# Patient Record
Sex: FEMALE | Race: WHITE | NOT HISPANIC OR LATINO | Employment: FULL TIME | URBAN - METROPOLITAN AREA
[De-identification: names, ages, dates, MRNs, and addresses within clinical notes are randomized per-mention and may not be internally consistent; named-entity substitution may affect disease eponyms.]

---

## 2019-05-24 ENCOUNTER — TRANSFERRED RECORDS (OUTPATIENT)
Dept: HEALTH INFORMATION MANAGEMENT | Facility: CLINIC | Age: 44
End: 2019-05-24

## 2020-12-10 ENCOUNTER — TRANSFERRED RECORDS (OUTPATIENT)
Dept: HEALTH INFORMATION MANAGEMENT | Facility: CLINIC | Age: 45
End: 2020-12-10

## 2023-05-09 ENCOUNTER — TRANSFERRED RECORDS (OUTPATIENT)
Dept: HEALTH INFORMATION MANAGEMENT | Facility: CLINIC | Age: 48
End: 2023-05-09
Payer: COMMERCIAL

## 2023-05-17 ENCOUNTER — TRANSFERRED RECORDS (OUTPATIENT)
Dept: HEALTH INFORMATION MANAGEMENT | Facility: CLINIC | Age: 48
End: 2023-05-17
Payer: COMMERCIAL

## 2023-05-18 ENCOUNTER — TRANSFERRED RECORDS (OUTPATIENT)
Dept: HEALTH INFORMATION MANAGEMENT | Facility: CLINIC | Age: 48
End: 2023-05-18

## 2023-05-25 ENCOUNTER — TELEPHONE (OUTPATIENT)
Dept: TRANSPLANT | Facility: CLINIC | Age: 48
End: 2023-05-25
Payer: COMMERCIAL

## 2023-05-25 ENCOUNTER — TRANSCRIBE ORDERS (OUTPATIENT)
Dept: OTHER | Age: 48
End: 2023-05-25

## 2023-05-25 DIAGNOSIS — C81.98 HODGKIN'S DISEASE OF LYMPH NODES OF MULTIPLE SITES (H): Primary | ICD-10-CM

## 2023-05-25 DIAGNOSIS — C81.90 HODGKIN'S LYMPHOMA (H): Primary | ICD-10-CM

## 2023-05-30 ENCOUNTER — PRE VISIT (OUTPATIENT)
Dept: ONCOLOGY | Facility: CLINIC | Age: 48
End: 2023-05-30
Payer: COMMERCIAL

## 2023-05-30 NOTE — TELEPHONE ENCOUNTER
RECORDS STATUS - ALL OTHER DIAGNOSIS    Dx: Hodgkins Lymphoma    RECORDS RECEIVED FROM:  [facility] ([facility]) , [facility]/ [facility]    DATE RECEIVED: TBD- NN WQ     Action    Action Taken 5/30/2023 9:18AM KEB     I called [facility] Ph:  [phone]- unavailable. I called again - spoke to Luiza in medical records. They don't need an STEFANIE form. Patient was last seen on 5/17/2023 and then not until 2019.     I called [facility] Ph: [phone], opt 2- they will push two scans from 5/9/2023 and fax the reports over soon.     I called [facility] [phone] - They will push related scans from 2015- 2023 5/30/2023 10:35AM KEB   I faxed [facility] image reports to HIM.     10:48AM KEB   I resolved scans from [facility] and [facility] in PACS    5/30/2023 3:11pm MARTIN   I faxed recs from [facility] to HIM. I also emailed the recs to the NN Team.       NOTES STATUS DETAILS   OFFICE NOTE from referring provider Complete  Ref by: Dr Reddy   OFFICE NOTE from medical oncologist     DISCHARGE SUMMARY from hospital     DISCHARGE REPORT from the ER     OPERATIVE REPORT     CLINICAL TRIAL TREATMENTS TO DATE     LABS     PATHOLOGY REPORTS External [facility] Biopsy Slides  5/18/2023- CE  A) LYMPH NODE, RIGHT CERVICAL, BIOPSY:   1.  Nodular sclerosis classic Hodgkin lymphoma (NSCHL)(WHO 2016)    3/29/2023  A) UTERUS WITH CERVIX, OVARIES  AND FALLOPIAN TUBES, TOTAL HYSTERECTOMY WITH BILATERAL SALPINGO-OOPHORECTOMY:   1. Cervix: No significant histologic abnormality     1/23/2023   A) ENDOMETRIUM, BIOPSY:   1. Disordered proliferative endometrium with breakdown   ANYTHING RELATED TO DIAGNOSIS     GENONOMIC TESTING     TYPE:     IMAGING (NEED IMAGES & REPORT)     CT SCANS Complete - [facility]     Complete - [facility]  5/9/2023     [facility]  CT Neck Soft Tissue 5/2/2023   MRI     MAMMO     ULTRASOUND Complete- [facility]  US Pelvis Complete 11/1/2022    US Abdomen Limited 8/14/2022   PET Complete - [facility]  5/9/2023

## 2023-05-31 ENCOUNTER — PATIENT OUTREACH (OUTPATIENT)
Dept: ONCOLOGY | Facility: CLINIC | Age: 48
End: 2023-05-31
Payer: COMMERCIAL

## 2023-05-31 NOTE — PROGRESS NOTES
[facility]: Cancer Care                                                                   Hem/Onc  Referral reviewed  Referred By 05/25/23 9453 Referred To   Call from patient Diagnoses: Hodgkin's lymphoma (H)   Order: Adult Oncology/Hematology  Referral    Dr Sandeep Harrell  [facility] / [facility]      Comment:    Dx: Hodgkins Lymphoma   Ref to: Dr Harrell   Ref by: Dr Reddy   Records: [facility], [facility]     ASSESSMENT      Clinical History (per Nurse review of records provided):    47 year old female patient with hx of stage IIa Hodgkin lymphoma diagnosed Nov 2008 (neck/axillary LAD), now with neck LN bx and PET scan confirming relapse.   Referred by Dr Reddy to Dr Harrell for salvage therapy recommendations    Home address:  [address] [place], [place] [address]    Records Location: [facility] , [facility] [email or web address], Care Everywhere - [facility]    Pertinent labs -- BOOKMARKED    May 18, 2023 Pathology report  -- BOOKMARKED  Final Diagnosis  A) LYMPH NODE, RIGHT CERVICAL, BIOPSY:   1.  Nodular sclerosis classic Hodgkin lymphoma (NSCHL)(WHO 2016)   2.  See comment    Electronically signed by Lilia Bhakta MD on 5/23/2023 at  8:56 AM         Pertinent imaging recent PET-CT May 2023 -- [facility]    Referring provider notes-- fax reviewed by NN, HIM scanning into media, last clinic note with Dr Reddy 5/17/23    INTERVENTION(S)                                                      -OUTGOING CALL to pt:  Introduced my role as nurse navigator with [facility] Hematology/Oncology dept and that we have recd the referral to Dr Richards from Dr Reddy  Identity verified. Pt confirms they are aware of the referral and ready to schedule consult   Explained that records team will contact pt directly if ROIs needed for records, imaging, slides will be requested on recent bx for path consult at [facility] as part of the 2nd opinion  consultation  Pt voiced understanding of above instructions and information and denied further questions and was provided my callback phone number and understands to expect a call from our scheduling team to arrange the consult with Dr Harrell on 6/5 afternoon.    -Referral Triage Scheduling Instructions added to Hem/Onc  referral for Patient Access rep    PLAN                                                      Consult on June 5 with Dr Sandeep Harrell    Records Needed: YES including hempath slides from 5/18/23 [facility] bx for path consult at [facility] and all prior PET-CT images from [facility], etc resolved to PACS (May 2023 PET-CT is resolved)      See records team's Pre-Visit encounter documentation for additional records retrieval information.    Jocelin Jordan, RN, BSN, OCN  Hematology/Oncology New Patient Nurse Navigator   [facility]  [phone]

## 2023-06-01 NOTE — TELEPHONE ENCOUNTER
RECORDS STATUS - ALL OTHER DIAGNOSIS      RECORDS RECEIVED FROM: [facility], [facility], [facility] (outside imaging previously resolved)   DATE RECEIVED:    NOTES STATUS DETAILS   OFFICE NOTE from referring provider [facility] Dr. Dar Reddy   OFFICE NOTE from medical oncologist [facility] Dr. Reddy: 23   DISCHARGE SUMMARY from hospital CE - [facility] 22   DISCHARGE REPORT from the ER CE - [facility] 22, 22, 22, 18, 16, 1/18/15, 14, 14, 12, 4/10/09, 09, 12/15/08   OPERATIVE REPORT CE - [facility] 23: Excisional Bx of Right Cervical Lymph Node  3/29/23 Hysterectomy  08: Excision Mass Neck   MEDICATION LIST CE [facility]   LABS     PATHOLOGY REPORTS [facility], Reports in CE, slides requested   [employer] 23: [id number]  08: [id number]   ANYTHING RELATED TO DIAGNOSIS Epic/CE 23

## 2023-06-02 PROBLEM — C81.98 HODGKIN'S DISEASE OF LYMPH NODES OF MULTIPLE SITES (H): Status: ACTIVE | Noted: 2023-06-02

## 2023-06-05 ENCOUNTER — PRE VISIT (OUTPATIENT)
Dept: ONCOLOGY | Facility: CLINIC | Age: 48
End: 2023-06-05
Payer: COMMERCIAL

## 2023-06-06 ENCOUNTER — CARE COORDINATION (OUTPATIENT)
Dept: TRANSPLANT | Facility: CLINIC | Age: 48
End: 2023-06-06
Payer: COMMERCIAL

## 2023-06-06 NOTE — PROGRESS NOTES
[facility] BMT and Cell Therapy Program  RN Coordinator Pre-Visit Documentation      Jennifer Ward is a 47 year old female who has been referred to the [facility] BMT and Cell Therapy Program for hematopoietic cell transplant or immune effector cell therapy.      Referring MD Name: Dar Reddy, telephone [phone]    Reason for referral: auto HSCT    Link to BMT & CT Program Algorithms      For allos only:    Previous HLA typing? N/A    Previous formal donor search? N/A    PRA needed? N/A    CMV IGG needed? N/A    and ABO needed? N/A    Potential family donors to type? Unknown    Need URD consents? N/A    For CAR-T Candidates Only:    Orders placed for virologies: N/A      All relevant clinical notes, labs, imaging, and pathology may be reviewed in Epic Bookmarks under name: Yoon James RN      Patient Care Team    No active team members.           Yoon James RN

## 2023-06-08 ENCOUNTER — OFFICE VISIT (OUTPATIENT)
Dept: ONCOLOGY | Facility: CLINIC | Age: 48
End: 2023-06-08
Attending: INTERNAL MEDICINE
Payer: COMMERCIAL

## 2023-06-08 ENCOUNTER — LAB (OUTPATIENT)
Dept: LAB | Facility: CLINIC | Age: 48
End: 2023-06-08
Attending: INTERNAL MEDICINE
Payer: COMMERCIAL

## 2023-06-08 ENCOUNTER — ALLIED HEALTH/NURSE VISIT (OUTPATIENT)
Dept: TRANSPLANT | Facility: CLINIC | Age: 48
End: 2023-06-08
Attending: INTERNAL MEDICINE
Payer: COMMERCIAL

## 2023-06-08 ENCOUNTER — MEDICAL CORRESPONDENCE (OUTPATIENT)
Dept: TRANSPLANT | Facility: CLINIC | Age: 48
End: 2023-06-08
Payer: COMMERCIAL

## 2023-06-08 VITALS
RESPIRATION RATE: 18 BRPM | HEART RATE: 60 BPM | BODY MASS INDEX: 38.41 KG/M2 | SYSTOLIC BLOOD PRESSURE: 109 MMHG | HEIGHT: 64 IN | WEIGHT: 225 LBS | DIASTOLIC BLOOD PRESSURE: 76 MMHG | TEMPERATURE: 98 F | OXYGEN SATURATION: 100 %

## 2023-06-08 DIAGNOSIS — C81.90 HODGKIN DISEASE (H): Primary | ICD-10-CM

## 2023-06-08 DIAGNOSIS — Z01.818 ENCOUNTER FOR PRE-TRANSPLANT EVALUATION FOR STEM CELL TRANSPLANT: ICD-10-CM

## 2023-06-08 DIAGNOSIS — Z71.9 VISIT FOR COUNSELING: Primary | ICD-10-CM

## 2023-06-08 DIAGNOSIS — C81.18 NODULAR SCLEROSIS HODGKIN LYMPHOMA OF LYMPH NODES OF MULTIPLE REGIONS (H): Primary | ICD-10-CM

## 2023-06-08 PROCEDURE — 88321 CONSLTJ&REPRT SLD PREP ELSWR: CPT | Performed by: PATHOLOGY

## 2023-06-08 PROCEDURE — 99213 OFFICE O/P EST LOW 20 MIN: CPT | Performed by: INTERNAL MEDICINE

## 2023-06-08 PROCEDURE — 99205 OFFICE O/P NEW HI 60 MIN: CPT | Performed by: INTERNAL MEDICINE

## 2023-06-08 RX ORDER — TOPIRAMATE 50 MG/1
1 TABLET, FILM COATED ORAL EVERY EVENING
COMMUNITY
Start: 2023-05-31 | End: 2024-02-10

## 2023-06-08 RX ORDER — NARATRIPTAN 2.5 MG/1
2.5 TABLET ORAL
COMMUNITY
Start: 2023-04-11

## 2023-06-08 RX ORDER — VENLAFAXINE HYDROCHLORIDE 150 MG/1
150 CAPSULE, EXTENDED RELEASE ORAL DAILY
Status: ON HOLD | COMMUNITY
Start: 2023-03-20 | End: 2023-10-26

## 2023-06-08 RX ORDER — PROPRANOLOL HYDROCHLORIDE 20 MG/1
20 TABLET ORAL 2 TIMES DAILY
COMMUNITY
Start: 2023-05-08

## 2023-06-08 ASSESSMENT — PAIN SCALES - GENERAL: PAINLEVEL: SEVERE PAIN (7)

## 2023-06-08 NOTE — PROGRESS NOTES
[facility]         BMT/CT NEW PATIENT REFERRAL        Jun 8, 2023   Subjective   REFERRAL SOURCE: Dar Reddy MD    REASON FOR VISIT: Auto consult for relapsed Hodgkin lymphoma    HISTORY OF PRESENT ILLNESS:  Ms. Jennifer Ward is a 47 year old female who presents for consultation regarding late relapse of Hodgkin lymphoma.     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 PET showed lymphoma limited to left cervical, supraclavicular, and axillary lymph nodes. BmBx negative.     Late relapse in May 2023 presenting with right neck swelling. Right cervical LN biopsy showed recurrent vs new primary cHL (nodular sclerosing subtype). PET with FDG-avid right level II and level V cervical lymph nodes only (SUVmax 13.2)    Date Treatment Name Response Side Effects / Toxicities   12/2008 to 5/2009 ABVD x 6 cycles followed by adjuvant radiation CR Fatigue, N/V              Jennifer is here today with her  Philip for consideration of autologous transplant. She is still shocked overall by her late relapse. She noticed the right neck swelling about a month ago and went to see her PCP immediately which led to the work-up that showed relapsed cHL. She notes some occasional night sweats but wasn't sure if it was due to her KALYN-BSO in March. Otherwise no fever/chills, other lumps/bumps, SOB, chest pain, N/V, abdominal pain, diarrhea, bleeding, swelling, or numbness/tingling. Energy and appetite are good.    PASTMEDICAL HISTORY:  1. Migraines  2. Umbilical hernia  3. Hysterectomy  4. Obesity    FAMILY HISTORY:  Father had non-Hodgkin lymphoma in his 40s.     SOCIAL HISTORY:  Never smoker. Rare alcohol use.   Lives in [place], [place] with her  Philip. Has a 20-year son and 2 grandkids.   Works as a .     Allergies   Allergen  "Reactions     Ceclor [Cefaclor]        Current Outpatient Medications:      naratriptan (AMERGE) 2.5 MG tablet, , Disp: , Rfl:      propranolol (INDERAL) 20 MG tablet, , Disp: , Rfl:      topiramate (TOPAMAX) 50 MG tablet, Take 1 tablet by mouth daily at 2 pm, Disp: , Rfl:      venlafaxine (EFFEXOR XR) 150 MG 24 hr capsule, , Disp: , Rfl:      REVIEW OF SYSTEMS:  12-point ROS reviewed and negative other than that mentioned in HPI.     Objective   VITAL SIGNS:  /76   Pulse 60   Temp 98  F (36.7  C) (Oral)   Resp 18   Ht 1.635 m (5' 4.37\")   Wt 102.1 kg (225 lb)   SpO2 100%   BMI 38.18 kg/m      ECOG PS: 0     PHYSICAL EXAM:  General: Awake, alert, in no acute distress. Oriented x 3.  HEENT: Normocephalic, atraumatic. No scleral icterus. Mucous membranes moist.   Lymph: Well-healing right neck incision. No cervical, supraclavicular, or axillary lymphadenopathy appreciated.   CV: Regular rate and rhythm. No murmurs, rubs, or gallops appreciated.  Resp: Good inspiratory effort, lungs clear to auscultation bilaterally.  GI: Abdomen soft, nontender, nondistended. Normal bowel sounds. No masses or organomegaly appreciated.   Ext: No peripheral edema bilaterally.  Neuro: CN II-XII grossly intact. Moves all extremities spontaneously.   Skin: No rash, unusual bruising or prominent lesions.  Psych: Pleasant, normal affect.    LABS:  Outside records reviewed.    IMAGIN23 CT Neck:  Increased size of multiple predominantly right-sided cervical chain lymph nodes. Finding raises suspicion for disease recurrence. Alternatively, given asymmetry findings may be reactive due to nonspecific infectious/inflammatory process, although this is felt less likely.     23 PET-CT:      PATHOLOGY:  23 Right cervical LN biopsy:  A) LYMPH NODE, RIGHT CERVICAL, BIOPSY:   1.  Nodular sclerosis classic Hodgkin lymphoma (NSCHL)(WHO 2016)   2.  See comment     The diagnosis is supported on by morphologic and immunostain " "studies.  It is noted that the patient's original diagnosis was I 2008 (~15 years ago).  As discussed with Dr. Reddy, although this may represent a \"late recurrence\", the possibility of a new primary lymphoma is deferred to clinical impression as it may guide therapy decisions.     Assessment & Plan   #Recurrent classic Hodgkin lymphoma    Pleasant 46 yo female with history of stage II classic Hodgkin lymphoma in 2008 treated with ABVD x 6 cycles plus adjuvant radiation with CR. Now has recurrent early stage disease involving right neck lymph nodes almost 15 years out, unclear if relapse of same clone vs new primary cHL. However, we discussed that given second occurrence of Hodgkin lymphoma, we would recommend salvage therapy followed by consolidative autologous stem cell transplant as risk of relapse may be high without consolidation.     For salvage therapy, I recommend novel therapy with regimen of brentuximab plus nivolumab rather than traditional chemotherapy to try to avoid excess toxicity prior to stem cell transplant. She did have a lot of conventional toxicity with ABVD the first time. The phase 2 study of BV-nivo demonstrated [place] of 85% and CR of 67%. Patients who proceeded directly to transplant had 3-year PFS of 91% (https://doi.org/10.1182/blood.[email or web address]). Emerging retrospective data also show that receipt of immune checkpoint inhibitor prior to ASCT is associated with favorable transplant outcomes. I recommend restaging PET after 2 cycles of BV-nivo -  if in CR, she can proceed to transplant; if in TN, may need 1-2 additional cycles of BV-nivo.    We reviewed the transplant process, including the work-up week of testing, stem cell collection process using G-CSF priming and possibly plerixafor, and admission to the hospital for high-dose chemo and stem cell rescue. We discussed the BEAM conditioning regimen for non-Hodgkin lymphoma at the [facility] which consists of BCNU/carmustine 300 " mg/m  on day -6, etoposide 100 mg/m2 BID and cytarabine 100 mg/m2 BID on days -5 to -2, melphalan 140 mg/m2 on day -1, and then stem cell infusion at day 0. We discussed the hospital discharge to BMT clinic with frequent visits post transplant with preventative antibiotics, growth factor support, and possible transfusions until count recovery through day +30. We reviewed possible side effects of conditioning including fatigue, mucositis, N/V, diarrhea, organ dysfunction, infections, bleeding, and 2-5% risk of secondary cancers including MDS and leukemia.     We also discussed a new Phase III randomized clinical trial here (LY5216-60, E-Celerate study) that she may be eligible for, examining the use of AB-205 (genetically engineered allogenic endothelial cells) vs placebo 2-4 hours following stem cell infusion to potentially reduce the incidence of GI side effects associated with BEAM. Palifermin is no longer available as standard-of-care for mucositis prevention. She seemed interested and was provided a copy of the consent form for her review; will discuss again at close visit.       PLAN:  - Recommend starting brentuximab-nivolumab for salvage therapy  - Repeat PET after 2 cycles of BV-nivo; if in CR, will bring for auto work-up    Total of 60 minutes on patient visit, reviewing records, interpreting test results, placing orders, and documentation on the day of service.    Tammy Gonzalez MD  Attending Physician, [facility]

## 2023-06-08 NOTE — PROGRESS NOTES
Blood and Marrow Transplant   New Transplant Visit with   Clinical     Assessment completed on 6/8/23 in the BMT clinic. Information for this assessment was provided by pt and pt's spouse's report, consultation with medical team, and medical chart review.      Present:  Patient: Jennifer Ward  Spouse: Philip Ward  : DUONG Baker, [facility]    Medical Team   Nurse Coordinator: Yoon James RN  BMT Physician: Tammy Gonzalez MD  Referring Physician: Dar Reddy MD    Diagnosis: Hodgkin's Lymphoma  Diagnosis Date: 2008; recurrence 4/2023    Presenting Information:  Pt is a 47 year old female diagnosed with Hodgkin's Lymphoma . Pt was diagnosed on 2008 and relapsed 4/2023. Pt presents for an autologous stem cell transplant discussion.    Contact Information:  Cell Phone: [phone]  Pt email: [email or web address]    Special Needs:   Pt shared that she is feeling grief and anger associated w/ relapse of disease. She also processed anxiety associated w/ her grandchildren being fearful of her hair loss during the BMT process. SW discussed children's resources through the [employer]. Pt identified feeling vulnerable w/ relapsed disease processing that the first time she was diagnosed she was single w/ limited support and no health insurance at the time. Pt identifies are large support system at this time. Pt's spouse presents engaged and supportive.    Relocation Requirement:   Pt lives in [place], [place] (approximately [place] minutes from [facility]). Pt will need to relocate and will need local lodging. SW discussed relocation and explained in detail the different lodging options. Pt and spouse are in agreement with relocating. Pt is interested in staying at the [facility] post-BMT. She is processing what that may look like with multiple caregivers.    Living Situation:   Pt & spouse live in [place], [place]. Pt's son Oscar (21 y/o) lives there as well as spouse's son's (Mathew - 12 y/o & Daniel - 18  y/o) every other week.    Family Information:   Spouse: Philip Ward -  7 yrs in 2023.   Parents: Mother - Ba Dalton; Father   Siblings: 1 brother - Ramo (lives in [place])  Children: Pt has 2 adult children - Carol (26 y/o;  Yobani) & Oscar (21 y/o); Pt's spouse has 4 children - Mykel (wife Lauren), Filipe, Daniel, & Mathew.    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 No insurance concerns identified at this time. KEE provided information regarding the insurance authorization process and the role of the B[employer] Financial . KEE provided contact info for the [employer]  and referred pt to them for future insurance questions.     Finances:   Pt's source of income is payroll. No financial concerns identified at this time. KEE discussed willem options and asked pt to let KEE know if they would like to apply in the future.     Caregiver:   KEE discussed with the patient and spouse the caregiver role and expectation at length. Pt is agreeable to having a full time caregiver for the minimum of 30 days until cleared by the BMT Physician. Pt's caregiver plan is to be determined at this time. Caregiver education and information provided. No caregiver concerns identified.     Healthcare Directive:  No. KEE provided education and forms. KEE encouraged pt to have discussions with their family regarding their health care wishes.  In the absence of a healthcare document, KEE discussed the [place] Policy on who would make decisions on her behalf if she did not have the capacity to make healthcare decisions.    Resources Provided:  -BMT Information Book  -BMT Resources Packet  -Healthcare Directive  -Honoring Choices - Your Rights: Making Your Own Health Care Treatment  Decisions  -Caregiver Contract/Description  -Transplant Unit Description and Information   -Lodging Resources    Identified Concerns:  No concerns identified at this time.     Summary:  Pt presents to [facility] regarding an autologous stem cell transplant. Pt and pt's spouse asked good/appropriate questions regarding psychosocial factors related to BMT; all questions were addressed. Pt presented as pleasant and engaged. Pt's affect was appropriate. Pt shared that she is feeling grief and anger associated w/ relapse of disease. She also processed anxiety associated w/ her grandchildren being fearful of her hair loss during the BMT process. SW discussed children's resources through the [employer]. Pt identified feeling vulnerable w/ relapsed disease processing that the first time she was diagnosed she was single w/ limited support and no health insurance at the time. Pt identifies are large support system at this time. Pt's spouse presents engaged and supportive. Pt will need to relocate for BMT and is still determining her caregiver plan.    Plan:   SW provided contact information and encouraged pt to contact SW with any additional questions, concerns, resources and/or for support. SW will continue to follow pt to provide support and guidance with resources as needed.     DUONG Baker, [facility]  Adult Blood & Marrow Transplant   Phone: [phone]  Pager: [phone]

## 2023-06-08 NOTE — LETTER
6/8/2023         RE: Jennifer Ward  [address] [facility] [place] [address]        Dear Colleague,    Thank you for referring your patient, Jennifer Ward, to the [facility]. Please see a copy of my visit note below.                                     [facility]         BMT/CT NEW PATIENT REFERRAL        Jun 8, 2023   Subjective   REFERRAL SOURCE: Dar Reddy MD    REASON FOR VISIT: Auto consult for relapsed Hodgkin lymphoma    HISTORY OF PRESENT ILLNESS:  Ms. Jennifer Ward is a 47 year old female who presents for consultation regarding late relapse of Hodgkin lymphoma.     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 PET showed lymphoma limited to left cervical, supraclavicular, and axillary lymph nodes. BmBx negative.     Late relapse in May 2023 presenting with right neck swelling. Right cervical LN biopsy showed recurrent vs new primary cHL (nodular sclerosing subtype). PET with FDG-avid right level II and level V cervical lymph nodes only (SUVmax 13.2)    Date Treatment Name Response Side Effects / Toxicities   12/2008 to 5/2009 ABVD x 6 cycles followed by adjuvant radiation CR Fatigue, N/V              Jennifer is here today with her  Philip for consideration of autologous transplant. She is still shocked overall by her late relapse. She noticed the right neck swelling about a month ago and went to see her PCP immediately which led to the work-up that showed relapsed cHL. She notes some occasional night sweats but wasn't sure if it was due to her KALYN-BSO in March. Otherwise no fever/chills, other lumps/bumps, SOB, chest pain, N/V, abdominal pain, diarrhea, bleeding, swelling, or numbness/tingling. Energy and appetite are good.    PASTMEDICAL HISTORY:  Migraines  Umbilical hernia  Hysterectomy  Obesity    FAMILY  "HISTORY:  Father had non-Hodgkin lymphoma in his 40s.     SOCIAL HISTORY:  Never smoker. Rare alcohol use.   Lives in [place], [place] with her  Philip. Has a 20-year son and 2 grandkids.   Works as a .     Allergies   Allergen Reactions    Ceclor [Cefaclor]        Current Outpatient Medications:     naratriptan (AMERGE) 2.5 MG tablet, , Disp: , Rfl:     propranolol (INDERAL) 20 MG tablet, , Disp: , Rfl:     topiramate (TOPAMAX) 50 MG tablet, Take 1 tablet by mouth daily at 2 pm, Disp: , Rfl:     venlafaxine (EFFEXOR XR) 150 MG 24 hr capsule, , Disp: , Rfl:      REVIEW OF SYSTEMS:  12-point ROS reviewed and negative other than that mentioned in HPI.     Objective   VITAL SIGNS:  /76   Pulse 60   Temp 98  F (36.7  C) (Oral)   Resp 18   Ht 1.635 m (5' 4.37\")   Wt 102.1 kg (225 lb)   SpO2 100%   BMI 38.18 kg/m      ECOG PS: 0     PHYSICAL EXAM:  General: Awake, alert, in no acute distress. Oriented x 3.  HEENT: Normocephalic, atraumatic. No scleral icterus. Mucous membranes moist.   Lymph: Well-healing right neck incision. No cervical, supraclavicular, or axillary lymphadenopathy appreciated.   CV: Regular rate and rhythm. No murmurs, rubs, or gallops appreciated.  Resp: Good inspiratory effort, lungs clear to auscultation bilaterally.  GI: Abdomen soft, nontender, nondistended. Normal bowel sounds. No masses or organomegaly appreciated.   Ext: No peripheral edema bilaterally.  Neuro: CN II-XII grossly intact. Moves all extremities spontaneously.   Skin: No rash, unusual bruising or prominent lesions.  Psych: Pleasant, normal affect.    LABS:  Outside records reviewed.    IMAGIN23 CT Neck:  Increased size of multiple predominantly right-sided cervical chain lymph nodes. Finding raises suspicion for disease recurrence. Alternatively, given asymmetry findings may be reactive due to nonspecific infectious/inflammatory process, although this is felt less likely.     23 " "PET-CT:      PATHOLOGY:  5/18/23 Right cervical LN biopsy:  A) LYMPH NODE, RIGHT CERVICAL, BIOPSY:   1.  Nodular sclerosis classic Hodgkin lymphoma (NSCHL)(WHO 2016)   2.  See comment       The diagnosis is supported on by morphologic and immunostain studies.  It is noted that the patient's original diagnosis was I 2008 (~15 years ago).  As discussed with Dr. Reddy, although this may represent a \"late recurrence\", the possibility of a new primary lymphoma is deferred to clinical impression as it may guide therapy decisions.     Assessment & Plan   #Recurrent classic Hodgkin lymphoma    Pleasant 48 yo female with history of stage II classic Hodgkin lymphoma in 2008 treated with ABVD x 6 cycles plus adjuvant radiation with CR. Now has recurrent early stage disease involving right neck lymph nodes almost 15 years out, unclear if relapse of same clone vs new primary cHL. However, we discussed that given second occurrence of Hodgkin lymphoma, we would recommend salvage therapy followed by consolidative autologous stem cell transplant as risk of relapse may be high without consolidation.     For salvage therapy, I recommend novel therapy with regimen of brentuximab plus nivolumab rather than traditional chemotherapy to try to avoid excess toxicity prior to stem cell transplant. She did have a lot of conventional toxicity with ABVD the first time. The phase 2 study of BV-nivo demonstrated [place] of 85% and CR of 67%. Patients who proceeded directly to transplant had 3-year PFS of 91% (https://doi.org/10.1182/[email or web address]). Emerging retrospective data also show that receipt of immune checkpoint inhibitor prior to ASCT is associated with favorable transplant outcomes. I recommend restaging PET after 2 cycles of BV-nivo -  if in CR, she can proceed to transplant; if in NC, may need 1-2 additional cycles of BV-nivo.    We reviewed the transplant process, including the work-up week of testing, stem cell collection process using " G-CSF priming and possibly plerixafor, and admission to the hospital for high-dose chemo and stem cell rescue. We discussed the BEAM conditioning regimen for non-Hodgkin lymphoma at the [facility] which consists of BCNU/carmustine 300 mg/m  on day -6, etoposide 100 mg/m2 BID and cytarabine 100 mg/m2 BID on days -5 to -2, melphalan 140 mg/m2 on day -1, and then stem cell infusion at day 0. We discussed the hospital discharge to BMT clinic with frequent visits post transplant with preventative antibiotics, growth factor support, and possible transfusions until count recovery through day +30. We reviewed possible side effects of conditioning including fatigue, mucositis, N/V, diarrhea, organ dysfunction, infections, bleeding, and 2-5% risk of secondary cancers including MDS and leukemia.     We also discussed a new Phase III randomized clinical trial here (OD8302-44, E-Celerate study) that she may be eligible for, examining the use of AB-205 (genetically engineered allogenic endothelial cells) vs placebo 2-4 hours following stem cell infusion to potentially reduce the incidence of GI side effects associated with BEAM. Palifermin is no longer available as standard-of-care for mucositis prevention. She seemed interested and was provided a copy of the consent form for her review; will discuss again at close visit.       PLAN:  - Recommend starting brentuximab-nivolumab for salvage therapy  - Repeat PET after 2 cycles of BV-nivo; if in CR, will bring for auto work-up    Total of 60 minutes on patient visit, reviewing records, interpreting test results, placing orders, and documentation on the day of service.    Tammy Gonzalez MD  Attending Physician, [facility]

## 2023-06-08 NOTE — NURSING NOTE
"Oncology Rooming Note    June 8, 2023 12:14 PM   Jennifer Ward is a 47 year old female who presents for:    Chief Complaint   Patient presents with     Oncology Clinic Visit     Hodgkin's lymphoma     Initial Vitals: /76   Pulse 60   Temp 98  F (36.7  C) (Oral)   Resp 18   Ht 1.635 m (5' 4.37\")   Wt 102.1 kg (225 lb)   SpO2 100%   BMI 38.18 kg/m   Estimated body mass index is 38.18 kg/m  as calculated from the following:    Height as of this encounter: 1.635 m (5' 4.37\").    Weight as of this encounter: 102.1 kg (225 lb). Body surface area is 2.15 meters squared.  Severe Pain (7) Comment: Data Unavailable   No LMP recorded. Patient has had a hysterectomy.  Allergies reviewed: Yes  Medications reviewed: Yes    Medications: Medication refills not needed today.  Pharmacy name entered into [employer]: [id number] - [address] [place] AT [facility] [address]    Clinical concerns:        Mikayla Platt CMA              "

## 2023-06-13 LAB
PATH REPORT.COMMENTS IMP SPEC: ABNORMAL
PATH REPORT.COMMENTS IMP SPEC: YES
PATH REPORT.FINAL DX SPEC: ABNORMAL
PATH REPORT.GROSS SPEC: ABNORMAL
PATH REPORT.MICROSCOPIC SPEC OTHER STN: ABNORMAL
PATH REPORT.RELEVANT HX SPEC: ABNORMAL
PATH REPORT.RELEVANT HX SPEC: ABNORMAL
PATH REPORT.SITE OF ORIGIN SPEC: ABNORMAL

## 2023-06-14 ENCOUNTER — TELEPHONE (OUTPATIENT)
Dept: TRANSPLANT | Facility: CLINIC | Age: 48
End: 2023-06-14
Payer: COMMERCIAL

## 2023-06-14 NOTE — TELEPHONE ENCOUNTER
BMT CSW Telephone Encounter  Clinical Social Work  [facility]      Focus: Supportive Counseling /Resources    Data: Pt is a 47 year old female diagnosed with Hodgkin's Lymphoma . Pt was diagnosed on 2008 and relapsed 4/2023. Pt had a BMT NT w/ Dr. Gonzalez on 6/8/23 to discuss autologous stem cell transplant.     Pt contacted SW stating her insurance is asking her a specific question about the [facility] BMT center and asking if it is a part of the  [facility].  Per Pt, this would greatly impact whether or not she has good coverage and if she would receive relocation/travel benefits. SW sent this question to the BMT Financial  team requesting follow-up w/ Pt's insurance and to Pt to clarify this and her benefits for BMT.     Interventions: KEE sent this question to the BMT Financial  team requesting follow-up w/ Pt's insurance and to Pt to clarify this and her benefits for BMT. SW encouraged Pt to contact CSW for support, questions and/or resources.    Plan: CSW will continue to work with Pt and family to provide supportive counseling and assist with resources as needed. CSW will continue to collaborate with multidisciplinary team regarding Pt's plan of care.     DUONG Baker, [facility]  Adult Blood & Marrow Transplant   Phone: [phone]  Pager: [phone]

## 2023-07-16 ENCOUNTER — HEALTH MAINTENANCE LETTER (OUTPATIENT)
Age: 48
End: 2023-07-16

## 2023-09-12 ENCOUNTER — TRANSFERRED RECORDS (OUTPATIENT)
Dept: HEALTH INFORMATION MANAGEMENT | Facility: CLINIC | Age: 48
End: 2023-09-12
Payer: COMMERCIAL

## 2023-09-13 DIAGNOSIS — C81.98 HODGKIN'S DISEASE OF LYMPH NODES OF MULTIPLE SITES (H): ICD-10-CM

## 2023-09-13 DIAGNOSIS — Z11.59 SCREENING FOR VIRAL DISEASE: ICD-10-CM

## 2023-09-13 DIAGNOSIS — Z01.818 PREOP EXAMINATION: ICD-10-CM

## 2023-09-13 DIAGNOSIS — C81.90 HODGKIN DISEASE (H): ICD-10-CM

## 2023-09-13 DIAGNOSIS — C81.98 HODGKIN'S DISEASE OF LYMPH NODES OF MULTIPLE SITES (H): Primary | ICD-10-CM

## 2023-09-13 DIAGNOSIS — Z86.2 PERSONAL HISTORY OF DISEASES OF BLOOD AND BLOOD-FORMING ORGANS: ICD-10-CM

## 2023-09-13 LAB
BMT WORKUP IRRADIATED BLOOD REQUIRED: NORMAL
SPECIMEN EXPIRATION DATE: NORMAL

## 2023-09-15 ENCOUNTER — TELEPHONE (OUTPATIENT)
Dept: TRANSPLANT | Facility: CLINIC | Age: 48
End: 2023-09-15
Payer: COMMERCIAL

## 2023-09-18 ENCOUNTER — HOSPITAL ENCOUNTER (INPATIENT)
Dept: GENERAL RADIOLOGY | Facility: CLINIC | Age: 48
Discharge: HOME | End: 2023-09-18
Attending: INTERNAL MEDICINE
Payer: COMMERCIAL

## 2023-09-18 PROCEDURE — 78815 PET IMAGE W/CT SKULL-THIGH: CPT | Mod: 26 | Performed by: RADIOLOGY

## 2023-09-19 ENCOUNTER — LAB (OUTPATIENT)
Dept: LAB | Facility: CLINIC | Age: 48
End: 2023-09-19
Payer: COMMERCIAL

## 2023-09-19 DIAGNOSIS — C81.98 HODGKIN'S DISEASE OF LYMPH NODES OF MULTIPLE SITES (H): Primary | ICD-10-CM

## 2023-09-19 LAB
ABO/RH(D): NORMAL
ANTIBODY SCREEN: NEGATIVE
SPECIMEN EXPIRATION DATE: NORMAL

## 2023-09-19 RX ORDER — ALPRAZOLAM 0.25 MG
0.25 TABLET ORAL 3 TIMES DAILY PRN
COMMUNITY
End: 2023-09-21

## 2023-09-19 RX ORDER — BENZOCAINE/MENTHOL 6 MG-10 MG
LOZENGE MUCOUS MEMBRANE 3 TIMES DAILY
Status: ON HOLD | COMMUNITY
Start: 2023-06-29 | End: 2023-10-16

## 2023-09-19 RX ORDER — IBUPROFEN 800 MG/1
800 TABLET, FILM COATED ORAL EVERY 6 HOURS PRN
COMMUNITY
Start: 2023-03-29 | End: 2023-09-21

## 2023-09-19 RX ORDER — KETOROLAC TROMETHAMINE 10 MG/1
10 TABLET, FILM COATED ORAL EVERY 6 HOURS PRN
COMMUNITY
End: 2023-09-21

## 2023-09-19 RX ORDER — BUPROPION HYDROCHLORIDE 150 MG/1
150 TABLET ORAL EVERY MORNING
COMMUNITY
Start: 2021-07-15 | End: 2024-02-10

## 2023-09-19 RX ORDER — LEVOTHYROXINE SODIUM 75 UG/1
1 TABLET ORAL EVERY MORNING
COMMUNITY
Start: 2023-08-27 | End: 2024-02-10

## 2023-09-20 ENCOUNTER — MEDICAL CORRESPONDENCE (OUTPATIENT)
Dept: TRANSPLANT | Facility: CLINIC | Age: 48
End: 2023-09-20

## 2023-09-20 ENCOUNTER — ONCOLOGY VISIT (OUTPATIENT)
Dept: TRANSPLANT | Facility: CLINIC | Age: 48
End: 2023-09-20
Attending: INTERNAL MEDICINE
Payer: COMMERCIAL

## 2023-09-20 ENCOUNTER — OFFICE VISIT (OUTPATIENT)
Dept: PULMONOLOGY | Facility: CLINIC | Age: 48
End: 2023-09-20
Payer: COMMERCIAL

## 2023-09-20 VITALS
TEMPERATURE: 94.6 F | BODY MASS INDEX: 36.82 KG/M2 | DIASTOLIC BLOOD PRESSURE: 75 MMHG | HEIGHT: 65 IN | HEART RATE: 88 BPM | WEIGHT: 221 LBS | SYSTOLIC BLOOD PRESSURE: 120 MMHG | OXYGEN SATURATION: 100 % | RESPIRATION RATE: 16 BRPM

## 2023-09-20 DIAGNOSIS — C81.98 HODGKIN LYMPHOMA OF LYMPH NODES OF MULTIPLE REGIONS, UNSPECIFIED HODGKIN LYMPHOMA TYPE (H): ICD-10-CM

## 2023-09-20 DIAGNOSIS — C81.98 HODGKIN'S DISEASE OF LYMPH NODES OF MULTIPLE SITES (H): ICD-10-CM

## 2023-09-20 DIAGNOSIS — Z11.59 SCREENING FOR VIRAL DISEASE: ICD-10-CM

## 2023-09-20 DIAGNOSIS — Z86.2 PERSONAL HISTORY OF DISEASES OF BLOOD AND BLOOD-FORMING ORGANS: ICD-10-CM

## 2023-09-20 DIAGNOSIS — Z01.818 PREOP EXAMINATION: ICD-10-CM

## 2023-09-20 DIAGNOSIS — C81.98 HODGKIN LYMPHOMA OF LYMPH NODES OF MULTIPLE REGIONS, UNSPECIFIED HODGKIN LYMPHOMA TYPE (H): Primary | ICD-10-CM

## 2023-09-20 DIAGNOSIS — C81.28 MIXED CELLULARITY HODGKIN LYMPHOMA OF LYMPH NODES OF MULTIPLE REGIONS (H): ICD-10-CM

## 2023-09-20 LAB
ALBUMIN SERPL BCG-MCNC: 4.4 G/DL (ref 3.5–5.2)
ALBUMIN UR-MCNC: NEGATIVE MG/DL
ALP SERPL-CCNC: 69 U/L (ref 35–104)
ALT SERPL W P-5'-P-CCNC: 11 U/L (ref 0–50)
ANION GAP SERPL CALCULATED.3IONS-SCNC: 10 MMOL/L (ref 7–15)
APPEARANCE UR: ABNORMAL
APTT PPP: 31 SECONDS (ref 22–38)
AST SERPL W P-5'-P-CCNC: 14 U/L (ref 0–45)
B2 MICROGLOB TUMOR MARKER SER-MCNC: 2.2 MG/L
BACTERIA #/AREA URNS HPF: ABNORMAL /HPF
BASOPHILS # BLD AUTO: 0.1 10E3/UL (ref 0–0.2)
BASOPHILS NFR BLD AUTO: 1 %
BILIRUB SERPL-MCNC: 0.5 MG/DL
BILIRUB UR QL STRIP: NEGATIVE
BUN SERPL-MCNC: 21.3 MG/DL (ref 6–20)
CALCIUM SERPL-MCNC: 9.5 MG/DL (ref 8.6–10)
CHLORIDE SERPL-SCNC: 105 MMOL/L (ref 98–107)
COLOR UR AUTO: YELLOW
CREAT SERPL-MCNC: 0.99 MG/DL (ref 0.51–0.95)
DEPRECATED HCO3 PLAS-SCNC: 24 MMOL/L (ref 22–29)
EBV VCA IGG SER IA-ACNC: 149 U/ML
EBV VCA IGG SER IA-ACNC: POSITIVE
EGFRCR SERPLBLD CKD-EPI 2021: 70 ML/MIN/1.73M2
EOSINOPHIL # BLD AUTO: 0.3 10E3/UL (ref 0–0.7)
EOSINOPHIL NFR BLD AUTO: 6 %
ERYTHROCYTE [DISTWIDTH] IN BLOOD BY AUTOMATED COUNT: 13.2 % (ref 10–15)
GLUCOSE SERPL-MCNC: 93 MG/DL (ref 70–99)
GLUCOSE UR STRIP-MCNC: NEGATIVE MG/DL
HCG SERPL QL: NEGATIVE
HCT VFR BLD AUTO: 35.1 % (ref 35–47)
HGB BLD-MCNC: 11.4 G/DL (ref 11.7–15.7)
HGB UR QL STRIP: NEGATIVE
HOLD SPECIMEN: NORMAL
HSV1 IGG SERPL QL IA: 0.64 INDEX
HSV1 IGG SERPL QL IA: ABNORMAL
HSV2 IGG SERPL QL IA: 17.9 INDEX
HSV2 IGG SERPL QL IA: ABNORMAL
HYALINE CASTS: 9 /LPF
IGA SERPL-MCNC: 115 MG/DL (ref 84–499)
IGG SERPL-MCNC: 1044 MG/DL (ref 610–1616)
IGM SERPL-MCNC: 145 MG/DL (ref 35–242)
IMM GRANULOCYTES # BLD: 0 10E3/UL
IMM GRANULOCYTES NFR BLD: 0 %
INR PPP: 1.1 (ref 0.85–1.15)
KETONES UR STRIP-MCNC: NEGATIVE MG/DL
LDH SERPL L TO P-CCNC: 174 U/L (ref 0–250)
LEUKOCYTE ESTERASE UR QL STRIP: ABNORMAL
LYMPHOCYTES # BLD AUTO: 1.2 10E3/UL (ref 0.8–5.3)
LYMPHOCYTES NFR BLD AUTO: 29 %
MAGNESIUM SERPL-MCNC: 1.9 MG/DL (ref 1.7–2.3)
MCH RBC QN AUTO: 27.9 PG (ref 26.5–33)
MCHC RBC AUTO-ENTMCNC: 32.5 G/DL (ref 31.5–36.5)
MCV RBC AUTO: 86 FL (ref 78–100)
MONOCYTES # BLD AUTO: 0.3 10E3/UL (ref 0–1.3)
MONOCYTES NFR BLD AUTO: 7 %
MUCOUS THREADS #/AREA URNS LPF: PRESENT /LPF
NEUTROPHILS # BLD AUTO: 2.4 10E3/UL (ref 1.6–8.3)
NEUTROPHILS NFR BLD AUTO: 57 %
NITRATE UR QL: NEGATIVE
NRBC # BLD AUTO: 0 10E3/UL
NRBC BLD AUTO-RTO: 0 /100
PH UR STRIP: 6 [PH] (ref 5–7)
PHOSPHATE SERPL-MCNC: 3.4 MG/DL (ref 2.5–4.5)
PLATELET # BLD AUTO: 220 10E3/UL (ref 150–450)
POTASSIUM SERPL-SCNC: 4.2 MMOL/L (ref 3.4–5.3)
PROT SERPL-MCNC: 7.1 G/DL (ref 6.4–8.3)
RBC # BLD AUTO: 4.08 10E6/UL (ref 3.8–5.2)
RBC URINE: 1 /HPF
SODIUM SERPL-SCNC: 139 MMOL/L (ref 136–145)
SP GR UR STRIP: 1.03 (ref 1–1.03)
SQUAMOUS EPITHELIAL: 14 /HPF
T3FREE SERPL-MCNC: 2.8 PG/ML (ref 2–4.4)
T4 FREE SERPL-MCNC: 1.24 NG/DL (ref 0.9–1.7)
TOTAL PROTEIN SERUM FOR ELP: 6.6 G/DL (ref 6.4–8.3)
TSH SERPL DL<=0.005 MIU/L-ACNC: 0.79 UIU/ML (ref 0.3–4.2)
URATE SERPL-MCNC: 5.1 MG/DL (ref 2.4–5.7)
UROBILINOGEN UR STRIP-MCNC: NORMAL MG/DL
VZV IGG SER QL IA: 743.2 INDEX
VZV IGG SER QL IA: POSITIVE
WBC # BLD AUTO: 4.3 10E3/UL (ref 4–11)
WBC URINE: 5 /HPF

## 2023-09-20 PROCEDURE — 87535 HIV-1 PROBE&REVERSE TRNSCRPJ: CPT

## 2023-09-20 PROCEDURE — 84443 ASSAY THYROID STIM HORMONE: CPT

## 2023-09-20 PROCEDURE — 86696 HERPES SIMPLEX TYPE 2 TEST: CPT

## 2023-09-20 PROCEDURE — 84439 ASSAY OF FREE THYROXINE: CPT

## 2023-09-20 PROCEDURE — 86687 HTLV-I ANTIBODY: CPT

## 2023-09-20 PROCEDURE — 83615 LACTATE (LD) (LDH) ENZYME: CPT

## 2023-09-20 PROCEDURE — 83735 ASSAY OF MAGNESIUM: CPT

## 2023-09-20 PROCEDURE — 84550 ASSAY OF BLOOD/URIC ACID: CPT

## 2023-09-20 PROCEDURE — 86665 EPSTEIN-BARR CAPSID VCA: CPT

## 2023-09-20 PROCEDURE — 86703 HIV-1/HIV-2 1 RESULT ANTBDY: CPT

## 2023-09-20 PROCEDURE — 87086 URINE CULTURE/COLONY COUNT: CPT

## 2023-09-20 PROCEDURE — 93005 ELECTROCARDIOGRAM TRACING: CPT

## 2023-09-20 PROCEDURE — 86787 VARICELLA-ZOSTER ANTIBODY: CPT

## 2023-09-20 PROCEDURE — 36591 DRAW BLOOD OFF VENOUS DEVICE: CPT

## 2023-09-20 PROCEDURE — 94375 RESPIRATORY FLOW VOLUME LOOP: CPT | Performed by: INTERNAL MEDICINE

## 2023-09-20 PROCEDURE — 84165 PROTEIN E-PHORESIS SERUM: CPT | Mod: TC | Performed by: PATHOLOGY

## 2023-09-20 PROCEDURE — 93010 ELECTROCARDIOGRAM REPORT: CPT | Performed by: INTERNAL MEDICINE

## 2023-09-20 PROCEDURE — 87521 HEPATITIS C PROBE&RVRS TRNSC: CPT

## 2023-09-20 PROCEDURE — 86777 TOXOPLASMA ANTIBODY: CPT

## 2023-09-20 PROCEDURE — 250N000011 HC RX IP 250 OP 636: Mod: JZ | Performed by: INTERNAL MEDICINE

## 2023-09-20 PROCEDURE — 86850 RBC ANTIBODY SCREEN: CPT

## 2023-09-20 PROCEDURE — 85730 THROMBOPLASTIN TIME PARTIAL: CPT

## 2023-09-20 PROCEDURE — 82232 ASSAY OF BETA-2 PROTEIN: CPT

## 2023-09-20 PROCEDURE — 84703 CHORIONIC GONADOTROPIN ASSAY: CPT

## 2023-09-20 PROCEDURE — 88240 CELL CRYOPRESERVE/STORAGE: CPT

## 2023-09-20 PROCEDURE — 94729 DIFFUSING CAPACITY: CPT | Performed by: INTERNAL MEDICINE

## 2023-09-20 PROCEDURE — 84100 ASSAY OF PHOSPHORUS: CPT

## 2023-09-20 PROCEDURE — 82784 ASSAY IGA/IGD/IGG/IGM EACH: CPT

## 2023-09-20 PROCEDURE — 84155 ASSAY OF PROTEIN SERUM: CPT | Mod: 91

## 2023-09-20 PROCEDURE — 85025 COMPLETE CBC W/AUTO DIFF WBC: CPT

## 2023-09-20 PROCEDURE — 83021 HEMOGLOBIN CHROMOTOGRAPHY: CPT

## 2023-09-20 PROCEDURE — 80053 COMPREHEN METABOLIC PANEL: CPT

## 2023-09-20 PROCEDURE — 84481 FREE ASSAY (FT-3): CPT

## 2023-09-20 PROCEDURE — 81001 URINALYSIS AUTO W/SCOPE: CPT

## 2023-09-20 PROCEDURE — 86780 TREPONEMA PALLIDUM: CPT

## 2023-09-20 PROCEDURE — 94726 PLETHYSMOGRAPHY LUNG VOLUMES: CPT | Performed by: INTERNAL MEDICINE

## 2023-09-20 PROCEDURE — 85610 PROTHROMBIN TIME: CPT

## 2023-09-20 PROCEDURE — 99215 OFFICE O/P EST HI 40 MIN: CPT

## 2023-09-20 RX ORDER — HEPARIN SODIUM (PORCINE) LOCK FLUSH IV SOLN 100 UNIT/ML 100 UNIT/ML
5 SOLUTION INTRAVENOUS EVERY 8 HOURS
Status: DISCONTINUED | OUTPATIENT
Start: 2023-09-20 | End: 2023-09-28 | Stop reason: HOSPADM

## 2023-09-20 RX ADMIN — Medication 5 ML: at 10:26

## 2023-09-20 ASSESSMENT — PAIN SCALES - GENERAL: PAINLEVEL: NO PAIN (0)

## 2023-09-20 NOTE — LETTER
9/20/2023         RE: Jennifer Ward  [address] [facility] [address]        Dear Colleague,    Thank you for referring your patient, Jennifer aWrd, to the [facility] BLOOD AND MARROW TRANSPLANT PROGRAM [place]. Please see a copy of my visit note below.        [facility]  BMTCT OPEN VISIT    September 20, 2023        Jennifer Ward is a 48 year old female undergoing evaluation prior to hematopoietic cell transplant or immune effector cell therapy.    Reason for BMTCT: 2016-11 BEAM Auto    Recent chemotherapy: 8/23/23    Recent infections: No    Blood thinner use? If yes, why? No    Treatment for diabetes? No      Today, the patient notes the following symptoms:    Longstanding Migraines--used to be timed with menstrual cycle but now post total hysterectomy/oopherectomy   At least once a week still.     Itchy rash sometimes. She thinks nerve related. Also it warmer areas like heat rash. Not yeast.     Remainder of 10 point review Of Systems negative other then noted in HPI    Jennifer Ward's History    PASTMEDICAL HISTORY:  Migraines  Umbilical hernia  Obesity  HL    Surgical History:  Total Hysterectomy/bilateral oophorectomy and salpingectomy   Breast Reduction      FAMILY HISTORY:  Father had non-Hodgkin lymphoma in his 40s.      SOCIAL HISTORY:  Never smoker. Rare alcohol use.   Lives in [place], [place] with her  Philip. Has a 20-year son and a 25yr old daughter and 4 grandkids with a 5th on the way.   Works as a .        Jennifer Ward's Medications and Allergies    Current Outpatient Medications   Medication    ALPRAZolam (XANAX) 0.25 MG tablet    buPROPion (WELLBUTRIN XL) 150 MG 24 hr tablet    hydrocortisone (CORTAID) 1 % external cream    ibuprofen (ADVIL/MOTRIN) 800 MG tablet    ketorolac (TORADOL) 10 MG tablet    levothyroxine (SYNTHROID/LEVOTHROID) 75 MCG tablet    naratriptan (AMERGE) 2.5 MG tablet    propranolol (INDERAL) 20 MG tablet    topiramate  (TOPAMAX) 50 MG tablet    venlafaxine (EFFEXOR XR) 150 MG 24 hr capsule     No current facility-administered medications for this visit.     Facility-Administered Medications Ordered in Other Visits   Medication    heparin 100 unit/mL injection 5 mL        Allergies   Allergen Reactions    Ceclor [Cefaclor]      Physical Examination  Vitals: /75 RR 16 HR 88 SPO2 100%   Exam:  Constitutional: healthy, alert, and no distress  Head: Normocephalic. No masses, lesions, tenderness or abnormalities  ENT: +she notes a cracked upper molar on R that does cause pain off/on. No notable gum swelling or facial swelling.   Cardiovascular: RRR. NO G/R/M  Respiratory: BCTA  Musculoskeletal: extremities normal- no gross deformities noted, gait normal, and normal muscle tone  Skin: +few areas of eczema like irritation   Neurologic: No focal deficits  Psychiatric: mentation appears normal and affect normal/bright  Lymph: No LE edema      Frailty Screening    Weight loss: Have you lost >10 pounds (or >5% body weight) unintentionally over the last year? No      Exhaustion: How often in the past week did you feel that:  I feel that everything I do is an effort : .Exhaustion: 1 = some of the time (1-2 days)  I feel I cannot get going: Exhaustion: 1 = some of the time (1-2 days)    Weakness:  Hand  strength (measured by MA; calculate average): 11.3     Male BMI Frailty Criteria for  Male  Strength Female BMI Frailty Criteria for  Female  Strength   ?24 ?29 ?23 ?17   24.1 - 26 ?30 23.1 - 26 ?17.3   26.1 - 28 ?30 26.1 - 29 ?18   >28 ?32 >29 ?21     Slowness:  15 foot walk time (measured by MA):  4    Height Frailty Criteria for  15 Foot Walk Time   Men   ?173 cm ? 7 seconds    >173 cm  ? 6 seconds   Women   ?159 cm ? 7 seconds   >159 cm  ? 6 seconds     Physical activity:     *Please complete 2 calculations for kcal (see frailty worksheet for equations)     Energy expenditure for frailty: >270 kcal expended per week      Gender Frailty Criteria for Low Physical Activity   Male <383 kcal/week   Female <270 kcal/weel     Jennifer Ward met the following criteria for prefrailty (score 1-2) or frailty (score 3+): Frailty: Weakness  Frailty Score is: 1      Additional assessments not to be used in frailty calculation:     Sit to stand test (time to complete 5 reps): 6 seconds    Standing balance in 10 seconds:  Record the Total number of seconds(0-30)--add a+b+c ( take best attempt for each)  First attempt: 30 seconds      Overall Assessment    I have reviewed the diagnostic data, medications, frailty screening, and general processes prior to BMTCT.  I have notified the Primary BMT Physician/and or Attending Physician in the clinic of any issues. We also discussed in detail the database and biorepository research for which Jennifer Ward is eligible. We discussed the potential risks and potential benefits of each of these protocols individually. We explained potential alternatives to the protocols discussed. We explained to the patient that participation is voluntary and that consent may be withdrawn at any time.     Jennifer has a newly cracked upper molar that is causing discomfort also has not seen dentist in several years. Rec she call and get an appt to get this taken care of. She has a dentist to call who is family friend. She has a normal plt and wbc/anc      Consents Signed:  Blood transfusion consent form  Ethnicity form  [facility] database  [facility] BMTCT Database    Present during the discussion were patient, patient's cousin, and I. Copies of the signed consent forms will be provided to the patient on admission. No procedures specific to any studies were performed prior to the patient signing the consent form.    Jennifer Ward had the opportunity to ask questions, and I answered all of the questions to the best of my ability.      40 minutes spent by me on the date of the encounter doing chart review, history and exam,  documentation and further activities per the note         VIDA SantiagoC

## 2023-09-20 NOTE — PROGRESS NOTES
[facility]  BMTCT OPEN VISIT    September 20, 2023        Jennifer Ward is a 48 year old female undergoing evaluation prior to hematopoietic cell transplant or immune effector cell therapy.    Reason for BMTCT: 2016-11 BEAM Auto    Recent chemotherapy: 8/23/23    Recent infections: No    Blood thinner use? If yes, why? No    Treatment for diabetes? No      Today, the patient notes the following symptoms:    Longstanding Migraines--used to be timed with menstrual cycle but now post total hysterectomy/oopherectomy   At least once a week still.     Itchy rash sometimes. She thinks nerve related. Also it warmer areas like heat rash. Not yeast.     Remainder of 10 point review Of Systems negative other then noted in HPI    Jennifer Ward's History    PASTMEDICAL HISTORY:  Migraines  Umbilical hernia  Obesity  HL    Surgical History:  Total Hysterectomy/bilateral oophorectomy and salpingectomy   Breast Reduction      FAMILY HISTORY:  Father had non-Hodgkin lymphoma in his 40s.      SOCIAL HISTORY:  Never smoker. Rare alcohol use.   Lives in [place], [place] with her  Philip. Has a 20-year son and a 25yr old daughter and 4 grandkids with a 5th on the way.   Works as a .        Jennifer Ward's Medications and Allergies    Current Outpatient Medications   Medication    ALPRAZolam (XANAX) 0.25 MG tablet    buPROPion (WELLBUTRIN XL) 150 MG 24 hr tablet    hydrocortisone (CORTAID) 1 % external cream    ibuprofen (ADVIL/MOTRIN) 800 MG tablet    ketorolac (TORADOL) 10 MG tablet    levothyroxine (SYNTHROID/LEVOTHROID) 75 MCG tablet    naratriptan (AMERGE) 2.5 MG tablet    propranolol (INDERAL) 20 MG tablet    topiramate (TOPAMAX) 50 MG tablet    venlafaxine (EFFEXOR XR) 150 MG 24 hr capsule     No current facility-administered medications for this visit.     Facility-Administered Medications Ordered in Other Visits   Medication    heparin 100 unit/mL injection 5 mL        Allergies   Allergen Reactions     Darianlor [Cefaclor]      Physical Examination  Vitals: /75 RR 16 HR 88 SPO2 100%   Exam:  Constitutional: healthy, alert, and no distress  Head: Normocephalic. No masses, lesions, tenderness or abnormalities  ENT: +she notes a cracked upper molar on R that does cause pain off/on. No notable gum swelling or facial swelling.   Cardiovascular: RRR. NO G/R/M  Respiratory: BCTA  Musculoskeletal: extremities normal- no gross deformities noted, gait normal, and normal muscle tone  Skin: +few areas of eczema like irritation   Neurologic: No focal deficits  Psychiatric: mentation appears normal and affect normal/bright  Lymph: No LE edema      Frailty Screening    Weight loss: Have you lost >10 pounds (or >5% body weight) unintentionally over the last year? No      Exhaustion: How often in the past week did you feel that:  I feel that everything I do is an effort : .Exhaustion: 1 = some of the time (1-2 days)  I feel I cannot get going: Exhaustion: 1 = some of the time (1-2 days)    Weakness:  Hand  strength (measured by MA; calculate average): 11.3     Male BMI Frailty Criteria for  Male  Strength Female BMI Frailty Criteria for  Female  Strength   ?24 ?29 ?23 ?17   24.1 - 26 ?30 23.1 - 26 ?17.3   26.1 - 28 ?30 26.1 - 29 ?18   >28 ?32 >29 ?21     Slowness:  15 foot walk time (measured by MA):  4    Height Frailty Criteria for  15 Foot Walk Time   Men   ?173 cm ? 7 seconds    >173 cm  ? 6 seconds   Women   ?159 cm ? 7 seconds   >159 cm  ? 6 seconds     Physical activity:     *Please complete 2 calculations for kcal (see frailty worksheet for equations)     Energy expenditure for frailty: >270 kcal expended per week     Gender Frailty Criteria for Low Physical Activity   Male <383 kcal/week   Female <270 kcal/charisma BURRELL [place] met the following criteria for prefrailty (score 1-2) or frailty (score 3+): Frailty: Weakness  Frailty Score is: 1      Additional assessments not to be used in frailty  calculation:     Sit to stand test (time to complete 5 reps): 6 seconds    Standing balance in 10 seconds:  Record the Total number of seconds(0-30)--add a+b+c ( take best attempt for each)  First attempt: 30 seconds      Overall Assessment    I have reviewed the diagnostic data, medications, frailty screening, and general processes prior to BMTCT.  I have notified the Primary BMT Physician/and or Attending Physician in the clinic of any issues. We also discussed in detail the database and biorepository research for which Jennifer Ward is eligible. We discussed the potential risks and potential benefits of each of these protocols individually. We explained potential alternatives to the protocols discussed. We explained to the patient that participation is voluntary and that consent may be withdrawn at any time.     Jennifer has a newly cracked upper molar that is causing discomfort also has not seen dentist in several years. Rec she call and get an appt to get this taken care of. She has a dentist to call who is family friend. She has a normal plt and wbc/anc      Consents Signed:  Blood transfusion consent form  Ethnicity form  [facility] database  [facility] BMTCT Database    Present during the discussion were patient, patient's cousin, and I. Copies of the signed consent forms will be provided to the patient on admission. No procedures specific to any studies were performed prior to the patient signing the consent form.    Jennifer Ward had the opportunity to ask questions, and I answered all of the questions to the best of my ability.      40 minutes spent by me on the date of the encounter doing chart review, history and exam, documentation and further activities per the note         Tracy Warren PA-C

## 2023-09-20 NOTE — NURSING NOTE
EKG was performed today per order written by Tammy Gonzalez.  Name and  verified with patient. Patient tolerated well without incident. File transmitted to chart.    Eh Albert on 2023 at 11:44 AM

## 2023-09-21 ENCOUNTER — ALLIED HEALTH/NURSE VISIT (OUTPATIENT)
Dept: TRANSPLANT | Facility: CLINIC | Age: 48
End: 2023-09-21
Attending: INTERNAL MEDICINE
Payer: COMMERCIAL

## 2023-09-21 DIAGNOSIS — C81.98 HODGKIN'S DISEASE OF LYMPH NODES OF MULTIPLE SITES (H): ICD-10-CM

## 2023-09-21 DIAGNOSIS — Z11.59 SCREENING FOR VIRAL DISEASE: ICD-10-CM

## 2023-09-21 DIAGNOSIS — Z86.2 PERSONAL HISTORY OF DISEASES OF BLOOD AND BLOOD-FORMING ORGANS: ICD-10-CM

## 2023-09-21 DIAGNOSIS — Z01.818 PREOP EXAMINATION: ICD-10-CM

## 2023-09-21 DIAGNOSIS — Z71.9 VISIT FOR COUNSELING: Primary | ICD-10-CM

## 2023-09-21 LAB
ALBUMIN SERPL ELPH-MCNC: 4.1 G/DL (ref 3.7–5.1)
ALPHA1 GLOB SERPL ELPH-MCNC: 0.2 G/DL (ref 0.2–0.4)
ALPHA2 GLOB SERPL ELPH-MCNC: 0.7 G/DL (ref 0.5–0.9)
B-GLOBULIN SERPL ELPH-MCNC: 0.7 G/DL (ref 0.6–1)
GAMMA GLOB SERPL ELPH-MCNC: 0.9 G/DL (ref 0.7–1.6)
M PROTEIN SERPL ELPH-MCNC: 0 G/DL
PROT PATTERN SERPL ELPH-IMP: NORMAL

## 2023-09-21 PROCEDURE — 84165 PROTEIN E-PHORESIS SERUM: CPT | Mod: 26

## 2023-09-21 ASSESSMENT — ANXIETY QUESTIONNAIRES
7. FEELING AFRAID AS IF SOMETHING AWFUL MIGHT HAPPEN: NOT AT ALL
6. BECOMING EASILY ANNOYED OR IRRITABLE: NOT AT ALL
5. BEING SO RESTLESS THAT IT IS HARD TO SIT STILL: NOT AT ALL
1. FEELING NERVOUS, ANXIOUS, OR ON EDGE: MORE THAN HALF THE DAYS
IF YOU CHECKED OFF ANY PROBLEMS ON THIS QUESTIONNAIRE, HOW DIFFICULT HAVE THESE PROBLEMS MADE IT FOR YOU TO DO YOUR WORK, TAKE CARE OF THINGS AT HOME, OR GET ALONG WITH OTHER PEOPLE: NOT DIFFICULT AT ALL
3. WORRYING TOO MUCH ABOUT DIFFERENT THINGS: SEVERAL DAYS
GAD7 TOTAL SCORE: 6
2. NOT BEING ABLE TO STOP OR CONTROL WORRYING: MORE THAN HALF THE DAYS
GAD7 TOTAL SCORE: 6

## 2023-09-21 ASSESSMENT — PATIENT HEALTH QUESTIONNAIRE - PHQ9
5. POOR APPETITE OR OVEREATING: SEVERAL DAYS
SUM OF ALL RESPONSES TO PHQ QUESTIONS 1-9: 11

## 2023-09-21 NOTE — PROGRESS NOTES
BMT Teaching Flowsheet    Jennifer Ward is a 48 year old female  Diagnoses of Hodgkin's disease of lymph nodes of multiple sites (H), Personal history of diseases of blood and blood-forming organs, Preop examination, and Screening for viral disease were pertinent to this visit.    Teaching Topic: [id number] Arm 1 BEAM    Person(s) involved in teaching: Patient and Mother    Motivation Level  Asks Questions: Yes  Eager to Learn: Yes  Cooperative: Yes  Receptive (willing/able to accept information): Yes  Any cultural factors/Latter-day beliefs that may influence understanding or compliance? No    Patient and Caregiver demonstrates understanding of the following:   Reason for the appointment, diagnosis and treatment plan: Yes  Knowledge of proper use of medications and conditions for which they are ordered (with special attention to potential side effects or drug interactions): Yes  Which situations necessitate calling provider and whom to contact: Yes  Proper use and care of (medical equipment, care aids, etc.) Yes  Pain management techniques: Yes  How and/when to access community resources: Yes    Teaching/ learning concerns addressed: None identified    Infection Control:  Patient and Caregiver instructed on hand hygiene: Yes  Signs and symptoms of infection taught: Yes    Instructional Materials Used/Given:   Patient was given and reviewed BMT Teaching Binder, including medication pamphlets, sample treatment calendars, consents, contact phone numbers, hospital and discharge guidelines.  Patient verbalizes understanding of the material and was encouraged to call with any additional questions.        Time spent with patient: 90 minutes.  Specific Concerns: NA

## 2023-09-21 NOTE — PROGRESS NOTES
"Pharmacy Assessment - Pre-Stem Cell Transplant    Assessments & Recommendations:  1) Cefaclor reaction (full body hives) as a child.  Cefepime and cefaclor do not share side chains so there should be no risk for cross reactivity.  Recommend to use cefepime if needed for febrile neutropenia.  Consult pharmacy if other beta-lactams are needed to check for cross reactivity risk.   2) I confirmed that the hospital has naratriptan in stock so the pt won't need to bring her own supply.   3) Ice therapy estuardo-melphalan.     If this patient is admitted under observation, the patient may bring in their own supply of the following medication for use in the hospital:  1) none  -Per \"Medications Not Supplied by Pharmacy\" policy (available on [employer])    History of Present Illness:  Jennifer Ward is a 48 year old year old female diagnosed with Hodgkin's Lymphoma.  she has been treated with ABVD and brentuximab/nivolumab.  she is now being work up for autologous Stem Cell Transplant on protocol 2016-11, which utilizes BEAM (carmustine, etoposide, cytarabine, melphalan) as a conditioning regimen.    Pertinent labs/tests:  Viral Testing:  HSV(+) / EBV(+) / VZV (+)  Ejection Fraction: pending  QTc: 403 msec (9/20/23)    Weights:   Wt Readings from Last 3 Encounters:   09/20/23 100.2 kg (221 lb)   06/08/23 102.1 kg (225 lb)   Ideal body weight: 55.8 kg (123 lb 2 oz)  Adjusted ideal body weight: 73.6 kg (162 lb 4.4 oz)  % IBW:  184%  There is no height or weight on file to calculate BMI.    Primary BMT Physician: Dr Gonzalez  BMT RN Coordinator:  Jacob Richards    Past Medical History:  No past medical history on file.    Medication Allergies:  Allergies   Allergen Reactions    Ceclor [Cefaclor] Hives     Hives as a child    Imitrex [Sumatriptan] Palpitations     Tolerated for a long period, then has one time tachycardia.  Tolerates naratriptan.        Current Medications (pre-admit):  Current Outpatient Medications   Medication Sig " Dispense Refill    buPROPion (WELLBUTRIN XL) 150 MG 24 hr tablet Take 150 mg by mouth every morning      hydrocortisone (CORTAID) 1 % external cream Apply topically 3 times daily      levothyroxine (SYNTHROID/LEVOTHROID) 75 MCG tablet Take 1 tablet by mouth every morning      naratriptan (AMERGE) 2.5 MG tablet Take 2.5 mg by mouth at onset of headache      propranolol (INDERAL) 20 MG tablet Take 20 mg by mouth 2 times daily      topiramate (TOPAMAX) 50 MG tablet Take 1 tablet by mouth every evening      venlafaxine (EFFEXOR XR) 150 MG 24 hr capsule Take 150 mg by mouth daily         Herbal Medication/Nutritional Supplements: none    Smoking/Past Drug Use: didn't discuss    Nausea/Vomiting, Pain, or other issues: Long history of migraine headaches.     Summary:  I met with Jennifer Ward for approximately 30 minutes.  We discussed allergies, home medications, filgrastim priming, BEAM schedule and side effects, anti-infective prophylaxis (acyclovir, levofloxacin, fluconazole, Bactrim), supportive meds (allopurinol, filgrastim, anti-emetics), vaccines, med box/pharmacy expectations.    Jung Andersen, ShaistaD

## 2023-09-21 NOTE — PROGRESS NOTES
Blood and Marrow Transplant   Psychosocial Assessment with   Clinical     Assessment completed on 9/21/23 of Pt's living situation, support system, financial status, functional status, coping, stressors, need for resources and social work intervention provided as needed.  Information for this assessment was provided by Pt and her mother's report in addition to medical chart review and consultation with medical team.     Present at Assessment:   Patient: Jennifer Ward  Mother: Ba Dalton  : DUONG Baker, FELICIA    Diagnosis: Hodgkin Lymphoma      Date of Diagnosis: 2008; recurrence 4/2023     Transplant type: Autologous    Donor: Autologous     Physician: Tammy Gonzalez MD    Work-Up Nurse Coordinator: Jacob Richards RN    Long-term Nurse Coordinator: Laurie Bui RN    : DUONG Baker, FELICIA    Permanent Address:   [address] [place] [address]     Living Situation: Pt & spouse live in [place], [place]. Pt's son Oscar (19 y/o) lives there as well as spouse's son's (Mathew - 12 y/o & Daniel - 19 y/o) every other week.     Local Address:  on the [facility]. Pt plans to have her two mini poodles with her.  [address] [place] [address]    Contact Information:  Cell Phone: [phone]  Pt email: [email or web address]    Presenting Information:  Jennifer Ward is a 48 year old female diagnosed with Hodgkin Lymphoma who presents for evaluation for an autologous transplant at the [facility] ([facility] Pt was accompanied to today's visit by her mother Ba.     Decision Making: Self    Health Care Directive:   No. SW provided education and forms. SW encouraged pt to have discussions with their family regarding their health care wishes. SW explained that since she does not have a healthcare directive, legally her NOK would make decisions on her behalf, if she did not have capacity to make her own medical decisions. Pt understood. Pt is  considering having HCD notarized during IP stay.    Relationship Status: . Pt and pt's spouse have been  for 7 years. Pt described relationship as stable/supportive.     Special Needs: Local Lodging Needed.    Family/Support System: Pt endorsed a strong support system including family and close friends who will be available to support pt throughout transplant process.     Family Information:   Spouse: Philip Ward -  7 yrs in 2023.   Parents: Mother - Ba Dalton; Father   Siblings: 1 brother - Ramo (lives in [place])  Children: Pt has 2 adult children - Carol (26 y/o;  Yobani) & Oscar (21 y/o); Pt's spouse has 4 children - Mykel (wife Lauren), Daniel Dent, & Mathew.    Caregiver: SW discussed with pt and pt's mother the caregiver role and expectation at length. Pt is agreeable to having a full time caregiver for a minimum of 30 days until cleared by the BMT physician. Pt and pt's mother confirmed understanding of the caregiver requirement. Pt's primary caregiver will be her mother Ba the first two weeks with the support of her  Philip and friend Lenny Blackwelldawitshannan as well. Pt reviewed and signed the caregiver contract which will be scanned into the EMR. Caregiver education and resources provided. No caregiver concerns identified.     Caregiver Contact Information:  Ba Dalton (mother) - cell phone: [phone]  Philip Ward (spouse) - cell phone: [phone]  Lenny Grajimmie (friend) - cell phone: [phone]    Transportation Mode: Personal Vehicle. Pt is aware of driving restrictions post-BMT and the need for the caregiver is to drive until cleared to drive by the BMT physician. SW provided information on parking info and monthly parking pass options.    Insurance: Pt has [employer] health insurance. Pt shared that her insurance has relocation/travel benefits for Patients who live greater than 50 miles from the hospital. Pt is in the process of appealing this  "with her insurance due to required relocation. KEE collaborated with Dr Gonzalez to provide a relocation statement letter to pt in clinic on 9/21/23. KEE reiterated information about the BMT Financial  should specific insurance questions arise as pt moves through transplant process.     Sources of Income: Pt's source of income is payroll. Pt shared that she is working on setting up a payment plan with other hospital systems stating that she is unable to pay \"all hospital bills in full at once.\" Pt shared she is overall getting a lot of support from her employer and denies immediate financial concerns at this time. KEE discussed willem options ([employer] & [employer]) and asked pt to let SW know if they would like to apply in the future.     Employment:   Currently employed: Yes; 100% remote; flexible/supportive employer  Occupation:      Spouse/Partner Employed: Yes  Occupation: Construction    Mental Health:   Pt reported a hx of Depression. Pt is currently taking medication (wellbutrin) approximately 1 year ago and pt feels the medication is working well. Pt states her daughter has identified improvement in Pt's overall mental health more than Pt has insight to.     We discussed how many patients may see an increase in feelings of anxiety or depression while hospitalized for extended periods of time and pursue  isolation precautions post-BMT. Encouraged Pt and family to let us know if they are noticing an increase in symptoms. Pt believes she would be able to identify symptoms of depression/anxiety throughout the transplant process. We talked about the variety of modalities available to use as coping mechanisms (including but not limited to guided imagery, relaxation techniques, progressive muscle relaxation, counseling/talk therapy and medication).    PHQ-9:  Pt scored a 11 which indicates moderate on the depression severity scale. Pt shared that these symptoms have not been difficult for " "her to carry on her work and take care of things at home. Pt feels that she is overall coping appropriately.    GAD7:  Pt scored a 6 which indicates a mild sign of anxiety on the Generalized Anxiety Disorder Questionnaire. Pt endorses this is an accurate reflection of her emotional state.    Chemical Use:   Tobacco: No hx  Alcohol: No hx  Marijuana: No hx  Other Drugs: No hx  Based on the information provided, there appear to be no specific risks or concerns identified at this time.     Trauma/Loss/Abuse History: Multiple losses associated with cancer diagnosis and treatment, including health, employment, changes to physical appearance, etc. Pt shared that it is highly difficult for her to be losing her hair again.     Spirituality: Pt identified as Amish. SW explained that there are Chaplains on the unit and pt can request to meet with a  at anytime.  Pt is interested in having a blessing ceremony - referral made to spiritual care on 9/21/23.    Coping: Pt noted that she is currently feeling \"depressed, fearful, sad, worried, nervous, frustrated, ready to begin\". Pt shared that her main coping mechanisms are talking with friends/family, spending time with her granddaughter and her dogs, working on her hobbies (owen art), and cleaning.  Pt noted that she enjoys spending time with family, working, and . SW and pt discussed additional positive coping mechanisms that pt can utilize while in the hospital. While hospitalized, pt plans to use her IPAD, watch movies, and walk the halls. Pt identified feeling vulnerable w/ relapsed disease processing that the first time she was diagnosed she was single w/ limited support and no health insurance at the time.  Pt also shared that it is highly difficult for her to be losing her hair again.     Caregiver Coping: Pt's mother noted that she is feeling \"positive, hopeful, worried, nervous, and ready to begin\" at this time. Pt's mother noted that she ariela by talking " w/ friends/family, positive self-talk, and working on her hobbies (crafting and sewing). Caregiver resources offered/reviewed.     Education Provided: Transplant process expectations, Caregiver requirements, Caregiver self-care, Financial issues related to transplant, Financial resources/grants available, Common psychosocial stressors pre/post transplant, Support group(s) available, Hospital resources available, Web site information, Social Work role and Resources for children/siblings    Interventions Provided: Psychosocial Support and Education     Assessment and Recommendations for Team:  Pt is a 48 year old female diagnosed with Hodgkin Lymphoma who is here undergoing preparation for a planned autologous transplant.     Pt is pleasant, calm and able to articulate concerns/coping mechanisms in an appropriate manner. During our meeting pt was alert, she was interactive, affect was full, she displayed appropriate eye contact, memory and thought processes. Pt feels comfortable communicating with the medical team. Pt has a strong supportive network of family and friends who are involved.     Pt will benefit from ongoing psychosocial support in regards to coping with the adjustment to the BMT process. CSW has discussed  psychosocial support options in regards to coping with the adjustment to the BMT process and support groups opportunities.      Pt has a strong support system and a confirmed caregiver plan. Pt verbalizes understanding of the transplant process and wanting to proceed. SW provided contact information and encouraged pt to contact SW with questions, concerns, resources and for support.    Per this assessment, SW did not identify any barriers to this patient moving forward with transplant.    Important Information:   -would like a blessing ceremony.  -[address] at the end of her hospital stay.  -hx of Depression    Follow up Planned:   Initiate financial resources - [employer] &  [employer] provided.  Psychosocial support  Lodging referrals - none required; she plans to stay [address]  Healthcare Directive - may need a notary while IP.    DUONG Baker, [facility]  Adult Blood & Marrow Transplant   Phone: [phone]  Pager: [phone]

## 2023-09-21 NOTE — LETTER
9/21/2023         RE: Jennifer Ward  [address] [facility] [address]        Dear Colleague,    Thank you for referring your patient, Jennifer Ward, to the [facility] BLOOD AND MARROW TRANSPLANT PROGRAM [place]. Please see a copy of my visit note below.    BMT Teaching Flowsheet    Jennifer Ward is a 48 year old female  Diagnoses of Hodgkin's disease of lymph nodes of multiple sites (H), Personal history of diseases of blood and blood-forming organs, Preop examination, and Screening for viral disease were pertinent to this visit.    Teaching Topic: MT2016-11 Arm 1 BEAM    Person(s) involved in teaching: Patient and Mother    Motivation Level  Asks Questions: Yes  Eager to Learn: Yes  Cooperative: Yes  Receptive (willing/able to accept information): Yes  Any cultural factors/Samaritan beliefs that may influence understanding or compliance? No    Patient and Caregiver demonstrates understanding of the following:   Reason for the appointment, diagnosis and treatment plan: Yes  Knowledge of proper use of medications and conditions for which they are ordered (with special attention to potential side effects or drug interactions): Yes  Which situations necessitate calling provider and whom to contact: Yes  Proper use and care of (medical equipment, care aids, etc.) Yes  Pain management techniques: Yes  How and/when to access community resources: Yes    Teaching/ learning concerns addressed: None identified    Infection Control:  Patient and Caregiver instructed on hand hygiene: Yes  Signs and symptoms of infection taught: Yes    Instructional Materials Used/Given:   Patient was given and reviewed BMT Teaching Binder, including medication pamphlets, sample treatment calendars, consents, contact phone numbers, hospital and discharge guidelines.  Patient verbalizes understanding of the material and was encouraged to call with any additional questions.        Time spent with patient: 90  minutes.  Specific Concerns: NA      Again, thank you for allowing me to participate in the care of your patient.        Sincerely,        Jacob Richards RN

## 2023-09-21 NOTE — LETTER
"    9/21/2023         RE: Jennifer Ward  [address] [address]        Dear Colleague,    Thank you for referring your patient, Jennifer Ward, to the [facility] BLOOD AND MARROW TRANSPLANT PROGRAM [place]. Please see a copy of my visit note below.    Pharmacy Assessment - Pre-Stem Cell Transplant    Assessments & Recommendations:  1) Cefaclor reaction (full body hives) as a child.  Cefepime and cefaclor do not share side chains so there should be no risk for cross reactivity.  Recommend to use cefepime if needed for febrile neutropenia.  Consult pharmacy if other beta-lactams are needed to check for cross reactivity risk.   2) I confirmed that the hospital has naratriptan in stock so the pt won't need to bring her own supply.   3) Ice therapy estuardo-melphalan.     If this patient is admitted under observation, the patient may bring in their own supply of the following medication for use in the hospital:  1) none  -Per \"Medications Not Supplied by Pharmacy\" policy (available on [employer])    History of Present Illness:  Jennifer Ward is a 48 year old year old female diagnosed with Hodgkin's Lymphoma.  she has been treated with ABVD and brentuximab/nivolumab.  she is now being work up for autologous Stem Cell Transplant on protocol 2016-11, which utilizes BEAM (carmustine, etoposide, cytarabine, melphalan) as a conditioning regimen.    Pertinent labs/tests:  Viral Testing:  HSV(+) / EBV(+) / VZV (+)  Ejection Fraction: pending  QTc: 403 msec (9/20/23)    Weights:   Wt Readings from Last 3 Encounters:   09/20/23 100.2 kg (221 lb)   06/08/23 102.1 kg (225 lb)   Ideal body weight: 55.8 kg (123 lb 2 oz)  Adjusted ideal body weight: 73.6 kg (162 lb 4.4 oz)  % IBW:  184%  There is no height or weight on file to calculate BMI.    Primary BMT Physician: Dr Gonzalez  BMT RN Coordinator:  Jacob Richards    Past Medical History:  No past medical history on file.    Medication Allergies:  Allergies "   Allergen Reactions    Ceclor [Cefaclor] Hives     Hives as a child    Imitrex [Sumatriptan] Palpitations     Tolerated for a long period, then has one time tachycardia.  Tolerates naratriptan.        Current Medications (pre-admit):  Current Outpatient Medications   Medication Sig Dispense Refill    buPROPion (WELLBUTRIN XL) 150 MG 24 hr tablet Take 150 mg by mouth every morning      hydrocortisone (CORTAID) 1 % external cream Apply topically 3 times daily      levothyroxine (SYNTHROID/LEVOTHROID) 75 MCG tablet Take 1 tablet by mouth every morning      naratriptan (AMERGE) 2.5 MG tablet Take 2.5 mg by mouth at onset of headache      propranolol (INDERAL) 20 MG tablet Take 20 mg by mouth 2 times daily      topiramate (TOPAMAX) 50 MG tablet Take 1 tablet by mouth every evening      venlafaxine (EFFEXOR XR) 150 MG 24 hr capsule Take 150 mg by mouth daily         Herbal Medication/Nutritional Supplements: none    Smoking/Past Drug Use: didn't discuss    Nausea/Vomiting, Pain, or other issues: Long history of migraine headaches.     Summary:  I met with Jennifer Ward for approximately 30 minutes.  We discussed allergies, home medications, filgrastim priming, BEAM schedule and side effects, anti-infective prophylaxis (acyclovir, levofloxacin, fluconazole, Bactrim), supportive meds (allopurinol, filgrastim, anti-emetics), vaccines, med box/pharmacy expectations.    Jung Andersen, ShaistaD

## 2023-09-22 ENCOUNTER — ANESTHESIA EVENT (OUTPATIENT)
Dept: SURGERY | Facility: AMBULATORY SURGERY CENTER | Age: 48
End: 2023-09-22
Payer: COMMERCIAL

## 2023-09-22 LAB
ATRIAL RATE - MUSE: 51 BPM
BACTERIA UR CULT: NORMAL
DIASTOLIC BLOOD PRESSURE - MUSE: NORMAL MMHG
DLCOCOR-%PRED-PRE: 109 %
DLCOCOR-PRE: 22.96 ML/MIN/MMHG
DLCOUNC-%PRED-PRE: 102 %
DLCOUNC-PRE: 21.42 ML/MIN/MMHG
DLCOUNC-PRED: 20.96 ML/MIN/MMHG
ERV-%PRED-PRE: 27 %
ERV-PRE: 0.32 L
ERV-PRED: 1.19 L
EXPTIME-PRE: 7.06 SEC
FEF2575-%PRED-PRE: 120 %
FEF2575-PRE: 3.2 L/SEC
FEF2575-PRED: 2.66 L/SEC
FEFMAX-%PRED-PRE: 86 %
FEFMAX-PRE: 5.94 L/SEC
FEFMAX-PRED: 6.84 L/SEC
FEV1-%PRED-PRE: 106 %
FEV1-PRE: 2.81 L
FEV1FEV6-PRE: 84 %
FEV1FEV6-PRED: 82 %
FEV1FVC-PRE: 84 %
FEV1FVC-PRED: 81 %
FEV1SVC-PRE: 79 %
FEV1SVC-PRED: 70 %
FIFMAX-PRE: 5.53 L/SEC
FRCPLETH-%PRED-PRE: 61 %
FRCPLETH-PRE: 1.66 L
FRCPLETH-PRED: 2.71 L
FVC-%PRED-PRE: 102 %
FVC-PRE: 3.34 L
FVC-PRED: 3.25 L
HGB S BLD QL: NEGATIVE
IC-%PRED-PRE: 134 %
IC-PRE: 3.22 L
IC-PRED: 2.39 L
INTERPRETATION ECG - MUSE: NORMAL
P AXIS - MUSE: 41 DEGREES
PR INTERVAL - MUSE: 142 MS
QRS DURATION - MUSE: 84 MS
QT - MUSE: 438 MS
QTC - MUSE: 403 MS
R AXIS - MUSE: 25 DEGREES
RVPLETH-%PRED-PRE: 89 %
RVPLETH-PRE: 1.34 L
RVPLETH-PRED: 1.5 L
SYSTOLIC BLOOD PRESSURE - MUSE: NORMAL MMHG
T AXIS - MUSE: 34 DEGREES
T GONDII IGG SER-ACNC: <3 IU/ML
T GONDII IGM SER-ACNC: <3 AU/ML
TLCPLETH-%PRED-PRE: 92 %
TLCPLETH-PRE: 4.88 L
TLCPLETH-PRED: 5.3 L
VA-%PRED-PRE: 94 %
VA-PRE: 4.65 L
VC-%PRED-PRE: 93 %
VC-PRE: 3.54 L
VC-PRED: 3.79 L
VENTRICULAR RATE- MUSE: 51 BPM

## 2023-09-25 ENCOUNTER — HOSPITAL ENCOUNTER (OUTPATIENT)
Facility: AMBULATORY SURGERY CENTER | Age: 48
Discharge: HOME | End: 2023-09-25
Attending: PHYSICIAN ASSISTANT
Payer: COMMERCIAL

## 2023-09-25 ENCOUNTER — LAB (OUTPATIENT)
Dept: LAB | Facility: CLINIC | Age: 48
End: 2023-09-25
Attending: PHYSICIAN ASSISTANT
Payer: COMMERCIAL

## 2023-09-25 ENCOUNTER — OFFICE VISIT (OUTPATIENT)
Dept: TRANSPLANT | Facility: CLINIC | Age: 48
End: 2023-09-25
Attending: PHYSICIAN ASSISTANT
Payer: COMMERCIAL

## 2023-09-25 ENCOUNTER — ANESTHESIA (OUTPATIENT)
Dept: SURGERY | Facility: AMBULATORY SURGERY CENTER | Age: 48
End: 2023-09-25
Payer: COMMERCIAL

## 2023-09-25 VITALS
SYSTOLIC BLOOD PRESSURE: 93 MMHG | OXYGEN SATURATION: 100 % | BODY MASS INDEX: 36.15 KG/M2 | HEART RATE: 54 BPM | TEMPERATURE: 97.2 F | DIASTOLIC BLOOD PRESSURE: 53 MMHG | RESPIRATION RATE: 16 BRPM | WEIGHT: 217 LBS | HEIGHT: 65 IN

## 2023-09-25 VITALS
BODY MASS INDEX: 37.17 KG/M2 | RESPIRATION RATE: 16 BRPM | WEIGHT: 217.7 LBS | SYSTOLIC BLOOD PRESSURE: 111 MMHG | TEMPERATURE: 97.8 F | DIASTOLIC BLOOD PRESSURE: 74 MMHG | OXYGEN SATURATION: 99 % | HEART RATE: 61 BPM | HEIGHT: 64 IN

## 2023-09-25 DIAGNOSIS — Z01.818 PREOP EXAMINATION: ICD-10-CM

## 2023-09-25 DIAGNOSIS — C81.98 HODGKIN'S DISEASE OF LYMPH NODES OF MULTIPLE SITES (H): Primary | ICD-10-CM

## 2023-09-25 DIAGNOSIS — Z11.59 SCREENING FOR VIRAL DISEASE: ICD-10-CM

## 2023-09-25 DIAGNOSIS — Z86.2 PERSONAL HISTORY OF DISEASES OF BLOOD AND BLOOD-FORMING ORGANS: ICD-10-CM

## 2023-09-25 DIAGNOSIS — C81.98 HODGKIN LYMPHOMA OF LYMPH NODES OF MULTIPLE REGIONS, UNSPECIFIED HODGKIN LYMPHOMA TYPE (H): Primary | ICD-10-CM

## 2023-09-25 LAB
BASOPHILS # BLD AUTO: 0.1 10E3/UL (ref 0–0.2)
BASOPHILS NFR BLD AUTO: 2 %
EOSINOPHIL # BLD AUTO: 0.3 10E3/UL (ref 0–0.7)
EOSINOPHIL NFR BLD AUTO: 7 %
ERYTHROCYTE [DISTWIDTH] IN BLOOD BY AUTOMATED COUNT: 13.1 % (ref 10–15)
HCT VFR BLD AUTO: 38.1 % (ref 35–47)
HGB BLD-MCNC: 12.6 G/DL (ref 11.7–15.7)
IMM GRANULOCYTES # BLD: 0 10E3/UL
IMM GRANULOCYTES NFR BLD: 0 %
LYMPHOCYTES # BLD AUTO: 1.1 10E3/UL (ref 0.8–5.3)
LYMPHOCYTES NFR BLD AUTO: 26 %
MCH RBC QN AUTO: 28.3 PG (ref 26.5–33)
MCHC RBC AUTO-ENTMCNC: 33.1 G/DL (ref 31.5–36.5)
MCV RBC AUTO: 86 FL (ref 78–100)
MONOCYTES # BLD AUTO: 0.3 10E3/UL (ref 0–1.3)
MONOCYTES NFR BLD AUTO: 8 %
NEUTROPHILS # BLD AUTO: 2.3 10E3/UL (ref 1.6–8.3)
NEUTROPHILS NFR BLD AUTO: 57 %
NRBC # BLD AUTO: 0 10E3/UL
NRBC BLD AUTO-RTO: 0 /100
PATH REPORT.COMMENTS IMP SPEC: ABNORMAL
PATH REPORT.COMMENTS IMP SPEC: YES
PATH REPORT.FINAL DX SPEC: ABNORMAL
PATH REPORT.GROSS SPEC: ABNORMAL
PATH REPORT.MICROSCOPIC SPEC OTHER STN: ABNORMAL
PATH REPORT.RELEVANT HX SPEC: ABNORMAL
PATH REPORT.RELEVANT HX SPEC: ABNORMAL
PATH REPORT.SITE OF ORIGIN SPEC: ABNORMAL
PLATELET # BLD AUTO: 221 10E3/UL (ref 150–450)
RBC # BLD AUTO: 4.45 10E6/UL (ref 3.8–5.2)
WBC # BLD AUTO: 4.1 10E3/UL (ref 4–11)

## 2023-09-25 PROCEDURE — 38222 DX BONE MARROW BX & ASPIR: CPT | Mod: LT | Performed by: PHYSICIAN ASSISTANT

## 2023-09-25 PROCEDURE — 88291 CYTO/MOLECULAR REPORT: CPT | Performed by: MEDICAL GENETICS

## 2023-09-25 PROCEDURE — 88161 CYTOPATH SMEAR OTHER SOURCE: CPT | Mod: TC,XU

## 2023-09-25 PROCEDURE — 88271 CYTOGENETICS DNA PROBE: CPT

## 2023-09-25 PROCEDURE — 88321 CONSLTJ&REPRT SLD PREP ELSWR: CPT | Mod: GC | Performed by: STUDENT IN AN ORGANIZED HEALTH CARE EDUCATION/TRAINING PROGRAM

## 2023-09-25 PROCEDURE — 36415 COLL VENOUS BLD VENIPUNCTURE: CPT

## 2023-09-25 PROCEDURE — 85025 COMPLETE CBC W/AUTO DIFF WBC: CPT

## 2023-09-25 PROCEDURE — 88237 TISSUE CULTURE BONE MARROW: CPT

## 2023-09-25 PROCEDURE — 88342 IMHCHEM/IMCYTCHM 1ST ANTB: CPT | Mod: TC

## 2023-09-25 RX ORDER — ONDANSETRON 2 MG/ML
INJECTION INTRAMUSCULAR; INTRAVENOUS PRN
Status: DISCONTINUED | OUTPATIENT
Start: 2023-09-25 | End: 2023-09-25

## 2023-09-25 RX ORDER — PROPOFOL 10 MG/ML
INJECTION, EMULSION INTRAVENOUS PRN
Status: DISCONTINUED | OUTPATIENT
Start: 2023-09-25 | End: 2023-09-25

## 2023-09-25 RX ORDER — SODIUM CHLORIDE, SODIUM LACTATE, POTASSIUM CHLORIDE, CALCIUM CHLORIDE 600; 310; 30; 20 MG/100ML; MG/100ML; MG/100ML; MG/100ML
INJECTION, SOLUTION INTRAVENOUS CONTINUOUS
Status: DISCONTINUED | OUTPATIENT
Start: 2023-09-25 | End: 2023-09-26 | Stop reason: HOSPADM

## 2023-09-25 RX ORDER — ONDANSETRON 4 MG/1
4 TABLET, ORALLY DISINTEGRATING ORAL EVERY 30 MIN PRN
Status: DISCONTINUED | OUTPATIENT
Start: 2023-09-25 | End: 2023-09-26 | Stop reason: HOSPADM

## 2023-09-25 RX ORDER — LIDOCAINE HYDROCHLORIDE 20 MG/ML
INJECTION, SOLUTION INFILTRATION; PERINEURAL PRN
Status: DISCONTINUED | OUTPATIENT
Start: 2023-09-25 | End: 2023-09-25

## 2023-09-25 RX ORDER — HYDROMORPHONE HYDROCHLORIDE 1 MG/ML
0.4 INJECTION, SOLUTION INTRAMUSCULAR; INTRAVENOUS; SUBCUTANEOUS EVERY 5 MIN PRN
Status: DISCONTINUED | OUTPATIENT
Start: 2023-09-25 | End: 2023-09-26 | Stop reason: HOSPADM

## 2023-09-25 RX ORDER — ACETAMINOPHEN 325 MG/1
975 TABLET ORAL ONCE
Status: COMPLETED | OUTPATIENT
Start: 2023-09-25 | End: 2023-09-25

## 2023-09-25 RX ORDER — ONDANSETRON 2 MG/ML
4 INJECTION INTRAMUSCULAR; INTRAVENOUS EVERY 30 MIN PRN
Status: DISCONTINUED | OUTPATIENT
Start: 2023-09-25 | End: 2023-09-26 | Stop reason: HOSPADM

## 2023-09-25 RX ORDER — LIDOCAINE 40 MG/G
CREAM TOPICAL
Status: CANCELLED | OUTPATIENT
Start: 2023-09-25

## 2023-09-25 RX ORDER — OXYCODONE HYDROCHLORIDE 5 MG/1
5 TABLET ORAL
Status: COMPLETED | OUTPATIENT
Start: 2023-09-25 | End: 2023-09-25

## 2023-09-25 RX ORDER — FENTANYL CITRATE 50 UG/ML
50 INJECTION, SOLUTION INTRAMUSCULAR; INTRAVENOUS EVERY 5 MIN PRN
Status: DISCONTINUED | OUTPATIENT
Start: 2023-09-25 | End: 2023-09-26 | Stop reason: HOSPADM

## 2023-09-25 RX ORDER — LIDOCAINE HYDROCHLORIDE 10 MG/ML
8-10 INJECTION, SOLUTION EPIDURAL; INFILTRATION; INTRACAUDAL; PERINEURAL
Status: DISCONTINUED | OUTPATIENT
Start: 2023-09-25 | End: 2023-09-26 | Stop reason: HOSPADM

## 2023-09-25 RX ORDER — LIDOCAINE HYDROCHLORIDE 10 MG/ML
8-10 INJECTION, SOLUTION EPIDURAL; INFILTRATION; INTRACAUDAL; PERINEURAL
Status: CANCELLED | OUTPATIENT
Start: 2023-09-25

## 2023-09-25 RX ORDER — PROPOFOL 10 MG/ML
INJECTION, EMULSION INTRAVENOUS CONTINUOUS PRN
Status: DISCONTINUED | OUTPATIENT
Start: 2023-09-25 | End: 2023-09-25

## 2023-09-25 RX ORDER — FENTANYL CITRATE 50 UG/ML
25 INJECTION, SOLUTION INTRAMUSCULAR; INTRAVENOUS EVERY 5 MIN PRN
Status: DISCONTINUED | OUTPATIENT
Start: 2023-09-25 | End: 2023-09-26 | Stop reason: HOSPADM

## 2023-09-25 RX ORDER — LIDOCAINE 40 MG/G
CREAM TOPICAL
Status: DISCONTINUED | OUTPATIENT
Start: 2023-09-25 | End: 2023-09-26 | Stop reason: HOSPADM

## 2023-09-25 RX ORDER — HYDROMORPHONE HYDROCHLORIDE 1 MG/ML
0.2 INJECTION, SOLUTION INTRAMUSCULAR; INTRAVENOUS; SUBCUTANEOUS EVERY 5 MIN PRN
Status: DISCONTINUED | OUTPATIENT
Start: 2023-09-25 | End: 2023-09-26 | Stop reason: HOSPADM

## 2023-09-25 RX ORDER — OXYCODONE HYDROCHLORIDE 5 MG/1
10 TABLET ORAL
Status: DISCONTINUED | OUTPATIENT
Start: 2023-09-25 | End: 2023-09-26 | Stop reason: HOSPADM

## 2023-09-25 RX ADMIN — PROPOFOL 150 MCG/KG/MIN: 10 INJECTION, EMULSION INTRAVENOUS at 12:21

## 2023-09-25 RX ADMIN — ONDANSETRON 4 MG: 2 INJECTION INTRAMUSCULAR; INTRAVENOUS at 12:21

## 2023-09-25 RX ADMIN — PROPOFOL 60 MG: 10 INJECTION, EMULSION INTRAVENOUS at 12:21

## 2023-09-25 RX ADMIN — OXYCODONE HYDROCHLORIDE 5 MG: 5 TABLET ORAL at 13:19

## 2023-09-25 RX ADMIN — PROPOFOL 15 MG: 10 INJECTION, EMULSION INTRAVENOUS at 12:24

## 2023-09-25 RX ADMIN — LIDOCAINE HYDROCHLORIDE 50 MG: 20 INJECTION, SOLUTION INFILTRATION; PERINEURAL at 12:21

## 2023-09-25 RX ADMIN — ACETAMINOPHEN 975 MG: 325 TABLET ORAL at 11:07

## 2023-09-25 RX ADMIN — SODIUM CHLORIDE, SODIUM LACTATE, POTASSIUM CHLORIDE, CALCIUM CHLORIDE: 600; 310; 30; 20 INJECTION, SOLUTION INTRAVENOUS at 12:21

## 2023-09-25 ASSESSMENT — PAIN SCALES - GENERAL: PAINLEVEL: MILD PAIN (3)

## 2023-09-25 NOTE — PROGRESS NOTES
Patient Name: Jennifer Ward  Patient MRN: [id number]  Patient : 1975    Abbreviated H&P and Pre-sedation Assessment for Bone marrow Biopsy with sedation    Chief complaint and/or reason for Procedure: Hodgkin kymphoma - work up     Planned level of sedation: MAC    History of problems with sedation: (patient or family hx): Yes; nausea following sedation otherwise no issues    ASA Assessment Category: 2 - Mild systemic disease    History of sleep apnea: No    History of blood thinners: No     Appropriate NPO status: Yes    Current tobacco use: No    Any recent fever, cough, chest or sinus congestion, SOB, weight loss, chest pain, diarrhea or constipation. No    Medications   Currently Scheduled Medications   Current Outpatient Medications   Medication    buPROPion (WELLBUTRIN XL) 150 MG 24 hr tablet    hydrocortisone (CORTAID) 1 % external cream    levothyroxine (SYNTHROID/LEVOTHROID) 75 MCG tablet    naratriptan (AMERGE) 2.5 MG tablet    propranolol (INDERAL) 20 MG tablet    topiramate (TOPAMAX) 50 MG tablet    venlafaxine (EFFEXOR XR) 150 MG 24 hr capsule     No current facility-administered medications for this visit.     Facility-Administered Medications Ordered in Other Visits   Medication    heparin 100 unit/mL injection 5 mL      Allergies  Ceclor [cefaclor] and Imitrex [sumatriptan]    PMH:  No past medical history on file.    No past surgical history on file.    Focused Physical exam pertinent to procedure:          (Details of heart, lung, ASA assessment and mallampati assessment in pre procedure assessment flowsheet)  General- healthy,alert,no distress   Recent vital signs-  /74 (BP Location: Right arm, Patient Position: Sitting, Cuff Size: Adult Large)   Pulse 61   Resp 16   Wt 98.7 kg (217 lb 11.2 oz)   SpO2 99%   BMI 36.79 kg/m    HEART-regular rate and rhythm and no murmurs, gallops, or rub  LUNGS-Clear to Ausculation  OROPHARYNGEAL - MALLAMPATTI- Class III (visualization of the  soft palate and base of uvula)    A/P:Reviewed history, medications, allergies, clinical information and pre procedure assessment. The patient was informed of the risks and benefits of the procedure.  They would like to proceed.  Jennifer Ward is approved for use of sedation during their procedure as noted above, pending anesthesia consultation.      Nomi Reid PA-C

## 2023-09-25 NOTE — ANESTHESIA POSTPROCEDURE EVALUATION
Patient: Jennifer Ward    Procedure: Procedure(s):  BIOPSY, BONE MARROW       Anesthesia Type:  MAC    Note:  Disposition: Outpatient   Postop Pain Control: Uneventful            Sign Out: Well controlled pain   PONV: No   Neuro/Psych: Uneventful            Sign Out: Acceptable/Baseline neuro status   Airway/Respiratory: Uneventful            Sign Out: Acceptable/Baseline resp. status   CV/Hemodynamics: Uneventful            Sign Out: Acceptable CV status; No obvious hypovolemia; No obvious fluid overload   Other NRE: NONE   DID A NON-ROUTINE EVENT OCCUR?            Last vitals:  Vitals Value Taken Time   BP 99/51 09/25/23 1257   Temp 36.2  C (97.2  F) 09/25/23 1257   Pulse 57 09/25/23 1257   Resp 16 09/25/23 1257   SpO2 100 % 09/25/23 1257       Electronically Signed By: Joseph Saeed MD  September 25, 2023  1:05 PM

## 2023-09-25 NOTE — LETTER
9/25/2023         RE: Jennifer Ward  [address] [address]        Dear Colleague,    Thank you for referring your patient, Jennifer Ward, to the [facility] BLOOD AND MARROW TRANSPLANT PROGRAM [place]. Please see a copy of my visit note below.    See procedure note other encounter.      Again, thank you for allowing me to participate in the care of your patient.        Sincerely,        [facility] Advanced Practice Provider

## 2023-09-25 NOTE — ANESTHESIA PREPROCEDURE EVALUATION
Anesthesia Pre-Procedure Evaluation    Patient: Jennifer Wadr   MRN: [id number] : 1975        Procedure : Procedure(s):  BIOPSY, BONE MARROW          No past medical history on file.   No past surgical history on file.   Allergies   Allergen Reactions    Ceclor [Cefaclor] Hives     Hives as a child    Imitrex [Sumatriptan] Palpitations     Tolerated for a long period, then has one time tachycardia.  Tolerates naratriptan.       Social History     Tobacco Use    Smoking status: Never     Passive exposure: Never    Smokeless tobacco: Never   Substance Use Topics    Alcohol use: Yes     Comment: Occassional      Wt Readings from Last 1 Encounters:   23 98.4 kg (217 lb)        Anesthesia Evaluation            ROS/MED HX  ENT/Pulmonary:       Neurologic:       Cardiovascular:       METS/Exercise Tolerance:     Hematologic:       Musculoskeletal:       GI/Hepatic:       Renal/Genitourinary:       Endo:       Psychiatric/Substance Use:       Infectious Disease:       Malignancy:   (+) Malignancy, History of Lymphoma/Leukemia.    Other:            Physical Exam    Airway        Mallampati: II   TM distance: > 3 FB   Neck ROM: full     Respiratory Devices and Support         Dental       (+) Completely normal teeth      Cardiovascular          Rhythm and rate: regular     Pulmonary           breath sounds clear to auscultation           OUTSIDE LABS:  CBC:   Lab Results   Component Value Date    WBC 4.1 2023    WBC 4.3 2023    HGB 12.6 2023    HGB 11.4 (L) 2023    HCT 38.1 2023    HCT 35.1 2023     2023     2023     BMP:   Lab Results   Component Value Date     2023    POTASSIUM 4.2 2023    CHLORIDE 105 2023    CO2 24 2023    BUN 21.3 (H) 2023    CR 0.99 (H) 2023    GLC 93 2023     COAGS:   Lab Results   Component Value Date    PTT 31 2023    INR 1.10 2023     POC:   Lab Results   Component  Value Date    HCGS Negative 09/20/2023     HEPATIC:   Lab Results   Component Value Date    ALBUMIN 4.4 09/20/2023    PROTTOTAL 7.1 09/20/2023    ALT 11 09/20/2023    AST 14 09/20/2023    ALKPHOS 69 09/20/2023    BILITOTAL 0.5 09/20/2023     OTHER:   Lab Results   Component Value Date    PHYLICIA 9.5 09/20/2023    PHOS 3.4 09/20/2023    MAG 1.9 09/20/2023    TSH 0.79 09/20/2023    T4 1.24 09/20/2023       Anesthesia Plan    ASA Status:  2       Anesthesia Type: MAC.     - Reason for MAC: straight local not clinically adequate              Consents    Anesthesia Plan(s) and associated risks, benefits, and realistic alternatives discussed. Questions answered and patient/representative(s) expressed understanding.     - Discussed: Risks, Benefits and Alternatives for BOTH SEDATION and the PROCEDURE were discussed     - Discussed with:  Patient            Postoperative Care    Pain management: Multi-modal analgesia.   PONV prophylaxis: Ondansetron (or other 5HT-3), Background Propofol Infusion     Comments:                Joseph Saeed MD

## 2023-09-25 NOTE — ANESTHESIA POSTPROCEDURE EVALUATION
Patient: Jennifer Ward    Procedure: Procedure(s):  BIOPSY, BONE MARROW       Anesthesia Type:  MAC    Note:  Disposition: Outpatient   Postop Pain Control: Uneventful            Sign Out: Well controlled pain   PONV: No   Neuro/Psych: Uneventful            Sign Out: Acceptable/Baseline neuro status   Airway/Respiratory: Uneventful            Sign Out: Acceptable/Baseline resp. status   CV/Hemodynamics: Uneventful            Sign Out: Acceptable CV status; No obvious hypovolemia; No obvious fluid overload   Other NRE: NONE   DID A NON-ROUTINE EVENT OCCUR?            Last vitals:  Vitals Value Taken Time   BP 91/51 09/25/23 1244   Temp 36.2  C (97.2  F) 09/25/23 1244   Pulse 62 09/25/23 1244   Resp 16 09/25/23 1244   SpO2 97 % 09/25/23 1244       Electronically Signed By: Joseph Saeed MD  September 25, 2023  12:56 PM

## 2023-09-25 NOTE — DISCHARGE INSTRUCTIONS
How to Care for your Bone Marrow Biopsy    Activity  Relax and take it easy for the next 24 hours.   Resume regular activity after 24 hours.    Diet   Resume pre-procedure diet and drink plenty of fluids.    If you received sedation, you may feel a little nauseated so start with a clear liquid diet until the nausea passes.    Do Not Immerse Bone Marrow Biopsy Puncture Site in Water  Do not take a bath until the puncture site has healed.  Do not sit in a hot tub or spa until the puncture site has healed.  Do not swim until the puncture site has healed.  Wait 24 hours before taking a shower.    Drainage  Drainage should be minimal.  IF bleeding should occur and soaks through the dressing, lie down and put pressure on the puncture site.    IF bleeding persists, apply gentle pressure with your hand over the dressing for 5 minutes.    IF the pressure doesn't stop the bleeding, contact your provider immediately.    Dressing  Keep the dressing dry and in place for 24 hours, unless instructed otherwise.    IF bleeding soaks through the dressing in the first 24 hours do NOT remove the dressing as you may pull off any scab that has formed.  Instead, reinforce the dressing with extra gauze and tape.    No Alcohol  Do not drink alcoholic beverages for the next 24 hours.    No Driving or Operating Machinery  No driving or operating machinery for the next 24 hours.    Notify your provider IF:    Excessive bleeding or drainage at the puncture site    Excessive swelling, redness or tenderness at the puncture site    Fever above 100.5 degrees taken orally    Severe pain    Drainage that is green, yellow, thick white or has a bad odor    Telephone Numbers  Bone Marrow transplant clinic:  [phone] (Monday thru Friday, 8:00 am to 4:00 pm)  After business hours call the [facility]:  [phone] and ask for the Hematology/BMT doctor on call.  Or call the Emergency Room at the [facility]  [facility]:  [phone].

## 2023-09-25 NOTE — NURSING NOTE
"Oncology Rooming Note    September 25, 2023 10:15 AM   Jennifer Ward is a 48 year old female who presents for:    Chief Complaint   Patient presents with    Blood Draw     Labs drawn with piv start by rn.  VS taken.    Oncology Clinic Visit     [facility] RETURN - HODGKINS DISEASE     Initial Vitals: /74 (BP Location: Right arm, Patient Position: Sitting, Cuff Size: Adult Large)   Pulse 61   Temp 97.8  F (36.6  C) (Oral)   Resp 16   Ht 1.638 m (5' 4.49\")   Wt 98.7 kg (217 lb 11.2 oz)   SpO2 99%   BMI 36.80 kg/m   Estimated body mass index is 36.8 kg/m  as calculated from the following:    Height as of this encounter: 1.638 m (5' 4.49\").    Weight as of this encounter: 98.7 kg (217 lb 11.2 oz). Body surface area is 2.12 meters squared.  Mild Pain (3) Comment: Data Unavailable   No LMP recorded. Patient has had a hysterectomy.  Allergies reviewed: Yes  Medications reviewed: Yes    Medications: Medication refills not needed today.  Pharmacy name entered into [facility]: [id number] - [place], [place] - [facility] [address] AT [facility] [address]    Simone Foreman LPN              "

## 2023-09-25 NOTE — ANESTHESIA CARE TRANSFER NOTE
Patient: Jennifer Ward    Procedure: Procedure(s):  BIOPSY, BONE MARROW       Diagnosis: Hodgkin's disease of lymph nodes of multiple sites (H) [C81.98]  Diagnosis Additional Information: No value filed.    Anesthesia Type:   MAC     Note:    Oropharynx: oropharynx clear of all foreign objects and spontaneously breathing  Level of Consciousness: awake  Oxygen Supplementation: room air    Independent Airway: airway patency satisfactory and stable  Dentition: dentition unchanged  Vital Signs Stable: post-procedure vital signs reviewed and stable  Report to RN Given: handoff report given  Patient transferred to: Phase II    Handoff Report: Identifed the Patient, Identified the Reponsible Provider, Reviewed the pertinent medical history, Discussed the surgical course, Reviewed Intra-OP anesthesia mangement and issues during anesthesia, Set expectations for post-procedure period and Allowed opportunity for questions and acknowledgement of understanding      Vitals:  Vitals Value Taken Time   BP 99/51 09/25/23 1257   Temp 36.2  C (97.2  F) 09/25/23 1257   Pulse 57 09/25/23 1257   Resp 16 09/25/23 1257   SpO2 100 % 09/25/23 1257       Electronically Signed By: MARIE Mahmood CRNA  September 25, 2023  1:02 PM

## 2023-09-25 NOTE — PROCEDURES
"BMT ONC Adult Bone Marrow Biopsy Procedure Note  September 25, 2023  /74 (BP Location: Right arm)   Pulse 54   Temp 97  F (36.1  C) (Temporal)   Resp 18   Ht 1.638 m (5' 4.5\")   Wt 98.4 kg (217 lb)   SpO2 100%   BMI 36.67 kg/m       Learning needs assessment complete within 12 months? YES    DIAGNOSIS: HD pre- BMT     PROCEDURE: Unilateral Bone Marrow Biopsy and Unilateral Aspirate    LOCATION: [facility] 5th floor-Procedure Room    Patient s identification was positively verified by verbal identification and invasive procedure safety checklist was completed. Informed consent was obtained. Following the administration of Propofol as pre-medication, patient was placed in the prone position and prepped and draped in a sterile manner. Approximately 15 cc of 1% Lidocaine was used over the left posterior iliac spine. Following this a 3 mm incision was made. Trephine bone marrow core(s) was (were) obtained from the [facility]. Bone marrow aspirates were obtained from the [facility]. Aspirates were sent for morphology, immunophenotyping, cytogenetics, and molecular diagnostics -process/hold. A total of approximately 20 ml of marrow was aspirated. Following this procedure a sterile dressing was applied to the bone marrow biopsy site(s). The patient was placed in the supine position to maintain pressure on the biopsy site. Post-procedure wound care instructions were given.     Complications: NO       Interventions: spinal needle for deep tissue/periosteal anesthesia    Length of procedure:20 minutes or less      Procedure performed by: Patricia Salcedo PA-C  [phone]      "

## 2023-09-25 NOTE — NURSING NOTE
Chief Complaint   Patient presents with    Blood Draw     Labs drawn with piv start by rn.  VS taken.     Labs drawn with PIV start by rn.  Pt tolerated well.  VS taken and pt checked in for next appt.    Trina Hdz RN

## 2023-09-25 NOTE — ANESTHESIA POSTPROCEDURE EVALUATION
Patient: Jennifer Ward    Procedure: Procedure(s):  BIOPSY, BONE MARROW       Anesthesia Type:  MAC    Note:  Disposition: Outpatient   Postop Pain Control: Uneventful            Sign Out: Well controlled pain   PONV: No   Neuro/Psych: Uneventful            Sign Out: Acceptable/Baseline neuro status   Airway/Respiratory: Uneventful            Sign Out: Acceptable/Baseline resp. status   CV/Hemodynamics: Uneventful            Sign Out: Acceptable CV status; No obvious hypovolemia; No obvious fluid overload   Other NRE: NONE   DID A NON-ROUTINE EVENT OCCUR?            Last vitals:  Vitals Value Taken Time   BP 93/53 09/25/23 1313   Temp 36.2  C (97.2  F) 09/25/23 1313   Pulse 54 09/25/23 1313   Resp 16 09/25/23 1313   SpO2 100 % 09/25/23 1313       Electronically Signed By: Joseph Saeed MD  September 25, 2023  1:43 PM

## 2023-09-25 NOTE — LETTER
2023         RE: Jennifer Ward  [address] [address]        Dear Colleague,    Thank you for referring your patient, Jennifer Ward, to the [facility] BLOOD AND MARROW TRANSPLANT PROGRAM [place]. Please see a copy of my visit note below.    Patient Name: Jennifer Ward  Patient MRN: [id number]  Patient : 1975    Abbreviated H&P and Pre-sedation Assessment for Bone marrow Biopsy with sedation    Chief complaint and/or reason for Procedure: Hodgkin kymphoma - work up     Planned level of sedation: MAC    History of problems with sedation: (patient or family hx): Yes; nausea following sedation otherwise no issues    ASA Assessment Category: 2 - Mild systemic disease    History of sleep apnea: No    History of blood thinners: No     Appropriate NPO status: Yes    Current tobacco use: No    Any recent fever, cough, chest or sinus congestion, SOB, weight loss, chest pain, diarrhea or constipation. No    Medications   Currently Scheduled Medications   Current Outpatient Medications   Medication    buPROPion (WELLBUTRIN XL) 150 MG 24 hr tablet    hydrocortisone (CORTAID) 1 % external cream    levothyroxine (SYNTHROID/LEVOTHROID) 75 MCG tablet    naratriptan (AMERGE) 2.5 MG tablet    propranolol (INDERAL) 20 MG tablet    topiramate (TOPAMAX) 50 MG tablet    venlafaxine (EFFEXOR XR) 150 MG 24 hr capsule     No current facility-administered medications for this visit.     Facility-Administered Medications Ordered in Other Visits   Medication    heparin 100 unit/mL injection 5 mL      Allergies  Ceclor [cefaclor] and Imitrex [sumatriptan]    PMH:  No past medical history on file.    No past surgical history on file.    Focused Physical exam pertinent to procedure:          (Details of heart, lung, ASA assessment and mallampati assessment in pre procedure assessment flowsheet)  General- healthy,alert,no distress   Recent vital signs-  /74 (BP Location: Right arm, Patient  Position: Sitting, Cuff Size: Adult Large)   Pulse 61   Resp 16   Wt 98.7 kg (217 lb 11.2 oz)   SpO2 99%   BMI 36.79 kg/m    HEART-regular rate and rhythm and no murmurs, gallops, or rub  LUNGS-Clear to Ausculation  OROPHARYNGEAL - MALLAMPATTI- Class III (visualization of the soft palate and base of uvula)    A/P:Reviewed history, medications, allergies, clinical information and pre procedure assessment. The patient was informed of the risks and benefits of the procedure.  They would like to proceed.  Jennifer Ward is approved for use of sedation during their procedure as noted above, pending anesthesia consultation.      Nomi Reid PA-C

## 2023-09-26 ENCOUNTER — HOSPITAL ENCOUNTER (OUTPATIENT)
Dept: LAB | Facility: CLINIC | Age: 48
Discharge: HOME | End: 2023-09-26
Attending: INTERNAL MEDICINE
Payer: COMMERCIAL

## 2023-09-26 ENCOUNTER — ANCILLARY PROCEDURE (OUTPATIENT)
Dept: CARDIOLOGY | Facility: CLINIC | Age: 48
End: 2023-09-26
Attending: INTERNAL MEDICINE
Payer: COMMERCIAL

## 2023-09-26 ENCOUNTER — OFFICE VISIT (OUTPATIENT)
Dept: EDUCATION SERVICES | Facility: CLINIC | Age: 48
End: 2023-09-26
Attending: INTERNAL MEDICINE
Payer: COMMERCIAL

## 2023-09-26 VITALS
RESPIRATION RATE: 16 BRPM | TEMPERATURE: 98.1 F | HEART RATE: 68 BPM | WEIGHT: 217.59 LBS | BODY MASS INDEX: 37.15 KG/M2 | SYSTOLIC BLOOD PRESSURE: 123 MMHG | DIASTOLIC BLOOD PRESSURE: 86 MMHG | HEIGHT: 64 IN

## 2023-09-26 DIAGNOSIS — Z01.818 PREOP EXAMINATION: ICD-10-CM

## 2023-09-26 DIAGNOSIS — Z11.59 SCREENING FOR VIRAL DISEASE: ICD-10-CM

## 2023-09-26 DIAGNOSIS — Z86.2 PERSONAL HISTORY OF DISEASES OF BLOOD AND BLOOD-FORMING ORGANS: ICD-10-CM

## 2023-09-26 DIAGNOSIS — C81.98 HODGKIN'S DISEASE OF LYMPH NODES OF MULTIPLE SITES (H): ICD-10-CM

## 2023-09-26 LAB
LVEF ECHO: NORMAL
PATH REPORT.COMMENTS IMP SPEC: NORMAL
PATH REPORT.FINAL DX SPEC: NORMAL
PATH REPORT.GROSS SPEC: NORMAL
PATH REPORT.MICROSCOPIC SPEC OTHER STN: NORMAL
PATH REPORT.MICROSCOPIC SPEC OTHER STN: NORMAL
PATH REPORT.RELEVANT HX SPEC: NORMAL

## 2023-09-26 PROCEDURE — 88313 SPECIAL STAINS GROUP 2: CPT | Mod: 26 | Performed by: STUDENT IN AN ORGANIZED HEALTH CARE EDUCATION/TRAINING PROGRAM

## 2023-09-26 PROCEDURE — G0463 HOSPITAL OUTPT CLINIC VISIT: HCPCS

## 2023-09-26 PROCEDURE — 93356 MYOCRD STRAIN IMG SPCKL TRCK: CPT | Performed by: STUDENT IN AN ORGANIZED HEALTH CARE EDUCATION/TRAINING PROGRAM

## 2023-09-26 PROCEDURE — 88305 TISSUE EXAM BY PATHOLOGIST: CPT | Mod: 26 | Performed by: STUDENT IN AN ORGANIZED HEALTH CARE EDUCATION/TRAINING PROGRAM

## 2023-09-26 PROCEDURE — 88341 IMHCHEM/IMCYTCHM EA ADD ANTB: CPT | Mod: 26 | Performed by: STUDENT IN AN ORGANIZED HEALTH CARE EDUCATION/TRAINING PROGRAM

## 2023-09-26 PROCEDURE — 93306 TTE W/DOPPLER COMPLETE: CPT | Performed by: STUDENT IN AN ORGANIZED HEALTH CARE EDUCATION/TRAINING PROGRAM

## 2023-09-26 PROCEDURE — 88342 IMHCHEM/IMCYTCHM 1ST ANTB: CPT | Mod: 26 | Performed by: STUDENT IN AN ORGANIZED HEALTH CARE EDUCATION/TRAINING PROGRAM

## 2023-09-26 PROCEDURE — 85097 BONE MARROW INTERPRETATION: CPT | Performed by: STUDENT IN AN ORGANIZED HEALTH CARE EDUCATION/TRAINING PROGRAM

## 2023-09-26 PROCEDURE — 88311 DECALCIFY TISSUE: CPT | Mod: 26 | Performed by: STUDENT IN AN ORGANIZED HEALTH CARE EDUCATION/TRAINING PROGRAM

## 2023-09-26 NOTE — PROGRESS NOTES
Met with patient, her mom and aunt for CVC education. Discussed what to expect, basic care and safety of the CVC, when to call 911, when to call the care team. Showed them when/how to use emergency clamp. Writer demonstrated how to flush the line, stressing the importance of leaving the end cap in place at all times. Patient, her mom and aunt all taught back flushing demo CVC with heparin. They did well with flushing and did not twist the end cap off while practicing. Also showed them how to do a dressing change and end cap change, if needed. They were all engaged in education and asked good questions.   Literature given: Handwashing and Skin Care, Your Central Venous Catheter, Caring for Your Central Venous Catheter at Home, Changing the End Cap, Flushing the Line with Heparin, Saline or Citrate, Changing Your Bandage.

## 2023-09-26 NOTE — PROGRESS NOTES
"APHERESIS INITIAL CONSULT CHECKLIST    Current Encounter Information  Current Encounter Information: Reason for Visit, Allergies and Current Meds  Procedure Requested: MNC/PBSC Collection  History of: (Reason for Apheresis): Hodgkins Lymphoma    Access Assessment  Access Assessment  Needs a catheter placed for Apheresis?: Yes, transfusion medicine physician informed.    Vital Signs  Vital Signs  BP: 123/86  Pulse: 68  Temp: 98.1  F (36.7  C)  Temp src: Oral  Resp: 16  Height: 163.8 cm (5' 4.49\")  Weight: 98.7 kg (217 lb 9.5 oz)    Reviewed   Review With Patient  Have you read the brochure Getting ready for Apheresis?: Yes  Have you had any invasive procedures, surgery, biopsy, bleeding in the last month?: Yes (bone marrow biopsy 9/25/23)  Transfusion medicine physician informed: Yes  Review medications and allergies: Yes  Have you ever been transfused?: No  Do you require pre-medication for blood products?: No  Patient given tour of the unit: Yes    Additional Information  Notes, needs and time spent with patient  Explain procedure, side effects or reactions, instructions: Yes  Patient has special need?: No  Time spent: 30 min face to face  Met with patient, her mother, and aunt for consult. Pt informed of low fat diet. Pt met with Dr. De La Paz for consent. All questions answered.        "

## 2023-09-26 NOTE — CONSULTS
"  Transfusion Medicine Consultation    Jennifer Ward [id number]   YOB: 1975 Age: 48 year old   Date of Consult: 9/26/2023     Reason for consult: Autologous Hematopoietic Progenitor Cell (HPC)  Collection           Assessment and Plan:   48 year old female presents for consultation for autologous HPC collection for Hodgkin's disease. The plan is to collect for 1 to 3 days or until the target goal is met. A central line will be placed and will be used for access for the procedure.          Chief Complaint:   Transfusion medicine consultation.         History of Present Illness:   48 year old female presents for consultation for autologous HPC collection. Her past medical history includes Hodgkin's disease. Her initial diagnoses was in 2008 and she was treated with chemo and radiation at the time. Her recent diagnosis was in May 2023 with a \"lump\" on her neck. She also has a history of migraines for which she takes daily propanolol and topimarate, and as needed naratriptan. She is currently well. The patient denies any back pain that would prevent her from tolerating the procedure. The patient has not had a recent flu vaccination. She travelled to [place] in April 2023. The patient has no identifiable risk factors for infectious disease.  The procedure, risks/benefits were discussed with the patient and all of her questions were addressed at this time.             Past Medical History:     Hodgkin's disease   Migraines           Past Surgical History:     Total hysterectomy   Breast reduction in 2003     Past Surgical History:   Procedure Laterality Date    BONE MARROW BIOPSY, BONE SPECIMEN, NEEDLE/TROCAR Left 9/25/2023    Procedure: BIOPSY, BONE MARROW;  Surgeon: Patricia Salcedo PA-C;  Location: [facility] [place]              Social History:     Social History     Tobacco Use    Smoking status: Never     Passive exposure: Never    Smokeless tobacco: Never   Substance Use Topics    Alcohol use: " Occasional      Comment: Occassional             Family History:     Father had NHL and type 2 DM           Immunizations:     Immunization History   Administered Date(s) Administered    COVID-19 MONOVALENT 12+ (Pfizer) 09/27/2021, 10/18/2021             Allergies:     Allergies   Allergen Reactions    Ceclor [Cefaclor] Hives     Hives as a child    Imitrex [Sumatriptan] Palpitations     Tolerated for a long period, then has one time tachycardia.  Tolerates naratriptan.              Medications:     Current Outpatient Medications   Medication Sig    buPROPion (WELLBUTRIN XL) 150 MG 24 hr tablet Take 150 mg by mouth every morning    hydrocortisone (CORTAID) 1 % external cream Apply topically 3 times daily    levothyroxine (SYNTHROID/LEVOTHROID) 75 MCG tablet Take 1 tablet by mouth every morning    naratriptan (AMERGE) 2.5 MG tablet Take 2.5 mg by mouth at onset of headache    propranolol (INDERAL) 20 MG tablet Take 20 mg by mouth 2 times daily    topiramate (TOPAMAX) 50 MG tablet Take 1 tablet by mouth every evening    venlafaxine (EFFEXOR XR) 150 MG 24 hr capsule Take 150 mg by mouth daily     No current facility-administered medications for this encounter.     Facility-Administered Medications Ordered in Other Encounters   Medication    heparin 100 unit/mL injection 5 mL             Review of Systems:     CONSTITUTIONAL: NEGATIVE for fever, chills  EYES: NEGATIVE for vision changes or irritation  ENT/MOUTH: NEGATIVE for ear, mouth and throat problems  RESP: NEGATIVE for significant cough or SOB  CV: NEGATIVE for chest pain, palpitations or peripheral edema  GI: NEGATIVE for nausea, abdominal pain, heartburn, or change in bowel habits  : negative for, dysuria, and frequency   MUSCULOSKELETAL: NEGATIVE for significant arthralgias or myalgia  NEURO: POSITIVE for HX headaches-migraine and NEGATIVE for HX seizure and numbness or tingling   HEME/ALLERGY/IMMUNE: NEGATIVE for bleeding problems           Vital Signs:   BP  "123/86   Pulse 68   Temp 98.1  F (36.7  C) (Oral)   Resp 16   Ht 1.638 m (5' 4.49\")   Wt 98.7 kg (217 lb 9.5 oz)   BMI 36.79 kg/m              Data:     CBC RESULTS:   Recent Labs   Lab Test 09/25/23  0946   WBC 4.1   RBC 4.45   HGB 12.6   HCT 38.1   MCV 86   MCH 28.3   MCHC 33.1   RDW 13.1        ABO/RH(D)   Date Value Ref Range Status   09/20/2023 B POS  Final     Antibody Screen   Date Value Ref Range Status   09/20/2023 Negative Negative Final     Mara (Kavon) MD Wood, PhD  Transfusion Medicine/Blood Bank Fellow  Pager: [phone]  "

## 2023-09-27 DIAGNOSIS — C81.98 HODGKIN LYMPHOMA OF LYMPH NODES OF MULTIPLE REGIONS, UNSPECIFIED HODGKIN LYMPHOMA TYPE (H): Primary | ICD-10-CM

## 2023-09-27 DIAGNOSIS — C81.18 NODULAR SCLEROSIS HODGKIN LYMPHOMA OF LYMPH NODES OF MULTIPLE REGIONS (H): Primary | ICD-10-CM

## 2023-09-27 LAB — INTERPRETATION: NORMAL

## 2023-09-27 RX ORDER — HEPARIN SODIUM (PORCINE) LOCK FLUSH IV SOLN 100 UNIT/ML 100 UNIT/ML
5 SOLUTION INTRAVENOUS
Status: CANCELLED | OUTPATIENT
Start: 2023-09-27

## 2023-09-27 RX ORDER — PLERIXAFOR 24 MG/1.2ML
0.24 SOLUTION SUBCUTANEOUS DAILY
Status: CANCELLED
Start: 2023-09-27

## 2023-09-27 RX ORDER — HEPARIN SODIUM,PORCINE 10 UNIT/ML
5-20 VIAL (ML) INTRAVENOUS DAILY PRN
Status: CANCELLED | OUTPATIENT
Start: 2023-09-27

## 2023-09-27 NOTE — PROGRESS NOTES
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 PET showed lymphoma limited to left cervical, supraclavicular, and axillary lymph nodes. BmBx negative.     Late relapse in May 2023 presenting with right neck swelling. Right cervical LN biopsy showed recurrent vs new primary cHL (nodular sclerosing subtype). PET with FDG-avid right level II and level V cervical lymph nodes only (SUVmax 13.2)    Date Treatment Name Response Side Effects / Toxicities   12/2008 to 5/2009 ABVD x 6 cycles followed by adjuvant radiation CR Fatigue, N/V   6/2023 to 8/2023 Brentuximab/nivolumab x 2 cycles, nivolumab alone x 2 cycles CR Brentuximab infusion reaction (difficulty breathing)        INTERVAL HISTORY:  Jennifer is here today with her  Philip and mother Ba. She states that she had an infusion reaction with difficulty breathing during Cycle 2 BV, so continued with nivo only for 2 more cycles (working on getting OSH notes). Otherwise did well with nivo, last dose was 8/23/23. Denies fevers, night sweats, SOB, chest pain, N/V, abd pain, diarrhea, bleeding, or numbness/tingling. Some fatigue but still able to do household activities. Saw a dentist and had a few teeth pulled in preparation for transplant.     PAST MEDICAL HISTORY:  Migraines  Umbilical hernia  Hysterectomy  Obesity  Depression    FAMILY HISTORY:  Father had non-Hodgkin lymphoma in his 40s.     SOCIAL HISTORY:  Never smoker. Rare alcohol use.   Lives in [place], [place] with her  Philip. Has a 20-year son and 2 grandkids.   Works as a .     Allergies   Allergen Reactions    Ceclor [Cefaclor] Hives     Hives as a child    Imitrex  [Sumatriptan] Palpitations     Tolerated for a long period, then has one time tachycardia.  Tolerates naratriptan.        Current Outpatient Medications:     buPROPion (WELLBUTRIN XL) 150 MG 24 hr tablet, Take 150 mg by mouth every morning, Disp: , Rfl:     hydrocortisone (CORTAID) 1 % external cream, Apply topically 3 times daily, Disp: , Rfl:     levothyroxine (SYNTHROID/LEVOTHROID) 75 MCG tablet, Take 1 tablet by mouth every morning, Disp: , Rfl:     naratriptan (AMERGE) 2.5 MG tablet, Take 2.5 mg by mouth at onset of headache, Disp: , Rfl:     propranolol (INDERAL) 20 MG tablet, Take 20 mg by mouth 2 times daily, Disp: , Rfl:     topiramate (TOPAMAX) 50 MG tablet, Take 1 tablet by mouth every evening, Disp: , Rfl:     venlafaxine (EFFEXOR XR) 150 MG 24 hr capsule, Take 150 mg by mouth daily, Disp: , Rfl:   No current facility-administered medications for this visit.    Facility-Administered Medications Ordered in Other Visits:     heparin 100 unit/mL injection 5 mL, 5 mL, Intracatheter, Q8H, Tammy Gonzalez MD, 5 mL at 09/20/23 1026     REVIEW OF SYSTEMS:  12-point ROS reviewed and negative other than that mentioned in HPI.     Objective   VITAL SIGNS:   09/28/23   /77   Temp 97.9  F (36.6  C)   SpO2 99 %   Weight 98.6 kg (217 lb 4.8 oz)   BMI (Calculated) 36.74     ECOG PS: 1     PHYSICAL EXAM:  General: Awake, alert, in no acute distress. Oriented x 3.  HEENT: Normocephalic, atraumatic. No scleral icterus.   Lymph: No cervical, supraclavicular, or axillary lymphadenopathy appreciated.   CV: Regular rate and rhythm. No murmurs, rubs, or gallops appreciated.  Resp: Good inspiratory effort, lungs clear to auscultation bilaterally.  GI: Abdomen soft, nontender, nondistended.   Ext: No peripheral edema bilaterally.  Neuro: CN II-XII grossly intact. No focal deficits.   Skin: No rash, unusual bruising or prominent lesions.  Psych: Pleasant, normal affect.    LABS:  Lab Results   Component Value Date    WBC 4.1  09/25/2023    HGB 12.6 09/25/2023    HCT 38.1 09/25/2023    MCV 86 09/25/2023     09/25/2023    INR 1.10 09/20/2023    PTT 31 09/20/2023     Lab Results   Component Value Date     09/20/2023    POTASSIUM 4.2 09/20/2023    CR 0.99 (H) 09/20/2023    BUN 21.3 (H) 09/20/2023    CHLORIDE 105 09/20/2023    PHYLICIA 9.5 09/20/2023    GLC 93 09/20/2023      Lab Results   Component Value Date    ALT 11 09/20/2023    AST 14 09/20/2023    ALKPHOS 69 09/20/2023    BILITOTAL 0.5 09/20/2023      Lab Results   Component Value Date     09/20/2023    URIC 5.1 09/20/2023 9/12/23 PET-CT overread:  IMPRESSION:   In this patient with history of Hodgkin's lymphoma relapse currently  on chemotherapy:     Interval resolution of right-sided cervical adenopathy.     New minimal FDG uptake by nonenlarged right and left axillary nodes  and bilateral inguinal nodes are likely reactive given their sizes,  morphology and uptake level.    9/20/23 PFTs:  The FVC, FEV1, FEV1/FVC ratio and IDN72-17% are within normal limits.  The diffusing capacity is normal.     9/25/23 Bone marrow biopsy:  Bone marrow, posterior iliac crest, left decalcified trephine biopsy and touch imprint; left direct aspirate smear, and concentrated aspirate smear; and peripheral smear:  - No morphologic evidence of marrow involvement by classic Hodgkin lymphoma  - Normocellular marrow for age (overall 50%) with trilineage hematopoiesis and no increase in blasts  - Peripheral blood showing normal hemogram and differential    9/26/23 TTE:  Interpretation Summary  Global and regional left ventricular function is normal with an EF of 55-60%.  Right ventricular function, chamber size, wall motion, and thickness are  normal.  Pulmonary artery systolic pressure is normal.  The inferior vena cava was normal in size with preserved respiratory  variability.  No pericardial effusion is present.    Assessment & Plan   #Recurrent classic Hodgkin lymphoma    Pleasant 47  yo female with history of stage II classic Hodgkin lymphoma in 2008 treated with ABVD x 6 cycles plus adjuvant radiation with CR. Relapsed almost 15 years out with recurrent early stage cHL involving right neck lymph nodes, unclear if relapse of same clone vs new primary cHL. However, given second occurrence of Hodgkin lymphoma, we recommended consolidative autologous stem cell transplant in CR2 as risk of relapse may be high without consolidation. HCT-CI is 2 (obesity and depression).    Restaging PET after BV-nivo shows CR and organ function all adequate to proceed with autologous transplant. Plan to start G-CSF mobilization on 9/29. Briefly reviewed schedule and risks/benefits of ASCT again. Pt agreeable and eager to proceed. She also consented to participate in the E-CELERATE study.      Given absence of high-risk features and reported intolerance to brentuximab, would not recommend BV maintenance after ASCT.     BMT and Cell Therapy Informed Consent Discussion     In today's visit, we discussed in detail the research for which Jennifer Ward is eligible. We discussed the potential risks and potential benefits of each protocol individually. We explained potential alternatives to the protocols discussed. We explained to the patient that participation is voluntary and that consent may be withdrawn at any time.     We discussed:  The rationale for our approach to the disease treatment  The eligibility requirements for treatment in the context of clinical trials  The need for caregiver support and the caregiver's role in recovery  The importance of adherence to the treatment plan and appropriate follow up  The requirements for contraception while undergoing treatment  The potential risks of morbidity and mortality related to this treatment  The requirements for supportive care to reduce the risk of infection and other complications  The role of the dietician and PT/OT to reduce the risk of muscle  loss/sarcopenia  Support that is available through our social workers and care team to mitigate distress  The desired outcomes/goals of treatment, including the possibility of long-term disease control    The patient completed the last round of treatment on 8/23/23.    HCT-CI score: 2 (depression and obesity). We counseled the patient about the impact of this on the risk of treatment related and overall mortality. The score fit within treatment protocol eligibility criteria.    Karnofsky performance score: 90       ECOG: (required for CAR-T): 1    Active infections:  none.  Prior infections that require additional special prophylaxis considerations: none.  I reviewed and discussed infectious disease evaluation with the patient and the management plan during treatment.    Reproductive status: What methods of birth control does the patient plan to use during the treatment period beginning with conditioning and ending with the discontinuation of immune suppression (indicate with an X all that apply):  _x_ The patient is confirmed to be sterile or post-menopausal  __ Sexual abstinence  __ Condoms  __ Implants  __ Injectables  __ Oral contraceptives  __ Intrauterine devices (IUD)  __ Other (describe)    The patient received appropriate reproductive counseling and agreed with the need for effective contraception during the treatment procedures.    Dental health suitable to proceed: Yes    After our detailed discussion above, the patient signed the following consents for treatment and protocols:  [id number] Arm 2 (BEAM auto)  [id number] (E-celerate)     Known issues that I take into account for medical decisions, with salient changes to the plan considering these complexities noted above.    Patient Active Problem List   Diagnosis    Hodgkin's disease of lymph nodes of multiple sites (H)         I spent 60 minutes in the care of this patient today, which included time necessary for preparation for the visit, obtaining  history, ordering medications/tests/procedures as medically indicated, review of pertinent medical literature, counseling of the patient, communication of recommendations to the care team, and documentation time.    Tammy Gonzalez MD

## 2023-09-28 ENCOUNTER — TELEPHONE (OUTPATIENT)
Dept: TRANSPLANT | Facility: CLINIC | Age: 48
End: 2023-09-28
Payer: COMMERCIAL

## 2023-09-28 ENCOUNTER — OFFICE VISIT (OUTPATIENT)
Dept: ONCOLOGY | Facility: CLINIC | Age: 48
End: 2023-09-28
Attending: INTERNAL MEDICINE
Payer: COMMERCIAL

## 2023-09-28 ENCOUNTER — ALLIED HEALTH/NURSE VISIT (OUTPATIENT)
Dept: TRANSPLANT | Facility: CLINIC | Age: 48
End: 2023-09-28
Payer: COMMERCIAL

## 2023-09-28 VITALS
DIASTOLIC BLOOD PRESSURE: 77 MMHG | WEIGHT: 217.3 LBS | SYSTOLIC BLOOD PRESSURE: 114 MMHG | RESPIRATION RATE: 16 BRPM | OXYGEN SATURATION: 99 % | HEART RATE: 52 BPM | HEIGHT: 64 IN | TEMPERATURE: 97.9 F | BODY MASS INDEX: 37.1 KG/M2

## 2023-09-28 DIAGNOSIS — C81.18 NODULAR SCLEROSIS HODGKIN LYMPHOMA OF LYMPH NODES OF MULTIPLE REGIONS (H): Primary | ICD-10-CM

## 2023-09-28 DIAGNOSIS — C81.98 HODGKIN'S DISEASE OF LYMPH NODES OF MULTIPLE SITES (H): Primary | ICD-10-CM

## 2023-09-28 DIAGNOSIS — Z52.011 AUTOLOGOUS DONOR OF STEM CELLS: Primary | ICD-10-CM

## 2023-09-28 DIAGNOSIS — Z01.818 PREOP EXAMINATION: ICD-10-CM

## 2023-09-28 DIAGNOSIS — Z86.2 PERSONAL HISTORY OF DISEASES OF BLOOD AND BLOOD-FORMING ORGANS: ICD-10-CM

## 2023-09-28 DIAGNOSIS — Z11.59 SCREENING FOR VIRAL DISEASE: ICD-10-CM

## 2023-09-28 LAB
DONOR CYTOMEGALOVIRUS ABY: NEGATIVE
DONOR HEP B CORE ABY: NORMAL
DONOR HEP B SURF AGN: NORMAL
DONOR HEPATITIS C ABY: NORMAL
DONOR HTLV 1&2 ANTIBODY: NORMAL
DONOR TREPONEMA PAL ABY: NORMAL
HBV DNA SERPL QL NAA+PROBE: NORMAL
HCV RNA SERPL QL NAA+PROBE: NORMAL
HIV1+2 AB SERPL QL IA: NORMAL
HIV1+2 RNA SERPL QL NAA+PROBE: NORMAL
TRYPANOSOMA CRUZI: NORMAL
WNV RNA SERPL DONR QL NAA+PROBE: NORMAL

## 2023-09-28 PROCEDURE — 99213 OFFICE O/P EST LOW 20 MIN: CPT | Performed by: INTERNAL MEDICINE

## 2023-09-28 PROCEDURE — 99215 OFFICE O/P EST HI 40 MIN: CPT | Performed by: INTERNAL MEDICINE

## 2023-09-28 RX ORDER — AZITHROMYCIN 250 MG/1
250 TABLET, FILM COATED ORAL DAILY
Status: ON HOLD | COMMUNITY
Start: 2023-09-27 | End: 2023-10-06

## 2023-09-28 ASSESSMENT — PAIN SCALES - GENERAL: PAINLEVEL: NO PAIN (0)

## 2023-09-28 NOTE — LETTER
9/28/2023         RE: Jennifer Ward  [address] [address]        Dear Colleague,    Thank you for referring your patient, Jennifer Ward, to the [facility]. Please see a copy of my visit note below.                                [facility]         BMT/CT FOLLOW-UP VISIT        Sep 28, 2023   Subjective  REASON FOR VISIT: Hodgkin auto close visit     ONCOLOGIC SUMMARY:  Disease presentation and baseline characteristics: Stage IIa classic Hodgkin lymphoma (nodular sclerosing subtype) dx 11/2008 via left cervical LN biopsy, presenting with left neck swelling. PET showed lymphoma limited to left cervical, supraclavicular, and axillary lymph nodes. BmBx negative.     Late relapse in May 2023 presenting with right neck swelling. Right cervical LN biopsy showed recurrent vs new primary cHL (nodular sclerosing subtype). PET with FDG-avid right level II and level V cervical lymph nodes only (SUVmax 13.2)    Date Treatment Name Response Side Effects / Toxicities   12/2008 to 5/2009 ABVD x 6 cycles followed by adjuvant radiation CR Fatigue, N/V   6/2023 to 8/2023 Brentuximab/nivolumab x 2 cycles, nivolumab alone x 2 cycles CR Brentuximab infusion reaction (difficulty breathing)        INTERVAL HISTORY:  Jennifer is here today with her  Philip and mother Ba. She states that she had an infusion reaction with difficulty breathing during Cycle 2 BV, so continued with nivo only for 2 more cycles (working on getting OSH notes). Otherwise did well with nivo, last dose was 8/23/23. Denies fevers, night sweats, SOB, chest pain, N/V, abd pain, diarrhea, bleeding, or numbness/tingling. Some fatigue but still able to do household activities. Saw a dentist and had a few teeth pulled in preparation for transplant.     PAST MEDICAL HISTORY:  Migraines  Umbilical hernia  Hysterectomy  Obesity  Depression    FAMILY HISTORY:  Father had non-Hodgkin lymphoma in his 40s.      SOCIAL HISTORY:  Never smoker. Rare alcohol use.   Lives in [place], [place] with her  Philip. Has a 20-year son and 2 grandkids.   Works as a .     Allergies   Allergen Reactions    Ceclor [Cefaclor] Hives     Hives as a child    Imitrex [Sumatriptan] Palpitations     Tolerated for a long period, then has one time tachycardia.  Tolerates naratriptan.        Current Outpatient Medications:     buPROPion (WELLBUTRIN XL) 150 MG 24 hr tablet, Take 150 mg by mouth every morning, Disp: , Rfl:     hydrocortisone (CORTAID) 1 % external cream, Apply topically 3 times daily, Disp: , Rfl:     levothyroxine (SYNTHROID/LEVOTHROID) 75 MCG tablet, Take 1 tablet by mouth every morning, Disp: , Rfl:     naratriptan (AMERGE) 2.5 MG tablet, Take 2.5 mg by mouth at onset of headache, Disp: , Rfl:     propranolol (INDERAL) 20 MG tablet, Take 20 mg by mouth 2 times daily, Disp: , Rfl:     topiramate (TOPAMAX) 50 MG tablet, Take 1 tablet by mouth every evening, Disp: , Rfl:     venlafaxine (EFFEXOR XR) 150 MG 24 hr capsule, Take 150 mg by mouth daily, Disp: , Rfl:   No current facility-administered medications for this visit.    Facility-Administered Medications Ordered in Other Visits:     heparin 100 unit/mL injection 5 mL, 5 mL, Intracatheter, Q8H, Tammy Gonzalez MD, 5 mL at 09/20/23 1026     REVIEW OF SYSTEMS:  12-point ROS reviewed and negative other than that mentioned in HPI.     Objective  VITAL SIGNS:   09/28/23   /77   Temp 97.9  F (36.6  C)   SpO2 99 %   Weight 98.6 kg (217 lb 4.8 oz)   BMI (Calculated) 36.74     ECOG PS: 1     PHYSICAL EXAM:  General: Awake, alert, in no acute distress. Oriented x 3.  HEENT: Normocephalic, atraumatic. No scleral icterus.   Lymph: No cervical, supraclavicular, or axillary lymphadenopathy appreciated.   CV: Regular rate and rhythm. No murmurs, rubs, or gallops appreciated.  Resp: Good inspiratory effort, lungs clear to auscultation bilaterally.  GI: Abdomen soft,  nontender, nondistended.   Ext: No peripheral edema bilaterally.  Neuro: CN II-XII grossly intact. No focal deficits.   Skin: No rash, unusual bruising or prominent lesions.  Psych: Pleasant, normal affect.    LABS:  Lab Results   Component Value Date    WBC 4.1 09/25/2023    HGB 12.6 09/25/2023    HCT 38.1 09/25/2023    MCV 86 09/25/2023     09/25/2023    INR 1.10 09/20/2023    PTT 31 09/20/2023     Lab Results   Component Value Date     09/20/2023    POTASSIUM 4.2 09/20/2023    CR 0.99 (H) 09/20/2023    BUN 21.3 (H) 09/20/2023    CHLORIDE 105 09/20/2023    PHYLICIA 9.5 09/20/2023    GLC 93 09/20/2023      Lab Results   Component Value Date    ALT 11 09/20/2023    AST 14 09/20/2023    ALKPHOS 69 09/20/2023    BILITOTAL 0.5 09/20/2023      Lab Results   Component Value Date     09/20/2023    URIC 5.1 09/20/2023 9/12/23 PET-CT overread:  IMPRESSION:   In this patient with history of Hodgkin's lymphoma relapse currently  on chemotherapy:     Interval resolution of right-sided cervical adenopathy.     New minimal FDG uptake by nonenlarged right and left axillary nodes  and bilateral inguinal nodes are likely reactive given their sizes,  morphology and uptake level.    9/20/23 PFTs:  The FVC, FEV1, FEV1/FVC ratio and VQX33-29% are within normal limits.  The diffusing capacity is normal.     9/25/23 Bone marrow biopsy:  Bone marrow, posterior iliac crest, left decalcified trephine biopsy and touch imprint; left direct aspirate smear, and concentrated aspirate smear; and peripheral smear:  - No morphologic evidence of marrow involvement by classic Hodgkin lymphoma  - Normocellular marrow for age (overall 50%) with trilineage hematopoiesis and no increase in blasts  - Peripheral blood showing normal hemogram and differential    9/26/23 TTE:  Interpretation Summary  Global and regional left ventricular function is normal with an EF of 55-60%.  Right ventricular function, chamber size, wall motion, and  thickness are  normal.  Pulmonary artery systolic pressure is normal.  The inferior vena cava was normal in size with preserved respiratory  variability.  No pericardial effusion is present.    Assessment & Plan  #Recurrent classic Hodgkin lymphoma    Pleasant 48 yo female with history of stage II classic Hodgkin lymphoma in 2008 treated with ABVD x 6 cycles plus adjuvant radiation with CR. Relapsed almost 15 years out with recurrent early stage cHL involving right neck lymph nodes, unclear if relapse of same clone vs new primary cHL. However, given second occurrence of Hodgkin lymphoma, we recommended consolidative autologous stem cell transplant in CR2 as risk of relapse may be high without consolidation. HCT-CI is 2 (obesity and depression).    Restaging PET after BV-nivo shows CR and organ function all adequate to proceed with autologous transplant. Plan to start G-CSF mobilization on 9/29. Briefly reviewed schedule and risks/benefits of ASCT again. Pt agreeable and eager to proceed. She also consented to participate in the E-CELERATE study.        BMT and Cell Therapy Informed Consent Discussion     In today's visit, we discussed in detail the research for which Jennifer Ward is eligible. We discussed the potential risks and potential benefits of each protocol individually. We explained potential alternatives to the protocols discussed. We explained to the patient that participation is voluntary and that consent may be withdrawn at any time.     We discussed:  The rationale for our approach to the disease treatment  The eligibility requirements for treatment in the context of clinical trials  The need for caregiver support and the caregiver's role in recovery  The importance of adherence to the treatment plan and appropriate follow up  The requirements for contraception while undergoing treatment  The potential risks of morbidity and mortality related to this treatment  The requirements for supportive care to  reduce the risk of infection and other complications  The role of the dietician and PT/OT to reduce the risk of muscle loss/sarcopenia  Support that is available through our social workers and care team to mitigate distress  The desired outcomes/goals of treatment, including the possibility of long-term disease control    The patient completed the last round of treatment on 8/23/23.    HCT-CI score: 2 (depression and obesity). We counseled the patient about the impact of this on the risk of treatment related and overall mortality. The score fit within treatment protocol eligibility criteria.    Karnofsky performance score: 90       ECOG: (required for CAR-T): 1    Active infections:  none.  Prior infections that require additional special prophylaxis considerations: none.  I reviewed and discussed infectious disease evaluation with the patient and the management plan during treatment.    Reproductive status: What methods of birth control does the patient plan to use during the treatment period beginning with conditioning and ending with the discontinuation of immune suppression (indicate with an X all that apply):  _x_ The patient is confirmed to be sterile or post-menopausal  __ Sexual abstinence  __ Condoms  __ Implants  __ Injectables  __ Oral contraceptives  __ Intrauterine devices (IUD)  __ Other (describe)    The patient received appropriate reproductive counseling and agreed with the need for effective contraception during the treatment procedures.    Dental health suitable to proceed: Yes    After our detailed discussion above, the patient signed the following consents for treatment and protocols:  [id number] Arm 2 (BEAM auto)  [id number] (E-celerate)     Known issues that I take into account for medical decisions, with salient changes to the plan considering these complexities noted above.    Patient Active Problem List   Diagnosis    Hodgkin's disease of lymph nodes of multiple sites (H)         I spent 60  minutes in the care of this patient today, which included time necessary for preparation for the visit, obtaining history, ordering medications/tests/procedures as medically indicated, review of pertinent medical literature, counseling of the patient, communication of recommendations to the care team, and documentation time.    Tammy Gonzalez MD

## 2023-09-28 NOTE — NURSING NOTE
"Oncology Rooming Note    September 28, 2023 2:57 PM   Jennifer Ward is a 48 year old female who presents for:    Chief Complaint   Patient presents with    Oncology Clinic Visit     Hodgkin's Disease     Initial Vitals: /77 (BP Location: Right arm, Patient Position: Sitting, Cuff Size: Adult Regular)   Pulse 52   Temp 97.9  F (36.6  C) (Oral)   Resp 16   Ht 1.638 m (5' 4.49\")   Wt 98.6 kg (217 lb 4.8 oz)   SpO2 99%   BMI 36.74 kg/m   Estimated body mass index is 36.74 kg/m  as calculated from the following:    Height as of this encounter: 1.638 m (5' 4.49\").    Weight as of this encounter: 98.6 kg (217 lb 4.8 oz). Body surface area is 2.12 meters squared.  No Pain (0) Comment: Data Unavailable   No LMP recorded. Patient has had a hysterectomy.  Allergies reviewed: Yes  Medications reviewed: Yes    Medications: Medication refills not needed today.  Pharmacy name entered into Switch[employer]: [id number] - [address] AT [facility] & [address]    Clinical concerns: None       Mita Ray LPN  9/28/2023              "

## 2023-09-28 NOTE — TELEPHONE ENCOUNTER
BMT CSW Telephone Encounter  Clinical Social Work  [facility]      Focus: Supportive Counseling/Resources    Data: Pt is a 48 year old female diagnosed with Hodgkin's Lymphoma who is undergoing workup for an Autologous transplant.    Interventions: Clinical  (CSW) returned pt's phone call on 9/28/23 to assist with willem application questions.  Pt shared she completed and submitted the [employer] application but still needs the medical information form completed by a member of the medical team. CSW agreed to complete the form and fax to [employer] @ [phone]. Pt expressed appreciation and did not have any further questions or concerns at this time.  CSW assessed coping, provide supportive counseling and assist with resources as needed. Pt was encouraged to contact CSW for further support, questions and/or resources.    Assessment: Pt presented as anxious but pleasant.  Pt appears to be coping well at this time. Pt continues to be supported by her family.     Plan: CSW will continue to work with Pt and family to provide supportive counseling and assist with resources as needed. CSW will continue to collaborate with multidisciplinary team regarding pt's plan of care.     Stephanie Gregg, DUONG, [facility]   Clinical   [facility]  Adult Blood and Marrow Transplant and Cellular Therapy Program  Phone: [phone]  Pager: [phone]

## 2023-09-29 ENCOUNTER — HOME INFUSION (PRE-WILLOW HOME INFUSION) (OUTPATIENT)
Dept: PHARMACY | Facility: CLINIC | Age: 48
End: 2023-09-29
Payer: COMMERCIAL

## 2023-09-29 NOTE — PROGRESS NOTES
Therapy: Line Care  Insurance: BCBS    Patient has coverage for line care with their BCBS plan. Coverage is 100% since deductible and out of pocket have been met in full at this time.    Ded: $2850  Met: $2850  Co-Insurance: 70/30  Max Out of Pocket: $7750  Met: $7750    [facility] BMT clinic in reference to 09/28/2023 benefit check for line care coverage.    Please contact Intake with any questions, [phone] or In Basket pool, [facility] ([phone]).

## 2023-09-29 NOTE — PROGRESS NOTES
EL1071-02/E-CELERATE: Informed Consent Note     The consent form, including purpose, risks and benefits, was reviewed with Corry Ward, and all questions were answered before she signed the consent form. The patient understands that the study involves an active treatment phase as well as a post-treatment follow up phase.     Present during the discussion was patient, family, and Dr. Gonzalez. A copy of the signed form was provided to the patient. No procedures specific to this study were performed prior to the patient signing the consent form.    Consent Version Date: 7Dec22  Consent obtained by: Tammy Gonzalez MD    Date: 28Sep23  HIPAA authorization signed?: yes  HIPAA authorization version date: 12Aug22    CORRY SHERMAN RN    Form 503.03.01 (Version 2)     Effective date: 01AUG2018     Next Review Date: 01AUG2020

## 2023-09-30 ENCOUNTER — INFUSION THERAPY VISIT (OUTPATIENT)
Dept: TRANSPLANT | Facility: CLINIC | Age: 48
End: 2023-09-30
Attending: INTERNAL MEDICINE
Payer: COMMERCIAL

## 2023-09-30 VITALS
RESPIRATION RATE: 16 BRPM | HEART RATE: 62 BPM | SYSTOLIC BLOOD PRESSURE: 100 MMHG | OXYGEN SATURATION: 100 % | DIASTOLIC BLOOD PRESSURE: 67 MMHG | TEMPERATURE: 98.2 F

## 2023-09-30 DIAGNOSIS — Z52.011 AUTOLOGOUS DONOR OF STEM CELLS: Primary | ICD-10-CM

## 2023-09-30 PROCEDURE — 250N000011 HC RX IP 250 OP 636: Mod: JZ | Performed by: INTERNAL MEDICINE

## 2023-09-30 PROCEDURE — 96372 THER/PROPH/DIAG INJ SC/IM: CPT | Performed by: INTERNAL MEDICINE

## 2023-09-30 RX ORDER — PLERIXAFOR 24 MG/1.2ML
0.24 SOLUTION SUBCUTANEOUS DAILY
Status: CANCELLED
Start: 2023-10-03

## 2023-09-30 RX ORDER — HEPARIN SODIUM,PORCINE 10 UNIT/ML
5-20 VIAL (ML) INTRAVENOUS DAILY PRN
Status: CANCELLED | OUTPATIENT
Start: 2023-10-03

## 2023-09-30 RX ORDER — HEPARIN SODIUM (PORCINE) LOCK FLUSH IV SOLN 100 UNIT/ML 100 UNIT/ML
5 SOLUTION INTRAVENOUS
Status: CANCELLED | OUTPATIENT
Start: 2023-10-03

## 2023-09-30 RX ADMIN — FILGRASTIM-AAFI 1080 MCG: 480 INJECTION, SOLUTION INTRAVENOUS; SUBCUTANEOUS at 09:41

## 2023-09-30 ASSESSMENT — PAIN SCALES - GENERAL: PAINLEVEL: NO PAIN (0)

## 2023-09-30 NOTE — PROGRESS NOTES
Infusion Nursing Note:  Jennifer Ward presents today for   Chief Complaint   Patient presents with    Infusion     Re bmt for lymphoma here for first CSF inj.     .    Patient seen by provider today: No   present during visit today: Not Applicable.    Note: pt received first nivestym inj, monitored x 15 min post inj. No urine pregnancy test needed.  Pt hasd a total hysterectomy last spring      Intravenous Access:  No Intravenous access/labs at this visit.    Treatment Conditions:  Not Applicable.      Post Infusion Assessment:  Patient tolerated injection without incident.       Discharge Plan:   Patient and/or family verbalized understanding of discharge instructions and all questions answered.      Enriqueta Melgar RN

## 2023-10-01 ENCOUNTER — INFUSION THERAPY VISIT (OUTPATIENT)
Dept: TRANSPLANT | Facility: CLINIC | Age: 48
End: 2023-10-01
Attending: INTERNAL MEDICINE
Payer: COMMERCIAL

## 2023-10-01 VITALS — TEMPERATURE: 98.7 F | OXYGEN SATURATION: 99 % | HEART RATE: 61 BPM | RESPIRATION RATE: 16 BRPM

## 2023-10-01 DIAGNOSIS — Z52.011 AUTOLOGOUS DONOR OF STEM CELLS: Primary | ICD-10-CM

## 2023-10-01 PROCEDURE — 250N000011 HC RX IP 250 OP 636: Mod: JZ | Performed by: INTERNAL MEDICINE

## 2023-10-01 PROCEDURE — 96372 THER/PROPH/DIAG INJ SC/IM: CPT | Performed by: INTERNAL MEDICINE

## 2023-10-01 RX ORDER — PLERIXAFOR 24 MG/1.2ML
0.24 SOLUTION SUBCUTANEOUS DAILY
Status: CANCELLED
Start: 2023-10-03

## 2023-10-01 RX ORDER — HEPARIN SODIUM,PORCINE 10 UNIT/ML
5-20 VIAL (ML) INTRAVENOUS DAILY PRN
Status: CANCELLED | OUTPATIENT
Start: 2023-10-03

## 2023-10-01 RX ORDER — HEPARIN SODIUM (PORCINE) LOCK FLUSH IV SOLN 100 UNIT/ML 100 UNIT/ML
5 SOLUTION INTRAVENOUS
Status: CANCELLED | OUTPATIENT
Start: 2023-10-03

## 2023-10-01 RX ADMIN — FILGRASTIM-AAFI 1080 MCG: 480 INJECTION, SOLUTION INTRAVENOUS; SUBCUTANEOUS at 09:19

## 2023-10-01 ASSESSMENT — PAIN SCALES - GENERAL: PAINLEVEL: NO PAIN (0)

## 2023-10-01 NOTE — PROGRESS NOTES
Chief Complaint   Patient presents with    Oncology Clinic Visit     Pre bmt for lymphoma here for nivestym inj     Tolerating inj well, aware of future visits..  Enriqueta Melgar RN on 10/1/2023 at 9:33 AM

## 2023-10-02 ENCOUNTER — ALLIED HEALTH/NURSE VISIT (OUTPATIENT)
Dept: TRANSPLANT | Facility: CLINIC | Age: 48
End: 2023-10-02
Attending: INTERNAL MEDICINE
Payer: COMMERCIAL

## 2023-10-02 VITALS
OXYGEN SATURATION: 100 % | TEMPERATURE: 97.8 F | DIASTOLIC BLOOD PRESSURE: 62 MMHG | RESPIRATION RATE: 16 BRPM | HEART RATE: 54 BPM | SYSTOLIC BLOOD PRESSURE: 110 MMHG

## 2023-10-02 DIAGNOSIS — C81.98 HODGKIN'S DISEASE OF LYMPH NODES OF MULTIPLE SITES (H): Primary | ICD-10-CM

## 2023-10-02 DIAGNOSIS — Z52.011 AUTOLOGOUS DONOR OF STEM CELLS: Primary | ICD-10-CM

## 2023-10-02 PROCEDURE — G0463 HOSPITAL OUTPT CLINIC VISIT: HCPCS

## 2023-10-02 PROCEDURE — 99213 OFFICE O/P EST LOW 20 MIN: CPT

## 2023-10-02 PROCEDURE — 96372 THER/PROPH/DIAG INJ SC/IM: CPT | Performed by: INTERNAL MEDICINE

## 2023-10-02 PROCEDURE — 250N000011 HC RX IP 250 OP 636: Mod: JZ | Performed by: INTERNAL MEDICINE

## 2023-10-02 RX ORDER — LORATADINE 10 MG/1
10 TABLET ORAL DAILY
Status: ON HOLD | COMMUNITY
End: 2023-10-06

## 2023-10-02 RX ORDER — HEPARIN SODIUM,PORCINE 10 UNIT/ML
5-20 VIAL (ML) INTRAVENOUS DAILY PRN
Status: CANCELLED | OUTPATIENT
Start: 2023-10-04

## 2023-10-02 RX ORDER — HEPARIN SODIUM (PORCINE) LOCK FLUSH IV SOLN 100 UNIT/ML 100 UNIT/ML
5 SOLUTION INTRAVENOUS
Status: CANCELLED | OUTPATIENT
Start: 2023-10-04

## 2023-10-02 RX ORDER — ACETAMINOPHEN 325 MG/1
650 TABLET ORAL EVERY 6 HOURS PRN
COMMUNITY
End: 2024-02-10

## 2023-10-02 RX ORDER — PLERIXAFOR 24 MG/1.2ML
0.24 SOLUTION SUBCUTANEOUS DAILY
Status: CANCELLED
Start: 2023-10-04

## 2023-10-02 RX ADMIN — FILGRASTIM-AAFI 1080 MCG: 480 INJECTION, SOLUTION INTRAVENOUS; SUBCUTANEOUS at 10:26

## 2023-10-02 ASSESSMENT — PAIN SCALES - GENERAL: PAINLEVEL: MILD PAIN (3)

## 2023-10-03 ENCOUNTER — ALLIED HEALTH/NURSE VISIT (OUTPATIENT)
Dept: TRANSPLANT | Facility: CLINIC | Age: 48
End: 2023-10-03
Attending: PHYSICIAN ASSISTANT
Payer: COMMERCIAL

## 2023-10-03 ENCOUNTER — APPOINTMENT (OUTPATIENT)
Dept: LAB | Facility: CLINIC | Age: 48
End: 2023-10-03
Attending: PHYSICIAN ASSISTANT
Payer: COMMERCIAL

## 2023-10-03 ENCOUNTER — ANESTHESIA EVENT (OUTPATIENT)
Dept: SURGERY | Facility: AMBULATORY SURGERY CENTER | Age: 48
End: 2023-10-03
Payer: COMMERCIAL

## 2023-10-03 VITALS
RESPIRATION RATE: 16 BRPM | HEART RATE: 64 BPM | TEMPERATURE: 97.5 F | DIASTOLIC BLOOD PRESSURE: 77 MMHG | WEIGHT: 219.9 LBS | OXYGEN SATURATION: 97 % | SYSTOLIC BLOOD PRESSURE: 112 MMHG | BODY MASS INDEX: 37.18 KG/M2

## 2023-10-03 DIAGNOSIS — C81.18 NODULAR SCLEROSIS HODGKIN LYMPHOMA OF LYMPH NODES OF MULTIPLE REGIONS (H): ICD-10-CM

## 2023-10-03 DIAGNOSIS — Z52.011 AUTOLOGOUS DONOR OF STEM CELLS: Primary | ICD-10-CM

## 2023-10-03 LAB
BASOPHILS # BLD MANUAL: 0.3 10E3/UL (ref 0–0.2)
BASOPHILS NFR BLD MANUAL: 1 %
BILIRUB DIRECT SERPL-MCNC: <0.2 MG/DL (ref 0–0.3)
CD34 ABSOLUTE COUNT COMMENT: NORMAL
CD34 CELLS # SPEC: 45 CELLS/UL
CD34 CELLS NFR SPEC: 0.18 %
EOSINOPHIL # BLD MANUAL: 1.1 10E3/UL (ref 0–0.7)
EOSINOPHIL NFR BLD MANUAL: 4 %
ERYTHROCYTE [DISTWIDTH] IN BLOOD BY AUTOMATED COUNT: 13.6 % (ref 10–15)
HCT VFR BLD AUTO: 34.3 % (ref 35–47)
HGB BLD-MCNC: 11.5 G/DL (ref 11.7–15.7)
LYMPHOCYTES # BLD MANUAL: 2.5 10E3/UL (ref 0.8–5.3)
LYMPHOCYTES NFR BLD MANUAL: 9 %
MCH RBC QN AUTO: 28.6 PG (ref 26.5–33)
MCHC RBC AUTO-ENTMCNC: 33.5 G/DL (ref 31.5–36.5)
MCV RBC AUTO: 85 FL (ref 78–100)
MONOCYTES # BLD MANUAL: 0.6 10E3/UL (ref 0–1.3)
MONOCYTES NFR BLD MANUAL: 2 %
MYELOCYTES # BLD MANUAL: 0.3 10E3/UL
MYELOCYTES NFR BLD MANUAL: 1 %
NEUTROPHILS # BLD MANUAL: 23.3 10E3/UL (ref 1.6–8.3)
NEUTROPHILS NFR BLD MANUAL: 83 %
PLAT MORPH BLD: ABNORMAL
PLATELET # BLD AUTO: 189 10E3/UL (ref 150–450)
PRODUCT NUMBER FLOW CYTOMETRY: NORMAL
RBC # BLD AUTO: 4.02 10E6/UL (ref 3.8–5.2)
RBC MORPH BLD: ABNORMAL
RETICS # AUTO: 0.1 10E6/UL (ref 0.03–0.1)
RETICS/RBC NFR AUTO: 2.6 % (ref 0.5–2)
VIABLE CD34 CELLS NFR FLD: 99.55 %
WBC # BLD AUTO: 28.1 10E3/UL (ref 4–11)

## 2023-10-03 PROCEDURE — 85045 AUTOMATED RETICULOCYTE COUNT: CPT

## 2023-10-03 PROCEDURE — 96372 THER/PROPH/DIAG INJ SC/IM: CPT | Mod: XS | Performed by: INTERNAL MEDICINE

## 2023-10-03 PROCEDURE — 86367 STEM CELLS TOTAL COUNT: CPT

## 2023-10-03 PROCEDURE — 99214 OFFICE O/P EST MOD 30 MIN: CPT

## 2023-10-03 PROCEDURE — 82248 BILIRUBIN DIRECT: CPT

## 2023-10-03 PROCEDURE — 85007 BL SMEAR W/DIFF WBC COUNT: CPT

## 2023-10-03 PROCEDURE — 250N000011 HC RX IP 250 OP 636: Mod: JZ | Performed by: PHYSICIAN ASSISTANT

## 2023-10-03 PROCEDURE — 36591 DRAW BLOOD OFF VENOUS DEVICE: CPT

## 2023-10-03 PROCEDURE — 85027 COMPLETE CBC AUTOMATED: CPT

## 2023-10-03 PROCEDURE — 250N000011 HC RX IP 250 OP 636: Mod: JZ | Performed by: INTERNAL MEDICINE

## 2023-10-03 PROCEDURE — 99213 OFFICE O/P EST LOW 20 MIN: CPT | Mod: 25

## 2023-10-03 PROCEDURE — 96374 THER/PROPH/DIAG INJ IV PUSH: CPT

## 2023-10-03 RX ORDER — HEPARIN SODIUM,PORCINE 10 UNIT/ML
5-20 VIAL (ML) INTRAVENOUS DAILY PRN
Status: CANCELLED | OUTPATIENT
Start: 2023-10-05

## 2023-10-03 RX ORDER — HEPARIN SODIUM (PORCINE) LOCK FLUSH IV SOLN 100 UNIT/ML 100 UNIT/ML
5 SOLUTION INTRAVENOUS
Status: CANCELLED | OUTPATIENT
Start: 2023-10-05

## 2023-10-03 RX ORDER — HEPARIN SODIUM (PORCINE) LOCK FLUSH IV SOLN 100 UNIT/ML 100 UNIT/ML
5 SOLUTION INTRAVENOUS ONCE
Status: COMPLETED | OUTPATIENT
Start: 2023-10-03 | End: 2023-10-03

## 2023-10-03 RX ORDER — PLERIXAFOR 24 MG/1.2ML
0.24 SOLUTION SUBCUTANEOUS DAILY
Status: CANCELLED
Start: 2023-10-05

## 2023-10-03 RX ADMIN — FILGRASTIM-AAFI 1080 MCG: 480 INJECTION, SOLUTION INTRAVENOUS; SUBCUTANEOUS at 09:41

## 2023-10-03 RX ADMIN — ALTEPLASE 2 MG: 2.2 INJECTION, POWDER, LYOPHILIZED, FOR SOLUTION INTRAVENOUS at 09:37

## 2023-10-03 RX ADMIN — Medication 5 ML: at 10:26

## 2023-10-03 RX ADMIN — Medication 5 ML: at 09:19

## 2023-10-03 ASSESSMENT — PAIN SCALES - GENERAL: PAINLEVEL: NO PAIN (0)

## 2023-10-03 NOTE — LETTER
10/3/2023         RE: Jennifer Ward  [address] [address]        Dear Colleague,    Thank you for referring your patient, Jennifer Ward, to the [facility]. Please see a copy of my visit note below.     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 PET showed lymphoma limited to left cervical, supraclavicular, and axillary lymph nodes. BmBx negative.      Late relapse in May 2023 presenting with right neck swelling. Right cervical LN biopsy showed recurrent vs new primary cHL (nodular sclerosing subtype). PET with FDG-avid right level II and level V cervical lymph nodes only (SUVmax 13.2)     Date Treatment Name Response Side Effects / Toxicities   12/2008 to 5/2009 ABVD x 6 cycles followed by adjuvant radiation CR Fatigue, N/V   6/2023 to 8/2023 Brentuximab/nivolumab x 2 cycles, nivolumab alone x 2 cycles CR Brentuximab infusion reaction (difficulty breathing)         INTERVAL HISTORY:  Here for follow-up and labs. She has some aches from GCSF, started Claritin yesterday and taking Tylenol prn. No fevers or other sx. Line placement tomorrow am.      REVIEW OF SYSTEMS:  8-point ROS reviewed and negative other than that mentioned in HPI.           PHYSICAL EXAM:  Blood pressure 112/77, pulse 64, temperature 97.5  F (36.4  C), temperature source Oral, resp. rate 16, weight 99.7 kg (219 lb 14.4 oz), SpO2 97 %.  Wt Readings from Last 4 Encounters:   10/03/23 99.7 kg (219 lb 14.4 oz)   09/28/23 98.6 kg (217 lb 4.8 oz)   09/26/23 98.7 kg (217 lb 9.5 oz)   09/25/23 98.4 kg (217 lb)       General: Awake, alert, in no acute distress.  Oriented x 3.  HEENT: Normocephalic, atraumatic. No scleral icterus.   Ext: No peripheral edema bilaterally.  Neuro: CN II-XII grossly intact. No focal deficits.   Skin: No rash, unusual bruising or prominent lesions.  Psych: Pleasant, normal affect.     LABS:  Lab Results   Component Value Date    WBC 28.1 (H) 10/03/2023    ANEU 23.3 (H) 10/03/2023    HGB 11.5 (L) 10/03/2023    HCT 34.3 (L) 10/03/2023     10/03/2023     09/20/2023    POTASSIUM 4.2 09/20/2023    CHLORIDE 105 09/20/2023    CO2 24 09/20/2023    GLC 93 09/20/2023    BUN 21.3 (H) 09/20/2023    CR 0.99 (H) 09/20/2023    MAG 1.9 09/20/2023    INR 1.10 09/20/2023    BILITOTAL 0.5 09/20/2023    AST 14 09/20/2023    ALT 11 09/20/2023    ALKPHOS 69 09/20/2023    PROTTOTAL 7.1 09/20/2023    ALBUMIN 4.4 09/20/2023            Assessment & Plan: Jennifer Ward is a 47 yo woman currently Day 4 of GCSF priming prior to planned BEAM auto PBSCT for HD    Recurrent classic Hodgkin lymphoma/BMT  Restaging PET after BV-nivo shows CR and organ function all adequate to proceed with autologous transplant.   - Day 4 GCSF mobilization; CD34 45. Line placement tomorrow then start apheresis.  - Goal 5  - She also consented to participate in the E-CELERATE study.    - Given absence of high-risk features and reported intolerance to brentuximab, would not recommend BV maintenance after ASCT.     Psych:  #hx depression  Cont Effexor, Wellbutrin    Endocrine:  #hx hypothyroid: levothyroxine         I spent 30 minutes in the care of this patient today, which included time necessary for preparation for the visit, obtaining history, ordering medications/tests/procedures as medically indicated, review of pertinent medical literature, counseling of the patient, communication of recommendations to the care team, and documentation time.    MASTER Cabrera-C  [phone]

## 2023-10-03 NOTE — PROGRESS NOTES
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 PET showed lymphoma limited to left cervical, supraclavicular, and axillary lymph nodes. BmBx negative.      Late relapse in May 2023 presenting with right neck swelling. Right cervical LN biopsy showed recurrent vs new primary cHL (nodular sclerosing subtype). PET with FDG-avid right level II and level V cervical lymph nodes only (SUVmax 13.2)     Date Treatment Name Response Side Effects / Toxicities   12/2008 to 5/2009 ABVD x 6 cycles followed by adjuvant radiation CR Fatigue, N/V   6/2023 to 8/2023 Brentuximab/nivolumab x 2 cycles, nivolumab alone x 2 cycles CR Brentuximab infusion reaction (difficulty breathing)         INTERVAL HISTORY:  Here for follow-up and labs. She has some aches from GCSF, started Claritin yesterday and taking Tylenol prn. No fevers or other sx. Line placement tomorrow am.      REVIEW OF SYSTEMS:  8-point ROS reviewed and negative other than that mentioned in HPI.           PHYSICAL EXAM:  Blood pressure 112/77, pulse 64, temperature 97.5  F (36.4  C), temperature source Oral, resp. rate 16, weight 99.7 kg (219 lb 14.4 oz), SpO2 97 %.  Wt Readings from Last 4 Encounters:   10/03/23 99.7 kg (219 lb 14.4 oz)   09/28/23 98.6 kg (217 lb 4.8 oz)   09/26/23 98.7 kg (217 lb 9.5 oz)   09/25/23 98.4 kg (217 lb)       General: Awake, alert, in no acute distress. Oriented x 3.  HEENT: Normocephalic, atraumatic. No scleral icterus.   Ext: No peripheral edema bilaterally.  Neuro: CN II-XII grossly intact. No focal deficits.   Skin: No rash, unusual bruising or prominent lesions.  Psych: Pleasant, normal affect.     LABS:  Lab Results   Component  Value Date    WBC 28.1 (H) 10/03/2023    ANEU 23.3 (H) 10/03/2023    HGB 11.5 (L) 10/03/2023    HCT 34.3 (L) 10/03/2023     10/03/2023     09/20/2023    POTASSIUM 4.2 09/20/2023    CHLORIDE 105 09/20/2023    CO2 24 09/20/2023    GLC 93 09/20/2023    BUN 21.3 (H) 09/20/2023    CR 0.99 (H) 09/20/2023    MAG 1.9 09/20/2023    INR 1.10 09/20/2023    BILITOTAL 0.5 09/20/2023    AST 14 09/20/2023    ALT 11 09/20/2023    ALKPHOS 69 09/20/2023    PROTTOTAL 7.1 09/20/2023    ALBUMIN 4.4 09/20/2023            Assessment & Plan: Jennifer Ward is a 47 yo woman currently Day 4 of GCSF priming prior to planned BEAM auto PBSCT for HD    Recurrent classic Hodgkin lymphoma/BMT  Restaging PET after BV-nivo shows CR and organ function all adequate to proceed with autologous transplant.   - Day 4 GCSF mobilization; CD34 45. Line placement tomorrow then start apheresis.  - Goal 5  - She also consented to participate in the E-CELERATE study.    - Given absence of high-risk features and reported intolerance to brentuximab, would not recommend BV maintenance after ASCT.     Psych:  #hx depression  Cont Effexor, Wellbutrin    Endocrine:  #hx hypothyroid: levothyroxine         I spent 30 minutes in the care of this patient today, which included time necessary for preparation for the visit, obtaining history, ordering medications/tests/procedures as medically indicated, review of pertinent medical literature, counseling of the patient, communication of recommendations to the care team, and documentation time.    MASTER Cabrera-C  [phone]

## 2023-10-03 NOTE — PROGRESS NOTES
Pre-collections GCSF administered subcutaneous in right posterior arm. Pt tolerated well.     Lab staff unable to obtain blood return from port, TPA instilled and allowed to dwell x30 minutes with success.    Gricelda Mejia RN

## 2023-10-03 NOTE — NURSING NOTE
Chief Complaint   Patient presents with    Blood Draw     No blood return port. Heparin flushed by lab RN. Vitals taken and appointment arrived     Clinic RN notified for TPA. Labs not drawn.    Stephanie Barker RN

## 2023-10-03 NOTE — NURSING NOTE
"Oncology Rooming Note    October 3, 2023 9:51 AM   Jennifer Ward is a 48 year old female who presents for:    Chief Complaint   Patient presents with    Blood Draw     No blood return port. Heparin flushed by lab RN. Vitals taken and appointment arrived    Oncology Clinic Visit     Rtn pre-bmt collections hx HL     Initial Vitals: /77   Pulse 64   Temp 97.5  F (36.4  C) (Oral)   Resp 16   Wt 99.7 kg (219 lb 14.4 oz)   SpO2 97%   BMI 37.18 kg/m   Estimated body mass index is 37.18 kg/m  as calculated from the following:    Height as of 9/28/23: 1.638 m (5' 4.49\").    Weight as of this encounter: 99.7 kg (219 lb 14.4 oz). Body surface area is 2.13 meters squared.  No Pain (0) Comment: Data Unavailable   No LMP recorded. Patient has had a hysterectomy.  Allergies reviewed: Yes  Medications reviewed: Yes    Medications: Medication refills not needed today.  Pharmacy [facility] entered into [facility]: [id number] - [address] AT [facility] & [address]    Clinical concerns: none       Gricelda Mejia RN             "

## 2023-10-04 ENCOUNTER — HOSPITAL ENCOUNTER (OUTPATIENT)
Facility: AMBULATORY SURGERY CENTER | Age: 48
Discharge: HOME | End: 2023-10-04
Attending: RADIOLOGY
Payer: COMMERCIAL

## 2023-10-04 ENCOUNTER — ANCILLARY PROCEDURE (OUTPATIENT)
Dept: RADIOLOGY | Facility: AMBULATORY SURGERY CENTER | Age: 48
End: 2023-10-04
Attending: INTERNAL MEDICINE
Payer: COMMERCIAL

## 2023-10-04 ENCOUNTER — ANESTHESIA (OUTPATIENT)
Dept: SURGERY | Facility: AMBULATORY SURGERY CENTER | Age: 48
End: 2023-10-04
Payer: COMMERCIAL

## 2023-10-04 ENCOUNTER — ONCOLOGY VISIT (OUTPATIENT)
Dept: TRANSPLANT | Facility: CLINIC | Age: 48
End: 2023-10-04
Attending: INTERNAL MEDICINE
Payer: COMMERCIAL

## 2023-10-04 ENCOUNTER — HOSPITAL ENCOUNTER (OUTPATIENT)
Dept: LAB | Facility: CLINIC | Age: 48
Discharge: HOME | End: 2023-10-04
Attending: INTERNAL MEDICINE
Payer: COMMERCIAL

## 2023-10-04 VITALS
TEMPERATURE: 97.1 F | SYSTOLIC BLOOD PRESSURE: 112 MMHG | OXYGEN SATURATION: 100 % | DIASTOLIC BLOOD PRESSURE: 72 MMHG | RESPIRATION RATE: 15 BRPM | BODY MASS INDEX: 37.05 KG/M2 | HEART RATE: 59 BPM | HEIGHT: 64 IN | WEIGHT: 217 LBS

## 2023-10-04 VITALS
SYSTOLIC BLOOD PRESSURE: 111 MMHG | TEMPERATURE: 97.9 F | WEIGHT: 219.14 LBS | HEART RATE: 60 BPM | BODY MASS INDEX: 37.41 KG/M2 | RESPIRATION RATE: 14 BRPM | HEIGHT: 64 IN | DIASTOLIC BLOOD PRESSURE: 60 MMHG

## 2023-10-04 DIAGNOSIS — Z52.011 AUTOLOGOUS DONOR OF STEM CELLS: Primary | ICD-10-CM

## 2023-10-04 DIAGNOSIS — C81.18 NODULAR SCLEROSIS HODGKIN LYMPHOMA OF LYMPH NODES OF MULTIPLE REGIONS (H): ICD-10-CM

## 2023-10-04 DIAGNOSIS — C81.98 HODGKIN LYMPHOMA OF LYMPH NODES OF MULTIPLE REGIONS, UNSPECIFIED HODGKIN LYMPHOMA TYPE (H): ICD-10-CM

## 2023-10-04 LAB
ANION GAP SERPL CALCULATED.3IONS-SCNC: 12 MMOL/L (ref 7–15)
BASO+EOS+MONOS # BLD AUTO: ABNORMAL 10*3/UL
BASO+EOS+MONOS NFR BLD AUTO: ABNORMAL %
BASOPHILS # BLD AUTO: ABNORMAL 10*3/UL
BASOPHILS # BLD MANUAL: 0 10E3/UL (ref 0–0.2)
BASOPHILS NFR BLD AUTO: ABNORMAL %
BASOPHILS NFR BLD MANUAL: 0 %
BILL ONLY AUTO PBPC FREEZE: NORMAL
BUN SERPL-MCNC: 14.8 MG/DL (ref 6–20)
CALCIUM SERPL-MCNC: 9.4 MG/DL (ref 8.6–10)
CD34 ABSOLUTE COUNT COMMENT: NORMAL
CD34 CELLS # SPEC: 84 CELLS/UL
CD34 CELLS NFR SPEC: 0.3 %
CHLORIDE SERPL-SCNC: 104 MMOL/L (ref 98–107)
CREAT SERPL-MCNC: 1 MG/DL (ref 0.51–0.95)
DEPRECATED HCO3 PLAS-SCNC: 23 MMOL/L (ref 22–29)
EGFRCR SERPLBLD CKD-EPI 2021: 69 ML/MIN/1.73M2
EOSINOPHIL # BLD AUTO: ABNORMAL 10*3/UL
EOSINOPHIL # BLD MANUAL: 0.6 10E3/UL (ref 0–0.7)
EOSINOPHIL NFR BLD AUTO: ABNORMAL %
EOSINOPHIL NFR BLD MANUAL: 2 %
ERYTHROCYTE [DISTWIDTH] IN BLOOD BY AUTOMATED COUNT: 13.7 % (ref 10–15)
GLUCOSE SERPL-MCNC: 128 MG/DL (ref 70–99)
HCT VFR BLD AUTO: 32.5 % (ref 35–47)
HGB BLD-MCNC: 10.6 G/DL (ref 11.7–15.7)
IMM GRANULOCYTES # BLD: ABNORMAL 10*3/UL
IMM GRANULOCYTES NFR BLD: ABNORMAL %
LYMPHOCYTES # BLD AUTO: ABNORMAL 10*3/UL
LYMPHOCYTES # BLD MANUAL: 2.6 10E3/UL (ref 0.8–5.3)
LYMPHOCYTES NFR BLD AUTO: ABNORMAL %
LYMPHOCYTES NFR BLD MANUAL: 9 %
MAGNESIUM SERPL-MCNC: 1.8 MG/DL (ref 1.7–2.3)
MCH RBC QN AUTO: 28.1 PG (ref 26.5–33)
MCHC RBC AUTO-ENTMCNC: 32.6 G/DL (ref 31.5–36.5)
MCV RBC AUTO: 86 FL (ref 78–100)
MONOCYTES # BLD AUTO: ABNORMAL 10*3/UL
MONOCYTES # BLD MANUAL: 2.3 10E3/UL (ref 0–1.3)
MONOCYTES NFR BLD AUTO: ABNORMAL %
MONOCYTES NFR BLD MANUAL: 8 %
MYELOCYTES # BLD MANUAL: 0.9 10E3/UL
MYELOCYTES NFR BLD MANUAL: 3 %
NEUTROPHILS # BLD AUTO: ABNORMAL 10*3/UL
NEUTROPHILS # BLD MANUAL: 22.2 10E3/UL (ref 1.6–8.3)
NEUTROPHILS NFR BLD AUTO: ABNORMAL %
NEUTROPHILS NFR BLD MANUAL: 77 %
NRBC # BLD AUTO: 0.1 10E3/UL
NRBC BLD AUTO-RTO: 0 /100
PLAT MORPH BLD: ABNORMAL
PLATELET # BLD AUTO: 188 10E3/UL (ref 150–450)
POTASSIUM SERPL-SCNC: 3.7 MMOL/L (ref 3.4–5.3)
PRODUCT NUMBER FLOW CYTOMETRY: NORMAL
PROMYELOCYTES # BLD MANUAL: 0.3 10E3/UL
PROMYELOCYTES NFR BLD MANUAL: 1 %
RBC # BLD AUTO: 3.77 10E6/UL (ref 3.8–5.2)
RBC MORPH BLD: ABNORMAL
SODIUM SERPL-SCNC: 139 MMOL/L (ref 135–145)
VIABLE CD34 CELLS NFR FLD: 99.2 %
WBC # BLD AUTO: 28.8 10E3/UL (ref 4–11)

## 2023-10-04 PROCEDURE — 250N000013 HC RX MED GY IP 250 OP 250 PS 637: Performed by: STUDENT IN AN ORGANIZED HEALTH CARE EDUCATION/TRAINING PROGRAM

## 2023-10-04 PROCEDURE — 250N000009 HC RX 250: Performed by: PATHOLOGY

## 2023-10-04 PROCEDURE — 36558 INSERT TUNNELED CV CATH: CPT | Performed by: RADIOLOGY

## 2023-10-04 PROCEDURE — 36561 INSERT TUNNELED CV CATH: CPT

## 2023-10-04 PROCEDURE — 76937 US GUIDE VASCULAR ACCESS: CPT | Mod: 26 | Performed by: RADIOLOGY

## 2023-10-04 PROCEDURE — 99214 OFFICE O/P EST MOD 30 MIN: CPT | Mod: 25

## 2023-10-04 PROCEDURE — 83735 ASSAY OF MAGNESIUM: CPT | Performed by: PATHOLOGY

## 2023-10-04 PROCEDURE — 96372 THER/PROPH/DIAG INJ SC/IM: CPT | Performed by: INTERNAL MEDICINE

## 2023-10-04 PROCEDURE — 85007 BL SMEAR W/DIFF WBC COUNT: CPT

## 2023-10-04 PROCEDURE — 85027 COMPLETE CBC AUTOMATED: CPT

## 2023-10-04 PROCEDURE — 250N000011 HC RX IP 250 OP 636: Performed by: PATHOLOGY

## 2023-10-04 PROCEDURE — 38206 HARVEST AUTO STEM CELLS: CPT

## 2023-10-04 PROCEDURE — 77001 FLUOROGUIDE FOR VEIN DEVICE: CPT | Mod: 26 | Performed by: RADIOLOGY

## 2023-10-04 PROCEDURE — 38207 CRYOPRESERVE STEM CELLS: CPT

## 2023-10-04 PROCEDURE — 36415 COLL VENOUS BLD VENIPUNCTURE: CPT

## 2023-10-04 PROCEDURE — 80048 BASIC METABOLIC PNL TOTAL CA: CPT

## 2023-10-04 PROCEDURE — 86367 STEM CELLS TOTAL COUNT: CPT

## 2023-10-04 PROCEDURE — 250N000011 HC RX IP 250 OP 636: Mod: JZ | Performed by: INTERNAL MEDICINE

## 2023-10-04 DEVICE — CATH VA PRECISION CHRONIC PALINDROME 14.5FRX28CM 8888128450P: Type: IMPLANTABLE DEVICE | Site: CHEST | Status: FUNCTIONAL

## 2023-10-04 RX ORDER — ONDANSETRON 4 MG/1
4 TABLET, ORALLY DISINTEGRATING ORAL EVERY 30 MIN PRN
Status: DISCONTINUED | OUTPATIENT
Start: 2023-10-04 | End: 2023-10-05 | Stop reason: HOSPADM

## 2023-10-04 RX ORDER — HEPARIN SODIUM (PORCINE) LOCK FLUSH IV SOLN 100 UNIT/ML 100 UNIT/ML
SOLUTION INTRAVENOUS DAILY PRN
Status: DISCONTINUED | OUTPATIENT
Start: 2023-10-04 | End: 2023-10-04 | Stop reason: HOSPADM

## 2023-10-04 RX ORDER — HEPARIN SODIUM (PORCINE) LOCK FLUSH IV SOLN 100 UNIT/ML 100 UNIT/ML
3 SOLUTION INTRAVENOUS EVERY 24 HOURS
Status: DISCONTINUED | OUTPATIENT
Start: 2023-10-04 | End: 2023-10-05 | Stop reason: HOSPADM

## 2023-10-04 RX ORDER — LIDOCAINE 40 MG/G
CREAM TOPICAL
Status: DISCONTINUED | OUTPATIENT
Start: 2023-10-04 | End: 2023-10-05 | Stop reason: HOSPADM

## 2023-10-04 RX ORDER — HEPARIN SODIUM (PORCINE) LOCK FLUSH IV SOLN 100 UNIT/ML 100 UNIT/ML
3 SOLUTION INTRAVENOUS ONCE
Status: CANCELLED | OUTPATIENT
Start: 2023-10-04 | End: 2023-10-04

## 2023-10-04 RX ORDER — SODIUM CHLORIDE 9 MG/ML
INJECTION, SOLUTION INTRAVENOUS CONTINUOUS
Status: DISCONTINUED | OUTPATIENT
Start: 2023-10-04 | End: 2023-10-05 | Stop reason: HOSPADM

## 2023-10-04 RX ORDER — LIDOCAINE HYDROCHLORIDE 20 MG/ML
INJECTION, SOLUTION INFILTRATION; PERINEURAL PRN
Status: DISCONTINUED | OUTPATIENT
Start: 2023-10-04 | End: 2023-10-04

## 2023-10-04 RX ORDER — CLINDAMYCIN PHOSPHATE 900 MG/50ML
900 INJECTION, SOLUTION INTRAVENOUS
Status: COMPLETED | OUTPATIENT
Start: 2023-10-04 | End: 2023-10-04

## 2023-10-04 RX ORDER — HEPARIN SODIUM (PORCINE) LOCK FLUSH IV SOLN 100 UNIT/ML 100 UNIT/ML
5 SOLUTION INTRAVENOUS
Status: CANCELLED | OUTPATIENT
Start: 2023-10-06

## 2023-10-04 RX ORDER — OXYCODONE HYDROCHLORIDE 5 MG/1
5 TABLET ORAL
Status: COMPLETED | OUTPATIENT
Start: 2023-10-04 | End: 2023-10-04

## 2023-10-04 RX ORDER — HEPARIN SODIUM (PORCINE) LOCK FLUSH IV SOLN 100 UNIT/ML 100 UNIT/ML
3 SOLUTION INTRAVENOUS ONCE
Status: COMPLETED | OUTPATIENT
Start: 2023-10-04 | End: 2023-10-04

## 2023-10-04 RX ORDER — ACETAMINOPHEN 325 MG/1
975 TABLET ORAL ONCE
Status: COMPLETED | OUTPATIENT
Start: 2023-10-04 | End: 2023-10-04

## 2023-10-04 RX ORDER — HEPARIN SODIUM,PORCINE 10 UNIT/ML
5-20 VIAL (ML) INTRAVENOUS DAILY PRN
Status: CANCELLED | OUTPATIENT
Start: 2023-10-06

## 2023-10-04 RX ORDER — ONDANSETRON 2 MG/ML
4 INJECTION INTRAMUSCULAR; INTRAVENOUS EVERY 30 MIN PRN
Status: DISCONTINUED | OUTPATIENT
Start: 2023-10-04 | End: 2023-10-05 | Stop reason: HOSPADM

## 2023-10-04 RX ORDER — PLERIXAFOR 24 MG/1.2ML
0.24 SOLUTION SUBCUTANEOUS DAILY
Status: CANCELLED
Start: 2023-10-06

## 2023-10-04 RX ORDER — CEFAZOLIN SODIUM 2 G/50ML
2 SOLUTION INTRAVENOUS ONCE
Status: COMPLETED | OUTPATIENT
Start: 2023-10-04 | End: 2023-10-04

## 2023-10-04 RX ORDER — ACETAMINOPHEN 325 MG/1
325 TABLET ORAL ONCE
Status: COMPLETED | OUTPATIENT
Start: 2023-10-04 | End: 2023-10-04

## 2023-10-04 RX ORDER — SODIUM CHLORIDE, SODIUM LACTATE, POTASSIUM CHLORIDE, CALCIUM CHLORIDE 600; 310; 30; 20 MG/100ML; MG/100ML; MG/100ML; MG/100ML
INJECTION, SOLUTION INTRAVENOUS CONTINUOUS
Status: DISCONTINUED | OUTPATIENT
Start: 2023-10-04 | End: 2023-10-05 | Stop reason: HOSPADM

## 2023-10-04 RX ORDER — PROPOFOL 10 MG/ML
INJECTION, EMULSION INTRAVENOUS CONTINUOUS PRN
Status: DISCONTINUED | OUTPATIENT
Start: 2023-10-04 | End: 2023-10-04

## 2023-10-04 RX ORDER — HEPARIN SODIUM (PORCINE) LOCK FLUSH IV SOLN 100 UNIT/ML 100 UNIT/ML
3 SOLUTION INTRAVENOUS
Status: DISCONTINUED | OUTPATIENT
Start: 2023-10-04 | End: 2023-10-05 | Stop reason: HOSPADM

## 2023-10-04 RX ORDER — OXYCODONE HYDROCHLORIDE 5 MG/1
10 TABLET ORAL
Status: DISCONTINUED | OUTPATIENT
Start: 2023-10-04 | End: 2023-10-05 | Stop reason: HOSPADM

## 2023-10-04 RX ADMIN — ANTICOAGULANT CITRATE DEXTROSE SOLUTION FORMULA A: 12.25; 11; 3.65 SOLUTION INTRAVENOUS at 10:22

## 2023-10-04 RX ADMIN — CALCIUM GLUCONATE 1994 MG/HR: 98 INJECTION, SOLUTION INTRAVENOUS at 10:22

## 2023-10-04 RX ADMIN — Medication 3 ML: at 15:13

## 2023-10-04 RX ADMIN — SODIUM CHLORIDE, SODIUM LACTATE, POTASSIUM CHLORIDE, CALCIUM CHLORIDE: 600; 310; 30; 20 INJECTION, SOLUTION INTRAVENOUS at 07:21

## 2023-10-04 RX ADMIN — Medication 50 MCG: at 08:04

## 2023-10-04 RX ADMIN — FILGRASTIM-AAFI 1080 MCG: 480 INJECTION, SOLUTION INTRAVENOUS; SUBCUTANEOUS at 10:12

## 2023-10-04 RX ADMIN — PROPOFOL 150 MCG/KG/MIN: 10 INJECTION, EMULSION INTRAVENOUS at 07:39

## 2023-10-04 RX ADMIN — LIDOCAINE HYDROCHLORIDE 80 MG: 20 INJECTION, SOLUTION INFILTRATION; PERINEURAL at 07:39

## 2023-10-04 RX ADMIN — Medication 50 MCG: at 07:55

## 2023-10-04 RX ADMIN — CEFAZOLIN SODIUM 2 G: 2 SOLUTION INTRAVENOUS at 07:37

## 2023-10-04 RX ADMIN — ACETAMINOPHEN 975 MG: 325 TABLET ORAL at 08:57

## 2023-10-04 RX ADMIN — Medication 50 MCG: at 07:50

## 2023-10-04 RX ADMIN — OXYCODONE HYDROCHLORIDE 5 MG: 5 TABLET ORAL at 09:22

## 2023-10-04 RX ADMIN — Medication 100 MCG: at 08:13

## 2023-10-04 RX ADMIN — ACETAMINOPHEN 325 MG: 325 TABLET ORAL at 14:13

## 2023-10-04 NOTE — ANESTHESIA CARE TRANSFER NOTE
Patient: Jennifer Ward    Procedure: Procedure(s):  cvc tunnled line Insert Catheter Vascular Access       Diagnosis: Hodgkin lymphoma of lymph nodes of multiple regions, unspecified Hodgkin lymphoma type (H) [C81.98]  Diagnosis Additional Information: No value filed.    Anesthesia Type:   MAC     Note:    Oropharynx: oropharynx clear of all foreign objects and spontaneously breathing  Level of Consciousness: awake  Oxygen Supplementation: room air    Independent Airway: airway patency satisfactory and stable  Dentition: dentition unchanged  Vital Signs Stable: post-procedure vital signs reviewed and stable  Report to RN Given: handoff report given  Patient transferred to: Phase II    Handoff Report: Identifed the Patient, Identified the Reponsible Provider, Reviewed the pertinent medical history, Discussed the surgical course, Reviewed Intra-OP anesthesia mangement and issues during anesthesia, Set expectations for post-procedure period and Allowed opportunity for questions and acknowledgement of understanding      Vitals:  Vitals Value Taken Time   /44 10/04/23 0835   Temp 36  C (96.8  F) 10/04/23 0835   Pulse 68 10/04/23 0835   Resp 15 10/04/23 0835   SpO2 100 % 10/04/23 0835       Electronically Signed By: MARIE Vizcarra CRNA  October 4, 2023  8:39 AM

## 2023-10-04 NOTE — ANESTHESIA POSTPROCEDURE EVALUATION
Patient: Jennifer Ward    Procedure: Procedure(s):  cvc tunnled line Insert Catheter Vascular Access       Anesthesia Type:  MAC    Note:  Disposition: Outpatient   Postop Pain Control: Uneventful            Sign Out: Well controlled pain   PONV: No   Neuro/Psych: Uneventful            Sign Out: Acceptable/Baseline neuro status   Airway/Respiratory: Uneventful            Sign Out: Acceptable/Baseline resp. status   CV/Hemodynamics: Uneventful            Sign Out: Acceptable CV status; No obvious hypovolemia; No obvious fluid overload   Other NRE: NONE   DID A NON-ROUTINE EVENT OCCUR? No           Last vitals:  Vitals Value Taken Time   /72 10/04/23 0915   Temp 36.2  C (97.1  F) 10/04/23 0915   Pulse 59 10/04/23 0915   Resp 15 10/04/23 0915   SpO2 100 % 10/04/23 0900       Electronically Signed By: Miguel Neri MD  October 4, 2023  10:08 AM

## 2023-10-04 NOTE — DISCHARGE INSTRUCTIONS
Autologous HPC/MNC Collection:   In most cases, the cell dose report will be available tomorrow morning.   The Bone Marrow Transplant (BMT) clinic staff looks at your report, and a decision is made if you will need another collection.   Remember it is important to follow a low fat diet during the collection process. Sometimes following the procedure, your blood platelet count may be low.  If you are told your platelet count is low, you need to avoid taking aspirin/aspirin containing products and avoid heavy physical activity and activities that may result in bruising or traumatic injury.  To contact the BMT fellow or attending physician after 5 p.m. call [phone].    Apheresis Blood Donor Center Post Instructions  You may feel tired after your procedure today.   Please call your doctor if you have:  bleeding that doesn t stop, fever, pain where a needle or tube (catheter) was placed, seizures, trouble breathing, red urine, nausea or vomiting, other health concerns.     If your symptoms are severe, call [phone].  If you have a Central Venous Catheter:  Notify your doctor if you have had a fever, chills, shaking  or redness, warmth, swelling, drainage at the exit-site.  This could be a sign of infection.    The Apheresis/Blood Donor Center is open Monday-Friday 7:30 a.m. to 5 p.m.  The phone number is [phone].  A Transfusion Medicine physician can be reached after 5:00 p.m. weekdays and on weekends /Holidays by calling [phone], and asking for the physician on call.

## 2023-10-04 NOTE — DISCHARGE INSTRUCTIONS
A collaboration between [facility] and [facility]  Experts in minimally invasive, targeted treatments performed using imaging guidance    Venous Access Device,  Port Catheter or Tunneled or Non-Tunneled Central Line Placement    Today you had a procedure today to install a venous access device; either a tunneled central vein catheter or a subcutaneous port catheter.    After you go home:  Drink plenty of fluids.  Generally 6-8 (8 ounce) glasses a day is recommended.  Resume your regular diet unless otherwise ordered by a medical provider.  Keep any applied tape/gauze dressings clean and dry.  Change tape/gauze dressings if they get wet or soiled.  You may shower the following day after procedure, however cover and protect from moisture any tape/gauze dressings.  You may let water hit and run over dried skin glue, but do not scrub.  Pat the area dry after showering.  Port placement incisions are closed with absorbable suture, meaning they do not need to be removed at a later date, and a topical skin adhesive (skin glue).  This glue will wear off in 7-14 days.  Do not remove before this time.  If 14 days have passed and residual glue is present, you may gently remove it.  Do not apply gels, lotions, or ointments to the glue site for the first 10 days as this may cause the glue to prematurely soften and fail.  Do not perform strenuous activities or lift greater than 10 pounds for the next three days.  If there is bleeding or oozing from the procedure site, apply firm pressure to the area for 5-10 minutes.  If the bleeding continues seek medical advice at the numbers below.  Mild procedure site discomfort can be treated with an ice pack and over-the-counter pain relievers.        For 24 hours after any sedation used:  Relax and take it easy.  No strenuous activities.  Do not drive or operate machines at home or at work.  No alcohol consumption.  Do not make any  important or legal decisions.    Call our Interventional Radiology (IR) service if:  If you start bleeding from the procedure site.  If you do start to bleed from the site, lie down and hold some pressure on the site.  Our radiology provider can help you decide if you need to return to the hospital.  If you have new or worsening pain related to the procedure.  If you have concerning swelling at the procedure site.  If you develop persistent nausea or vomiting.  If you develop hives or a rash or any unexplained itching.  If you have a fever of greater than 100.5  F and chills in the first 5 days after procedure.  Any other concerns related to your procedure.      [facility]  Interventional Radiology (IR)  [address]  [address] [facility] [place] [address]    Contact Number:  [phone]  (IR control desk)  Monday - Friday 8:00 am - 4:30 pm    After hours for urgent concerns:  [phone]  After 4:30 pm Monday - Friday, Weekends and Holidays.   Ask for Interventional Radiology on-call.  Someone is available 24 hours a day.  [facility] toll free number:  [phone]

## 2023-10-04 NOTE — BRIEF OP NOTE
[facility]    Brief Operative Note: IR placement of left TCVC    Pre-operative diagnosis: Hodgkin lymphoma of lymph nodes of multiple regions, unspecified Hodgkin lymphoma type (H) [C81.98]  Post-operative diagnosis Same as pre-operative diagnosis    Procedure: cvc tunnled line Insert Catheter Vascular Access, Left - Chest    Surgeon: Surgeon(s) and Role:     * Yobani Reeves MD - Primary  Anesthesia: MAC   Estimated Blood Loss: Minimal    Drains: None  Specimens: * No specimens in log *  Findings:   None.  Complications: None.  Implants:   Implant Name Type Inv. Item Serial No.  Lot No. LRB No. Used Action   CATH [employer] CHRONIC PALINDROME [id number] - [id number] Catheter CATH VA PRECISION CHRONIC PALINDROME [id number]  Blink [id number] Left 1 Used as a Supply     Image-guided placement of left 14.5 Fr, 28 cm apheresis-capable double-lumen tunneled central venous catheter via left internal jugular vein. Aspirates and flushes well. Heparin-locked & ready for immediate use.

## 2023-10-04 NOTE — ANESTHESIA PREPROCEDURE EVALUATION
Anesthesia Pre-Procedure Evaluation    Patient: Jennifer Ward   MRN: [id number] : 1975        Procedure : Procedure(s):  cvc tunnled line Insert Catheter Vascular Access          No past medical history on file.   Past Surgical History:   Procedure Laterality Date    BONE MARROW BIOPSY, BONE SPECIMEN, NEEDLE/TROCAR Left 2023    Procedure: BIOPSY, BONE MARROW;  Surgeon: Patricia Salcedo PA-C;  Location: [facility] OR      Allergies   Allergen Reactions    Ceclor [Cefaclor] Hives     Hives as a child    Imitrex [Sumatriptan] Palpitations     Tolerated for a long period, then has one time tachycardia.  Tolerates naratriptan.       Social History     Tobacco Use    Smoking status: Never     Passive exposure: Never    Smokeless tobacco: Never   Substance Use Topics    Alcohol use: Yes     Comment: Occassional      Wt Readings from Last 1 Encounters:   10/04/23 98.4 kg (217 lb)        Anesthesia Evaluation            ROS/MED HX  ENT/Pulmonary:  - neg pulmonary ROS     Neurologic:  - neg neurologic ROS     Cardiovascular:  - neg cardiovascular ROS     METS/Exercise Tolerance: >4 METS    Hematologic:  - neg hematologic  ROS     Musculoskeletal:  - neg musculoskeletal ROS     GI/Hepatic:  - neg GI/hepatic ROS     Renal/Genitourinary:  - neg Renal ROS     Endo:     (+)               Obesity,       Psychiatric/Substance Use:  - neg psychiatric ROS     Infectious Disease:  - neg infectious disease ROS     Malignancy:   (+) Malignancy, History of Lymphoma/Leukemia.Lymph CA Active status post.      Other:  - neg other ROS          Physical Exam    Airway        Mallampati: II   TM distance: > 3 FB   Neck ROM: full   Mouth opening: > 3 cm    Respiratory Devices and Support         Dental       (+) Completely normal teeth      Cardiovascular          Rhythm and rate: regular and normal     Pulmonary           breath sounds clear to auscultation           OUTSIDE LABS:  CBC:   Lab Results   Component Value Date     WBC 28.1 (H) 10/03/2023    WBC 4.1 09/25/2023    HGB 11.5 (L) 10/03/2023    HGB 12.6 09/25/2023    HCT 34.3 (L) 10/03/2023    HCT 38.1 09/25/2023     10/03/2023     09/25/2023     BMP:   Lab Results   Component Value Date     09/20/2023    POTASSIUM 4.2 09/20/2023    CHLORIDE 105 09/20/2023    CO2 24 09/20/2023    BUN 21.3 (H) 09/20/2023    CR 0.99 (H) 09/20/2023    GLC 93 09/20/2023     COAGS:   Lab Results   Component Value Date    PTT 31 09/20/2023    INR 1.10 09/20/2023     POC:   Lab Results   Component Value Date    HCGS Negative 09/20/2023     HEPATIC:   Lab Results   Component Value Date    ALBUMIN 4.4 09/20/2023    PROTTOTAL 7.1 09/20/2023    ALT 11 09/20/2023    AST 14 09/20/2023    ALKPHOS 69 09/20/2023    BILITOTAL 0.5 09/20/2023     OTHER:   Lab Results   Component Value Date    PHYLICIA 9.5 09/20/2023    PHOS 3.4 09/20/2023    MAG 1.9 09/20/2023    TSH 0.79 09/20/2023    T4 1.24 09/20/2023       Anesthesia Plan    ASA Status:  3    NPO Status:  NPO Appropriate    Anesthesia Type: MAC.     - Reason for MAC: straight local not clinically adequate              Consents    Anesthesia Plan(s) and associated risks, benefits, and realistic alternatives discussed. Questions answered and patient/representative(s) expressed understanding.     - Discussed:     - Discussed with:  Patient            Postoperative Care            Comments:                Miguel Neri MD

## 2023-10-04 NOTE — LETTER
10/4/2023         RE: Jennifer Ward  [address] [address]        Dear Colleague,    Thank you for referring your patient, Jennifer Ward, to the [facility] BLOOD AND MARROW TRANSPLANT PROGRAM [place]. Please see a copy of my visit note below.     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 PET showed lymphoma limited to left cervical, supraclavicular, and axillary lymph nodes. BmBx negative.      Late relapse in May 2023 presenting with right neck swelling. Right cervical LN biopsy showed recurrent vs new primary cHL (nodular sclerosing subtype). PET with FDG-avid right level II and level V cervical lymph nodes only (SUVmax 13.2)     Date Treatment Name Response Side Effects / Toxicities   12/2008 to 5/2009 ABVD x 6 cycles followed by adjuvant radiation CR Fatigue, N/V   6/2023 to 8/2023 Brentuximab/nivolumab x 2 cycles, nivolumab alone x 2 cycles CR Brentuximab infusion reaction (difficulty breathing)         INTERVAL HISTORY:  Here to begin collections. Continues to be achy from GCSF, line placement site is tender. Using claritin and tylenol as well as ice packs today for pain. No fevers or other sx.      REVIEW OF SYSTEMS:  8-point ROS reviewed and negative other than that mentioned in HPI.         PHYSICAL EXAM:  There were no vitals taken for this visit.  Wt Readings from Last 4 Encounters:   10/04/23 99.4 kg (219 lb 2.2 oz)   10/04/23 98.4 kg (217 lb)   10/03/23 99.7 kg (219 lb 14.4 oz)   09/28/23 98.6 kg (217 lb 4.8 oz)       General: Awake, alert, in no acute distress. Oriented x 3.  HEENT: Normocephalic, atraumatic. No scleral icterus.   Ext: No peripheral edema  bilaterally.  Neuro: CN II-XII grossly intact. No focal deficits.   Skin: No rash, unusual bruising or prominent lesions.  Psych: Pleasant, normal affect.  Additional: Louis in place, tender at this time. No bruising or swelling at line site.      LABS:  Lab Results   Component Value Date    WBC 28.8 (H) 10/04/2023    ANEU 22.2 (H) 10/04/2023    HGB 10.6 (L) 10/04/2023    HCT 32.5 (L) 10/04/2023     10/04/2023     10/04/2023    POTASSIUM 3.7 10/04/2023    CHLORIDE 104 10/04/2023    CO2 23 10/04/2023     (H) 10/04/2023    BUN 14.8 10/04/2023    CR 1.00 (H) 10/04/2023    MAG 1.8 10/04/2023    INR 1.10 09/20/2023    BILITOTAL 0.5 09/20/2023    AST 14 09/20/2023    ALT 11 09/20/2023    ALKPHOS 69 09/20/2023    PROTTOTAL 7.1 09/20/2023    ALBUMIN 4.4 09/20/2023            Assessment & Plan: Jennifer Wadr is a 49 yo woman currently Day 1 of collections prior to planned BEAM auto PBSCT for HD    Recurrent classic Hodgkin lymphoma/BMT  Restaging PET after BV-nivo shows CR and organ function all adequate to proceed with autologous transplant.   - Day 4 GCSF mobilization; CD34 45. Line placement tomorrow then start apheresis.  - 10/4: CD34: 84.  Day 1 of collections  - Goal 5 million CD34/kg  - She also consented to participate in the E-CELERATE study.    - Given absence of high-risk features and reported intolerance to brentuximab, would not recommend BV maintenance after ASCT.     Psych:  #hx depression  Cont Effexor, Wellbutrin    Endocrine:  #hx hypothyroid: levothyroxine       RTC:   Tomorrow for labs, possible second day of collections.    I spent 30 minutes in the care of this patient today, which included time necessary for preparation for the visit, obtaining history, ordering medications/tests/procedures as medically indicated, review of pertinent medical literature, counseling of the patient, communication of recommendations to the care team, and documentation time.    Frieda Bui,  CNP  Pager [phone]

## 2023-10-04 NOTE — PROGRESS NOTES
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 PET showed lymphoma limited to left cervical, supraclavicular, and axillary lymph nodes. BmBx negative.      Late relapse in May 2023 presenting with right neck swelling. Right cervical LN biopsy showed recurrent vs new primary cHL (nodular sclerosing subtype). PET with FDG-avid right level II and level V cervical lymph nodes only (SUVmax 13.2)     Date Treatment Name Response Side Effects / Toxicities   12/2008 to 5/2009 ABVD x 6 cycles followed by adjuvant radiation CR Fatigue, N/V   6/2023 to 8/2023 Brentuximab/nivolumab x 2 cycles, nivolumab alone x 2 cycles CR Brentuximab infusion reaction (difficulty breathing)         INTERVAL HISTORY:  Here to begin collections. Continues to be achy from GCSF, line placement site is tender. Using claritin and tylenol as well as ice packs today for pain. No fevers or other sx.      REVIEW OF SYSTEMS:  8-point ROS reviewed and negative other than that mentioned in HPI.         PHYSICAL EXAM:  There were no vitals taken for this visit.  Wt Readings from Last 4 Encounters:   10/04/23 99.4 kg (219 lb 2.2 oz)   10/04/23 98.4 kg (217 lb)   10/03/23 99.7 kg (219 lb 14.4 oz)   09/28/23 98.6 kg (217 lb 4.8 oz)       General: Awake, alert, in no acute distress. Oriented x 3.  HEENT: Normocephalic, atraumatic. No scleral icterus.   Ext: No peripheral edema bilaterally.  Neuro: CN II-XII grossly intact. No focal deficits.   Skin: No rash, unusual bruising or prominent lesions.  Psych: Pleasant, normal affect.  Additional: Louis in place, tender at this time. No bruising or swelling at line site.      LABS:  Lab Results   Component  Value Date    WBC 28.8 (H) 10/04/2023    ANEU 22.2 (H) 10/04/2023    HGB 10.6 (L) 10/04/2023    HCT 32.5 (L) 10/04/2023     10/04/2023     10/04/2023    POTASSIUM 3.7 10/04/2023    CHLORIDE 104 10/04/2023    CO2 23 10/04/2023     (H) 10/04/2023    BUN 14.8 10/04/2023    CR 1.00 (H) 10/04/2023    MAG 1.8 10/04/2023    INR 1.10 09/20/2023    BILITOTAL 0.5 09/20/2023    AST 14 09/20/2023    ALT 11 09/20/2023    ALKPHOS 69 09/20/2023    PROTTOTAL 7.1 09/20/2023    ALBUMIN 4.4 09/20/2023            Assessment & Plan: Jennifer Ward is a 49 yo woman currently Day 1 of collections prior to planned BEAM auto PBSCT for HD    Recurrent classic Hodgkin lymphoma/BMT  Restaging PET after BV-nivo shows CR and organ function all adequate to proceed with autologous transplant.   - Day 4 GCSF mobilization; CD34 45. Line placement tomorrow then start apheresis.  - 10/4: CD34: 84.  Day 1 of collections  - Goal 5 million CD34/kg  - She also consented to participate in the E-CELERATE study.    - Given absence of high-risk features and reported intolerance to brentuximab, would not recommend BV maintenance after ASCT.     Psych:  #hx depression  Cont Effexor, Wellbutrin    Endocrine:  #hx hypothyroid: levothyroxine       RTC:   Tomorrow for labs, possible second day of collections.    I spent 30 minutes in the care of this patient today, which included time necessary for preparation for the visit, obtaining history, ordering medications/tests/procedures as medically indicated, review of pertinent medical literature, counseling of the patient, communication of recommendations to the care team, and documentation time.    Frieda Bui, CNP  Pager [phone]

## 2023-10-04 NOTE — PROCEDURES
"  Transfusion Medicine Procedure    Jennifer Ward [id number]   YOB: 1975 Age: 48 year old        Procedure: Autologous Hematopoietic Progenitor Cell (HPC)  Collection           Assessment and Plan:   48 year old female presents for autologous HPC collection for Hodgkin's disease. The plan is to collect for 1 to 3 days or until the target goal is met. A central line has been placed for access for the procedure.     The patient tolerated the first day of collection with no complaints.  Continue with plan per BMT.           History of Present Illness:   48 year old female presents for consultation for autologous HPC collection. Her past medical history includes Hodgkin's disease. Her initial diagnoses was in 2008 and she was treated with chemo and radiation at the time. Her recent diagnosis was in May 2023 with a \"lump\" on her neck. She also has a history of migraines for which she takes daily propanolol and topimarate, and as needed naratriptan. She is currently well. The patient denies any back pain that would prevent her from tolerating the procedure. The patient has not had a recent flu vaccination. She travelled to [place] in April 2023. The patient has no identifiable risk factors for infectious disease.  The procedure, risks/benefits were discussed with the patient and all of her questions were addressed at this time.             Past Medical History:     Hodgkin's disease   Migraines           Past Surgical History:     Total hysterectomy   Breast reduction in 2003     Past Surgical History:   Procedure Laterality Date    BONE MARROW BIOPSY, BONE SPECIMEN, NEEDLE/TROCAR Left 9/25/2023    Procedure: BIOPSY, BONE MARROW;  Surgeon: Patricia Salcedo PA-C;  Location: [facility] [place] [facility] [place]    IR CVC TUNNEL PLACEMENT > 5 YRS OF AGE  10/4/2023              Social History:     Social History     Tobacco Use    Smoking status: Never     Passive exposure: Never    Smokeless tobacco: Never   Substance Use Topics "    Alcohol use: Occasional      Comment: Occassional             Family History:     Father had NHL and type 2 DM           Immunizations:     Immunization History   Administered Date(s) Administered    COVID-19 MONOVALENT 12+ (Pfizer) 09/27/2021, 10/18/2021             Allergies:     Allergies   Allergen Reactions    Ceclor [Cefaclor] Hives     Hives as a child    Imitrex [Sumatriptan] Palpitations     Tolerated for a long period, then has one time tachycardia.  Tolerates naratriptan.              Medications:     Current Outpatient Medications   Medication Sig    acetaminophen (TYLENOL) 325 MG tablet Take 650 mg by mouth every 6 hours as needed for mild pain    azithromycin (ZITHROMAX) 250 MG tablet Take 250 mg by mouth daily    buPROPion (WELLBUTRIN XL) 150 MG 24 hr tablet Take 150 mg by mouth every morning    hydrocortisone (CORTAID) 1 % external cream Apply topically 3 times daily    levothyroxine (SYNTHROID/LEVOTHROID) 75 MCG tablet Take 1 tablet by mouth every morning    loratadine (CLARITIN) 10 MG tablet Take 10 mg by mouth daily    naratriptan (AMERGE) 2.5 MG tablet Take 2.5 mg by mouth at onset of headache    propranolol (INDERAL) 20 MG tablet Take 20 mg by mouth 2 times daily    topiramate (TOPAMAX) 50 MG tablet Take 1 tablet by mouth every evening    venlafaxine (EFFEXOR XR) 150 MG 24 hr capsule Take 150 mg by mouth daily     Current Facility-Administered Medications   Medication    filgrastim-aafi (NIVESTYM) 1,080 mcg for subcutaneous injection     Facility-Administered Medications Ordered in Other Encounters   Medication    heparin 100 unit/mL injection 3 mL    heparin 100 unit/mL injection 3 mL    lactated ringers infusion    lidocaine (LMX4) kit    lidocaine (LMX4) kit    lidocaine 1 % 0.1-1 mL    lidocaine 1 % 0.1-1 mL    ondansetron (ZOFRAN ODT) ODT tab 4 mg    Or    ondansetron (ZOFRAN) injection 4 mg    oxyCODONE (ROXICODONE) tablet 10 mg    prochlorperazine (COMPAZINE) injection 5 mg    sodium  "chloride (PF) 0.9% PF flush 10 mL    sodium chloride (PF) 0.9% PF flush 10 mL    sodium chloride (PF) 0.9% PF flush 3 mL    sodium chloride (PF) 0.9% PF flush 3 mL    sodium chloride (PF) 0.9% PF flush 3 mL    sodium chloride (PF) 0.9% PF flush 3 mL    sodium chloride 0.9 % bag TABLE SOLN    sodium chloride 0.9% infusion               Vital Signs:   /73   Pulse 59   Temp 97.5  F (36.4  C) (Oral)   Resp 16   Ht 1.62 m (5' 3.78\")   Wt 99.4 kg (219 lb 2.2 oz)   BMI 37.87 kg/m              Data:     ROUTINE IP LABS (Last four results)  BMP  Recent Labs   Lab 10/04/23  1006      POTASSIUM 3.7   CHLORIDE 104   PHYLICIA 9.4   CO2 23   BUN 14.8   CR 1.00*   *     CBC  Recent Labs   Lab 10/04/23  1006 10/03/23  1018   WBC 28.8* 28.1*   RBC 3.77* 4.02   HGB 10.6* 11.5*   HCT 32.5* 34.3*   MCV 86 85   MCH 28.1 28.6   MCHC 32.6 33.5   RDW 13.7 13.6    189     INRNo lab results found in last 7 days.    ABO/RH(D)   Date Value Ref Range Status   09/20/2023 B POS  Final     Antibody Screen   Date Value Ref Range Status   09/20/2023 Negative Negative Final     ATTESTATION STATEMENT:   During the procedure this patient was directly seen and evaluated by me , Marissa Peterson MD, PhD.      Marissa Peterson MD, PhD  Transfusion Medicine Attending  Medical Director, Blood Bank Laboratory  Pager [phone]    "

## 2023-10-05 ENCOUNTER — CARE COORDINATION (OUTPATIENT)
Dept: TRANSPLANT | Facility: CLINIC | Age: 48
End: 2023-10-05

## 2023-10-05 DIAGNOSIS — C81.98 HODGKIN'S DISEASE OF LYMPH NODES OF MULTIPLE SITES (H): Primary | ICD-10-CM

## 2023-10-05 RX ORDER — FLUCONAZOLE 200 MG/1
400 TABLET ORAL DAILY
Status: CANCELLED | OUTPATIENT
Start: 2023-10-06

## 2023-10-05 RX ORDER — SODIUM CHLORIDE 9 MG/ML
INJECTION, SOLUTION INTRAVENOUS CONTINUOUS
Status: CANCELLED
Start: 2023-10-11

## 2023-10-05 RX ORDER — ACYCLOVIR 200 MG/1
400 CAPSULE ORAL 2 TIMES DAILY
Status: CANCELLED | OUTPATIENT
Start: 2023-10-13

## 2023-10-05 RX ORDER — DIPHENHYDRAMINE HCL 25 MG
25 CAPSULE ORAL ONCE
Status: CANCELLED
Start: 2023-10-12 | End: 2023-10-12

## 2023-10-05 RX ORDER — DEXTROSE MONOHYDRATE 50 MG/ML
10-20 INJECTION, SOLUTION INTRAVENOUS
Status: CANCELLED | OUTPATIENT
Start: 2023-10-17

## 2023-10-05 RX ORDER — DEXAMETHASONE SODIUM PHOSPHATE 10 MG/ML
8 INJECTION, SOLUTION INTRAMUSCULAR; INTRAVENOUS ONCE
Status: CANCELLED | OUTPATIENT
Start: 2023-10-12 | End: 2023-10-12

## 2023-10-05 RX ORDER — MEPERIDINE HYDROCHLORIDE 25 MG/ML
25-50 INJECTION INTRAMUSCULAR; INTRAVENOUS; SUBCUTANEOUS
Status: CANCELLED | OUTPATIENT
Start: 2023-10-12 | End: 2023-10-13

## 2023-10-05 RX ORDER — DEXAMETHASONE SODIUM PHOSPHATE 10 MG/ML
12 INJECTION, SOLUTION INTRAMUSCULAR; INTRAVENOUS ONCE
Status: CANCELLED | OUTPATIENT
Start: 2023-10-06

## 2023-10-05 RX ORDER — LEVOFLOXACIN 500 MG/1
500 TABLET, FILM COATED ORAL
Status: CANCELLED | OUTPATIENT
Start: 2023-10-06

## 2023-10-05 RX ORDER — ALLOPURINOL 300 MG/1
300 TABLET ORAL DAILY
Status: CANCELLED
Start: 2023-10-06

## 2023-10-05 RX ORDER — ACETAMINOPHEN 325 MG/1
650 TABLET ORAL ONCE
Status: CANCELLED
Start: 2023-10-12 | End: 2023-10-12

## 2023-10-05 RX ORDER — DEXAMETHASONE SODIUM PHOSPHATE 10 MG/ML
12 INJECTION, SOLUTION INTRAMUSCULAR; INTRAVENOUS ONCE
Status: CANCELLED | OUTPATIENT
Start: 2023-10-11 | End: 2023-10-11

## 2023-10-05 RX ORDER — ONDANSETRON 2 MG/ML
8 INJECTION INTRAMUSCULAR; INTRAVENOUS ONCE
Status: CANCELLED | OUTPATIENT
Start: 2023-10-12 | End: 2023-10-12

## 2023-10-05 RX ORDER — ONDANSETRON 2 MG/ML
8 INJECTION INTRAMUSCULAR; INTRAVENOUS EVERY 24 HOURS
Status: CANCELLED | OUTPATIENT
Start: 2023-10-07

## 2023-10-05 NOTE — PROGRESS NOTES
Inpatient Admission Information:      Admit Date:  10/6/23   Diagnosis:  Hodgkin Lymphoma   Transplant Type:  Autologous   Protocol:  [id number] Arm 1 BEAM & [id number]   Sedated bmbx needed?  Yes   NMDP lab (lab 7033) needed?  NO  **If YES, LOVE to please order with admission labs.  **If YES, this lab MUST BE DRAWN PRIOR TO ANY BMT PREP (chemo/TBI).   Notes:         New Eval Work-Up   MD Dr. Carlos James [facility]         Consult Type Date   1 NA NA   2 NA NA   3 NA NA         Long Term Follow-Up   MD Dr. Carlos Sullivan [place]      EOC updated? Yes  Care team updated? Yes     Notified patient of admission to hospital on 10/6/23 for planned autologous stem cell transplant.  Jennifer understands to check-in at hospital Admissions 8 am.   Jennifer verbalized that she has been feeling well, no symptoms of concern at this time.  Patient has no questions or concerns.

## 2023-10-06 ENCOUNTER — HOSPITAL ENCOUNTER (INPATIENT)
Facility: CLINIC | Age: 48
LOS: 7 days | Discharge: HOME | End: 2023-10-13
Attending: STUDENT IN AN ORGANIZED HEALTH CARE EDUCATION/TRAINING PROGRAM | Admitting: STUDENT IN AN ORGANIZED HEALTH CARE EDUCATION/TRAINING PROGRAM
Payer: COMMERCIAL

## 2023-10-06 DIAGNOSIS — C81.18 NODULAR SCLEROSIS HODGKIN LYMPHOMA OF LYMPH NODES OF MULTIPLE REGIONS (H): Primary | ICD-10-CM

## 2023-10-06 LAB
ABO/RH(D): NORMAL
ALBUMIN SERPL BCG-MCNC: 3.9 G/DL (ref 3.5–5.2)
ALP SERPL-CCNC: 147 U/L (ref 35–104)
ALT SERPL W P-5'-P-CCNC: 14 U/L (ref 0–50)
ANION GAP SERPL CALCULATED.3IONS-SCNC: 10 MMOL/L (ref 7–15)
ANTIBODY SCREEN: NEGATIVE
APTT PPP: 32 SECONDS (ref 22–38)
AST SERPL W P-5'-P-CCNC: 24 U/L (ref 0–45)
BASO+EOS+MONOS # BLD AUTO: ABNORMAL 10*3/UL
BASO+EOS+MONOS NFR BLD AUTO: ABNORMAL %
BASOPHILS # BLD AUTO: ABNORMAL 10*3/UL
BASOPHILS # BLD MANUAL: 0.3 10E3/UL (ref 0–0.2)
BASOPHILS NFR BLD AUTO: ABNORMAL %
BASOPHILS NFR BLD MANUAL: 1 %
BILIRUB DIRECT SERPL-MCNC: <0.2 MG/DL (ref 0–0.3)
BILIRUB SERPL-MCNC: 0.3 MG/DL
BUN SERPL-MCNC: 16.1 MG/DL (ref 6–20)
CALCIUM SERPL-MCNC: 9.1 MG/DL (ref 8.6–10)
CHLORIDE SERPL-SCNC: 105 MMOL/L (ref 98–107)
CREAT SERPL-MCNC: 0.95 MG/DL (ref 0.51–0.95)
DEPRECATED HCO3 PLAS-SCNC: 25 MMOL/L (ref 22–29)
EGFRCR SERPLBLD CKD-EPI 2021: 73 ML/MIN/1.73M2
EOSINOPHIL # BLD AUTO: ABNORMAL 10*3/UL
EOSINOPHIL # BLD MANUAL: 0.3 10E3/UL (ref 0–0.7)
EOSINOPHIL NFR BLD AUTO: ABNORMAL %
EOSINOPHIL NFR BLD MANUAL: 1 %
ERYTHROCYTE [DISTWIDTH] IN BLOOD BY AUTOMATED COUNT: 13.7 % (ref 10–15)
GLUCOSE SERPL-MCNC: 98 MG/DL (ref 70–99)
HCT VFR BLD AUTO: 29.1 % (ref 35–47)
HGB BLD-MCNC: 9.6 G/DL (ref 11.7–15.7)
IMM GRANULOCYTES # BLD: ABNORMAL 10*3/UL
IMM GRANULOCYTES NFR BLD: ABNORMAL %
INR PPP: 1.02 (ref 0.85–1.15)
LDH SERPL L TO P-CCNC: 377 U/L (ref 0–250)
LYMPHOCYTES # BLD AUTO: ABNORMAL 10*3/UL
LYMPHOCYTES # BLD MANUAL: 3.1 10E3/UL (ref 0.8–5.3)
LYMPHOCYTES NFR BLD AUTO: ABNORMAL %
LYMPHOCYTES NFR BLD MANUAL: 12 %
MAGNESIUM SERPL-MCNC: 1.7 MG/DL (ref 1.7–2.3)
MCH RBC QN AUTO: 28.5 PG (ref 26.5–33)
MCHC RBC AUTO-ENTMCNC: 33 G/DL (ref 31.5–36.5)
MCV RBC AUTO: 86 FL (ref 78–100)
MONOCYTES # BLD AUTO: ABNORMAL 10*3/UL
MONOCYTES # BLD MANUAL: 0.8 10E3/UL (ref 0–1.3)
MONOCYTES NFR BLD AUTO: ABNORMAL %
MONOCYTES NFR BLD MANUAL: 3 %
NEUTROPHILS # BLD AUTO: ABNORMAL 10*3/UL
NEUTROPHILS # BLD MANUAL: 21.2 10E3/UL (ref 1.6–8.3)
NEUTROPHILS NFR BLD AUTO: ABNORMAL %
NEUTROPHILS NFR BLD MANUAL: 82 %
NRBC # BLD AUTO: 0.1 10E3/UL
NRBC # BLD AUTO: 0.3 10E3/UL
NRBC BLD AUTO-RTO: 0 /100
NRBC BLD MANUAL-RTO: 1 %
PHOSPHATE SERPL-MCNC: 3.9 MG/DL (ref 2.5–4.5)
PLAT MORPH BLD: ABNORMAL
PLATELET # BLD AUTO: 125 10E3/UL (ref 150–450)
POTASSIUM SERPL-SCNC: 4 MMOL/L (ref 3.4–5.3)
PROMYELOCYTES # BLD MANUAL: 0.3 10E3/UL
PROMYELOCYTES NFR BLD MANUAL: 1 %
PROT SERPL-MCNC: 6.4 G/DL (ref 6.4–8.3)
RBC # BLD AUTO: 3.37 10E6/UL (ref 3.8–5.2)
RBC MORPH BLD: ABNORMAL
RETICS # AUTO: 0.07 10E6/UL (ref 0.03–0.1)
RETICS/RBC NFR AUTO: 2.1 % (ref 0.5–2)
SODIUM SERPL-SCNC: 140 MMOL/L (ref 135–145)
SPECIMEN EXPIRATION DATE: NORMAL
URATE SERPL-MCNC: 7.5 MG/DL (ref 2.4–5.7)
WBC # BLD AUTO: 25.8 10E3/UL (ref 4–11)

## 2023-10-06 PROCEDURE — 85730 THROMBOPLASTIN TIME PARTIAL: CPT | Performed by: PHYSICIAN ASSISTANT

## 2023-10-06 PROCEDURE — 84100 ASSAY OF PHOSPHORUS: CPT | Performed by: STUDENT IN AN ORGANIZED HEALTH CARE EDUCATION/TRAINING PROGRAM

## 2023-10-06 PROCEDURE — 250N000011 HC RX IP 250 OP 636: Performed by: STUDENT IN AN ORGANIZED HEALTH CARE EDUCATION/TRAINING PROGRAM

## 2023-10-06 PROCEDURE — 83615 LACTATE (LD) (LDH) ENZYME: CPT | Performed by: STUDENT IN AN ORGANIZED HEALTH CARE EDUCATION/TRAINING PROGRAM

## 2023-10-06 PROCEDURE — 250N000013 HC RX MED GY IP 250 OP 250 PS 637: Performed by: PHYSICIAN ASSISTANT

## 2023-10-06 PROCEDURE — 3E04305 INTRODUCTION OF OTHER ANTINEOPLASTIC INTO CENTRAL VEIN, PERCUTANEOUS APPROACH: ICD-10-PCS | Performed by: STUDENT IN AN ORGANIZED HEALTH CARE EDUCATION/TRAINING PROGRAM

## 2023-10-06 PROCEDURE — 250N000013 HC RX MED GY IP 250 OP 250 PS 637: Performed by: STUDENT IN AN ORGANIZED HEALTH CARE EDUCATION/TRAINING PROGRAM

## 2023-10-06 PROCEDURE — 250N000011 HC RX IP 250 OP 636: Performed by: PHYSICIAN ASSISTANT

## 2023-10-06 PROCEDURE — 258N000003 HC RX IP 258 OP 636: Performed by: STUDENT IN AN ORGANIZED HEALTH CARE EDUCATION/TRAINING PROGRAM

## 2023-10-06 PROCEDURE — 82248 BILIRUBIN DIRECT: CPT | Performed by: STUDENT IN AN ORGANIZED HEALTH CARE EDUCATION/TRAINING PROGRAM

## 2023-10-06 PROCEDURE — 87081 CULTURE SCREEN ONLY: CPT | Performed by: PHYSICIAN ASSISTANT

## 2023-10-06 PROCEDURE — 206N000001 HC R&B BMT UMMC

## 2023-10-06 PROCEDURE — 86901 BLOOD TYPING SEROLOGIC RH(D): CPT | Performed by: PHYSICIAN ASSISTANT

## 2023-10-06 PROCEDURE — 80053 COMPREHEN METABOLIC PANEL: CPT | Performed by: STUDENT IN AN ORGANIZED HEALTH CARE EDUCATION/TRAINING PROGRAM

## 2023-10-06 PROCEDURE — 99223 1ST HOSP IP/OBS HIGH 75: CPT | Mod: AI | Performed by: PHYSICIAN ASSISTANT

## 2023-10-06 PROCEDURE — 85045 AUTOMATED RETICULOCYTE COUNT: CPT | Performed by: STUDENT IN AN ORGANIZED HEALTH CARE EDUCATION/TRAINING PROGRAM

## 2023-10-06 PROCEDURE — 85610 PROTHROMBIN TIME: CPT | Performed by: PHYSICIAN ASSISTANT

## 2023-10-06 PROCEDURE — 83735 ASSAY OF MAGNESIUM: CPT | Performed by: PHYSICIAN ASSISTANT

## 2023-10-06 PROCEDURE — 85027 COMPLETE CBC AUTOMATED: CPT | Performed by: STUDENT IN AN ORGANIZED HEALTH CARE EDUCATION/TRAINING PROGRAM

## 2023-10-06 PROCEDURE — 85007 BL SMEAR W/DIFF WBC COUNT: CPT | Performed by: STUDENT IN AN ORGANIZED HEALTH CARE EDUCATION/TRAINING PROGRAM

## 2023-10-06 PROCEDURE — 84550 ASSAY OF BLOOD/URIC ACID: CPT | Performed by: PHYSICIAN ASSISTANT

## 2023-10-06 RX ORDER — ACYCLOVIR 800 MG/1
800 TABLET ORAL 2 TIMES DAILY
Status: DISCONTINUED | OUTPATIENT
Start: 2023-10-08 | End: 2023-10-06

## 2023-10-06 RX ORDER — LEVOFLOXACIN 250 MG/1
250 TABLET, FILM COATED ORAL
Status: DISCONTINUED | OUTPATIENT
Start: 2023-10-11 | End: 2023-10-06

## 2023-10-06 RX ORDER — NALOXONE HYDROCHLORIDE 0.4 MG/ML
0.2 INJECTION, SOLUTION INTRAMUSCULAR; INTRAVENOUS; SUBCUTANEOUS
Status: DISCONTINUED | OUTPATIENT
Start: 2023-10-06 | End: 2023-10-13 | Stop reason: HOSPADM

## 2023-10-06 RX ORDER — LORAZEPAM 2 MG/ML
.5-1 INJECTION INTRAMUSCULAR EVERY 4 HOURS PRN
Status: DISCONTINUED | OUTPATIENT
Start: 2023-10-06 | End: 2023-10-13 | Stop reason: HOSPADM

## 2023-10-06 RX ORDER — ACETAMINOPHEN 325 MG/1
325-650 TABLET ORAL EVERY 4 HOURS PRN
Status: DISCONTINUED | OUTPATIENT
Start: 2023-10-06 | End: 2023-10-13 | Stop reason: HOSPADM

## 2023-10-06 RX ORDER — ONDANSETRON 2 MG/ML
8 INJECTION INTRAMUSCULAR; INTRAVENOUS ONCE
Status: COMPLETED | OUTPATIENT
Start: 2023-10-12 | End: 2023-10-12

## 2023-10-06 RX ORDER — DEXTROSE MONOHYDRATE 50 MG/ML
10-20 INJECTION, SOLUTION INTRAVENOUS
Status: DISCONTINUED | OUTPATIENT
Start: 2023-10-17 | End: 2023-10-13 | Stop reason: HOSPADM

## 2023-10-06 RX ORDER — MAGNESIUM SULFATE HEPTAHYDRATE 40 MG/ML
2 INJECTION, SOLUTION INTRAVENOUS ONCE
Status: COMPLETED | OUTPATIENT
Start: 2023-10-06 | End: 2023-10-06

## 2023-10-06 RX ORDER — PANTOPRAZOLE SODIUM 40 MG/1
40 TABLET, DELAYED RELEASE ORAL DAILY
Status: DISCONTINUED | OUTPATIENT
Start: 2023-10-06 | End: 2023-10-13 | Stop reason: HOSPADM

## 2023-10-06 RX ORDER — NARATRIPTAN 2.5 MG/1
2.5 TABLET ORAL
Status: DISCONTINUED | OUTPATIENT
Start: 2023-10-06 | End: 2023-10-13 | Stop reason: HOSPADM

## 2023-10-06 RX ORDER — VENLAFAXINE HYDROCHLORIDE 75 MG/1
150 CAPSULE, EXTENDED RELEASE ORAL DAILY
Status: DISCONTINUED | OUTPATIENT
Start: 2023-10-06 | End: 2023-10-13 | Stop reason: HOSPADM

## 2023-10-06 RX ORDER — NALOXONE HYDROCHLORIDE 0.4 MG/ML
0.4 INJECTION, SOLUTION INTRAMUSCULAR; INTRAVENOUS; SUBCUTANEOUS
Status: DISCONTINUED | OUTPATIENT
Start: 2023-10-06 | End: 2023-10-13 | Stop reason: HOSPADM

## 2023-10-06 RX ORDER — LEVOFLOXACIN 250 MG/1
500 TABLET, FILM COATED ORAL
Status: DISCONTINUED | OUTPATIENT
Start: 2023-10-06 | End: 2023-10-13 | Stop reason: HOSPADM

## 2023-10-06 RX ORDER — ALLOPURINOL 300 MG/1
300 TABLET ORAL DAILY
Status: COMPLETED | OUTPATIENT
Start: 2023-10-06 | End: 2023-10-11

## 2023-10-06 RX ORDER — ACETAMINOPHEN 325 MG/1
650 TABLET ORAL ONCE
Status: COMPLETED | OUTPATIENT
Start: 2023-10-12 | End: 2023-10-12

## 2023-10-06 RX ORDER — CEFEPIME HYDROCHLORIDE 2 G/1
2 INJECTION, POWDER, FOR SOLUTION INTRAVENOUS
Status: DISCONTINUED | OUTPATIENT
Start: 2023-10-06 | End: 2023-10-13 | Stop reason: HOSPADM

## 2023-10-06 RX ORDER — SULFAMETHOXAZOLE/TRIMETHOPRIM 800-160 MG
1 TABLET ORAL
Status: DISCONTINUED | OUTPATIENT
Start: 2023-11-13 | End: 2023-10-06

## 2023-10-06 RX ORDER — MEPERIDINE HYDROCHLORIDE 25 MG/ML
25-50 INJECTION INTRAMUSCULAR; INTRAVENOUS; SUBCUTANEOUS
Status: DISCONTINUED | OUTPATIENT
Start: 2023-10-12 | End: 2023-10-13 | Stop reason: HOSPADM

## 2023-10-06 RX ORDER — ONDANSETRON 2 MG/ML
8 INJECTION INTRAMUSCULAR; INTRAVENOUS EVERY 24 HOURS
Status: COMPLETED | OUTPATIENT
Start: 2023-10-07 | End: 2023-10-10

## 2023-10-06 RX ORDER — DEXAMETHASONE SODIUM PHOSPHATE 4 MG/ML
8 INJECTION, SOLUTION INTRA-ARTICULAR; INTRALESIONAL; INTRAMUSCULAR; INTRAVENOUS; SOFT TISSUE ONCE
Status: COMPLETED | OUTPATIENT
Start: 2023-10-12 | End: 2023-10-12

## 2023-10-06 RX ORDER — LORAZEPAM 0.5 MG/1
.5-1 TABLET ORAL EVERY 4 HOURS PRN
Status: DISCONTINUED | OUTPATIENT
Start: 2023-10-06 | End: 2023-10-13 | Stop reason: HOSPADM

## 2023-10-06 RX ORDER — FLUCONAZOLE 200 MG/1
400 TABLET ORAL DAILY
Status: DISCONTINUED | OUTPATIENT
Start: 2023-10-06 | End: 2023-10-13 | Stop reason: HOSPADM

## 2023-10-06 RX ORDER — HEPARIN SODIUM,PORCINE 10 UNIT/ML
5-10 VIAL (ML) INTRAVENOUS
Status: DISCONTINUED | OUTPATIENT
Start: 2023-10-06 | End: 2023-10-13 | Stop reason: HOSPADM

## 2023-10-06 RX ORDER — PROCHLORPERAZINE MALEATE 5 MG
5-10 TABLET ORAL EVERY 6 HOURS PRN
Status: DISCONTINUED | OUTPATIENT
Start: 2023-10-06 | End: 2023-10-13 | Stop reason: HOSPADM

## 2023-10-06 RX ORDER — TOPIRAMATE 50 MG/1
50 TABLET, FILM COATED ORAL EVERY EVENING
Status: DISCONTINUED | OUTPATIENT
Start: 2023-10-06 | End: 2023-10-13 | Stop reason: HOSPADM

## 2023-10-06 RX ORDER — DIPHENHYDRAMINE HCL 25 MG
25 CAPSULE ORAL ONCE
Status: COMPLETED | OUTPATIENT
Start: 2023-10-12 | End: 2023-10-12

## 2023-10-06 RX ORDER — SODIUM CHLORIDE 9 MG/ML
INJECTION, SOLUTION INTRAVENOUS CONTINUOUS
Status: ACTIVE | OUTPATIENT
Start: 2023-10-11 | End: 2023-10-11

## 2023-10-06 RX ORDER — DEXAMETHASONE SODIUM PHOSPHATE 4 MG/ML
12 INJECTION, SOLUTION INTRA-ARTICULAR; INTRALESIONAL; INTRAMUSCULAR; INTRAVENOUS; SOFT TISSUE ONCE
Status: COMPLETED | OUTPATIENT
Start: 2023-10-06 | End: 2023-10-06

## 2023-10-06 RX ORDER — PROPRANOLOL HYDROCHLORIDE 10 MG/1
20 TABLET ORAL 2 TIMES DAILY
Status: DISCONTINUED | OUTPATIENT
Start: 2023-10-06 | End: 2023-10-13 | Stop reason: HOSPADM

## 2023-10-06 RX ORDER — TRAMADOL HYDROCHLORIDE 50 MG/1
50-100 TABLET ORAL EVERY 6 HOURS PRN
Status: DISCONTINUED | OUTPATIENT
Start: 2023-10-06 | End: 2023-10-13 | Stop reason: HOSPADM

## 2023-10-06 RX ORDER — BUPROPION HYDROCHLORIDE 150 MG/1
150 TABLET ORAL EVERY MORNING
Status: DISCONTINUED | OUTPATIENT
Start: 2023-10-06 | End: 2023-10-13 | Stop reason: HOSPADM

## 2023-10-06 RX ORDER — DEXAMETHASONE SODIUM PHOSPHATE 4 MG/ML
12 INJECTION, SOLUTION INTRA-ARTICULAR; INTRALESIONAL; INTRAMUSCULAR; INTRAVENOUS; SOFT TISSUE ONCE
Status: COMPLETED | OUTPATIENT
Start: 2023-10-11 | End: 2023-10-11

## 2023-10-06 RX ORDER — FLUCONAZOLE 200 MG/1
200 TABLET ORAL DAILY
Status: DISCONTINUED | OUTPATIENT
Start: 2023-10-06 | End: 2023-10-06

## 2023-10-06 RX ORDER — ACYCLOVIR 200 MG/1
400 CAPSULE ORAL 2 TIMES DAILY
Status: DISCONTINUED | OUTPATIENT
Start: 2023-10-13 | End: 2023-10-13 | Stop reason: HOSPADM

## 2023-10-06 RX ADMIN — LEVOFLOXACIN 500 MG: 250 TABLET, FILM COATED ORAL at 11:46

## 2023-10-06 RX ADMIN — ALLOPURINOL 300 MG: 300 TABLET ORAL at 11:46

## 2023-10-06 RX ADMIN — CARMUSTINE 500 MG: KIT at 18:33

## 2023-10-06 RX ADMIN — BUPROPION HYDROCHLORIDE 150 MG: 150 TABLET, FILM COATED, EXTENDED RELEASE ORAL at 13:25

## 2023-10-06 RX ADMIN — MAGNESIUM SULFATE IN WATER 2 G: 40 INJECTION, SOLUTION INTRAVENOUS at 15:11

## 2023-10-06 RX ADMIN — PANTOPRAZOLE SODIUM 40 MG: 40 TABLET, DELAYED RELEASE ORAL at 11:46

## 2023-10-06 RX ADMIN — DEXAMETHASONE SODIUM PHOSPHATE 12 MG: 4 INJECTION, SOLUTION INTRA-ARTICULAR; INTRALESIONAL; INTRAMUSCULAR; INTRAVENOUS; SOFT TISSUE at 17:12

## 2023-10-06 RX ADMIN — Medication 5 ML: at 21:30

## 2023-10-06 RX ADMIN — NARATRIPTAN 2.5 MG: 2.5 TABLET ORAL at 17:11

## 2023-10-06 RX ADMIN — ONDANSETRON 16 MG: 2 INJECTION INTRAMUSCULAR; INTRAVENOUS at 17:14

## 2023-10-06 RX ADMIN — TRAMADOL HYDROCHLORIDE 50 MG: 50 TABLET, COATED ORAL at 15:11

## 2023-10-06 RX ADMIN — TOPIRAMATE 50 MG: 50 TABLET, FILM COATED ORAL at 20:09

## 2023-10-06 RX ADMIN — TRAMADOL HYDROCHLORIDE 100 MG: 50 TABLET, COATED ORAL at 22:16

## 2023-10-06 RX ADMIN — Medication 10 ML: at 10:34

## 2023-10-06 RX ADMIN — FLUCONAZOLE 400 MG: 200 TABLET ORAL at 11:46

## 2023-10-06 RX ADMIN — VENLAFAXINE HYDROCHLORIDE 150 MG: 75 CAPSULE, EXTENDED RELEASE ORAL at 20:09

## 2023-10-06 RX ADMIN — PROPRANOLOL HYDROCHLORIDE 20 MG: 10 TABLET ORAL at 20:09

## 2023-10-06 RX ADMIN — Medication 5 ML: at 21:23

## 2023-10-06 RX ADMIN — TRAMADOL HYDROCHLORIDE 50 MG: 50 TABLET, COATED ORAL at 16:23

## 2023-10-06 ASSESSMENT — ACTIVITIES OF DAILY LIVING (ADL)
CONCENTRATING,_REMEMBERING_OR_MAKING_DECISIONS_DIFFICULTY: NO
ADLS_ACUITY_SCORE: 35
DIFFICULTY_COMMUNICATING: NO
WALKING_OR_CLIMBING_STAIRS_DIFFICULTY: NO
DIFFICULTY_COMMUNICATING: NO
ADLS_ACUITY_SCORE: 18
DIFFICULTY_EATING/SWALLOWING: NO
TOILETING_ISSUES: NO
DIFFICULTY_COMMUNICATING: NO
HEARING_DIFFICULTY_OR_DEAF: NO
DIFFICULTY_COMMUNICATING: NO
ADLS_ACUITY_SCORE: 18
DOING_ERRANDS_INDEPENDENTLY_DIFFICULTY: NO
DRESSING/BATHING_DIFFICULTY: NO
CHANGE_IN_FUNCTIONAL_STATUS_SINCE_ONSET_OF_CURRENT_ILLNESS/INJURY: NO
ADLS_ACUITY_SCORE: 35
ADLS_ACUITY_SCORE: 35
CONCENTRATING,_REMEMBERING_OR_MAKING_DECISIONS_DIFFICULTY: NO
WEAR_GLASSES_OR_BLIND: NO
DIFFICULTY_COMMUNICATING: NO
ADLS_ACUITY_SCORE: 18
CHANGE_IN_FUNCTIONAL_STATUS_SINCE_ONSET_OF_CURRENT_ILLNESS/INJURY: NO
HEARING_DIFFICULTY_OR_DEAF: NO
DIFFICULTY_COMMUNICATING: NO
HEARING_DIFFICULTY_OR_DEAF: NO
ADLS_ACUITY_SCORE: 18
DOING_ERRANDS_INDEPENDENTLY_DIFFICULTY: NO
DRESSING/BATHING_DIFFICULTY: NO
CHANGE_IN_FUNCTIONAL_STATUS_SINCE_ONSET_OF_CURRENT_ILLNESS/INJURY: NO
FALL_HISTORY_WITHIN_LAST_SIX_MONTHS: NO
TOILETING_ISSUES: NO
ADLS_ACUITY_SCORE: 18
HEARING_DIFFICULTY_OR_DEAF: NO
CONCENTRATING,_REMEMBERING_OR_MAKING_DECISIONS_DIFFICULTY: NO
HEARING_DIFFICULTY_OR_DEAF: NO
WEAR_GLASSES_OR_BLIND: NO
WEAR_GLASSES_OR_BLIND: NO
CONCENTRATING,_REMEMBERING_OR_MAKING_DECISIONS_DIFFICULTY: NO
HEARING_DIFFICULTY_OR_DEAF: NO
CHANGE_IN_FUNCTIONAL_STATUS_SINCE_ONSET_OF_CURRENT_ILLNESS/INJURY: NO
DIFFICULTY_COMMUNICATING: NO
WALKING_OR_CLIMBING_STAIRS_DIFFICULTY: NO
DIFFICULTY_EATING/SWALLOWING: NO
HEARING_DIFFICULTY_OR_DEAF: NO
FALL_HISTORY_WITHIN_LAST_SIX_MONTHS: NO

## 2023-10-06 ASSESSMENT — ENCOUNTER SYMPTOMS
MYALGIAS: 0
CHEST TIGHTNESS: 0
HEADACHES: 1
VOMITING: 0
APPETITE CHANGE: 0
FEVER: 0
LEG SWELLING: 0
ARTHRALGIAS: 0
NUMBNESS: 0
SORE THROAT: 0
DEPRESSION: 1
DYSURIA: 0
CONSTIPATION: 0
COUGH: 0
SHORTNESS OF BREATH: 0
ABDOMINAL PAIN: 0
DIARRHEA: 0
NAUSEA: 0
CHILLS: 0

## 2023-10-06 NOTE — CONSULTS
Assessment completed in BMT clinic on 09/21/23. Please see information below for IP review.     Blood and Marrow Transplant   Psychosocial Assessment with   Clinical      Assessment completed on 9/21/23 of Pt's living situation, support system, financial status, functional status, coping, stressors, need for resources and social work intervention provided as needed.  Information for this assessment was provided by Pt and her mother's report in addition to medical chart review and consultation with medical team.      Present at Assessment:   Patient: Jennifer Ward  Mother: Ba Dalton  : DUONG Baker, [facility]     Diagnosis: Hodgkin Lymphoma       Date of Diagnosis: 2008; recurrence 4/2023      Transplant type: Autologous     Donor: Autologous      Physician: Tammy Gonzalez MD     Work-Up Nurse Coordinator: Jacob Richards RN     Long-term Nurse Coordinator: Laurei Bui RN     : DUONG Baker, [facility]     Permanent Address:   [address] [place], [place] [address]      Living Situation: Pt & spouse live in [place], [place]. Pt's son Oscar (21 y/o) lives there as well as spouse's son's (Mathew - 12 y/o & Daniel - 19 y/o) every other week.      Local Address:  on the [facility]. Pt plans to have her two mini poodles with her.  [address] [place] [address]     Contact Information:  Cell Phone: [phone]  Pt email: [email or web address]     Presenting Information:  Jennifer Ward is a 48 year old female diagnosed with Hodgkin Lymphoma who presents for evaluation for an autologous transplant at the [facility] Pt was accompanied to today's visit by her mother Ba.      Decision Making: Self     Health Care Directive:   No. SW provided education and forms. SW encouraged pt to have discussions with their family regarding their health care wishes. SW explained that since she does not have a healthcare directive, legally her NOK would make  decisions on her behalf, if she did not have capacity to make her own medical decisions. Pt understood. Pt is considering having HCD notarized during IP stay.     Relationship Status: . Pt and pt's spouse have been  for 7 years. Pt described relationship as stable/supportive.      Special Needs: Local Lodging Needed.     Family/Support System: Pt endorsed a strong support system including family and close friends who will be available to support pt throughout transplant process.      Family Information:   Spouse: Philip Ward -  7 yrs in 2023.   Parents: Mother - Ba Dalton; Father   Siblings: 1 brother - Ramo (lives in [place])  Children: Pt has 2 adult children - Carol (26 y/o;  Yobani) & Oscar (21 y/o); Pt's spouse has 4 children - Mykel (wife Lauren), Daniel Dent, & Mathew.     Caregiver: SW discussed with pt and pt's mother the caregiver role and expectation at length. Pt is agreeable to having a full time caregiver for a minimum of 30 days until cleared by the BMT physician. Pt and pt's mother confirmed understanding of the caregiver requirement. Pt's primary caregiver will be her mother Ba the first two weeks with the support of her  Philip and friend Lenny Ashwini as well. Pt reviewed and signed the caregiver contract which will be scanned into the EMR. Caregiver education and resources provided. No caregiver concerns identified.      Caregiver Contact Information:  Ba Dalton (mother) - cell phone: [phone]  Philip Ward (spouse) - cell phone: [phone]  Lenny Maradiaga (friend) - cell phone: [phone]     Transportation Mode: Personal Vehicle. Pt is aware of driving restrictions post-BMT and the need for the caregiver is to drive until cleared to drive by the BMT physician. SW provided information on parking info and monthly parking pass options.     Insurance: Pt has [employer] health insurance. Pt shared that her insurance has  "relocation/travel benefits for Patients who live greater than 50 miles from the hospital. Pt is in the process of appealing this with her insurance due to required relocation. KEE collaborated with Dr Gonzalez to provide a relocation statement letter to pt in clinic on 9/21/23. KEE reiterated information about the BMT Financial  should specific insurance questions arise as pt moves through transplant process.      Sources of Income: Pt's source of income is payroll. Pt shared that she is working on setting up a payment plan with other hospital systems stating that she is unable to pay \"all hospital bills in full at once.\" Pt shared she is overall getting a lot of support from her employer and denies immediate financial concerns at this time. KEE discussed willem options ([employer] & [email or web address]) and asked pt to let KEE know if they would like to apply in the future.      Employment:   Currently employed: Yes; 100% remote; flexible/supportive employer  Occupation:      Spouse/Partner Employed: Yes  Occupation: Construction     Mental Health:   Pt reported a hx of Depression. Pt is currently taking medication (wellbutrin) approximately 1 year ago and pt feels the medication is working well. Pt states her daughter has identified improvement in Pt's overall mental health more than Pt has insight to.      We discussed how many patients may see an increase in feelings of anxiety or depression while hospitalized for extended periods of time and pursue  isolation precautions post-BMT. Encouraged Pt and family to let us know if they are noticing an increase in symptoms. Pt believes she would be able to identify symptoms of depression/anxiety throughout the transplant process. We talked about the variety of modalities available to use as coping mechanisms (including but not limited to guided imagery, relaxation techniques, progressive muscle relaxation, counseling/talk therapy and medication).   " "  PHQ-9:  Pt scored a 11 which indicates moderate on the depression severity scale. Pt shared that these symptoms have not been difficult for her to carry on her work and take care of things at home. Pt feels that she is overall coping appropriately.     GAD7:  Pt scored a 6 which indicates a mild sign of anxiety on the Generalized Anxiety Disorder Questionnaire. Pt endorses this is an accurate reflection of her emotional state.     Chemical Use:   Tobacco: No hx  Alcohol: No hx  Marijuana: No hx  Other Drugs: No hx  Based on the information provided, there appear to be no specific risks or concerns identified at this time.      Trauma/Loss/Abuse History: Multiple losses associated with cancer diagnosis and treatment, including health, employment, changes to physical appearance, etc. Pt shared that it is highly difficult for her to be losing her hair again.      Spirituality: Pt identified as Zoroastrian. SW explained that there are Chaplains on the unit and pt can request to meet with a  at anytime.  Pt is interested in having a blessing ceremony - referral made to spiritual care on 9/21/23.     Coping: Pt noted that she is currently feeling \"depressed, fearful, sad, worried, nervous, frustrated, ready to begin\". Pt shared that her main coping mechanisms are talking with friends/family, spending time with her granddaughter and her dogs, working on her hobbies ([employer] art), and cleaning.  Pt noted that she enjoys spending time with family, working, and . SW and pt discussed additional positive coping mechanisms that pt can utilize while in the hospital. While hospitalized, pt plans to use her IPAD, watch movies, and walk the halls. Pt identified feeling vulnerable w/ relapsed disease processing that the first time she was diagnosed she was single w/ limited support and no health insurance at the time.  Pt also shared that it is highly difficult for her to be losing her hair again.      Caregiver Coping: Pt's " "mother noted that she is feeling \"positive, hopeful, worried, nervous, and ready to begin\" at this time. Pt's mother noted that she ariela by talking w/ friends/family, positive self-talk, and working on her hobbies (crafting and sewing). Caregiver resources offered/reviewed.      Education Provided: Transplant process expectations, Caregiver requirements, Caregiver self-care, Financial issues related to transplant, Financial resources/grants available, Common psychosocial stressors pre/post transplant, Support group(s) available, Hospital resources available, Web site information, Social Work role and Resources for children/siblings     Interventions Provided: Psychosocial Support and Education      Assessment and Recommendations for Team:  Pt is a 48 year old female diagnosed with Hodgkin Lymphoma who is here undergoing preparation for a planned autologous transplant.      Pt is pleasant, calm and able to articulate concerns/coping mechanisms in an appropriate manner. During our meeting pt was alert, she was interactive, affect was full, she displayed appropriate eye contact, memory and thought processes. Pt feels comfortable communicating with the medical team. Pt has a strong supportive network of family and friends who are involved.      Pt will benefit from ongoing psychosocial support in regards to coping with the adjustment to the BMT process. CSW has discussed  psychosocial support options in regards to coping with the adjustment to the BMT process and support groups opportunities.       Pt has a strong support system and a confirmed caregiver plan. Pt verbalizes understanding of the transplant process and wanting to proceed. SW provided contact information and encouraged pt to contact SW with questions, concerns, resources and for support.     Per this assessment, SW did not identify any barriers to this patient moving forward with transplant.     Important Information:   -would like a blessing " ceremony.  -will [address] into [address] at the end of her hospital stay.  -hx of Depression     Follow up Planned:   Initiate financial resources - [employer] & [employer] billy provided.  Psychosocial support  Lodging referrals - none required; she plans to stay at [address]  Healthcare Directive - may need a notary while IP.     DUONG Baker, [facility]  Adult Blood & Marrow Transplant   Phone: [phone]  Pager: [phone]

## 2023-10-06 NOTE — PLAN OF CARE
Problem: Plan of Care - These are the overarching goals to be used throughout the patient stay.    Goal: Plan of Care Review  Description: The Plan of Care Review/Shift note should be completed every shift.  The Outcome Evaluation is a brief statement about your assessment that the patient is improving, declining, or no change.  This information will be displayed automatically on your shift note.  Outcome: Progressing  Flowsheets (Taken 10/6/2023 1009)  Outcome Evaluation: IDT will monitor for discharge needs pending clinical course post BMT  Plan of Care Reviewed With: patient  Overall Patient Progress: improving

## 2023-10-06 NOTE — PHARMACY-ADMISSION MEDICATION HISTORY
Pharmacist Admission Medication History    Admission medication history is complete. The information provided in this note is only as accurate as the sources available at the time of the update.    Information Source(s): Patient via in-person    Pertinent Information: confirmed that the hospital has naratriptan in stock     Changes made to PTA medication list:  Added: None  Deleted: None  Changed: None    Medication Affordability:       Allergies reviewed with patient and updates made in EHR: yes    Medication History Completed By: Igor Price RPH 10/6/2023 10:10 AM    No outpatient medications have been marked as taking for the 10/6/23 encounter (Hospital Encounter).

## 2023-10-07 ENCOUNTER — APPOINTMENT (OUTPATIENT)
Dept: PHYSICAL THERAPY | Facility: CLINIC | Age: 48
End: 2023-10-07
Attending: PHYSICIAN ASSISTANT
Payer: COMMERCIAL

## 2023-10-07 LAB
ANION GAP SERPL CALCULATED.3IONS-SCNC: 10 MMOL/L (ref 7–15)
BASO+EOS+MONOS # BLD AUTO: ABNORMAL 10*3/UL
BASO+EOS+MONOS NFR BLD AUTO: ABNORMAL %
BASOPHILS # BLD AUTO: ABNORMAL 10*3/UL
BASOPHILS # BLD MANUAL: 0.3 10E3/UL (ref 0–0.2)
BASOPHILS NFR BLD AUTO: ABNORMAL %
BASOPHILS NFR BLD MANUAL: 1 %
BUN SERPL-MCNC: 13.3 MG/DL (ref 6–20)
CALCIUM SERPL-MCNC: 9.4 MG/DL (ref 8.6–10)
CHLORIDE SERPL-SCNC: 105 MMOL/L (ref 98–107)
CREAT SERPL-MCNC: 0.91 MG/DL (ref 0.51–0.95)
DEPRECATED HCO3 PLAS-SCNC: 23 MMOL/L (ref 22–29)
EGFRCR SERPLBLD CKD-EPI 2021: 77 ML/MIN/1.73M2
EOSINOPHIL # BLD AUTO: ABNORMAL 10*3/UL
EOSINOPHIL # BLD MANUAL: 0 10E3/UL (ref 0–0.7)
EOSINOPHIL NFR BLD AUTO: ABNORMAL %
EOSINOPHIL NFR BLD MANUAL: 0 %
ERYTHROCYTE [DISTWIDTH] IN BLOOD BY AUTOMATED COUNT: 13.5 % (ref 10–15)
GLUCOSE SERPL-MCNC: 161 MG/DL (ref 70–99)
HCT VFR BLD AUTO: 32.3 % (ref 35–47)
HGB BLD-MCNC: 10.6 G/DL (ref 11.7–15.7)
IMM GRANULOCYTES # BLD: ABNORMAL 10*3/UL
IMM GRANULOCYTES NFR BLD: ABNORMAL %
LYMPHOCYTES # BLD AUTO: ABNORMAL 10*3/UL
LYMPHOCYTES # BLD MANUAL: 1.2 10E3/UL (ref 0.8–5.3)
LYMPHOCYTES NFR BLD AUTO: ABNORMAL %
LYMPHOCYTES NFR BLD MANUAL: 4 %
MAGNESIUM SERPL-MCNC: 2.2 MG/DL (ref 1.7–2.3)
MCH RBC QN AUTO: 28.6 PG (ref 26.5–33)
MCHC RBC AUTO-ENTMCNC: 32.8 G/DL (ref 31.5–36.5)
MCV RBC AUTO: 87 FL (ref 78–100)
METAMYELOCYTES # BLD MANUAL: 0.3 10E3/UL
METAMYELOCYTES NFR BLD MANUAL: 1 %
MONOCYTES # BLD AUTO: ABNORMAL 10*3/UL
MONOCYTES # BLD MANUAL: 1.2 10E3/UL (ref 0–1.3)
MONOCYTES NFR BLD AUTO: ABNORMAL %
MONOCYTES NFR BLD MANUAL: 4 %
MYELOCYTES # BLD MANUAL: 0.6 10E3/UL
MYELOCYTES NFR BLD MANUAL: 2 %
NEUTROPHILS # BLD AUTO: ABNORMAL 10*3/UL
NEUTROPHILS # BLD MANUAL: 26.7 10E3/UL (ref 1.6–8.3)
NEUTROPHILS NFR BLD AUTO: ABNORMAL %
NEUTROPHILS NFR BLD MANUAL: 87 %
NRBC # BLD AUTO: 0 10E3/UL
NRBC BLD AUTO-RTO: 0 /100
PHOSPHATE SERPL-MCNC: 3.4 MG/DL (ref 2.5–4.5)
PLAT MORPH BLD: ABNORMAL
PLATELET # BLD AUTO: 117 10E3/UL (ref 150–450)
POTASSIUM SERPL-SCNC: 4.3 MMOL/L (ref 3.4–5.3)
PROMYELOCYTES # BLD MANUAL: 0.3 10E3/UL
PROMYELOCYTES NFR BLD MANUAL: 1 %
RBC # BLD AUTO: 3.7 10E6/UL (ref 3.8–5.2)
RBC MORPH BLD: ABNORMAL
SODIUM SERPL-SCNC: 138 MMOL/L (ref 135–145)
WBC # BLD AUTO: 30.7 10E3/UL (ref 4–11)

## 2023-10-07 PROCEDURE — 999N000111 HC STATISTIC OT IP EVAL DEFER

## 2023-10-07 PROCEDURE — 97530 THERAPEUTIC ACTIVITIES: CPT | Mod: GP

## 2023-10-07 PROCEDURE — 250N000013 HC RX MED GY IP 250 OP 250 PS 637: Performed by: STUDENT IN AN ORGANIZED HEALTH CARE EDUCATION/TRAINING PROGRAM

## 2023-10-07 PROCEDURE — 80048 BASIC METABOLIC PNL TOTAL CA: CPT | Performed by: PHYSICIAN ASSISTANT

## 2023-10-07 PROCEDURE — 97110 THERAPEUTIC EXERCISES: CPT | Mod: GP

## 2023-10-07 PROCEDURE — 206N000001 HC R&B BMT UMMC

## 2023-10-07 PROCEDURE — 250N000011 HC RX IP 250 OP 636: Mod: JZ | Performed by: PHYSICIAN ASSISTANT

## 2023-10-07 PROCEDURE — 250N000013 HC RX MED GY IP 250 OP 250 PS 637: Performed by: PHYSICIAN ASSISTANT

## 2023-10-07 PROCEDURE — 84100 ASSAY OF PHOSPHORUS: CPT | Performed by: PHYSICIAN ASSISTANT

## 2023-10-07 PROCEDURE — 85027 COMPLETE CBC AUTOMATED: CPT | Performed by: PHYSICIAN ASSISTANT

## 2023-10-07 PROCEDURE — 97161 PT EVAL LOW COMPLEX 20 MIN: CPT | Mod: GP

## 2023-10-07 PROCEDURE — 99233 SBSQ HOSP IP/OBS HIGH 50: CPT | Mod: 24 | Performed by: PHYSICIAN ASSISTANT

## 2023-10-07 PROCEDURE — 85007 BL SMEAR W/DIFF WBC COUNT: CPT | Performed by: PHYSICIAN ASSISTANT

## 2023-10-07 PROCEDURE — 258N000003 HC RX IP 258 OP 636: Performed by: STUDENT IN AN ORGANIZED HEALTH CARE EDUCATION/TRAINING PROGRAM

## 2023-10-07 PROCEDURE — 250N000011 HC RX IP 250 OP 636: Mod: JZ | Performed by: STUDENT IN AN ORGANIZED HEALTH CARE EDUCATION/TRAINING PROGRAM

## 2023-10-07 PROCEDURE — 83735 ASSAY OF MAGNESIUM: CPT | Performed by: PHYSICIAN ASSISTANT

## 2023-10-07 RX ADMIN — Medication 5 ML: at 12:55

## 2023-10-07 RX ADMIN — FLUCONAZOLE 400 MG: 200 TABLET ORAL at 08:52

## 2023-10-07 RX ADMIN — TOPIRAMATE 50 MG: 50 TABLET, FILM COATED ORAL at 19:24

## 2023-10-07 RX ADMIN — PROCHLORPERAZINE MALEATE 10 MG: 5 TABLET ORAL at 19:24

## 2023-10-07 RX ADMIN — CYTARABINE 430 MG: 100 INJECTION, SOLUTION INTRATHECAL; INTRAVENOUS; SUBCUTANEOUS at 22:27

## 2023-10-07 RX ADMIN — Medication 5 ML: at 04:06

## 2023-10-07 RX ADMIN — TRAMADOL HYDROCHLORIDE 100 MG: 50 TABLET, COATED ORAL at 22:50

## 2023-10-07 RX ADMIN — ETOPOSIDE 215 MG: 20 INJECTION INTRAVENOUS at 20:08

## 2023-10-07 RX ADMIN — ETOPOSIDE 215 MG: 20 INJECTION INTRAVENOUS at 08:48

## 2023-10-07 RX ADMIN — PROPRANOLOL HYDROCHLORIDE 20 MG: 10 TABLET ORAL at 08:52

## 2023-10-07 RX ADMIN — Medication 5 ML: at 04:07

## 2023-10-07 RX ADMIN — VENLAFAXINE HYDROCHLORIDE 150 MG: 75 CAPSULE, EXTENDED RELEASE ORAL at 19:25

## 2023-10-07 RX ADMIN — PANTOPRAZOLE SODIUM 40 MG: 40 TABLET, DELAYED RELEASE ORAL at 08:52

## 2023-10-07 RX ADMIN — LEVOTHYROXINE SODIUM 75 MCG: 0.05 TABLET ORAL at 08:51

## 2023-10-07 RX ADMIN — BUPROPION HYDROCHLORIDE 150 MG: 150 TABLET, FILM COATED, EXTENDED RELEASE ORAL at 08:52

## 2023-10-07 RX ADMIN — ALLOPURINOL 300 MG: 300 TABLET ORAL at 08:52

## 2023-10-07 RX ADMIN — PROPRANOLOL HYDROCHLORIDE 20 MG: 10 TABLET ORAL at 19:25

## 2023-10-07 RX ADMIN — ONDANSETRON 8 MG: 2 INJECTION INTRAMUSCULAR; INTRAVENOUS at 08:51

## 2023-10-07 RX ADMIN — LEVOFLOXACIN 500 MG: 250 TABLET, FILM COATED ORAL at 11:16

## 2023-10-07 RX ADMIN — CYTARABINE 430 MG: 100 INJECTION, SOLUTION INTRATHECAL; INTRAVENOUS; SUBCUTANEOUS at 11:09

## 2023-10-07 ASSESSMENT — ACTIVITIES OF DAILY LIVING (ADL)
ADLS_ACUITY_SCORE: 18

## 2023-10-07 NOTE — PLAN OF CARE
Occupational Therapy Defer: Orders received and chart reviewed. Discussed the patient's functional performance with physical therapy. Patient is currently mobilizing and completing ADLs independently, no cognitive concerns. Patient is appropriate for one rehab discipline to follow. Physical therapy following and occupational therapy will sign off. Defer discharge recommendations to physical therapy.

## 2023-10-07 NOTE — PROGRESS NOTES
Stop time on MAR & chart I & O  Chemo drug: Carmustine (BICUNU)  Tolerated: Well  Intervention: Chemo completed at  2120.  Good blood return noted when taking down chemotherapy.  Response: Pt denies any nausea, vomiting or any other discomfort.  Plan: Will continue to monitor, will report changes to on call provider.

## 2023-10-07 NOTE — PLAN OF CARE
Jennifer was admitted from home with her  for an Auto transplant for Lymphoma.  Orientated to unit and unit routines.  Admission paperwork and lab work was completed.  Required a dose of Magnesium per electrolyte sliding scales.  Her first chemo Carmustine is infusing now through her central line with positive blood return.  She was premedicated with decadron and zofran.  Skin double check was done by Claudia ALLEN And myself - no significant findings.  Achy legs from growth factor that were improved with two 50 mg doses of Tramadol.      Problem: Plan of Care - These are the overarching goals to be used throughout the patient stay.    Goal: Plan of Care Review  Description: The Plan of Care Review/Shift note should be completed every shift.  The Outcome Evaluation is a brief statement about your assessment that the patient is improving, declining, or no change.  This information will be displayed automatically on your shift note.  Outcome: Progressing   Goal Outcome Evaluation:

## 2023-10-07 NOTE — PROGRESS NOTES
"   10/07/23 0910   Appointment Info   Signing Clinician's Name / Credentials (PT) Tammy Diallo, PT, DPT   Rehab Comments (PT) d-6 auto   Living Environment   People in Home child(louis), adult;spouse   Current Living Arrangements house   Home Accessibility stairs within home   Number of Stairs, Within Home, Primary ten   Stair Railings, Within Home, Primary railings safe and in good condition;railings on both sides of stairs   Transportation Anticipated family or friend will provide   Living Environment Comments Pt reports she lives with her spouse and 21 y.o. son in a split-level home with ~ 2 sets of 5 stairs, bilateral railings. States she has a standard toilet, standard bed, and tub-shower with no grab bars.   Self-Care   Usual Activity Tolerance good   Current Activity Tolerance good   Regular Exercise No   Equipment Currently Used at Home none   Fall history within last six months no   Activity/Exercise/Self-Care Comment Pt reports prior to admission she was independent with all ADLs, IADLs, and functional mobility. 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 Pt will have 24/7 support alt. between her  and other family members at discharge with plans to stay at local housing.   General Information   Onset of Illness/Injury or Date of Surgery 10/06/23   Referring Physician Helen Felix PA-C   Patient/Family Therapy Goals Statement (PT) maintain current level of IND, stay active, return home   Pertinent History of Current Problem (include personal factors and/or comorbidities that impact the POC) Per EMR: \"Jennifer Ward is a 48 year old female, currently day -6 of her autologous hematopoietic cell transplant as rescue after BEAM therapy for Hodgkin Lymphoma. Co-enrolled on [employer] study. \"   Existing Precautions/Restrictions immunosuppressed   Weight-Bearing Status - LUE full weight-bearing "   Weight-Bearing Status - RUE full weight-bearing   Weight-Bearing Status - LLE full weight-bearing   Weight-Bearing Status - RLE full weight-bearing   General Observations Activity: Ambulate BMT Adult, Up Ad Nasima   Cognition   Affect/Mental Status (Cognition) WNL   Orientation Status (Cognition) oriented x 4   Follows Commands (Cognition) WNL   Pain Assessment   Patient Currently in Pain No  (pain at CVC site with LUE movement)   Integumentary/Edema   Integumentary/Edema no deficits were identifed   Integumentary/Edema Comments L CVC, R Ana cath   Posture    Posture Protracted shoulders   Range of Motion (ROM)   Range of Motion ROM is WFL   ROM Comment BLE ROM WFL, LUE ROM limited 2/2 L CVC pain   Strength (Manual Muscle Testing)   Strength (Manual Muscle Testing) strength is WFL   Bed Mobility   Comment, (Bed Mobility) IND   Transfers   Comment, (Transfers) IND   Gait/Stairs (Locomotion)   Comment, (Gait/Stairs) IND with gait 10ft, stairs deferred   Balance   Balance no deficits were identified   Balance Comments grossly intact, no observable imbalance with ambulation using no AD   Sensory Examination   Sensory Perception patient reports no sensory changes   Coordination   Coordination no deficits were identified   Muscle Tone   Muscle Tone no deficits were identified   Clinical Impression   Criteria for Skilled Therapeutic Intervention Yes, treatment indicated   PT Diagnosis (PT) at risk for deconditioning 2/2 course of treatment and prolonged hospital stay   Influenced by the following impairments course of treatment, prolonged hospital stay   Functional limitations due to impairments activity tolerance/endurance with bed mobility, transfers, ambulation, and stairs   Clinical Presentation (PT Evaluation Complexity) Stable/Uncomplicated   Clinical Presentation Rationale clinical judgement   Clinical Decision Making (Complexity) low complexity   Planned Therapy Interventions (PT) balance training;bed mobility  training;gait training;home exercise program;neuromuscular re-education;patient/family education;postural re-education;ROM (range of motion);stair training;strengthening;stretching;transfer training;progressive activity/exercise;risk factor education;home program guidelines   Anticipated Equipment Needs at Discharge (PT)   (tbd, anticipate none)   Risk & Benefits of therapy have been explained evaluation/treatment results reviewed;care plan/treatment goals reviewed;risks/benefits reviewed;current/potential barriers reviewed;participants voiced agreement with care plan;participants included;patient   PT Total Evaluation Time   PT Madeleine Low Complexity Minutes (13506) 8   Plan of Care Review   Plan of Care Reviewed With patient   Physical Therapy Goals   PT Frequency 3x/week   PT Predicted Duration/Target Date for Goal Attainment 10/21/23   PT Goals Gait;Stairs;Aerobic Activity;PT Goal 1;PT Goal 2   PT: Gait Independent;Greater than 200 feet   PT: Stairs Modified independent;10 stairs;Rail on both sides   PT: Perform aerobic activity with stable cardiovascular response intermittent activity;30 minutes;continuous activity;20 minutes;ambulation;NuStep   PT: Goal 1 Pt will be independent with lab value education including he role of lab values (platelets, hemoglobin and white blood cells) in the body, normal lab value parameters and implications of low values on exercise and activity.   PT: Goal 2 Pt will be independent with HEP including standing and arm exercises for BUE/LE ROM, strengthening, and muscular endurance to prevent deconditioning and promote improved muscular strength for increased stability during transfers and ambulation.   PT Discharge Planning   PT Plan review lab values and HEP, progress gait endurance with no AD, trial stairs   PT Discharge Recommendation (DC Rec) home with assist   PT Rationale for DC Rec Pt at baseline in regards to independence and functional mobility. At this time due to course of  treatment and LOS pt is at risk for deconditioning and would benefit from IP PT for continued education and intervention during hospialization. Once medically cleared anticipate pt will be appropriate to discharge home with family assist, which she will have 24/7, for higher level IADLs as needed. Pending response to treatment, she may benefit from OP cancer rehab. PTwill continue to follow and monitor over LOS and update as appropriate.   PT Brief overview of current status IND, encourage up to chair and hallway ambulation at least 3-4x/day   Total Session Time   Total Session Time (sum of timed and untimed services) 8

## 2023-10-07 NOTE — PROGRESS NOTES
"BMT/Cell Therapy Daily Progress Note   10/07/2023    Patient ID:  Jennifer Ward is a 48 year old female, currently day -5 of her therapy.    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 PET showed lymphoma limited to left cervical, supraclavicular, and axillary lymph nodes. BmBx negative.      Late relapse in May 2023 presenting with right neck swelling. Right cervical LN biopsy showed recurrent vs new primary cHL (nodular sclerosing subtype). PET with FDG-avid right level II and level V cervical lymph nodes only (SUVmax 13.2)     Date Treatment Name Response Side Effects / Toxicities   2008 to 2009 ABVD x 6 cycles followed by adjuvant radiation CR Fatigue, N/V   2023 to 2023 Brentuximab/nivolumab x 2 cycles, nivolumab alone x 2 cycles CR Brentuximab infusion reaction (difficulty breathing)        Admission date: 10/6/2023    INTERVAL  HISTORY     Jennifer is feeling well this morning - no complaints this morning. She has not had a bowel movement over the last 24 hours but she thinks that once she is more settled today, she will have one. No bloating or abdominal pain.    Review of Systems: ROS negative except as noted above.      PHYSICAL EXAM     Weight In/Out     Wt Readings from Last 3 Encounters:   10/07/23 101.1 kg (222 lb 14.4 oz)   10/04/23 99.4 kg (219 lb 2.2 oz)   10/04/23 98.4 kg (217 lb)      I/O last 3 completed shifts:  In: 1470 [P.O.:1020; I.V.:150; IV Piggyback:300]  Out: 2350 [Urine:2350]       KPS:  90  ECO    BP (!) 142/85 (BP Location: Left arm)   Pulse 70   Temp 97.7  F (36.5  C) (Axillary)   Resp 16   Ht 1.64 m (5' 4.57\")   Wt 101.1 kg (222 lb 14.4 oz)   SpO2 99%   BMI 37.59 kg/m       General: NAD   Eyes: : ARMEN, sclera anicteric   Nose/Mouth/Throat: OP clear, buccal mucosa moist, no ulcerations   Lungs: CTA " bilaterally  Cardiovascular: RRR, no M/R/G   Abdominal/Rectal: +BS, soft, NT, ND  Lymphatics: no edema  Skin: no rashes or petechiae  Neuro: A&O   Additional Findings: Louis site NT, no drainage.      LABS AND IMAGING: I have assessed all abnormal lab values for their clinical significance and any values considered clinically significant have been addressed in the assessment and plan.        Lab Results   Component Value Date    WBC 30.7 (H) 10/07/2023    ANEU 26.7 (H) 10/07/2023    HGB 10.6 (L) 10/07/2023    HCT 32.3 (L) 10/07/2023     (L) 10/07/2023     10/07/2023    POTASSIUM 4.3 10/07/2023    CHLORIDE 105 10/07/2023    CO2 23 10/07/2023     (H) 10/07/2023    BUN 13.3 10/07/2023    CR 0.91 10/07/2023    MAG 2.2 10/07/2023    INR 1.02 10/06/2023     Oral/GI regimen related toxicities (per NCI CTCAE v 5.0):    Oral Mucositis: none  Nausea: none  Vomiting: none  Diarrhea: none    *diarrhea secondary to C. Diff is excluded from the definition of oral/GI RRT      CTCAE Terms     Vomiting: A disorder characterized by the reflexive act of ejecting the contents of the stomach through the mouth   Grade 1 Intervention not indicated   Grade 2 Outpatient IV hydration; medical intervention indicated   Grade 3 Tube feeding, TPN, or hospitalization indicated   Grade 4 Life-threatening consequences   Grade 5 Death     Oral Mucositis: A disorder characterized by ulceration or inflammation of the oral mucosal.   Grade 1 Asymptomatic or mild symptoms; intervention not indicated   Grade 2 Moderate pain or ulcer that does not interfere with oral intake; modified diet indicated   Grade 3 Severe pain; interfering with oral intake   Grade 4 Life-threatening consequences; urgent intervention indicated   Grade 5 Death     Nausea: A disorder characterized by a queasy sensation and/or the urge to vomit.   Grade 1 Loss of appetite without alteration in eating habits  Grade 2 Oral intake decreased without significant  weight loss, dehydration or malnutrition   Grade 3 Inadequate oral caloric or fluid intake; tube feeding, TPN, or hospitalization indicated   Grade 4 NA  Grade 5 NA    Diarrhea: A disorder characterized by an increase in frequency and/or loose or watery bowel movements.   Grade 1 Increase of <4 stools per day over baseline; mild increase in ostomy output compared to baseline   Grade 2 Increase of 4 - 6 stools per day over baseline; moderate increase in ostomy output compared to baseline; limiting instrumental ADL   Grade 3 Increase of >=7 stools per day over baseline; hospitalization indicated; severe increase in ostomy output compared to baseline; limiting self-care ADL    Grade 4 Life-threatening consequences; urgent intervention indicated    Grade 5 Death       Source: https://ctep.cancer.gov/protocoldevelopment/electronic_applications/docs/CTCAE_v5_Quick_Reference_8.5x11.pdf       SYSTEMS-BASED ASSESSMENT AND PLAN   Jennifer Ward is a 48 year old female, currently day -5 of her autologous hematopoietic cell transplant as rescue after BEAM therapy for Hodgkin Lymphoma. Co-enrolled on [employer] study.      Prep: BEAM     BMT  - BMT doc/Coordinator: Nay  - MT2016-11 with co-enrollment on MT2022-40 (E-CELERATE)  - Cell dose 7 million CD34+ cells/kg   - Prep as above. Flush and pre-meds prior to transplant.  - GCSF starts day +5, continue until ANC > 500 for 3 consecutive days.   - Restaging: next restage at day +28  - Allopurinol started Day -6     HEME/COAG  - Transfusion parameters: hgb <7g/dL and plts <10,000  - Leucocytosis secondary to recent stem cell mobilization  - Anemia and thrombocytopenia secondary to Hodgkin Lymphoma     ID  Prophy: note that antimicrobial dosing is specific to protocol  Viral: ACV 400mg PO bid  Bacterial: levofloxacin 500 mg daily  Fungal: fluconazole 400mg daily  PCP: Bactrim or other PCP prophy to start at day +28     GI/NUTRITION  - Anti-emetics: zofran/dex prior to  "transplant to prevent chemotherapy induced n/v.  Ativan and compazine prn.  Single dose of emend 30 minutes prior to melphalan on last day of chemo prep.  - Ulcer prophy: Protonix 40mg daily.   - dietician to support as indicated to prevent malnutrition.     ENDOCRINE  # hypothyroidism: continue PTA synthroid     PSYCH  # depression: continue PTA bupropion, venlafaxine     NEURO  # migraines: daily topamax with prn naratriptan     SUPPORTIVE CARE  - PT/OT to evaluate on admission and follow as indicated.   - BMT Social work to follow.      Dispo/Discharge: anticipate discharge on day +1.  Note that patient already has plenty of her PTA medications and will only need prescriptions for any new meds that we start this admission. [place] [address]       Clinically Significant Risk Factors                  # Thrombocytopenia: Lowest platelets = 117 in last 2 days, will monitor for bleeding          # Obesity: Estimated body mass index is 37.59 kg/m  as calculated from the following:    Height as of this encounter: 1.64 m (5' 4.57\").    Weight as of this encounter: 101.1 kg (222 lb 14.4 oz)., PRESENT ON ADMISSION              Known issues that I take into account for medical decisions, with salient changes to the plan considering these complexities noted above.    Patient Active Problem List   Diagnosis    Hodgkin's disease of lymph nodes of multiple sites (H)    Autologous donor of stem cells    Nodular sclerosis Hodgkin lymphoma of lymph nodes of multiple regions (H)       I spent 30 minutes in the care of this patient today, which included time necessary for preparation for the visit, obtaining history, ordering medications/tests/procedures as medically indicated, review of pertinent medical literature, counseling of the patient, communication of recommendations to the care team, and documentation time.    Nomi Reid PA-C  [phone]    Securely message with the Vocera Web " Console

## 2023-10-07 NOTE — PLAN OF CARE
Jennifer received her first dose of day -5 Etoposide and Cytarabine through her central line with positive blood return.  Tolerated both chemotherapies well.  Denied any nausea or vomiting.  She was very happy with her pain control overnight (used Tramadol for leg aches) and she has denied pain so far today.      Problem: Plan of Care - These are the overarching goals to be used throughout the patient stay.    Goal: Optimal Comfort and Wellbeing  Outcome: Progressing     Problem: Stem Cell/Bone Marrow Transplant  Goal: Absence of Infection  Outcome: Progressing  Goal: Nausea and Vomiting Symptom Relief  Outcome: Progressing   Goal Outcome Evaluation:

## 2023-10-07 NOTE — PLAN OF CARE
VSS    Afebrile.  Pt noted to be up ad layla, steady gait and balance.  Pt educated on good oral cares.  Pt tolerated chemotherapy well, good blood return noted when taking down chemotherapy.  Pt indicated experiencing bilateral lower extremities pain (aching sensation), pt indicates it seems to become more noticeable at night, pt indicated she believes it might be related to her growth factor.  PRN Ultram administered.  Pt aware she is to provide a stool sample for C.Diff.  Pt to receive chemotherapy today (10/07/2023) in AM and PM.  Left neck/clavicle area, wound area noted to be c/d/I, incision noted to be well approximated, dermabond.  No blood products needed this AM.  No electrolytes needed this AM.    Problem: Stem Cell/Bone Marrow Transplant  Goal: Improved Oral Mucous Membrane Health and Integrity  Intervention: Promote Oral Comfort and Health  Recent Flowsheet Documentation  Taken 10/6/2023 2000 by Luisito Carpenter RN  Oral Care:   oral rinse provided   mouth wash rinse     Problem: Stem Cell/Bone Marrow Transplant  Goal: Absence of Infection  Intervention: Prevent and Manage Infection  Recent Flowsheet Documentation  Taken 10/7/2023 0000 by Luisito Carpenter, RN  Infection Prevention:   cohorting utilized   environmental surveillance performed   equipment surfaces disinfected   hand hygiene promoted   personal protective equipment utilized   single patient room provided   rest/sleep promoted   visitors restricted/screened  Isolation Precautions:   cytotoxic precautions maintained   enteric precautions maintained   protective environment maintained  Taken 10/6/2023 2000 by Luisito Carpenter RN  Infection Prevention:   cohorting utilized   environmental surveillance performed   equipment surfaces disinfected   hand hygiene promoted   personal protective equipment utilized   single patient room provided   rest/sleep promoted   visitors restricted/screened  Isolation Precautions:   cytotoxic precautions  maintained   enteric precautions maintained   protective environment maintained     Problem: Stem Cell/Bone Marrow Transplant  Goal: Blood Counts Within Acceptable Range  Intervention: Monitor and Manage Hematologic Symptoms  Recent Flowsheet Documentation  Taken 10/7/2023 0000 by Luisito Carpenter RN  Medication Review/Management: medications reviewed  Taken 10/6/2023 2000 by Luisito Carpenter RN  Medication Review/Management: medications reviewed     Problem: Plan of Care - These are the overarching goals to be used throughout the patient stay.    Goal: Optimal Comfort and Wellbeing  Intervention: Provide Person-Centered Care  Recent Flowsheet Documentation  Taken 10/6/2023 2000 by Luisito Carpenter RN  Trust Relationship/Rapport:   care explained   choices provided   emotional support provided   empathic listening provided   questions answered   questions encouraged   reassurance provided   thoughts/feelings acknowledged     Problem: Plan of Care - These are the overarching goals to be used throughout the patient stay.    Goal: Optimal Comfort and Wellbeing  Intervention: Monitor Pain and Promote Comfort  Recent Flowsheet Documentation  Taken 10/6/2023 2216 by Luisito Carpenter RN  Pain Management Interventions: medication (see MAR)     Problem: Plan of Care - These are the overarching goals to be used throughout the patient stay.    Goal: Absence of Hospital-Acquired Illness or Injury  Intervention: Prevent Infection  Recent Flowsheet Documentation  Taken 10/7/2023 0000 by Luisito Carpenter RN  Infection Prevention:   cohorting utilized   environmental surveillance performed   equipment surfaces disinfected   hand hygiene promoted   personal protective equipment utilized   single patient room provided   rest/sleep promoted   visitors restricted/screened  Taken 10/6/2023 2000 by Luisito Carpenter RN  Infection Prevention:   cohorting utilized   environmental surveillance performed   equipment surfaces disinfected    hand hygiene promoted   personal protective equipment utilized   single patient room provided   rest/sleep promoted   visitors restricted/screened

## 2023-10-08 LAB
ANION GAP SERPL CALCULATED.3IONS-SCNC: 12 MMOL/L (ref 7–15)
BACTERIA SPEC CULT: NORMAL
BASO+EOS+MONOS # BLD AUTO: ABNORMAL 10*3/UL
BASO+EOS+MONOS NFR BLD AUTO: ABNORMAL %
BASOPHILS # BLD AUTO: ABNORMAL 10*3/UL
BASOPHILS # BLD MANUAL: 0 10E3/UL (ref 0–0.2)
BASOPHILS NFR BLD AUTO: ABNORMAL %
BASOPHILS NFR BLD MANUAL: 0 %
BUN SERPL-MCNC: 16.7 MG/DL (ref 6–20)
CALCIUM SERPL-MCNC: 8.9 MG/DL (ref 8.6–10)
CHLORIDE SERPL-SCNC: 106 MMOL/L (ref 98–107)
CREAT SERPL-MCNC: 0.91 MG/DL (ref 0.51–0.95)
DEPRECATED HCO3 PLAS-SCNC: 20 MMOL/L (ref 22–29)
EGFRCR SERPLBLD CKD-EPI 2021: 77 ML/MIN/1.73M2
EOSINOPHIL # BLD AUTO: ABNORMAL 10*3/UL
EOSINOPHIL # BLD MANUAL: 0 10E3/UL (ref 0–0.7)
EOSINOPHIL NFR BLD AUTO: ABNORMAL %
EOSINOPHIL NFR BLD MANUAL: 0 %
ERYTHROCYTE [DISTWIDTH] IN BLOOD BY AUTOMATED COUNT: 13.7 % (ref 10–15)
GLUCOSE SERPL-MCNC: 122 MG/DL (ref 70–99)
HCT VFR BLD AUTO: 28.8 % (ref 35–47)
HGB BLD-MCNC: 9.2 G/DL (ref 11.7–15.7)
IMM GRANULOCYTES # BLD: ABNORMAL 10*3/UL
IMM GRANULOCYTES NFR BLD: ABNORMAL %
LYMPHOCYTES # BLD AUTO: ABNORMAL 10*3/UL
LYMPHOCYTES # BLD MANUAL: 0.3 10E3/UL (ref 0.8–5.3)
LYMPHOCYTES NFR BLD AUTO: ABNORMAL %
LYMPHOCYTES NFR BLD MANUAL: 2 %
MAGNESIUM SERPL-MCNC: 2.2 MG/DL (ref 1.7–2.3)
MCH RBC QN AUTO: 27.7 PG (ref 26.5–33)
MCHC RBC AUTO-ENTMCNC: 31.9 G/DL (ref 31.5–36.5)
MCV RBC AUTO: 87 FL (ref 78–100)
MONOCYTES # BLD AUTO: ABNORMAL 10*3/UL
MONOCYTES # BLD MANUAL: 0.4 10E3/UL (ref 0–1.3)
MONOCYTES NFR BLD AUTO: ABNORMAL %
MONOCYTES NFR BLD MANUAL: 3 %
NEUTROPHILS # BLD AUTO: ABNORMAL 10*3/UL
NEUTROPHILS # BLD MANUAL: 13.5 10E3/UL (ref 1.6–8.3)
NEUTROPHILS NFR BLD AUTO: ABNORMAL %
NEUTROPHILS NFR BLD MANUAL: 95 %
NRBC # BLD AUTO: 0 10E3/UL
NRBC BLD AUTO-RTO: 0 /100
PHOSPHATE SERPL-MCNC: 3.3 MG/DL (ref 2.5–4.5)
PLAT MORPH BLD: ABNORMAL
PLATELET # BLD AUTO: 86 10E3/UL (ref 150–450)
POTASSIUM SERPL-SCNC: 4.5 MMOL/L (ref 3.4–5.3)
RBC # BLD AUTO: 3.32 10E6/UL (ref 3.8–5.2)
RBC MORPH BLD: ABNORMAL
SODIUM SERPL-SCNC: 138 MMOL/L (ref 135–145)
WBC # BLD AUTO: 14.2 10E3/UL (ref 4–11)

## 2023-10-08 PROCEDURE — 250N000011 HC RX IP 250 OP 636: Mod: JZ | Performed by: STUDENT IN AN ORGANIZED HEALTH CARE EDUCATION/TRAINING PROGRAM

## 2023-10-08 PROCEDURE — 250N000011 HC RX IP 250 OP 636: Mod: JZ | Performed by: PHYSICIAN ASSISTANT

## 2023-10-08 PROCEDURE — 85027 COMPLETE CBC AUTOMATED: CPT | Performed by: PHYSICIAN ASSISTANT

## 2023-10-08 PROCEDURE — 250N000013 HC RX MED GY IP 250 OP 250 PS 637: Performed by: PHYSICIAN ASSISTANT

## 2023-10-08 PROCEDURE — 206N000001 HC R&B BMT UMMC

## 2023-10-08 PROCEDURE — 250N000013 HC RX MED GY IP 250 OP 250 PS 637: Performed by: STUDENT IN AN ORGANIZED HEALTH CARE EDUCATION/TRAINING PROGRAM

## 2023-10-08 PROCEDURE — 84100 ASSAY OF PHOSPHORUS: CPT | Performed by: PHYSICIAN ASSISTANT

## 2023-10-08 PROCEDURE — 85007 BL SMEAR W/DIFF WBC COUNT: CPT | Performed by: PHYSICIAN ASSISTANT

## 2023-10-08 PROCEDURE — 83735 ASSAY OF MAGNESIUM: CPT | Performed by: PHYSICIAN ASSISTANT

## 2023-10-08 PROCEDURE — 258N000003 HC RX IP 258 OP 636: Performed by: STUDENT IN AN ORGANIZED HEALTH CARE EDUCATION/TRAINING PROGRAM

## 2023-10-08 PROCEDURE — 99233 SBSQ HOSP IP/OBS HIGH 50: CPT | Mod: 24 | Performed by: PHYSICIAN ASSISTANT

## 2023-10-08 PROCEDURE — 80048 BASIC METABOLIC PNL TOTAL CA: CPT | Performed by: PHYSICIAN ASSISTANT

## 2023-10-08 RX ORDER — DIPHENHYDRAMINE HCL 25 MG
25 CAPSULE ORAL 2 TIMES DAILY
Status: COMPLETED | OUTPATIENT
Start: 2023-10-08 | End: 2023-10-10

## 2023-10-08 RX ORDER — DIPHENHYDRAMINE HCL 25 MG
25 CAPSULE ORAL ONCE
Status: COMPLETED | OUTPATIENT
Start: 2023-10-08 | End: 2023-10-08

## 2023-10-08 RX ADMIN — FLUCONAZOLE 400 MG: 200 TABLET ORAL at 08:34

## 2023-10-08 RX ADMIN — Medication 5 ML: at 23:12

## 2023-10-08 RX ADMIN — LEVOFLOXACIN 500 MG: 250 TABLET, FILM COATED ORAL at 11:40

## 2023-10-08 RX ADMIN — DIPHENHYDRAMINE HYDROCHLORIDE 25 MG: 25 CAPSULE ORAL at 18:42

## 2023-10-08 RX ADMIN — DIPHENHYDRAMINE HYDROCHLORIDE 25 MG: 25 CAPSULE ORAL at 08:33

## 2023-10-08 RX ADMIN — PANTOPRAZOLE SODIUM 40 MG: 40 TABLET, DELAYED RELEASE ORAL at 08:35

## 2023-10-08 RX ADMIN — TOPIRAMATE 50 MG: 50 TABLET, FILM COATED ORAL at 20:01

## 2023-10-08 RX ADMIN — VENLAFAXINE HYDROCHLORIDE 150 MG: 75 CAPSULE, EXTENDED RELEASE ORAL at 20:01

## 2023-10-08 RX ADMIN — ETOPOSIDE 215 MG: 20 INJECTION INTRAVENOUS at 19:00

## 2023-10-08 RX ADMIN — PROPRANOLOL HYDROCHLORIDE 20 MG: 10 TABLET ORAL at 20:01

## 2023-10-08 RX ADMIN — CYTARABINE 430 MG: 100 INJECTION, SOLUTION INTRATHECAL; INTRAVENOUS; SUBCUTANEOUS at 11:29

## 2023-10-08 RX ADMIN — Medication 5 ML: at 23:10

## 2023-10-08 RX ADMIN — ALLOPURINOL 300 MG: 300 TABLET ORAL at 08:34

## 2023-10-08 RX ADMIN — PROPRANOLOL HYDROCHLORIDE 20 MG: 10 TABLET ORAL at 08:35

## 2023-10-08 RX ADMIN — LEVOTHYROXINE SODIUM 75 MCG: 0.05 TABLET ORAL at 08:34

## 2023-10-08 RX ADMIN — Medication 5 ML: at 04:15

## 2023-10-08 RX ADMIN — BUPROPION HYDROCHLORIDE 150 MG: 150 TABLET, FILM COATED, EXTENDED RELEASE ORAL at 08:34

## 2023-10-08 RX ADMIN — ONDANSETRON 8 MG: 2 INJECTION INTRAMUSCULAR; INTRAVENOUS at 08:34

## 2023-10-08 RX ADMIN — CYTARABINE 430 MG: 100 INJECTION, SOLUTION INTRATHECAL; INTRAVENOUS; SUBCUTANEOUS at 21:17

## 2023-10-08 RX ADMIN — Medication 5 ML: at 11:40

## 2023-10-08 RX ADMIN — ETOPOSIDE 215 MG: 20 INJECTION INTRAVENOUS at 09:26

## 2023-10-08 RX ADMIN — Medication 5 ML: at 00:09

## 2023-10-08 ASSESSMENT — ACTIVITIES OF DAILY LIVING (ADL)
ADLS_ACUITY_SCORE: 18

## 2023-10-08 NOTE — PLAN OF CARE
Jennifer received her Day -4 Etoposide and Cytarabine through her central line and tolerated them well.  Her face has been very flushed all day.  We gave benadryl before the chemo as a premedication.  She reports no change in the redness or hot feeling after the benadryl.  Denied pain and nausea.  Eating and drinking well.  She did feel tired from a late night with less sleep and the side effect of the Benadryl.  Will start her chemo's this evening a little earlier to promote better rest tonight.      Problem: Plan of Care - These are the overarching goals to be used throughout the patient stay.    Goal: Optimal Comfort and Wellbeing  Outcome: Progressing     Problem: Stem Cell/Bone Marrow Transplant  Goal: Optimal Coping with Transplant  Outcome: Progressing   Goal Outcome Evaluation:

## 2023-10-08 NOTE — PLAN OF CARE
"VSS   Afebrile.  Up ad layla, steady gait and balance.  Tolerated chemotherapy well.  Noted pt to have flushed face, pt indicates it feels \"warm\"  This was noted at the beginning of my shift (0280-5928).  When asked, pt indicated she did not have a flushed face earlier during the day.  On call provider notified.  Flushed face seems to be localized, no flushes areas noted on back or chest.  Pt c/o jaw pain, PRN Ultram administered with good results.  No blood products needed this AM.  No electrolytes needed this AM.    Problem: Stem Cell/Bone Marrow Transplant  Goal: Improved Oral Mucous Membrane Health and Integrity  Intervention: Promote Oral Comfort and Health  Recent Flowsheet Documentation  Taken 10/7/2023 1940 by Luisito Carpenter RN  Oral Care:   oral rinse provided   mouth wash rinse     Problem: Stem Cell/Bone Marrow Transplant  Goal: Absence of Infection  Intervention: Prevent and Manage Infection  Recent Flowsheet Documentation  Taken 10/8/2023 0010 by Luisito Carpenter, RN  Infection Prevention:   cohorting utilized   environmental surveillance performed   equipment surfaces disinfected   hand hygiene promoted   personal protective equipment utilized   rest/sleep promoted   single patient room provided   visitors restricted/screened  Isolation Precautions:   cytotoxic precautions maintained   enteric precautions maintained   protective environment maintained  Taken 10/7/2023 1940 by Luisito Carpenter RN  Infection Prevention:   cohorting utilized   environmental surveillance performed   equipment surfaces disinfected   hand hygiene promoted   personal protective equipment utilized   rest/sleep promoted   single patient room provided   visitors restricted/screened  Isolation Precautions:   cytotoxic precautions maintained   enteric precautions maintained   protective environment maintained     Problem: Stem Cell/Bone Marrow Transplant  Goal: Improved Activity Tolerance  Intervention: Promote Improved " Energy  Recent Flowsheet Documentation  Taken 10/7/2023 1940 by Luisito Carpenter, RN  Activity Management:   activity adjusted per tolerance   activity encouraged   ambulated in room   ambulated to bathroom   up ad layla     Problem: Plan of Care - These are the overarching goals to be used throughout the patient stay.    Goal: Optimal Comfort and Wellbeing  Intervention: Provide Person-Centered Care  Recent Flowsheet Documentation  Taken 10/7/2023 1940 by Luisito Carpenter RN  Trust Relationship/Rapport:   care explained   choices provided   emotional support provided   empathic listening provided   questions answered   reassurance provided   questions encouraged   thoughts/feelings acknowledged     Problem: Plan of Care - These are the overarching goals to be used throughout the patient stay.    Goal: Absence of Hospital-Acquired Illness or Injury  Intervention: Prevent Skin Injury  Recent Flowsheet Documentation  Taken 10/8/2023 0010 by Luisito Carpenter RN  Body Position: position changed independently  Taken 10/7/2023 1940 by Luisito Carpenter RN  Body Position: position changed independently

## 2023-10-08 NOTE — PROGRESS NOTES
On call provider (Ann Rodriguez MD) notified    FYI  Pt received Carmustine (BICNU), completed chemo at 2120 yesterday.  Chemo alcohol based, per pt flushed skin with alcohol.  Face is currently red, warm. Pt denies any discomfort. Receiving Etoposide and Cytarabine.

## 2023-10-08 NOTE — PROGRESS NOTES
Stop time on MAR & chart I & O  Chemo drug: Etoposide (Toposar) and Cytarabine (Cytosar).  Tolerated: Well.  Intervention: Protocol observed for administration of chemotherapy, good blood return noted pre and post chemo administration.  Response: Pt denies any nausea, or any discomfort.  Plan: Will continue to monitor, will report any changes to on call provider.  Lab: Na, K: Will be collected in the AM  .

## 2023-10-08 NOTE — PROGRESS NOTES
BMT/Cell Therapy Daily Progress Note   10/08/2023    Patient ID:  Jennifer Ward is a 48 year old female, currently day -4 of her therapy.    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 PET showed lymphoma limited to left cervical, supraclavicular, and axillary lymph nodes. BmBx negative.      Late relapse in May 2023 presenting with right neck swelling. Right cervical LN biopsy showed recurrent vs new primary cHL (nodular sclerosing subtype). PET with FDG-avid right level II and level V cervical lymph nodes only (SUVmax 13.2)     Date Treatment Name Response Side Effects / Toxicities   2008 to 2009 ABVD x 6 cycles followed by adjuvant radiation CR Fatigue, N/V   2023 to 2023 Brentuximab/nivolumab x 2 cycles, nivolumab alone x 2 cycles CR Brentuximab infusion reaction (difficulty breathing)        Admission date: 10/6/2023    INTERVAL  HISTORY     Late last night, nursing noted that the patient's face appears flushed which is over 24 hrs after BCNU. She has felt warm in her face all night but she notes that she gets flushed easily, especially with alcohol. She has no swelling, itching, or respiratory symptoms. There was also note of jaw pain last night but this completely resolved with tramadol and not recurred. No nausea, vomiting, or abdominal pain. She still has not had a stool over the last 3 days. No fevers or chills.    Review of Systems: ROS negative except as noted above.    PHYSICAL EXAM     Weight In/Out     Wt Readings from Last 3 Encounters:   10/08/23 102.6 kg (226 lb 3.2 oz)   10/04/23 99.4 kg (219 lb 2.2 oz)   10/04/23 98.4 kg (217 lb)      I/O last 3 completed shifts:  In: 4150.35 [P.O.:1800; IV Piggyback:2350.35]  Out: 2950 [Urine:2950]       KPS:  90  ECO    BP (!) (P) 154/94 (BP Location: Left arm)   Pulse 56   Temp  "97.5  F (36.4  C) (Axillary)   Resp 16   Ht 1.64 m (5' 4.57\")   Wt 102.6 kg (226 lb 3.2 oz)   SpO2 96%   BMI 38.15 kg/m       General: NAD   Eyes: : ARMEN, sclera anicteric   Nose/Mouth/Throat: OP clear, buccal mucosa moist, no ulcerations. No tongue swelling.   Lungs: CTA bilaterally  Cardiovascular: RRR, no M/R/G   Abdominal/Rectal: +BS, soft, NT, ND  Lymphatics: no edema  Skin: Flushed face and chest - no rash. Skin feels warm to the touch. Otherwise no rashes on extremities, abdomen, or back.  Neuro: A&O   Additional Findings: Louis site NT, no drainage.      LABS AND IMAGING: I have assessed all abnormal lab values for their clinical significance and any values considered clinically significant have been addressed in the assessment and plan.        Lab Results   Component Value Date    WBC 14.2 (H) 10/08/2023    ANEU 13.5 (H) 10/08/2023    HGB 9.2 (L) 10/08/2023    HCT 28.8 (L) 10/08/2023    PLT 86 (L) 10/08/2023     10/08/2023    POTASSIUM 4.5 10/08/2023    CHLORIDE 106 10/08/2023    CO2 20 (L) 10/08/2023     (H) 10/08/2023    BUN 16.7 10/08/2023    CR 0.91 10/08/2023    MAG 2.2 10/08/2023    INR 1.02 10/06/2023     Oral/GI regimen related toxicities (per NCI CTCAE v 5.0):    Oral Mucositis: none  Nausea: none  Vomiting: none  Diarrhea: none    *diarrhea secondary to C. Diff is excluded from the definition of oral/GI RRT      CTCAE Terms     Vomiting: A disorder characterized by the reflexive act of ejecting the contents of the stomach through the mouth   Grade 1 Intervention not indicated   Grade 2 Outpatient IV hydration; medical intervention indicated   Grade 3 Tube feeding, TPN, or hospitalization indicated   Grade 4 Life-threatening consequences   Grade 5 Death     Oral Mucositis: A disorder characterized by ulceration or inflammation of the oral mucosal.   Grade 1 Asymptomatic or mild symptoms; intervention not indicated   Grade 2 Moderate pain or ulcer that does not interfere with " oral intake; modified diet indicated   Grade 3 Severe pain; interfering with oral intake   Grade 4 Life-threatening consequences; urgent intervention indicated   Grade 5 Death     Nausea: A disorder characterized by a queasy sensation and/or the urge to vomit.   Grade 1 Loss of appetite without alteration in eating habits  Grade 2 Oral intake decreased without significant weight loss, dehydration or malnutrition   Grade 3 Inadequate oral caloric or fluid intake; tube feeding, TPN, or hospitalization indicated   Grade 4 NA  Grade 5 NA    Diarrhea: A disorder characterized by an increase in frequency and/or loose or watery bowel movements.   Grade 1 Increase of <4 stools per day over baseline; mild increase in ostomy output compared to baseline   Grade 2 Increase of 4 - 6 stools per day over baseline; moderate increase in ostomy output compared to baseline; limiting instrumental ADL   Grade 3 Increase of >=7 stools per day over baseline; hospitalization indicated; severe increase in ostomy output compared to baseline; limiting self-care ADL    Grade 4 Life-threatening consequences; urgent intervention indicated    Grade 5 Death       Source: https://ctep.cancer.gov/protocoldevelopment/electronic_applications/docs/CTCAE_v5_Quick_Reference_8.5x11.pdf       SYSTEMS-BASED ASSESSMENT AND PLAN   Jennifer Ward is a 48 year old female, currently day -4 of her autologous hematopoietic cell transplant as rescue after BEAM therapy for Hodgkin Lymphoma. Co-enrolled on E-CELERATE study.      Prep: BEAM     BMT  - BMT doc/Coordinator: Nay  - MT2016-11 with co-enrollment on MT2022-40 (E-CELERATE)  - Cell dose 7 million CD34+ cells/kg   - Prep as above. Flush and pre-meds prior to transplant.  - GCSF starts day +5, continue until ANC > 500 for 3 consecutive days.   - Restaging: next restage at day +28  - Allopurinol started Day -6     HEME/COAG  - Transfusion parameters: hgb <7g/dL and plts <10,000  - Leucocytosis secondary to  "recent stem cell mobilization  - Anemia and thrombocytopenia secondary to Hodgkin Lymphoma     ID  Prophy: note that antimicrobial dosing is specific to protocol  Viral: ACV 400mg PO bid  Bacterial: levofloxacin 500 mg daily  Fungal: fluconazole 400mg daily  PCP: Bactrim or other PCP prophy to start at day +28     GI/NUTRITION  # Constipation: Monitor.  - Anti-emetics: zofran/dex prior to transplant to prevent chemotherapy induced n/v.  Ativan and compazine prn.  Single dose of emend 30 minutes prior to melphalan on last day of chemo prep.  - Ulcer prophy: Protonix 40mg daily.   - dietician to support as indicated to prevent malnutrition.     ENDOCRINE  # hypothyroidism: continue PTA synthroid     PSYCH  # depression: continue PTA bupropion, venlafaxine     NEURO  # migraines: daily topamax with prn naratriptan    SKIN  # Flushed face: possibly 2/2 to chemo (no other new medications in last 24 hrs). Will give 25 mg of Benadryl prior to Cytarabine/Etoposide for next 6 doses.      SUPPORTIVE CARE  - PT/OT to evaluate on admission and follow as indicated.   - BMT Social work to follow.      Dispo/Discharge: anticipate discharge on day +1.  Note that patient already has plenty of her PTA medications and will only need prescriptions for any new meds that we start this admission. [place] [address]       Clinically Significant Risk Factors                  # Thrombocytopenia: Lowest platelets = 86 in last 2 days, will monitor for bleeding          # Obesity: Estimated body mass index is 38.15 kg/m  as calculated from the following:    Height as of this encounter: 1.64 m (5' 4.57\").    Weight as of this encounter: 102.6 kg (226 lb 3.2 oz).  , PRESENT ON ADMISSION              Known issues that I take into account for medical decisions, with salient changes to the plan considering these complexities noted above.    Patient Active Problem List   Diagnosis    Hodgkin's disease of " lymph nodes of multiple sites (H)    Autologous donor of stem cells    Nodular sclerosis Hodgkin lymphoma of lymph nodes of multiple regions (H)       I spent 30 minutes in the care of this patient today, which included time necessary for preparation for the visit, obtaining history, ordering medications/tests/procedures as medically indicated, review of pertinent medical literature, counseling of the patient, communication of recommendations to the care team, and documentation time.    Nomi Reid PA-C  [phone]    Securely message with the Vocera Web Console

## 2023-10-09 ENCOUNTER — TRANSFERRED RECORDS (OUTPATIENT)
Dept: HEALTH INFORMATION MANAGEMENT | Facility: CLINIC | Age: 48
End: 2023-10-09
Payer: COMMERCIAL

## 2023-10-09 ENCOUNTER — APPOINTMENT (OUTPATIENT)
Dept: PHYSICAL THERAPY | Facility: CLINIC | Age: 48
End: 2023-10-09
Attending: STUDENT IN AN ORGANIZED HEALTH CARE EDUCATION/TRAINING PROGRAM
Payer: COMMERCIAL

## 2023-10-09 LAB
ABO/RH(D): NORMAL
ACANTHOCYTES BLD QL SMEAR: ABNORMAL
ALBUMIN SERPL BCG-MCNC: 3.7 G/DL (ref 3.5–5.2)
ALP SERPL-CCNC: 87 U/L (ref 35–104)
ALT SERPL W P-5'-P-CCNC: 71 U/L (ref 0–50)
ANION GAP SERPL CALCULATED.3IONS-SCNC: 9 MMOL/L (ref 7–15)
ANTIBODY SCREEN: NEGATIVE
AST SERPL W P-5'-P-CCNC: 59 U/L (ref 0–45)
AUER BODIES BLD QL SMEAR: ABNORMAL
BASO STIPL BLD QL SMEAR: ABNORMAL
BASO+EOS+MONOS # BLD AUTO: ABNORMAL 10*3/UL
BASO+EOS+MONOS NFR BLD AUTO: ABNORMAL %
BASOPHILS # BLD AUTO: 0 10E3/UL (ref 0–0.2)
BASOPHILS NFR BLD AUTO: 0 %
BILIRUB DIRECT SERPL-MCNC: <0.2 MG/DL (ref 0–0.3)
BILIRUB SERPL-MCNC: 0.2 MG/DL
BITE CELLS BLD QL SMEAR: ABNORMAL
BLISTER CELLS BLD QL SMEAR: ABNORMAL
BUN SERPL-MCNC: 16 MG/DL (ref 6–20)
BURR CELLS BLD QL SMEAR: ABNORMAL
CALCIUM SERPL-MCNC: 8.9 MG/DL (ref 8.6–10)
CHLORIDE SERPL-SCNC: 107 MMOL/L (ref 98–107)
CREAT SERPL-MCNC: 0.84 MG/DL (ref 0.51–0.95)
DACRYOCYTES BLD QL SMEAR: ABNORMAL
DEPRECATED HCO3 PLAS-SCNC: 21 MMOL/L (ref 22–29)
EGFRCR SERPLBLD CKD-EPI 2021: 85 ML/MIN/1.73M2
ELLIPTOCYTES BLD QL SMEAR: SLIGHT
EOSINOPHIL # BLD AUTO: 0 10E3/UL (ref 0–0.7)
EOSINOPHIL NFR BLD AUTO: 0 %
ERYTHROCYTE [DISTWIDTH] IN BLOOD BY AUTOMATED COUNT: 14 % (ref 10–15)
FRAGMENTS BLD QL SMEAR: ABNORMAL
GLUCOSE SERPL-MCNC: 93 MG/DL (ref 70–99)
HCT VFR BLD AUTO: 26.2 % (ref 35–47)
HGB BLD-MCNC: 8.3 G/DL (ref 11.7–15.7)
HGB C CRYSTALS: ABNORMAL
HOWELL-JOLLY BOD BLD QL SMEAR: ABNORMAL
IMM GRANULOCYTES # BLD: 0.1 10E3/UL
IMM GRANULOCYTES NFR BLD: 2 %
INR PPP: 1.07 (ref 0.85–1.15)
LYMPHOCYTES # BLD AUTO: 0.4 10E3/UL (ref 0.8–5.3)
LYMPHOCYTES NFR BLD AUTO: 10 %
MAGNESIUM SERPL-MCNC: 2 MG/DL (ref 1.7–2.3)
MCH RBC QN AUTO: 28.2 PG (ref 26.5–33)
MCHC RBC AUTO-ENTMCNC: 31.7 G/DL (ref 31.5–36.5)
MCV RBC AUTO: 89 FL (ref 78–100)
MONOCYTES # BLD AUTO: 0.2 10E3/UL (ref 0–1.3)
MONOCYTES NFR BLD AUTO: 4 %
NEUTROPHILS # BLD AUTO: 3 10E3/UL (ref 1.6–8.3)
NEUTROPHILS NFR BLD AUTO: 84 %
NEUTS HYPERSEG BLD QL SMEAR: ABNORMAL
NRBC # BLD AUTO: 0 10E3/UL
NRBC BLD AUTO-RTO: 0 /100
PHOSPHATE SERPL-MCNC: 3.7 MG/DL (ref 2.5–4.5)
PLAT MORPH BLD: ABNORMAL
PLATELET # BLD AUTO: 43 10E3/UL (ref 150–450)
POLYCHROMASIA BLD QL SMEAR: ABNORMAL
POTASSIUM SERPL-SCNC: 4.3 MMOL/L (ref 3.4–5.3)
PROT SERPL-MCNC: 5.9 G/DL (ref 6.4–8.3)
RBC # BLD AUTO: 2.94 10E6/UL (ref 3.8–5.2)
RBC AGGLUT BLD QL: ABNORMAL
RBC MORPH BLD: ABNORMAL
ROULEAUX BLD QL SMEAR: ABNORMAL
SICKLE CELLS BLD QL SMEAR: ABNORMAL
SMUDGE CELLS BLD QL SMEAR: ABNORMAL
SODIUM SERPL-SCNC: 137 MMOL/L (ref 135–145)
SPECIMEN EXPIRATION DATE: NORMAL
SPHEROCYTES BLD QL SMEAR: ABNORMAL
STOMATOCYTES BLD QL SMEAR: ABNORMAL
TARGETS BLD QL SMEAR: ABNORMAL
TOXIC GRANULES BLD QL SMEAR: ABNORMAL
VARIANT LYMPHS BLD QL SMEAR: ABNORMAL
WBC # BLD AUTO: 3.6 10E3/UL (ref 4–11)

## 2023-10-09 PROCEDURE — 85025 COMPLETE CBC W/AUTO DIFF WBC: CPT | Performed by: PHYSICIAN ASSISTANT

## 2023-10-09 PROCEDURE — 83735 ASSAY OF MAGNESIUM: CPT | Performed by: PHYSICIAN ASSISTANT

## 2023-10-09 PROCEDURE — 85610 PROTHROMBIN TIME: CPT | Performed by: PHYSICIAN ASSISTANT

## 2023-10-09 PROCEDURE — 206N000001 HC R&B BMT UMMC

## 2023-10-09 PROCEDURE — 99233 SBSQ HOSP IP/OBS HIGH 50: CPT | Mod: 24 | Performed by: PHYSICIAN ASSISTANT

## 2023-10-09 PROCEDURE — 97110 THERAPEUTIC EXERCISES: CPT | Mod: GP | Performed by: REHABILITATION PRACTITIONER

## 2023-10-09 PROCEDURE — 99418 PROLNG IP/OBS E/M EA 15 MIN: CPT | Performed by: PHYSICIAN ASSISTANT

## 2023-10-09 PROCEDURE — 250N000011 HC RX IP 250 OP 636: Mod: JZ | Performed by: STUDENT IN AN ORGANIZED HEALTH CARE EDUCATION/TRAINING PROGRAM

## 2023-10-09 PROCEDURE — 250N000013 HC RX MED GY IP 250 OP 250 PS 637: Performed by: PHYSICIAN ASSISTANT

## 2023-10-09 PROCEDURE — 250N000013 HC RX MED GY IP 250 OP 250 PS 637: Performed by: STUDENT IN AN ORGANIZED HEALTH CARE EDUCATION/TRAINING PROGRAM

## 2023-10-09 PROCEDURE — 258N000003 HC RX IP 258 OP 636: Performed by: STUDENT IN AN ORGANIZED HEALTH CARE EDUCATION/TRAINING PROGRAM

## 2023-10-09 PROCEDURE — 86901 BLOOD TYPING SEROLOGIC RH(D): CPT | Performed by: PHYSICIAN ASSISTANT

## 2023-10-09 PROCEDURE — 86850 RBC ANTIBODY SCREEN: CPT | Performed by: PHYSICIAN ASSISTANT

## 2023-10-09 PROCEDURE — 84100 ASSAY OF PHOSPHORUS: CPT | Performed by: PHYSICIAN ASSISTANT

## 2023-10-09 PROCEDURE — 82248 BILIRUBIN DIRECT: CPT | Performed by: PHYSICIAN ASSISTANT

## 2023-10-09 PROCEDURE — 250N000011 HC RX IP 250 OP 636: Mod: JZ | Performed by: PHYSICIAN ASSISTANT

## 2023-10-09 RX ORDER — OXYCODONE HYDROCHLORIDE 5 MG/1
5 TABLET ORAL
Status: COMPLETED | OUTPATIENT
Start: 2023-10-09 | End: 2023-10-09

## 2023-10-09 RX ORDER — FUROSEMIDE 10 MG/ML
20 INJECTION INTRAMUSCULAR; INTRAVENOUS ONCE
Status: COMPLETED | OUTPATIENT
Start: 2023-10-09 | End: 2023-10-09

## 2023-10-09 RX ORDER — MAGNESIUM SULFATE HEPTAHYDRATE 40 MG/ML
2 INJECTION, SOLUTION INTRAVENOUS ONCE
Status: COMPLETED | OUTPATIENT
Start: 2023-10-09 | End: 2023-10-09

## 2023-10-09 RX ADMIN — DIPHENHYDRAMINE HYDROCHLORIDE 25 MG: 25 CAPSULE ORAL at 07:46

## 2023-10-09 RX ADMIN — OXYCODONE HYDROCHLORIDE 5 MG: 5 TABLET ORAL at 21:23

## 2023-10-09 RX ADMIN — LEVOTHYROXINE SODIUM 75 MCG: 0.05 TABLET ORAL at 07:46

## 2023-10-09 RX ADMIN — DIPHENHYDRAMINE HYDROCHLORIDE 25 MG: 25 CAPSULE ORAL at 20:22

## 2023-10-09 RX ADMIN — PANTOPRAZOLE SODIUM 40 MG: 40 TABLET, DELAYED RELEASE ORAL at 07:55

## 2023-10-09 RX ADMIN — TOPIRAMATE 50 MG: 50 TABLET, FILM COATED ORAL at 20:22

## 2023-10-09 RX ADMIN — MAGNESIUM SULFATE IN WATER 2 G: 40 INJECTION, SOLUTION INTRAVENOUS at 06:40

## 2023-10-09 RX ADMIN — FUROSEMIDE 20 MG: 10 INJECTION, SOLUTION INTRAMUSCULAR; INTRAVENOUS at 10:11

## 2023-10-09 RX ADMIN — VENLAFAXINE HYDROCHLORIDE 150 MG: 75 CAPSULE, EXTENDED RELEASE ORAL at 20:22

## 2023-10-09 RX ADMIN — FLUCONAZOLE 400 MG: 200 TABLET ORAL at 07:46

## 2023-10-09 RX ADMIN — BUPROPION HYDROCHLORIDE 150 MG: 150 TABLET, FILM COATED, EXTENDED RELEASE ORAL at 07:46

## 2023-10-09 RX ADMIN — Medication 5 ML: at 04:38

## 2023-10-09 RX ADMIN — TRAMADOL HYDROCHLORIDE 100 MG: 50 TABLET, COATED ORAL at 16:34

## 2023-10-09 RX ADMIN — ETOPOSIDE 215 MG: 20 INJECTION INTRAVENOUS at 08:47

## 2023-10-09 RX ADMIN — ONDANSETRON 8 MG: 2 INJECTION INTRAMUSCULAR; INTRAVENOUS at 07:47

## 2023-10-09 RX ADMIN — Medication 5 ML: at 23:59

## 2023-10-09 RX ADMIN — Medication 5 ML: at 12:30

## 2023-10-09 RX ADMIN — PROPRANOLOL HYDROCHLORIDE 20 MG: 10 TABLET ORAL at 07:46

## 2023-10-09 RX ADMIN — ETOPOSIDE 215 MG: 20 INJECTION INTRAVENOUS at 20:14

## 2023-10-09 RX ADMIN — LEVOFLOXACIN 500 MG: 250 TABLET, FILM COATED ORAL at 10:12

## 2023-10-09 RX ADMIN — CYTARABINE 430 MG: 100 INJECTION, SOLUTION INTRATHECAL; INTRAVENOUS; SUBCUTANEOUS at 11:15

## 2023-10-09 RX ADMIN — CYTARABINE 430 MG: 100 INJECTION, SOLUTION INTRATHECAL; INTRAVENOUS; SUBCUTANEOUS at 22:33

## 2023-10-09 RX ADMIN — ALLOPURINOL 300 MG: 300 TABLET ORAL at 07:46

## 2023-10-09 RX ADMIN — PROPRANOLOL HYDROCHLORIDE 20 MG: 10 TABLET ORAL at 20:22

## 2023-10-09 RX ADMIN — ACETAMINOPHEN 650 MG: 325 TABLET, FILM COATED ORAL at 21:23

## 2023-10-09 ASSESSMENT — ACTIVITIES OF DAILY LIVING (ADL)
ADLS_ACUITY_SCORE: 18

## 2023-10-09 NOTE — PLAN OF CARE
"  Problem: Plan of Care - These are the overarching goals to be used throughout the patient stay.    Goal: Patient-Specific Goal (Individualized)  Description: You can add care plan individualizations to a care plan. Examples of Individualization might be:  \"Parent requests to be called daily at 9am for status\", \"I have a hard time hearing out of my right ear\", or \"Do not touch me to wake me up as it startles me\".  Outcome: Progressing   Goal Outcome Evaluation:  Blood pressure 142/82. Heart rate 55,56. No pain. No nausea. Occasional hiccups but declined any intervention. Received Etoposide and Cytarabine. Ate a 95 % of breakfast sandwich, potatoes, banana and orange juice for breakfast. Ordered lunch. Weight 103.5 kg. Received 20 mg of lasix. Voided 2960 ml. Showered. Independent.                       "

## 2023-10-09 NOTE — PROGRESS NOTES
Stop time on MAR & chart I & O  Stop time on MAR & chart I & O  Chemo drug: Etoposide (Toposar) and Cytarabine (Cytosar).  Tolerated: Well.  Intervention: Protocol observed for administration of chemotherapy, good blood return noted pre and post chemo administration.  Response: Pt denies any nausea, or any discomfort.  Plan: Will continue to monitor, will report any changes to on call provider.  Lab: Na, K: Will be collected in the AM  .

## 2023-10-09 NOTE — PLAN OF CARE
VSS   Afebrile.  Up ad layla, steady gait and balance.  Tolerated chemotherapy well, good blood return noted pre and post chemotherapy.  Face noted to be flushed, pt denies any jaw pain or bilateral lower extremities pain.  Pt asked what she will need to have addressed prior to discharge on Friday 10/13/2023.  Writer informed pt that she and her caregiver will meet with discharge RN, a lot of information will be presented, pt informed to ask questions, to request any clarification because it is important she and her caregiver have all necessary questions and information addressed, answered and clarified.  Magnesium replaced, re-check values tomorrow AM.    Problem: Stem Cell/Bone Marrow Transplant  Goal: Improved Oral Mucous Membrane Health and Integrity  Intervention: Promote Oral Comfort and Health  Recent Flowsheet Documentation  Taken 10/8/2023 2000 by Luisito Carpenter RN  Oral Care:   suction provided   mouth wash rinse     Problem: Stem Cell/Bone Marrow Transplant  Goal: Absence of Infection  Intervention: Prevent and Manage Infection  Recent Flowsheet Documentation  Taken 10/9/2023 0000 by Luisito Carpenter RN  Infection Prevention:   cohorting utilized   environmental surveillance performed   visitors restricted/screened   single patient room provided   rest/sleep promoted   personal protective equipment utilized   hand hygiene promoted   equipment surfaces disinfected  Isolation Precautions:   cytotoxic precautions maintained   enteric precautions maintained   protective environment maintained  Taken 10/8/2023 2000 by Luisito Carpenter RN  Infection Prevention:   cohorting utilized   environmental surveillance performed   visitors restricted/screened   single patient room provided   rest/sleep promoted   personal protective equipment utilized   hand hygiene promoted   equipment surfaces disinfected  Isolation Precautions:   cytotoxic precautions maintained   enteric precautions maintained   protective  environment maintained     Problem: Plan of Care - These are the overarching goals to be used throughout the patient stay.    Goal: Optimal Comfort and Wellbeing  Intervention: Provide Person-Centered Care  Recent Flowsheet Documentation  Taken 10/8/2023 2000 by Luisito Carpenter RN  Trust Relationship/Rapport:   care explained   choices provided   emotional support provided   empathic listening provided   questions answered   questions encouraged   reassurance provided   thoughts/feelings acknowledged     Problem: Plan of Care - These are the overarching goals to be used throughout the patient stay.    Goal: Absence of Hospital-Acquired Illness or Injury  Intervention: Prevent Skin Injury  Recent Flowsheet Documentation  Taken 10/9/2023 0000 by Luisito Carpenter RN  Body Position: position changed independently  Taken 10/8/2023 2000 by Luisito Carpenter RN  Body Position: position changed independently     Problem: Plan of Care - These are the overarching goals to be used throughout the patient stay.    Goal: Absence of Hospital-Acquired Illness or Injury  Intervention: Identify and Manage Fall Risk  Recent Flowsheet Documentation  Taken 10/9/2023 0000 by Luisito Carpenter RN  Safety Promotion/Fall Prevention:   assistive device/personal items within reach   chemotherapeutic precautions   clutter free environment maintained   increased rounding and observation   increase visualization of patient   lighting adjusted   mobility aid in reach   nonskid shoes/slippers when out of bed   room near nurse's station  Taken 10/8/2023 2000 by Luisito Carpenter RN  Safety Promotion/Fall Prevention:   assistive device/personal items within reach   chemotherapeutic precautions   clutter free environment maintained   increased rounding and observation   increase visualization of patient   lighting adjusted   mobility aid in reach   nonskid shoes/slippers when out of bed   room near nurse's station

## 2023-10-09 NOTE — PROGRESS NOTES
SPIRITUAL HEALTH SERVICES  [facility] ([place] 5C  REFERRAL SOURCE: Social Work     SUMMARY OF VISIT  Introduced spiritual health services. Pt expressed interest in a blessing for her transplant on Thursday. Have left a template and options sheet for pt to look over. Pt is Orthodox     PLAN: Will follow-up on Tuesday.     Rev. Frieda Peterson MDiv, [facility]  Staff    Pager [phone]  * [facility] remains available 24/7 for emergent requests/referrals, either by having the switchboard page the on-call  or by entering an ASAP/STAT consult in Epic (this will also page the on-call ).*

## 2023-10-09 NOTE — PROGRESS NOTES
..Stop time on MAR & chart I & O  Chemo drug Etoposide and Cytarabine   Tolerated well   Intervention received benadryl and zofran prior to chemotherapy.   Response no n/v. occasional hiccups.  Plan continue to monitor the patient   Lab: Na, K, [facility] drug level  Wt/intervention  Shower/drsg/linen    Cytoxan Primer  Cytoxan infused at  [place] infusing at __ and ends__  Flush infusing at __ and ends __

## 2023-10-10 ENCOUNTER — TRANSFERRED RECORDS (OUTPATIENT)
Dept: HEALTH INFORMATION MANAGEMENT | Facility: CLINIC | Age: 48
End: 2023-10-10
Payer: COMMERCIAL

## 2023-10-10 LAB
ANION GAP SERPL CALCULATED.3IONS-SCNC: 12 MMOL/L (ref 7–15)
ANION GAP SERPL CALCULATED.3IONS-SCNC: 8 MMOL/L (ref 7–15)
BASO+EOS+MONOS # BLD AUTO: ABNORMAL 10*3/UL
BASO+EOS+MONOS NFR BLD AUTO: ABNORMAL %
BASOPHILS # BLD AUTO: ABNORMAL 10*3/UL
BASOPHILS # BLD MANUAL: 0 10E3/UL (ref 0–0.2)
BASOPHILS NFR BLD AUTO: ABNORMAL %
BASOPHILS NFR BLD MANUAL: 0 %
BUN SERPL-MCNC: 15.3 MG/DL (ref 6–20)
BUN SERPL-MCNC: 17.4 MG/DL (ref 6–20)
CALCIUM SERPL-MCNC: 8.7 MG/DL (ref 8.6–10)
CALCIUM SERPL-MCNC: 9.1 MG/DL (ref 8.6–10)
CHLORIDE SERPL-SCNC: 101 MMOL/L (ref 98–107)
CHLORIDE SERPL-SCNC: 105 MMOL/L (ref 98–107)
CREAT SERPL-MCNC: 0.9 MG/DL (ref 0.51–0.95)
CREAT SERPL-MCNC: 0.92 MG/DL (ref 0.51–0.95)
DEPRECATED HCO3 PLAS-SCNC: 23 MMOL/L (ref 22–29)
DEPRECATED HCO3 PLAS-SCNC: 23 MMOL/L (ref 22–29)
EGFRCR SERPLBLD CKD-EPI 2021: 76 ML/MIN/1.73M2
EGFRCR SERPLBLD CKD-EPI 2021: 78 ML/MIN/1.73M2
EOSINOPHIL # BLD AUTO: ABNORMAL 10*3/UL
EOSINOPHIL # BLD MANUAL: 0 10E3/UL (ref 0–0.7)
EOSINOPHIL NFR BLD AUTO: ABNORMAL %
EOSINOPHIL NFR BLD MANUAL: 1 %
ERYTHROCYTE [DISTWIDTH] IN BLOOD BY AUTOMATED COUNT: 13.7 % (ref 10–15)
GLUCOSE SERPL-MCNC: 86 MG/DL (ref 70–99)
GLUCOSE SERPL-MCNC: 94 MG/DL (ref 70–99)
HCT VFR BLD AUTO: 25.6 % (ref 35–47)
HGB BLD-MCNC: 8.3 G/DL (ref 11.7–15.7)
IMM GRANULOCYTES # BLD: ABNORMAL 10*3/UL
IMM GRANULOCYTES NFR BLD: ABNORMAL %
LYMPHOCYTES # BLD AUTO: ABNORMAL 10*3/UL
LYMPHOCYTES # BLD MANUAL: 0.4 10E3/UL (ref 0.8–5.3)
LYMPHOCYTES NFR BLD AUTO: ABNORMAL %
LYMPHOCYTES NFR BLD MANUAL: 17 %
MAGNESIUM SERPL-MCNC: 2 MG/DL (ref 1.7–2.3)
MCH RBC QN AUTO: 27.9 PG (ref 26.5–33)
MCHC RBC AUTO-ENTMCNC: 32.4 G/DL (ref 31.5–36.5)
MCV RBC AUTO: 86 FL (ref 78–100)
METAMYELOCYTES # BLD MANUAL: 0 10E3/UL
METAMYELOCYTES NFR BLD MANUAL: 1 %
MONOCYTES # BLD AUTO: ABNORMAL 10*3/UL
MONOCYTES # BLD MANUAL: 0 10E3/UL (ref 0–1.3)
MONOCYTES NFR BLD AUTO: ABNORMAL %
MONOCYTES NFR BLD MANUAL: 1 %
MYELOCYTES # BLD MANUAL: 0 10E3/UL
MYELOCYTES NFR BLD MANUAL: 1 %
NEUTROPHILS # BLD AUTO: ABNORMAL 10*3/UL
NEUTROPHILS # BLD MANUAL: 1.7 10E3/UL (ref 1.6–8.3)
NEUTROPHILS NFR BLD AUTO: ABNORMAL %
NEUTROPHILS NFR BLD MANUAL: 79 %
NRBC # BLD AUTO: 0 10E3/UL
NRBC BLD AUTO-RTO: 0 /100
PHOSPHATE SERPL-MCNC: 4.6 MG/DL (ref 2.5–4.5)
PLAT MORPH BLD: ABNORMAL
PLATELET # BLD AUTO: 51 10E3/UL (ref 150–450)
POTASSIUM SERPL-SCNC: 4.2 MMOL/L (ref 3.4–5.3)
POTASSIUM SERPL-SCNC: 4.3 MMOL/L (ref 3.4–5.3)
RBC # BLD AUTO: 2.98 10E6/UL (ref 3.8–5.2)
RBC MORPH BLD: ABNORMAL
SODIUM SERPL-SCNC: 136 MMOL/L (ref 135–145)
SODIUM SERPL-SCNC: 136 MMOL/L (ref 135–145)
WBC # BLD AUTO: 2.2 10E3/UL (ref 4–11)

## 2023-10-10 PROCEDURE — 84100 ASSAY OF PHOSPHORUS: CPT | Performed by: PHYSICIAN ASSISTANT

## 2023-10-10 PROCEDURE — 85007 BL SMEAR W/DIFF WBC COUNT: CPT | Performed by: PHYSICIAN ASSISTANT

## 2023-10-10 PROCEDURE — 250N000011 HC RX IP 250 OP 636: Performed by: STUDENT IN AN ORGANIZED HEALTH CARE EDUCATION/TRAINING PROGRAM

## 2023-10-10 PROCEDURE — 99418 PROLNG IP/OBS E/M EA 15 MIN: CPT | Performed by: PHYSICIAN ASSISTANT

## 2023-10-10 PROCEDURE — 80048 BASIC METABOLIC PNL TOTAL CA: CPT | Performed by: PHYSICIAN ASSISTANT

## 2023-10-10 PROCEDURE — 206N000001 HC R&B BMT UMMC

## 2023-10-10 PROCEDURE — 250N000013 HC RX MED GY IP 250 OP 250 PS 637: Performed by: STUDENT IN AN ORGANIZED HEALTH CARE EDUCATION/TRAINING PROGRAM

## 2023-10-10 PROCEDURE — 99233 SBSQ HOSP IP/OBS HIGH 50: CPT | Mod: 24 | Performed by: PHYSICIAN ASSISTANT

## 2023-10-10 PROCEDURE — 250N000013 HC RX MED GY IP 250 OP 250 PS 637: Performed by: PHYSICIAN ASSISTANT

## 2023-10-10 PROCEDURE — 250N000011 HC RX IP 250 OP 636: Mod: JZ | Performed by: PHYSICIAN ASSISTANT

## 2023-10-10 PROCEDURE — 85027 COMPLETE CBC AUTOMATED: CPT | Performed by: PHYSICIAN ASSISTANT

## 2023-10-10 PROCEDURE — 258N000003 HC RX IP 258 OP 636: Performed by: STUDENT IN AN ORGANIZED HEALTH CARE EDUCATION/TRAINING PROGRAM

## 2023-10-10 PROCEDURE — 83735 ASSAY OF MAGNESIUM: CPT | Performed by: PHYSICIAN ASSISTANT

## 2023-10-10 RX ORDER — FUROSEMIDE 10 MG/ML
20 INJECTION INTRAMUSCULAR; INTRAVENOUS ONCE
Status: COMPLETED | OUTPATIENT
Start: 2023-10-10 | End: 2023-10-10

## 2023-10-10 RX ADMIN — DIPHENHYDRAMINE HYDROCHLORIDE 25 MG: 25 CAPSULE ORAL at 08:05

## 2023-10-10 RX ADMIN — PANTOPRAZOLE SODIUM 40 MG: 40 TABLET, DELAYED RELEASE ORAL at 08:06

## 2023-10-10 RX ADMIN — PROPRANOLOL HYDROCHLORIDE 20 MG: 10 TABLET ORAL at 08:05

## 2023-10-10 RX ADMIN — VENLAFAXINE HYDROCHLORIDE 150 MG: 75 CAPSULE, EXTENDED RELEASE ORAL at 20:09

## 2023-10-10 RX ADMIN — ETOPOSIDE 215 MG: 20 INJECTION INTRAVENOUS at 20:07

## 2023-10-10 RX ADMIN — TOPIRAMATE 50 MG: 50 TABLET, FILM COATED ORAL at 20:09

## 2023-10-10 RX ADMIN — FUROSEMIDE 20 MG: 10 INJECTION, SOLUTION INTRAMUSCULAR; INTRAVENOUS at 16:18

## 2023-10-10 RX ADMIN — PROCHLORPERAZINE MALEATE 10 MG: 5 TABLET ORAL at 19:41

## 2023-10-10 RX ADMIN — FUROSEMIDE 20 MG: 10 INJECTION, SOLUTION INTRAMUSCULAR; INTRAVENOUS at 11:47

## 2023-10-10 RX ADMIN — Medication 5 ML: at 23:33

## 2023-10-10 RX ADMIN — CYTARABINE 430 MG: 100 INJECTION, SOLUTION INTRATHECAL; INTRAVENOUS; SUBCUTANEOUS at 22:24

## 2023-10-10 RX ADMIN — DIPHENHYDRAMINE HYDROCHLORIDE 25 MG: 25 CAPSULE ORAL at 19:41

## 2023-10-10 RX ADMIN — ALLOPURINOL 300 MG: 300 TABLET ORAL at 08:05

## 2023-10-10 RX ADMIN — NARATRIPTAN 2.5 MG: 2.5 TABLET ORAL at 10:24

## 2023-10-10 RX ADMIN — Medication 5 ML: at 04:32

## 2023-10-10 RX ADMIN — LEVOFLOXACIN 500 MG: 250 TABLET, FILM COATED ORAL at 10:48

## 2023-10-10 RX ADMIN — ETOPOSIDE 215 MG: 20 INJECTION INTRAVENOUS at 08:12

## 2023-10-10 RX ADMIN — ONDANSETRON 8 MG: 2 INJECTION INTRAMUSCULAR; INTRAVENOUS at 08:12

## 2023-10-10 RX ADMIN — FLUCONAZOLE 400 MG: 200 TABLET ORAL at 08:05

## 2023-10-10 RX ADMIN — PROPRANOLOL HYDROCHLORIDE 20 MG: 10 TABLET ORAL at 19:41

## 2023-10-10 RX ADMIN — LEVOTHYROXINE SODIUM 75 MCG: 0.05 TABLET ORAL at 08:05

## 2023-10-10 RX ADMIN — BUPROPION HYDROCHLORIDE 150 MG: 150 TABLET, FILM COATED, EXTENDED RELEASE ORAL at 08:05

## 2023-10-10 RX ADMIN — CYTARABINE 430 MG: 100 INJECTION, SOLUTION INTRATHECAL; INTRAVENOUS; SUBCUTANEOUS at 10:31

## 2023-10-10 RX ADMIN — Medication 5 ML: at 16:22

## 2023-10-10 ASSESSMENT — ACTIVITIES OF DAILY LIVING (ADL)
ADLS_ACUITY_SCORE: 18

## 2023-10-10 NOTE — PROGRESS NOTES
"BMT/Cell Therapy Daily Progress Note   10/10/2023    Patient ID:  Jennifer Ward is a 48 year old female, currently day -2 of her therapy.    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 PET showed lymphoma limited to left cervical, supraclavicular, and axillary lymph nodes. BmBx negative.      Late relapse in May 2023 presenting with right neck swelling. Right cervical LN biopsy showed recurrent vs new primary cHL (nodular sclerosing subtype). PET with FDG-avid right level II and level V cervical lymph nodes only (SUVmax 13.2)     Date Treatment Name Response Side Effects / Toxicities   2008 to 2009 ABVD x 6 cycles followed by adjuvant radiation CR Fatigue, N/V   2023 to 2023 Brentuximab/nivolumab x 2 cycles, nivolumab alone x 2 cycles CR Brentuximab infusion reaction (difficulty breathing)        Admission date: 10/6/2023    INTERVAL  HISTORY   Facial flushing continues.  Migraine this morning at right eye.  She also had a headache with 7/10 pain last night, for which she received tramacol and oxycodone. Eating fine.  No c/o n/v.     Review of Systems: ROS negative except as noted above.    PHYSICAL EXAM     Weight In/Out     Wt Readings from Last 3 Encounters:   10/10/23 102.7 kg (226 lb 8 oz)   10/04/23 99.4 kg (219 lb 2.2 oz)   10/04/23 98.4 kg (217 lb)      I/O last 3 completed shifts:  In: 5220.1 [P.O.:2870; IV Piggyback:2350.1]  Out: 5025 [Urine:5025]       KPS:  80  ECO    BP (!) 156/93 (BP Location: Left arm)   Pulse 59   Temp 97.3  F (36.3  C) (Axillary)   Resp 16   Ht 1.64 m (5' 4.57\")   Wt 102.7 kg (226 lb 8 oz)   SpO2 100%   BMI 38.20 kg/m       General: NAD   Eyes: : ARMEN, sclera anicteric   Lungs: CTA bilaterally  Cardiovascular: RRR, no M/R/G   Abdominal/Rectal: +BS, soft, NT, ND  Lymphatics: no edema  Skin: Flushed " face and chest  Neuro: A&O   Additional Findings: Louis site NT, no drainage.      LABS AND IMAGING: I have assessed all abnormal lab values for their clinical significance and any values considered clinically significant have been addressed in the assessment and plan.        Lab Results   Component Value Date    WBC 2.2 (L) 10/10/2023    ANEU 1.7 10/10/2023    HGB 8.3 (L) 10/10/2023    HCT 25.6 (L) 10/10/2023    PLT 51 (L) 10/10/2023     10/10/2023    POTASSIUM 4.2 10/10/2023    CHLORIDE 105 10/10/2023    CO2 23 10/10/2023    GLC 94 10/10/2023    BUN 17.4 10/10/2023    CR 0.90 10/10/2023    MAG 2.0 10/10/2023    INR 1.07 10/09/2023     Oral/GI regimen related toxicities (per NCI CTCAE v 5.0):    Oral Mucositis: none  Nausea: none  Vomiting: none  Diarrhea: none    *diarrhea secondary to C. Diff is excluded from the definition of oral/GI RRT      CTCAE Terms     Vomiting: A disorder characterized by the reflexive act of ejecting the contents of the stomach through the mouth   Grade 1 Intervention not indicated   Grade 2 Outpatient IV hydration; medical intervention indicated   Grade 3 Tube feeding, TPN, or hospitalization indicated   Grade 4 Life-threatening consequences   Grade 5 Death     Oral Mucositis: A disorder characterized by ulceration or inflammation of the oral mucosal.   Grade 1 Asymptomatic or mild symptoms; intervention not indicated   Grade 2 Moderate pain or ulcer that does not interfere with oral intake; modified diet indicated   Grade 3 Severe pain; interfering with oral intake   Grade 4 Life-threatening consequences; urgent intervention indicated   Grade 5 Death     Nausea: A disorder characterized by a queasy sensation and/or the urge to vomit.   Grade 1 Loss of appetite without alteration in eating habits  Grade 2 Oral intake decreased without significant weight loss, dehydration or malnutrition   Grade 3 Inadequate oral caloric or fluid intake; tube feeding, TPN, or hospitalization  indicated   Grade 4 NA  Grade 5 NA    Diarrhea: A disorder characterized by an increase in frequency and/or loose or watery bowel movements.   Grade 1 Increase of <4 stools per day over baseline; mild increase in ostomy output compared to baseline   Grade 2 Increase of 4 - 6 stools per day over baseline; moderate increase in ostomy output compared to baseline; limiting instrumental ADL   Grade 3 Increase of >=7 stools per day over baseline; hospitalization indicated; severe increase in ostomy output compared to baseline; limiting self-care ADL    Grade 4 Life-threatening consequences; urgent intervention indicated    Grade 5 Death       Source: https://ctep.cancer.gov/protocoldevelopment/electronic_applications/docs/CTCAE_v5_Quick_Reference_8.5x11.pdf       SYSTEMS-BASED ASSESSMENT AND PLAN   Jennifer Ward is a 48 year old female, currently day -2 of her autologous hematopoietic cell transplant as rescue after BEAM therapy for Hodgkin Lymphoma. Co-enrolled on E-CELERATE study.      Prep: BEAM     BMT  - BMT doc/Coordinator: Nay  - MT2016-11 with co-enrollment on MT2022-40 (E-CELERATE)  - Cell dose 7 million CD34+ cells/kg   - Prep as above. Flush and pre-meds prior to transplant.  - GCSF starts day +5, continue until ANC > 500 for 3 consecutive days.   - Restaging: next restage at day +28  - Allopurinol started Day -6     HEME/COAG  - Transfusion parameters: hgb <7g/dL and plts <10,000  - Pancytopenia secondary to Hodgkin Lymphoma and chemotherapy     ID  Prophy: note that antimicrobial dosing is specific to protocol  Viral: ACV 400mg PO bid  Bacterial: levofloxacin 500 mg daily  Fungal: fluconazole 400mg daily  PCP: Bactrim or other PCP prophy to start at day +28     GI/NUTRITION  # Constipation: Monitor.  - Anti-emetics: zofran/dex prior to transplant to prevent chemotherapy induced n/v.  Ativan and compazine prn.  Single dose of emend 30 minutes prior to melphalan on last day of chemo prep.  - Ulcer  "prophy: Protonix 40mg daily.     #Transaminitis:  likely secondary to chemotherapy; recheck 10/12.      ENDOCRINE  # hypothyroidism: continue PTA synthroid     PSYCH  # depression: continue PTA bupropion, venlafaxine     NEURO  # migraines: daily topamax with prn naratriptan    FEN  # fluid overload: lasix 20mg IV 10/9 and 10/10.   # high phill/high protein diet. Dietician to support as indicated to prevent malnutrition.    SKIN  # Flushed face: possibly 2/2 to chemo (no other new medications in last 24 hrs). Will give 25 mg of Benadryl prior to Cytarabine/Etoposide for remaining doses.     SUPPORTIVE CARE  - PT/OT to evaluate on admission and follow as indicated.   - BMT Social work to follow.      Dispo/Discharge: anticipate discharge on day +1.  Note that patient already has plenty of her PTA medications and will only need prescriptions for any new meds that we start this admission. [place] [address]       Clinically Significant Risk Factors                  # Thrombocytopenia: Lowest platelets = 43 in last 2 days, will monitor for bleeding          # Obesity: Estimated body mass index is 38.2 kg/m  as calculated from the following:    Height as of this encounter: 1.64 m (5' 4.57\").    Weight as of this encounter: 102.7 kg (226 lb 8 oz).                 Known issues that I take into account for medical decisions, with salient changes to the plan considering these complexities noted above.    Patient Active Problem List   Diagnosis    Hodgkin's disease of lymph nodes of multiple sites (H)    Autologous donor of stem cells    Nodular sclerosis Hodgkin lymphoma of lymph nodes of multiple regions (H)       I spent 30 minutes in the care of this patient today, which included time necessary for preparation for the visit, obtaining history, ordering medications/tests/procedures as medically indicated, review of pertinent medical literature, counseling of the patient, " communication of recommendations to the care team, and documentation time.    Helen Felix PA-C    Securely message with the Vocera Web Console

## 2023-10-10 NOTE — PROGRESS NOTES
Stop time on MAR & chart I & O  Chemo drug: iv Etoposide and iv cytarabine  Tolerated: Well-flushed to face not new.  Intervention: Ongoing headache since morning and worsening-Gave Oxycodone and Tylenol.    Response: Decrease in headache  Plan: Cont to monitor.

## 2023-10-10 NOTE — PLAN OF CARE
"  ./89 (BP Location: Left arm)   Pulse 55   Temp (!) 96.7  F (35.9  C) (Axillary)   Resp 16   Ht 1.64 m (5' 4.57\")   Wt 103.5 kg (228 lb 3.2 oz)   SpO2 98%   BMI 38.49 kg/m      Pt alert and oriented. Avss on RA. Still flushed to face. Denies nausea. C/o headache 7/10, gave prn Tylenol and Oxycodone with good relief. Tolerated chemo well. Had one formed hard bm on shift. Good UOP. Chemo planned for today. CVC heparin locked. No replacements. Cont with poc.    Problem: Plan of Care - These are the overarching goals to be used throughout the patient stay.    Goal: Plan of Care Review  Description: The Plan of Care Review/Shift note should be completed every shift.  The Outcome Evaluation is a brief statement about your assessment that the patient is improving, declining, or no change.  This information will be displayed automatically on your shift note.  Outcome: Progressing     "

## 2023-10-10 NOTE — PLAN OF CARE
Patient vital signs stable. Patient had a migraine this morning her Naratripton was given, this brought relief. She tolerated her Etoposide and Cytarabine this morning, she will get her last doses this evening. She responded well to diuretics today. Continue to monitor  Problem: Plan of Care - These are the overarching goals to be used throughout the patient stay.    Goal: Plan of Care Review  Description: The Plan of Care Review/Shift note should be completed every shift.  The Outcome Evaluation is a brief statement about your assessment that the patient is improving, declining, or no change.  This information will be displayed automatically on your shift note.  Outcome: Progressing   Goal Outcome Evaluation:

## 2023-10-10 NOTE — PROGRESS NOTES
"SPIRITUAL HEALTH SERVICES  [facility] ([place]) 5C  REFERRAL SOURCE: Follow-up    SUMMARY OF VISIT  Pt shared her choice of blessing for her transplant on Thursday. Pt's Aunt was present in room with her. Pt expresses excitement for her transplant - \"just 3more days.\"     PLAN: Follow-up on Thursday for transplant     Rev. Frieda Peterson MDiv, [facility]  Staff    Pager [phone]  * [facility] remains available 24/7 for emergent requests/referrals, either by having the switchboard page the on-call  or by entering an ASAP/STAT consult in Epic (this will also page the on-call ).*   "

## 2023-10-11 LAB
ANION GAP SERPL CALCULATED.3IONS-SCNC: 11 MMOL/L (ref 7–15)
BASO+EOS+MONOS # BLD AUTO: ABNORMAL 10*3/UL
BASO+EOS+MONOS NFR BLD AUTO: ABNORMAL %
BASOPHILS # BLD AUTO: ABNORMAL 10*3/UL
BASOPHILS # BLD MANUAL: 0 10E3/UL (ref 0–0.2)
BASOPHILS NFR BLD AUTO: ABNORMAL %
BASOPHILS NFR BLD MANUAL: 0 %
BUN SERPL-MCNC: 17.3 MG/DL (ref 6–20)
CALCIUM SERPL-MCNC: 9.2 MG/DL (ref 8.6–10)
CHLORIDE SERPL-SCNC: 101 MMOL/L (ref 98–107)
CREAT SERPL-MCNC: 0.9 MG/DL (ref 0.51–0.95)
DEPRECATED HCO3 PLAS-SCNC: 24 MMOL/L (ref 22–29)
EGFRCR SERPLBLD CKD-EPI 2021: 78 ML/MIN/1.73M2
EOSINOPHIL # BLD AUTO: ABNORMAL 10*3/UL
EOSINOPHIL # BLD MANUAL: 0.1 10E3/UL (ref 0–0.7)
EOSINOPHIL NFR BLD AUTO: ABNORMAL %
EOSINOPHIL NFR BLD MANUAL: 4 %
ERYTHROCYTE [DISTWIDTH] IN BLOOD BY AUTOMATED COUNT: 13.6 % (ref 10–15)
GLUCOSE SERPL-MCNC: 98 MG/DL (ref 70–99)
HCT VFR BLD AUTO: 27.7 % (ref 35–47)
HGB BLD-MCNC: 9.4 G/DL (ref 11.7–15.7)
IMM GRANULOCYTES # BLD: ABNORMAL 10*3/UL
IMM GRANULOCYTES NFR BLD: ABNORMAL %
LYMPHOCYTES # BLD AUTO: ABNORMAL 10*3/UL
LYMPHOCYTES # BLD MANUAL: 0.3 10E3/UL (ref 0.8–5.3)
LYMPHOCYTES NFR BLD AUTO: ABNORMAL %
LYMPHOCYTES NFR BLD MANUAL: 17 %
MAGNESIUM SERPL-MCNC: 1.9 MG/DL (ref 1.7–2.3)
MCH RBC QN AUTO: 28.3 PG (ref 26.5–33)
MCHC RBC AUTO-ENTMCNC: 33.9 G/DL (ref 31.5–36.5)
MCV RBC AUTO: 83 FL (ref 78–100)
MONOCYTES # BLD AUTO: ABNORMAL 10*3/UL
MONOCYTES # BLD MANUAL: 0 10E3/UL (ref 0–1.3)
MONOCYTES NFR BLD AUTO: ABNORMAL %
MONOCYTES NFR BLD MANUAL: 0 %
NEUTROPHILS # BLD AUTO: ABNORMAL 10*3/UL
NEUTROPHILS # BLD MANUAL: 1.5 10E3/UL (ref 1.6–8.3)
NEUTROPHILS NFR BLD AUTO: ABNORMAL %
NEUTROPHILS NFR BLD MANUAL: 79 %
NRBC # BLD AUTO: 0 10E3/UL
NRBC BLD AUTO-RTO: 0 /100
PHOSPHATE SERPL-MCNC: 4.3 MG/DL (ref 2.5–4.5)
PLAT MORPH BLD: ABNORMAL
PLATELET # BLD AUTO: 58 10E3/UL (ref 150–450)
POTASSIUM SERPL-SCNC: 4.2 MMOL/L (ref 3.4–5.3)
RBC # BLD AUTO: 3.32 10E6/UL (ref 3.8–5.2)
RBC MORPH BLD: ABNORMAL
SODIUM SERPL-SCNC: 136 MMOL/L (ref 135–145)
WBC # BLD AUTO: 1.9 10E3/UL (ref 4–11)

## 2023-10-11 PROCEDURE — 80048 BASIC METABOLIC PNL TOTAL CA: CPT | Performed by: PHYSICIAN ASSISTANT

## 2023-10-11 PROCEDURE — 258N000003 HC RX IP 258 OP 636: Performed by: STUDENT IN AN ORGANIZED HEALTH CARE EDUCATION/TRAINING PROGRAM

## 2023-10-11 PROCEDURE — 93005 ELECTROCARDIOGRAM TRACING: CPT

## 2023-10-11 PROCEDURE — 83735 ASSAY OF MAGNESIUM: CPT | Performed by: PHYSICIAN ASSISTANT

## 2023-10-11 PROCEDURE — 206N000001 HC R&B BMT UMMC

## 2023-10-11 PROCEDURE — 99232 SBSQ HOSP IP/OBS MODERATE 35: CPT | Mod: FS | Performed by: PHYSICIAN ASSISTANT

## 2023-10-11 PROCEDURE — 250N000011 HC RX IP 250 OP 636: Mod: JZ | Performed by: PHYSICIAN ASSISTANT

## 2023-10-11 PROCEDURE — 85027 COMPLETE CBC AUTOMATED: CPT | Performed by: PHYSICIAN ASSISTANT

## 2023-10-11 PROCEDURE — 85007 BL SMEAR W/DIFF WBC COUNT: CPT | Performed by: PHYSICIAN ASSISTANT

## 2023-10-11 PROCEDURE — 250N000013 HC RX MED GY IP 250 OP 250 PS 637: Performed by: STUDENT IN AN ORGANIZED HEALTH CARE EDUCATION/TRAINING PROGRAM

## 2023-10-11 PROCEDURE — 250N000011 HC RX IP 250 OP 636: Performed by: STUDENT IN AN ORGANIZED HEALTH CARE EDUCATION/TRAINING PROGRAM

## 2023-10-11 PROCEDURE — 250N000013 HC RX MED GY IP 250 OP 250 PS 637: Performed by: PHYSICIAN ASSISTANT

## 2023-10-11 PROCEDURE — 93010 ELECTROCARDIOGRAM REPORT: CPT | Performed by: INTERNAL MEDICINE

## 2023-10-11 PROCEDURE — 84100 ASSAY OF PHOSPHORUS: CPT | Performed by: STUDENT IN AN ORGANIZED HEALTH CARE EDUCATION/TRAINING PROGRAM

## 2023-10-11 RX ORDER — SODIUM CHLORIDE 9 MG/ML
INJECTION, SOLUTION INTRAVENOUS CONTINUOUS
Status: DISCONTINUED | OUTPATIENT
Start: 2023-10-12 | End: 2023-10-13 | Stop reason: HOSPADM

## 2023-10-11 RX ORDER — MAGNESIUM SULFATE HEPTAHYDRATE 40 MG/ML
2 INJECTION, SOLUTION INTRAVENOUS ONCE
Status: COMPLETED | OUTPATIENT
Start: 2023-10-11 | End: 2023-10-11

## 2023-10-11 RX ADMIN — PROPRANOLOL HYDROCHLORIDE 20 MG: 10 TABLET ORAL at 20:03

## 2023-10-11 RX ADMIN — PROCHLORPERAZINE MALEATE 10 MG: 5 TABLET ORAL at 20:03

## 2023-10-11 RX ADMIN — BUPROPION HYDROCHLORIDE 150 MG: 150 TABLET, FILM COATED, EXTENDED RELEASE ORAL at 08:18

## 2023-10-11 RX ADMIN — FOSAPREPITANT 150 MG: 150 INJECTION, POWDER, LYOPHILIZED, FOR SOLUTION INTRAVENOUS at 09:51

## 2023-10-11 RX ADMIN — DEXTROSE AND SODIUM CHLORIDE: 5; 450 INJECTION, SOLUTION INTRAVENOUS at 06:02

## 2023-10-11 RX ADMIN — DEXTROSE AND SODIUM CHLORIDE: 5; 450 INJECTION, SOLUTION INTRAVENOUS at 16:45

## 2023-10-11 RX ADMIN — MELPHALAN HYDROCHLORIDE 300 MG: KIT INTRAVENOUS at 10:07

## 2023-10-11 RX ADMIN — VENLAFAXINE HYDROCHLORIDE 150 MG: 75 CAPSULE, EXTENDED RELEASE ORAL at 20:03

## 2023-10-11 RX ADMIN — LEVOTHYROXINE SODIUM 75 MCG: 0.05 TABLET ORAL at 08:18

## 2023-10-11 RX ADMIN — FLUCONAZOLE 400 MG: 200 TABLET ORAL at 08:17

## 2023-10-11 RX ADMIN — Medication 5 ML: at 20:05

## 2023-10-11 RX ADMIN — SODIUM CHLORIDE: 9 INJECTION, SOLUTION INTRAVENOUS at 10:05

## 2023-10-11 RX ADMIN — Medication 5 ML: at 03:34

## 2023-10-11 RX ADMIN — DEXAMETHASONE SODIUM PHOSPHATE 12 MG: 4 INJECTION, SOLUTION INTRA-ARTICULAR; INTRALESIONAL; INTRAMUSCULAR; INTRAVENOUS; SOFT TISSUE at 09:09

## 2023-10-11 RX ADMIN — TOPIRAMATE 50 MG: 50 TABLET, FILM COATED ORAL at 20:03

## 2023-10-11 RX ADMIN — TRAMADOL HYDROCHLORIDE 100 MG: 50 TABLET, COATED ORAL at 20:03

## 2023-10-11 RX ADMIN — LEVOFLOXACIN 500 MG: 250 TABLET, FILM COATED ORAL at 09:10

## 2023-10-11 RX ADMIN — ALLOPURINOL 300 MG: 300 TABLET ORAL at 08:18

## 2023-10-11 RX ADMIN — PROPRANOLOL HYDROCHLORIDE 20 MG: 10 TABLET ORAL at 08:18

## 2023-10-11 RX ADMIN — MAGNESIUM SULFATE IN WATER 2 G: 40 INJECTION, SOLUTION INTRAVENOUS at 06:06

## 2023-10-11 RX ADMIN — ONDANSETRON 16 MG: 2 INJECTION INTRAMUSCULAR; INTRAVENOUS at 09:09

## 2023-10-11 RX ADMIN — PANTOPRAZOLE SODIUM 40 MG: 40 TABLET, DELAYED RELEASE ORAL at 08:18

## 2023-10-11 RX ADMIN — Medication 5 ML: at 10:38

## 2023-10-11 ASSESSMENT — ACTIVITIES OF DAILY LIVING (ADL)
ADLS_ACUITY_SCORE: 18

## 2023-10-11 NOTE — PLAN OF CARE
Patient vital signs stable. She denies nausea, she denies pain today. Melphalan given this morning patient tolerated the drug well, the drug was stopped at 10:30. Plan for transplant tomorrow at 10:30  2 bags to be given, then 1 hour after the transplant ends she will be given another bag of study drug AB-205 or placebo. EKG done this morning. Post Melphalan flush will end at 20:30 tonight. Appetite down today. Continue to monitor.  Problem: Plan of Care - These are the overarching goals to be used throughout the patient stay.    Goal: Plan of Care Review  Description: The Plan of Care Review/Shift note should be completed every shift.  The Outcome Evaluation is a brief statement about your assessment that the patient is improving, declining, or no change.  This information will be displayed automatically on your shift note.  Outcome: Progressing   Goal Outcome Evaluation:

## 2023-10-11 NOTE — PROGRESS NOTES
"BMT/Cell Therapy Daily Progress Note   10/11/2023    Patient ID:  Jennifer Ward is a 48 year old female, currently day -1 of her therapy.    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 PET showed lymphoma limited to left cervical, supraclavicular, and axillary lymph nodes. BmBx negative.      Late relapse in May 2023 presenting with right neck swelling. Right cervical LN biopsy showed recurrent vs new primary cHL (nodular sclerosing subtype). PET with FDG-avid right level II and level V cervical lymph nodes only (SUVmax 13.2)     Date Treatment Name Response Side Effects / Toxicities   2008 to 2009 ABVD x 6 cycles followed by adjuvant radiation CR Fatigue, N/V   2023 to 2023 Brentuximab/nivolumab x 2 cycles, nivolumab alone x 2 cycles CR Brentuximab infusion reaction (difficulty breathing)        Admission date: 10/6/2023    INTERVAL  HISTORY   Ongoing facial flushing.  Nausea without vomiting.  No diarrhea.  Frequent urination with diuresis.      Review of Systems: ROS negative except as noted above.    PHYSICAL EXAM     Weight In/Out     Wt Readings from Last 3 Encounters:   10/10/23 102.7 kg (226 lb 8 oz)   10/04/23 99.4 kg (219 lb 2.2 oz)   10/04/23 98.4 kg (217 lb)      I/O last 3 completed shifts:  In: 4053 [P.O.:2020; I.V.:210; IV Piggyback:1823]  Out: 6450 [Urine:6450]       KPS:  80  ECO    /74 (BP Location: Left arm)   Pulse 67   Temp 98.9  F (37.2  C) (Axillary)   Resp 16   Ht 1.64 m (5' 4.57\")   Wt 102.7 kg (226 lb 8 oz)   SpO2 97%   BMI 38.20 kg/m       General: NAD   Eyes: : ARMEN, sclera anicteric   Lungs: CTA bilaterally  Cardiovascular: RRR, no M/R/G   Abdominal/Rectal: +BS, soft, NT, ND  Lymphatics: no edema  Skin: Flushed face and chest  Neuro: A&O   Additional Findings: Louis site NT, no " drainage.      LABS AND IMAGING: I have assessed all abnormal lab values for their clinical significance and any values considered clinically significant have been addressed in the assessment and plan.        Lab Results   Component Value Date    WBC 1.9 (L) 10/11/2023    ANEU 1.7 10/10/2023    HGB 9.4 (L) 10/11/2023    HCT 27.7 (L) 10/11/2023    PLT 58 (L) 10/11/2023     10/11/2023    POTASSIUM 4.2 10/11/2023    CHLORIDE 101 10/11/2023    CO2 24 10/11/2023    GLC 98 10/11/2023    BUN 17.3 10/11/2023    CR 0.90 10/11/2023    MAG 1.9 10/11/2023    INR 1.07 10/09/2023     Oral/GI regimen related toxicities (per NCI CTCAE v 5.0):    Oral Mucositis: none  Nausea: Grade 1  Vomiting: none  Diarrhea: none    *diarrhea secondary to C. Diff is excluded from the definition of oral/GI RRT      CTCAE Terms     Vomiting: A disorder characterized by the reflexive act of ejecting the contents of the stomach through the mouth   Grade 1 Intervention not indicated   Grade 2 Outpatient IV hydration; medical intervention indicated   Grade 3 Tube feeding, TPN, or hospitalization indicated   Grade 4 Life-threatening consequences   Grade 5 Death     Oral Mucositis: A disorder characterized by ulceration or inflammation of the oral mucosal.   Grade 1 Asymptomatic or mild symptoms; intervention not indicated   Grade 2 Moderate pain or ulcer that does not interfere with oral intake; modified diet indicated   Grade 3 Severe pain; interfering with oral intake   Grade 4 Life-threatening consequences; urgent intervention indicated   Grade 5 Death     Nausea: A disorder characterized by a queasy sensation and/or the urge to vomit.   Grade 1 Loss of appetite without alteration in eating habits  Grade 2 Oral intake decreased without significant weight loss, dehydration or malnutrition   Grade 3 Inadequate oral caloric or fluid intake; tube feeding, TPN, or hospitalization indicated   Grade 4 NA  Grade 5 NA    Diarrhea: A disorder  characterized by an increase in frequency and/or loose or watery bowel movements.   Grade 1 Increase of <4 stools per day over baseline; mild increase in ostomy output compared to baseline   Grade 2 Increase of 4 - 6 stools per day over baseline; moderate increase in ostomy output compared to baseline; limiting instrumental ADL   Grade 3 Increase of >=7 stools per day over baseline; hospitalization indicated; severe increase in ostomy output compared to baseline; limiting self-care ADL    Grade 4 Life-threatening consequences; urgent intervention indicated    Grade 5 Death       Source: https://ctep.cancer.gov/protocoldevelopment/electronic_applications/docs/CTCAE_v5_Quick_Reference_8.5x11.pdf       SYSTEMS-BASED ASSESSMENT AND PLAN   Jennifer Ward is a 48 year old female, currently day -1 of her autologous hematopoietic cell transplant as rescue after BEAM therapy for Hodgkin Lymphoma. Co-enrolled on E-CELERATE study.      Prep: BEAM     BMT  - BMT doc/Coordinator: Nay  - MT2016-11 with co-enrollment on MT2022-40 (E-CELERATE)  - Cell dose 7 million CD34+ cells/kg   - Prep as above. Flush and pre-meds prior to transplant.  - GCSF starts day +5, continue until ANC > 500 for 3 consecutive days.   - Restaging: next restage at day +28  - Allopurinol started Day -6     HEME/COAG  - Transfusion parameters: hgb <7g/dL and plts <10,000  - Pancytopenia secondary to Hodgkin Lymphoma and chemotherapy     ID  Prophy: note that antimicrobial dosing is specific to protocol  Viral: ACV 400mg PO bid  Bacterial: levofloxacin 500 mg daily  Fungal: fluconazole 400mg daily  PCP: Bactrim or other PCP prophy to start at day +28     GI/NUTRITION  - Anti-emetics: zofran/dex prior to transplant to prevent chemotherapy induced n/v.  Ativan and compazine prn.  Single dose of emend 30 minutes prior to melphalan on last day of chemo prep.  - Ulcer prophy: Protonix 40mg daily.     #Transaminitis:  likely secondary to chemotherapy; recheck  "10/12.      ENDOCRINE  # hypothyroidism: continue PTA synthroid     PSYCH  # depression: continue PTA bupropion, venlafaxine     NEURO  # migraines: daily topamax with prn naratriptan    FEN  # fluid overload: diuresing as needed; last lasix (20mg IV bid) on 10/10.   # high phill/high protein diet. Dietician to support as indicated to prevent malnutrition.  # transplant flush ordered for 10/12    SKIN  # Flushed face: possibly 2/2 to chemo; no further interventions indicated.      SUPPORTIVE CARE  - PT/OT to evaluate on admission and follow as indicated.   - BMT Social work to follow.      Dispo/Discharge: anticipate discharge on day +1.  Note that patient already has plenty of her PTA medications and will only need prescriptions for any new meds that we start this admission. [place] [address]       Clinically Significant Risk Factors                  # Thrombocytopenia: Lowest platelets = 51 in last 2 days, will monitor for bleeding          # Obesity: Estimated body mass index is 38.2 kg/m  as calculated from the following:    Height as of this encounter: 1.64 m (5' 4.57\").    Weight as of this encounter: 102.7 kg (226 lb 8 oz).                 Known issues that I take into account for medical decisions, with salient changes to the plan considering these complexities noted above.    Patient Active Problem List   Diagnosis    Hodgkin's disease of lymph nodes of multiple sites (H)    Autologous donor of stem cells    Nodular sclerosis Hodgkin lymphoma of lymph nodes of multiple regions (H)       I spent 30 minutes in the care of this patient today, which included time necessary for preparation for the visit, obtaining history, ordering medications/tests/procedures as medically indicated, review of pertinent medical literature, counseling of the patient, communication of recommendations to the care team, and documentation time.    Helen Felix PA-C    Securely message " with the Vocera Web Console

## 2023-10-11 NOTE — PLAN OF CARE
VSS. Afebrile. Reported some nausea at start of shift. PRN compazine given with relief. No emesis. Up independently. Flushing remains unchanged. Replace magnesium this am.     Problem: Stem Cell/Bone Marrow Transplant  Goal: Nausea and Vomiting Symptom Relief  Outcome: Not Progressing  Intervention: Prevent and Manage Nausea and Vomiting  Recent Flowsheet Documentation  Taken 10/11/2023 0340 by Argelia Ellsworth RN  Nausea/Vomiting Interventions: antiemetic  Taken 10/10/2023 2130 by Argelia Ellsworth RN  Nausea/Vomiting Interventions: antiemetic  Taken 10/10/2023 2035 by Argelia Ellsworth RN  Nausea/Vomiting Interventions: antiemetic     Problem: Plan of Care - These are the overarching goals to be used throughout the patient stay.    Goal: Plan of Care Review  Description: The Plan of Care Review/Shift note should be completed every shift.  The Outcome Evaluation is a brief statement about your assessment that the patient is improving, declining, or no change.  This information will be displayed automatically on your shift note.  Outcome: Progressing  Goal: Absence of Hospital-Acquired Illness or Injury  Outcome: Progressing  Intervention: Identify and Manage Fall Risk  Recent Flowsheet Documentation  Taken 10/11/2023 0340 by Argelia Ellsworth RN  Safety Promotion/Fall Prevention: safety round/check completed  Taken 10/11/2023 0001 by Argelia Ellsworth RN  Safety Promotion/Fall Prevention: safety round/check completed  Intervention: Prevent Skin Injury  Recent Flowsheet Documentation  Taken 10/11/2023 0340 by Argelia Ellsworth RN  Body Position: position changed independently  Taken 10/10/2023 2130 by Argelia Ellsworth RN  Body Position: position changed independently  Taken 10/10/2023 2035 by Argelia Ellsworth RN  Body Position: position changed independently  Intervention: Prevent Infection  Recent Flowsheet Documentation  Taken 10/11/2023 0340 by Argelia Ellsworth RN  Infection Prevention:   rest/sleep promoted   single patient room provided  Taken 10/11/2023 0001  by Seng, Argelia, RN  Infection Prevention:   rest/sleep promoted   single patient room provided  Taken 10/10/2023 2130 by Argelia Ellsworth RN  Infection Prevention:   rest/sleep promoted   single patient room provided  Goal: Optimal Comfort and Wellbeing  Outcome: Progressing  Intervention: Provide Person-Centered Care  Recent Flowsheet Documentation  Taken 10/11/2023 0340 by Argelia Ellsworth RN  Trust Relationship/Rapport:   care explained   emotional support provided   thoughts/feelings acknowledged  Taken 10/10/2023 2130 by Argelia Ellsworth RN  Trust Relationship/Rapport:   care explained   emotional support provided   thoughts/feelings acknowledged  Goal: Readiness for Transition of Care  Outcome: Progressing     Problem: Stem Cell/Bone Marrow Transplant  Goal: Optimal Coping with Transplant  Outcome: Progressing  Goal: Symptom-Free Urinary Elimination  Outcome: Progressing  Goal: Diarrhea Symptom Control  Outcome: Progressing  Goal: Improved Activity Tolerance  Outcome: Progressing  Intervention: Promote Improved Energy  Recent Flowsheet Documentation  Taken 10/11/2023 0340 by Argelia Ellsworth RN  Activity Management: activity adjusted per tolerance  Taken 10/10/2023 2130 by Argelia Ellsworth RN  Activity Management: activity adjusted per tolerance  Taken 10/10/2023 2035 by Argelia Ellsworth RN  Activity Management: activity adjusted per tolerance  Goal: Blood Counts Within Acceptable Range  Outcome: Progressing  Intervention: Monitor and Manage Hematologic Symptoms  Recent Flowsheet Documentation  Taken 10/11/2023 0340 by Argelia Ellsworth RN  Medication Review/Management: medications reviewed  Taken 10/11/2023 0001 by Argelia Ellsworth RN  Medication Review/Management: medications reviewed  Taken 10/10/2023 2035 by Argelia Ellsworth RN  Medication Review/Management: medications reviewed  Goal: Absence of Hypersensitivity Reaction  Outcome: Progressing  Goal: Absence of Infection  Outcome: Progressing  Intervention: Prevent and Manage Infection  Recent  Flowsheet Documentation  Taken 10/11/2023 0340 by Argelia Ellsworth RN  Infection Prevention:   rest/sleep promoted   single patient room provided  Isolation Precautions:   cytotoxic precautions maintained   protective environment maintained  Taken 10/11/2023 0001 by Argelia Ellsworth RN  Infection Prevention:   rest/sleep promoted   single patient room provided  Isolation Precautions:   cytotoxic precautions maintained   protective environment maintained  Taken 10/10/2023 2130 by Argelia Ellsworth RN  Infection Prevention:   rest/sleep promoted   single patient room provided  Isolation Precautions:   cytotoxic precautions maintained   protective environment maintained  Goal: Improved Oral Mucous Membrane Health and Integrity  Outcome: Progressing  Goal: Optimal Nutrition Intake  Outcome: Progressing   Goal Outcome Evaluation:

## 2023-10-11 NOTE — PROGRESS NOTES
Chemo drug: etoposide and cytarabine  Tolerated: yes  Intervention: benadryl premed given  Response: tolerated  Plan for melphalan in am.

## 2023-10-11 NOTE — PROGRESS NOTES
"Care Management Discharge Note    Anticipated Discharge Date: 10/13/2023     Discharge Disposition: [facility]    Discharge Services/DME: See RNCC notes for community discharge details.    Discharge Transportation: family or friend will provide ()    Private pay costs discussed: Not applicable - See RNCC notes for needs    Does the patient's insurance plan have a 3 day qualifying hospital stay waiver?  No    PAS Confirmation Code:  N/A  Patient/family educated on Medicare website which has current facility and service quality ratings: no    Education Provided on the Discharge Plan: Yes  Persons Notified of Discharge Plans: Pt, Pt's  and mother, IDT  Patient/Family in Agreement with the Plan: yes    Handoff Referral Completed:  N/A    Additional Information:  Jennifer Ward is a 48 year old female, currently day -1 of her therapy.    Per Medical team, pt is anticipated to be medically stable for discharge on Fri 10/13/23 pending post-BMT on 10/12/23. SW met with pt at bedside to assess coping and provide psychosocial support  as needed. SW provided a discharge packet with psychosocial post-transplant resources for patient and caregiver. Pt shared that she has been overall feeling well. She has had various family members visit her during her stay and having them walk the halls with her. She shared that they is pleasantly surprised that \"the side effects haven't been too bad.\" Pt notes that today she is feeling significant fatigue that she was not feeling prior to today. Pt explained that she ariela best by \"preparing for the worst, but hoping for the best.\" SW provided empathetic listening, validation of concerns, and encouragement. Pt declined/questions or concerns. SW encouraged pt to contact  for support, questions and/or resources.    DUONG Baker, [facility]  Adult Blood & Marrow Transplant   Phone: [phone]  Pager: [phone]        "

## 2023-10-12 ENCOUNTER — CARE COORDINATION (OUTPATIENT)
Dept: TRANSPLANT | Facility: CLINIC | Age: 48
End: 2023-10-12
Payer: COMMERCIAL

## 2023-10-12 ENCOUNTER — LAB REQUISITION (OUTPATIENT)
Dept: LAB | Facility: CLINIC | Age: 48
End: 2023-10-12

## 2023-10-12 ENCOUNTER — TELEPHONE (OUTPATIENT)
Dept: TRANSPLANT | Facility: CLINIC | Age: 48
End: 2023-10-12

## 2023-10-12 ENCOUNTER — ALLIED HEALTH/NURSE VISIT (OUTPATIENT)
Dept: TRANSPLANT | Facility: CLINIC | Age: 48
End: 2023-10-12
Payer: COMMERCIAL

## 2023-10-12 DIAGNOSIS — C81.98 HODGKIN'S DISEASE OF LYMPH NODES OF MULTIPLE SITES (H): Primary | ICD-10-CM

## 2023-10-12 DIAGNOSIS — Z52.011 AUTOLOGOUS DONOR OF STEM CELLS: ICD-10-CM

## 2023-10-12 LAB
ABO/RH(D): NORMAL
ALBUMIN SERPL BCG-MCNC: 4.4 G/DL (ref 3.5–5.2)
ALP SERPL-CCNC: 83 U/L (ref 35–104)
ALT SERPL W P-5'-P-CCNC: 43 U/L (ref 0–50)
ANION GAP SERPL CALCULATED.3IONS-SCNC: 12 MMOL/L (ref 7–15)
ANTIBODY SCREEN: NEGATIVE
AST SERPL W P-5'-P-CCNC: 19 U/L (ref 0–45)
ATRIAL RATE - MUSE: 51 BPM
BASO+EOS+MONOS # BLD AUTO: ABNORMAL 10*3/UL
BASO+EOS+MONOS NFR BLD AUTO: ABNORMAL %
BASOPHILS # BLD AUTO: ABNORMAL 10*3/UL
BASOPHILS # BLD MANUAL: 0 10E3/UL (ref 0–0.2)
BASOPHILS NFR BLD AUTO: ABNORMAL %
BASOPHILS NFR BLD MANUAL: 0 %
BILIRUB DIRECT SERPL-MCNC: 0.24 MG/DL (ref 0–0.3)
BILIRUB SERPL-MCNC: 0.9 MG/DL
BILL ONLY STEM CELL INFUSION: NORMAL
BILL ONLY STEM CELL INFUSION: NORMAL
BUN SERPL-MCNC: 16.8 MG/DL (ref 6–20)
CALCIUM SERPL-MCNC: 9.6 MG/DL (ref 8.6–10)
CHLORIDE SERPL-SCNC: 101 MMOL/L (ref 98–107)
CREAT SERPL-MCNC: 0.76 MG/DL (ref 0.51–0.95)
DEPRECATED HCO3 PLAS-SCNC: 21 MMOL/L (ref 22–29)
DIASTOLIC BLOOD PRESSURE - MUSE: NORMAL MMHG
EGFRCR SERPLBLD CKD-EPI 2021: >90 ML/MIN/1.73M2
EOSINOPHIL # BLD AUTO: ABNORMAL 10*3/UL
EOSINOPHIL # BLD MANUAL: 0 10E3/UL (ref 0–0.7)
EOSINOPHIL NFR BLD AUTO: ABNORMAL %
EOSINOPHIL NFR BLD MANUAL: 0 %
ERYTHROCYTE [DISTWIDTH] IN BLOOD BY AUTOMATED COUNT: 13.1 % (ref 10–15)
GLUCOSE SERPL-MCNC: 139 MG/DL (ref 70–99)
HCT VFR BLD AUTO: 29.7 % (ref 35–47)
HGB BLD-MCNC: 10.1 G/DL (ref 11.7–15.7)
IMM GRANULOCYTES # BLD: ABNORMAL 10*3/UL
IMM GRANULOCYTES NFR BLD: ABNORMAL %
INTERPRETATION ECG - MUSE: NORMAL
LDH SERPL L TO P-CCNC: 201 U/L (ref 0–250)
LYMPHOCYTES # BLD AUTO: ABNORMAL 10*3/UL
LYMPHOCYTES # BLD MANUAL: 0.1 10E3/UL (ref 0.8–5.3)
LYMPHOCYTES NFR BLD AUTO: ABNORMAL %
LYMPHOCYTES NFR BLD MANUAL: 4 %
MAGNESIUM SERPL-MCNC: 2.2 MG/DL (ref 1.7–2.3)
MCH RBC QN AUTO: 27.9 PG (ref 26.5–33)
MCHC RBC AUTO-ENTMCNC: 34 G/DL (ref 31.5–36.5)
MCV RBC AUTO: 82 FL (ref 78–100)
MONOCYTES # BLD AUTO: ABNORMAL 10*3/UL
MONOCYTES # BLD MANUAL: 0 10E3/UL (ref 0–1.3)
MONOCYTES NFR BLD AUTO: ABNORMAL %
MONOCYTES NFR BLD MANUAL: 0 %
NEUTROPHILS # BLD AUTO: ABNORMAL 10*3/UL
NEUTROPHILS # BLD MANUAL: 2.2 10E3/UL (ref 1.6–8.3)
NEUTROPHILS NFR BLD AUTO: ABNORMAL %
NEUTROPHILS NFR BLD MANUAL: 96 %
NRBC # BLD AUTO: 0 10E3/UL
NRBC BLD AUTO-RTO: 0 /100
P AXIS - MUSE: 45 DEGREES
PHOSPHATE SERPL-MCNC: 3.2 MG/DL (ref 2.5–4.5)
PLAT MORPH BLD: ABNORMAL
PLATELET # BLD AUTO: 64 10E3/UL (ref 150–450)
POTASSIUM SERPL-SCNC: 4.5 MMOL/L (ref 3.4–5.3)
PR INTERVAL - MUSE: 148 MS
PROT SERPL-MCNC: 7.2 G/DL (ref 6.4–8.3)
QRS DURATION - MUSE: 84 MS
QT - MUSE: 446 MS
QTC - MUSE: 411 MS
R AXIS - MUSE: 28 DEGREES
RBC # BLD AUTO: 3.62 10E6/UL (ref 3.8–5.2)
RBC MORPH BLD: ABNORMAL
RETICS # AUTO: 0.02 10E6/UL (ref 0.03–0.1)
RETICS/RBC NFR AUTO: 0.5 % (ref 0.5–2)
SODIUM SERPL-SCNC: 134 MMOL/L (ref 135–145)
SPECIMEN EXPIRATION DATE: NORMAL
SYSTOLIC BLOOD PRESSURE - MUSE: NORMAL MMHG
T AXIS - MUSE: 49 DEGREES
VENTRICULAR RATE- MUSE: 51 BPM
WBC # BLD AUTO: 2.3 10E3/UL (ref 4–11)

## 2023-10-12 PROCEDURE — 83735 ASSAY OF MAGNESIUM: CPT | Performed by: STUDENT IN AN ORGANIZED HEALTH CARE EDUCATION/TRAINING PROGRAM

## 2023-10-12 PROCEDURE — 206N000001 HC R&B BMT UMMC

## 2023-10-12 PROCEDURE — 250N000013 HC RX MED GY IP 250 OP 250 PS 637: Performed by: PHYSICIAN ASSISTANT

## 2023-10-12 PROCEDURE — 258N000003 HC RX IP 258 OP 636: Performed by: PHYSICIAN ASSISTANT

## 2023-10-12 PROCEDURE — 250N000011 HC RX IP 250 OP 636: Mod: JZ | Performed by: STUDENT IN AN ORGANIZED HEALTH CARE EDUCATION/TRAINING PROGRAM

## 2023-10-12 PROCEDURE — 250N000011 HC RX IP 250 OP 636: Mod: JZ | Performed by: PHYSICIAN ASSISTANT

## 2023-10-12 PROCEDURE — 83615 LACTATE (LD) (LDH) ENZYME: CPT | Performed by: STUDENT IN AN ORGANIZED HEALTH CARE EDUCATION/TRAINING PROGRAM

## 2023-10-12 PROCEDURE — 85007 BL SMEAR W/DIFF WBC COUNT: CPT | Performed by: STUDENT IN AN ORGANIZED HEALTH CARE EDUCATION/TRAINING PROGRAM

## 2023-10-12 PROCEDURE — 38208 THAW PRESERVED STEM CELLS: CPT | Performed by: STUDENT IN AN ORGANIZED HEALTH CARE EDUCATION/TRAINING PROGRAM

## 2023-10-12 PROCEDURE — 80053 COMPREHEN METABOLIC PANEL: CPT | Performed by: STUDENT IN AN ORGANIZED HEALTH CARE EDUCATION/TRAINING PROGRAM

## 2023-10-12 PROCEDURE — 99232 SBSQ HOSP IP/OBS MODERATE 35: CPT | Mod: FS | Performed by: PHYSICIAN ASSISTANT

## 2023-10-12 PROCEDURE — 84100 ASSAY OF PHOSPHORUS: CPT | Performed by: STUDENT IN AN ORGANIZED HEALTH CARE EDUCATION/TRAINING PROGRAM

## 2023-10-12 PROCEDURE — 82248 BILIRUBIN DIRECT: CPT | Performed by: STUDENT IN AN ORGANIZED HEALTH CARE EDUCATION/TRAINING PROGRAM

## 2023-10-12 PROCEDURE — 86901 BLOOD TYPING SEROLOGIC RH(D): CPT | Performed by: PHYSICIAN ASSISTANT

## 2023-10-12 PROCEDURE — 85045 AUTOMATED RETICULOCYTE COUNT: CPT | Performed by: STUDENT IN AN ORGANIZED HEALTH CARE EDUCATION/TRAINING PROGRAM

## 2023-10-12 PROCEDURE — 250N000013 HC RX MED GY IP 250 OP 250 PS 637: Performed by: STUDENT IN AN ORGANIZED HEALTH CARE EDUCATION/TRAINING PROGRAM

## 2023-10-12 PROCEDURE — 85027 COMPLETE CBC AUTOMATED: CPT | Performed by: STUDENT IN AN ORGANIZED HEALTH CARE EDUCATION/TRAINING PROGRAM

## 2023-10-12 PROCEDURE — 999N000022 HC STATISTIC AUTOLOGOUS BM-INITIAL INFUSION

## 2023-10-12 PROCEDURE — 30243Y0 TRANSFUSION OF AUTOLOGOUS HEMATOPOIETIC STEM CELLS INTO CENTRAL VEIN, PERCUTANEOUS APPROACH: ICD-10-PCS | Performed by: STUDENT IN AN ORGANIZED HEALTH CARE EDUCATION/TRAINING PROGRAM

## 2023-10-12 RX ORDER — PANTOPRAZOLE SODIUM 40 MG/1
40 TABLET, DELAYED RELEASE ORAL DAILY
Qty: 30 TABLET | Refills: 0 | Status: ON HOLD | OUTPATIENT
Start: 2023-10-13 | End: 2023-10-26

## 2023-10-12 RX ORDER — LORAZEPAM 0.5 MG/1
.5-1 TABLET ORAL EVERY 4 HOURS PRN
Qty: 30 TABLET | Refills: 0 | Status: ON HOLD | OUTPATIENT
Start: 2023-10-12 | End: 2023-10-26

## 2023-10-12 RX ORDER — FLUCONAZOLE 200 MG/1
400 TABLET ORAL DAILY
Qty: 60 TABLET | Refills: 0 | Status: ON HOLD | OUTPATIENT
Start: 2023-10-13 | End: 2023-10-26

## 2023-10-12 RX ORDER — LEVOFLOXACIN 500 MG/1
500 TABLET, FILM COATED ORAL DAILY
Qty: 15 TABLET | Refills: 0 | Status: ON HOLD | OUTPATIENT
Start: 2023-10-12 | End: 2023-10-26

## 2023-10-12 RX ORDER — PROCHLORPERAZINE MALEATE 5 MG
5-10 TABLET ORAL EVERY 6 HOURS PRN
Qty: 30 TABLET | Refills: 1 | Status: SHIPPED | OUTPATIENT
Start: 2023-10-12 | End: 2023-11-02

## 2023-10-12 RX ORDER — ACYCLOVIR 200 MG/1
400 CAPSULE ORAL 2 TIMES DAILY
Qty: 60 CAPSULE | Refills: 0 | Status: ON HOLD | OUTPATIENT
Start: 2023-10-13 | End: 2023-10-26

## 2023-10-12 RX ADMIN — LEVOTHYROXINE SODIUM 75 MCG: 0.05 TABLET ORAL at 08:10

## 2023-10-12 RX ADMIN — Medication 5 ML: at 14:37

## 2023-10-12 RX ADMIN — VENLAFAXINE HYDROCHLORIDE 150 MG: 75 CAPSULE, EXTENDED RELEASE ORAL at 20:01

## 2023-10-12 RX ADMIN — ONDANSETRON 8 MG: 2 INJECTION INTRAMUSCULAR; INTRAVENOUS at 08:10

## 2023-10-12 RX ADMIN — BUPROPION HYDROCHLORIDE 150 MG: 150 TABLET, FILM COATED, EXTENDED RELEASE ORAL at 08:09

## 2023-10-12 RX ADMIN — ACETAMINOPHEN 650 MG: 325 TABLET, FILM COATED ORAL at 10:05

## 2023-10-12 RX ADMIN — PROCHLORPERAZINE MALEATE 5 MG: 5 TABLET ORAL at 08:09

## 2023-10-12 RX ADMIN — SODIUM CHLORIDE: 9 INJECTION, SOLUTION INTRAVENOUS at 09:22

## 2023-10-12 RX ADMIN — DIPHENHYDRAMINE HYDROCHLORIDE 25 MG: 25 CAPSULE ORAL at 10:04

## 2023-10-12 RX ADMIN — TRAMADOL HYDROCHLORIDE 100 MG: 50 TABLET, COATED ORAL at 17:59

## 2023-10-12 RX ADMIN — SODIUM CHLORIDE: 9 INJECTION, SOLUTION INTRAVENOUS at 04:14

## 2023-10-12 RX ADMIN — Medication 5 ML: at 23:31

## 2023-10-12 RX ADMIN — DEXAMETHASONE SODIUM PHOSPHATE 8 MG: 4 INJECTION, SOLUTION INTRA-ARTICULAR; INTRALESIONAL; INTRAMUSCULAR; INTRAVENOUS; SOFT TISSUE at 08:07

## 2023-10-12 RX ADMIN — TOPIRAMATE 50 MG: 50 TABLET, FILM COATED ORAL at 20:01

## 2023-10-12 RX ADMIN — SODIUM CHLORIDE: 9 INJECTION, SOLUTION INTRAVENOUS at 19:44

## 2023-10-12 RX ADMIN — LEVOFLOXACIN 500 MG: 250 TABLET, FILM COATED ORAL at 10:04

## 2023-10-12 RX ADMIN — PANTOPRAZOLE SODIUM 40 MG: 40 TABLET, DELAYED RELEASE ORAL at 08:10

## 2023-10-12 RX ADMIN — PROPRANOLOL HYDROCHLORIDE 20 MG: 10 TABLET ORAL at 08:10

## 2023-10-12 RX ADMIN — SODIUM CHLORIDE: 9 INJECTION, SOLUTION INTRAVENOUS at 14:14

## 2023-10-12 RX ADMIN — PROPRANOLOL HYDROCHLORIDE 20 MG: 10 TABLET ORAL at 20:01

## 2023-10-12 RX ADMIN — FLUCONAZOLE 400 MG: 200 TABLET ORAL at 08:10

## 2023-10-12 ASSESSMENT — ACTIVITIES OF DAILY LIVING (ADL)
ADLS_ACUITY_SCORE: 18

## 2023-10-12 NOTE — PROGRESS NOTES
[id number] E-CELERATE: Study Visit Note   Subject name: Corry Ward     Visit: D0    Did the study visit occur within the appropriate window allowed by the protocol? yes    Cell infusion of AB-205/placebo occurred within required window after end of autologous transplant.  Patient tolerated well.  Reviewed plan or care going forward; patient and her family verbalized understanding.    I have personally interviewed Corry Ward and reviewed her medical record for adverse events and concomitant medications and these have been recorded on the corresponding logs in Corry ASHANTI Ed's research file.     Corry Ward was given the opportunity to ask any trial related questions.  Please see provider progress note for physical exam and other clinical information. Labs were reviewed - any significant lab values were addressed and reviewed.    CORRY SHERMAN RN

## 2023-10-12 NOTE — PROGRESS NOTES
Type of transplant: Donor: Autologous  Product:   BMT INFUSION DOCUMENTATION (last 48 hours)       BMT/Cellular Product Infusion       Row Name 10/12/23 0700                [REMOVED] Product 10/12/23 0824 HPC, Apheresis    Product Details Product Release Date: 10/12/23  -CV Product Release Time: 0824 -CV Product Type: HPC, Apheresis  -CV DIN: [id number]  -CV Product Description Code: [id number]  -CV Volume Dispensed (mL): 80 mL  -CV Completion Date (RN to complete): 10/12/23  -FA Completion Time (RN to complete): 1107  -FA    Checked by (Patient RN) Nathaly Cook RN  -FA       Checked by (Witness) Diane Pritchett  -CV       Product Volume Infused (mL) 80 mL  -FA       Flush Volume (mL) 40 mL  -FA       Volume Dispensed (mL) --          [REMOVED] Product 10/12/23 0824 HPC, Apheresis    Product Details Product Release Date: 10/12/23  -CV Product Release Time: 0824 -CV Product Type: HPC, Apheresis  -CV DIN: [id number]  -CV Product Description Code: [id number]  -CV Volume Dispensed (mL): 80 mL  -CV Completion Date (RN to complete): 10/12/23  -FA Completion Time (RN to complete): 1130  -FA    Checked by (Patient RN) Nathaly Cook RN  -FA       Checked by (Witness) Diane Pritchett  -CV       Product Volume Infused (mL) 80 mL  -FA       Flush Volume (mL) 40 mL  -FA       Volume Dispensed (mL) --                 User Key  (r) = Recorded By, (t) = Taken By, (c) = Cosigned By      Initials Name Effective Dates    Diane Matthew 04/16/23 -     Nathaly Street RN 04/29/14 -                   Preparation: RN Documentation  Patient was premedicated as ordered: yes  Line Type: central line, left  Patient Stable Prior to Infusion: yes  Time Infusion Started: 1054  Teaching: side effects/monitoring  Tolerated/Reaction: Patient tolerance of product infusion  Immediate suspected transfusion reaction to the product: none  Did patient have prior history of similar signs/symptoms during this hospitalization?: no  Did the patient  tolerate the infusion well: yes  Flush until: 2330 tonight.   Plan: Continue with plan of care and notify MD for status changes.

## 2023-10-12 NOTE — PROGRESS NOTES
BMT/Cellular Autologous Product Infusion         Patient Vitals for the past 24 hrs:   Temp Temp src Pulse Resp BP   10/11/23 1107 97.5  F (36.4  C) Axillary 52 16 101/61   10/11/23 1532 96.8  F (36  C) Axillary 61 18 108/79   10/11/23 1933 98.6  F (37  C) Axillary 78 20 125/82   10/11/23 2328 97.6  F (36.4  C) Axillary 65 18 137/78   10/12/23 0404 97.7  F (36.5  C) Axillary 73 18 139/78   10/12/23 0715 97.8  F (36.6  C) Axillary 82 16 128/79      BMT INFUSION DOCUMENTATION (last 48 hours)       BMT/Cellular Product Infusion       Row Name                  Product 10/12/23 0824 HPC, Apheresis    Product Details Product Release Date: 10/12/23  -CV Product Release Time: 0824  -CV Product Type: HPC, Apheresis  -CV DIN: [id number]  -CV Product Description Code: [id number]  -CV Volume Dispensed (mL): 80 mL  -CV    Checked by (Patient RN) --       Checked by (Witness) --       Product Volume Infused (mL) --       Flush Volume (mL) --       Volume Dispensed (mL) --          Product 10/12/23 0824 HPC, Apheresis    Product Details Product Release Date: 10/12/23  -CV Product Release Time: 0824  -CV Product Type: HPC, Apheresis  -CV DIN: [id number]  -CV Product Description Code: [id number]  -CV Volume Dispensed (mL): 80 mL  -CV    Checked by (Patient RN) --       Checked by (Witness) --       Product Volume Infused (mL) --       Flush Volume (mL) --       Volume Dispensed (mL) --                 User Key  (r) = Recorded By, (t) = Taken By, (c) = Cosigned By      Initials Name Effective Dates    Diane Matthew 04/16/23 -                   Allogeneic Donor Eligibility Determination and Summary of Records: N/A - Autologous        Type of Infusion: Autologous      Baseline Pre-Infusion Evaluation (to be completed by Provider):   Dyspnea: Grade 0 - none  Hypoxia: Grade 0 - not present  Fever: Grade 0 - afebrile  Chills: Grade 0 - none  Febrile Neutropenia: Grade 0 - not present  Sinus Bradycardia: Grade 0 - none  Hypertension:  Grade 0 - none  Hypotension: Grade 0 - none  Chest Pain: Grade 0 - none  Bronchospasm: Grade 0 - none  Pain: Grade 0 - none  Rash: Grade 1 - Covering < 10% body surface area (BSA)  Neurologic Specify: none    If adverse reactions, events or complications occur (fever greater than 2 degrees fahrenheit increase, and severe reactions of the following types: chills, dyspnea, bronchospasm, hyper/hypotension, hypoxia, bradycardia, chest pain, back/flank pain, hypoxia, and any other reaction deemed severe or life threatening; any instance of product bag breakage or unusual product appearance)    Any other events that are >= grade 3, then immediately contact the BMT Attending physician, the Cell Therapy Laboratory Medical Director (pager [phone]) and the Cell Therapy Laboratory ([phone]).  After midnight, holidays & weekends contact the [facility] on the appropriate campus ([facility] [place]: [phone]; [facility] [place]: [phone]).    Helen Felix PA-C

## 2023-10-12 NOTE — PLAN OF CARE
"Day 0 auto for hodgkins lymphoma.  AVSS throughout shift on room air. A&Ox4, independent.  No reports of pain this shift. Nausea with movement noted early on, compazine given during prior shift, now resolved.  No PRNs given.  No blood products or electrolyte replacements needed after AM labs.  Transplant today at 1030 AM, 2 bags. Study drug 1 hour post transplant.  Will continue to monitor.    Problem: Plan of Care - These are the overarching goals to be used throughout the patient stay.    Goal: Plan of Care Review  Description: The Plan of Care Review/Shift note should be completed every shift.  The Outcome Evaluation is a brief statement about your assessment that the patient is improving, declining, or no change.  This information will be displayed automatically on your shift note.  Outcome: Progressing  Goal: Patient-Specific Goal (Individualized)  Description: You can add care plan individualizations to a care plan. Examples of Individualization might be:  \"Parent requests to be called daily at 9am for status\", \"I have a hard time hearing out of my right ear\", or \"Do not touch me to wake me up as it startles me\".  Outcome: Progressing  Goal: Absence of Hospital-Acquired Illness or Injury  Outcome: Progressing  Intervention: Identify and Manage Fall Risk  Recent Flowsheet Documentation  Taken 10/12/2023 0400 by Moon Kirk, RN  Safety Promotion/Fall Prevention:   assistive device/personal items within reach   clutter free environment maintained   nonskid shoes/slippers when out of bed   safety round/check completed  Taken 10/11/2023 2328 by Moon Kirk, RN  Safety Promotion/Fall Prevention:   assistive device/personal items within reach   clutter free environment maintained   nonskid shoes/slippers when out of bed   safety round/check completed  Intervention: Prevent Skin Injury  Recent Flowsheet Documentation  Taken 10/11/2023 2328 by Moon Kirk, RN  Body Position: position changed " independently  Intervention: Prevent Infection  Recent Flowsheet Documentation  Taken 10/12/2023 0400 by Moon Kirk, RN  Infection Prevention:   cohorting utilized   rest/sleep promoted  Taken 10/11/2023 2328 by Moon Kirk, RN  Infection Prevention:   cohorting utilized   rest/sleep promoted  Goal: Optimal Comfort and Wellbeing  Outcome: Progressing  Goal: Readiness for Transition of Care  Outcome: Progressing   Goal Outcome Evaluation:

## 2023-10-12 NOTE — TELEPHONE ENCOUNTER
----- Message from Merlyn Shah RN sent at 10/12/2023  8:41 AM CDT -----  Regarding: BAN Scheduling  Hey,    This patient is ready to schedule for BAN visits.    LT RNCC: Laurie  MD: Carlos Orellanax: Sedated  Weekly lab day preference:  unknown at this time    Other info: on e-celEfield study     Clonoseq: no    Restage per protocol. Orders are scanned into the media tab.  For allos only: please schedule with primary MD at D28, D60, D100, and D180.    Thanks!  Merlyn

## 2023-10-12 NOTE — PROGRESS NOTES
Blood and Marrow Transplant Discharge Teach    RNCC spoke with patient and caregivers to discuss upcoming discharge. Reviewed plan for line care supplies, PT/OT recommendations and upcoming clinic visits.      Patient demonstrates understanding of the following:  Which situations necessitate calling provider and whom to contact: Yes  Proper use and care of (medical equipment, care aids, etc.) Yes  Reviewed post autologous transplant guidelines and patient verbalizes understanding      Infection Control:  Patient instructed on hand hygiene: Yes  Signs and symptoms of infection taught: Yes    For CAR-T patient: Verified that patient has product specific wallet card. Patient instructed to remain within close proximity of facility (within 30-40 minutes per program standard) for at least four weeks post infusion; Must remain within 2 hours of facility until 8 weeks post-infusion. CAR-T patient is instructed not to operate motorized vehicle or heavy machinery for a period of 8 weeks.    Time spent with patient: 30 minutes.  Specific Concerns: n/a    Discharge location: Home    [ x ] Home Infusion Company: [facility]    [ x ] Pharmacy needs: none at this time    [ x ] PT/OT recommendations: home with assist    [ x ] 1st time discharge? yes    [ x ] Discharge teach    [ x ] PLC- done    [ x ] Weekly Lab Day Preference?    [ x ] D0 BAN scheduling notification    [ x ] clonoSEQ testing needed? no    Merlyn Shah  BMT Nurse Coordinator

## 2023-10-12 NOTE — PROGRESS NOTES
SPIRITUAL HEALTH SERVICES  [facility] ([place] 5C  REFERRAL SOURCE: Follow-up    SUMMARY OF VISIT  Provided pt with her bone marrow transplant blessing. Pt participated along with her Mom Marilee and her Aunt Candy. All were teary and filled with gratitude.      PLAN:  is available per pt/family/staff request.     Rev. Frieda Peterson MDiv, [facility]  Staff    Pager [phone]  * [facility] remains available 24/7 for emergent requests/referrals, either by having the switchboard page the on-call  or by entering an ASAP/STAT consult in Epic (this will also page the on-call ).*

## 2023-10-12 NOTE — PLAN OF CARE
Type of transplant: Donor: (P) Allogeneic - Unrelated  Product:   BMT INFUSION DOCUMENTATION (last 48 hours)       BMT/Cellular Product Infusion       Row Name 10/12/23 1200 10/12/23 0700             [REMOVED] Product 10/12/23 0824 HPC, Apheresis    Product Details Product Release Date: 10/12/23  -CV Product Release Time: 0824  -CV Product Type: HPC, Apheresis  -CV DIN: [id number]  -CV Product Description Code: [id number]  -CV Volume Dispensed (mL): 80 mL  -CV Completion Date (RN to complete): 10/12/23  -FA Completion Time (RN to complete): 1107  -FA    Checked by (Patient RN) -- Nathaly Cook RN  -FA      Checked by (Witness) -- Diane Yarely  -CV      Product Volume Infused (mL) -- 80 mL  -FA      Flush Volume (mL) -- 40 mL  -FA      Volume Dispensed (mL) -- --         [REMOVED] Product 10/12/23 0824 HPC, Apheresis    Product Details Product Release Date: 10/12/23  -CV Product Release Time: 0824 -CV Product Type: HPC, Apheresis  -CV DIN: [id number]  -CV Product Description Code: [id number]  -CV Volume Dispensed (mL): 80 mL  -CV Completion Date (RN to complete): 10/12/23  -FA Completion Time (RN to complete): 1130  -FA    Checked by (Patient RN) -- Nathaly Cook RN  -FA      Checked by (Witness) -- Diane Yarely  -CV      Product Volume Infused (mL) -- 80 mL  -FA      Flush Volume (mL) -- 40 mL  -FA      Volume Dispensed (mL) -- --         [REMOVED] Product 10/12/23 1245 Other (specify in comment)    Product Details Product Release Date: 10/12/23  -LL Product Release Time: 1245  -LL Product Type: Other (specify in comment)  -LL DIN: [id number]  -LL Product Description Code: [id number]  -LL Volume Dispensed (mL): 398.8 mL  -LL Completion Date (RN to complete): 10/12/23  -FA Completion Time (RN to complete): 1449  -FA    Checked by (Patient RN) Nathaly Cook RN  -FA --      Checked by (Witness) Jennifer Ulrich RN  -SHANON --      Product Volume Infused (mL) 398.8 mL  -FA --      Flush Volume (mL) 10 mL  -FA --      Volume  Dispensed (mL) -- --                User Key  (r) = Recorded By, (t) = Taken By, (c) = Cosigned By      Initials Name Effective Dates    CV Diane Pritchett 04/16/23 -     Nathaly Street RN 04/29/14 -     Jennifer Villafuerte RN 01/29/21 -     Consuelo Basurto 07/11/21 -                   Preparation: RN Documentation  Patient was premedicated as ordered: other (see comment)  Line Type: central line, left  Patient Stable Prior to Infusion: yes  Time Infusion Started: 1346  Teaching: side effects/monitoring  Tolerated/Reaction: Patient tolerance of product infusion  Immediate suspected transfusion reaction to the product: none  Did patient have prior history of similar signs/symptoms during this hospitalization?: no  Symptoms during/after infusion: hypertension  Did the patient tolerate the infusion well: yes  Medications and treatment for symptoms: No medications were ordered.  Did the symptoms resolve?: yes (improving when the pt sat up.)  Flush until: 2330.   Plan:Continue with plan of care and notify MD for status changes.

## 2023-10-12 NOTE — PLAN OF CARE
"BP (!) 152/91 (BP Location: Left arm)   Pulse 52   Temp 97.5  F (36.4  C) (Axillary)   Resp 18   Ht 1.64 m (5' 4.57\")   Wt 98.8 kg (217 lb 12.8 oz)   SpO2 100%   BMI 36.73 kg/m  Central line dressing is C/D/I and blue port is HL. Port cath is HL. Patient is A & O x 4. No c/o pain or nausea or emesis this shift. Patient is eating and drinking good. Patient received Auto cells, study drug as well and tolerated. Patient will do CHG wipes later tonight.. Continue to encourage patient to do good mouth cares, cough, deep breath and sit up while eating or drinking. Patient is walking on the hallway with her mother (caregiver) Continue with plan of care and notify MD for status changes.    Problem: Plan of Care - These are the overarching goals to be used throughout the patient stay.    Goal: Plan of Care Review  Description: The Plan of Care Review/Shift note should be completed every shift.  The Outcome Evaluation is a brief statement about your assessment that the patient is improving, declining, or no change.  This information will be displayed automatically on your shift note.  Outcome: Progressing  Flowsheets (Taken 10/12/2023 1510)  Plan of Care Reviewed With:   patient   caregiver  Overall Patient Progress: no change     Problem: Plan of Care - These are the overarching goals to be used throughout the patient stay.    Goal: Absence of Hospital-Acquired Illness or Injury  Intervention: Identify and Manage Fall Risk  Recent Flowsheet Documentation  Taken 10/12/2023 0803 by Nathaly Cook RN  Safety Promotion/Fall Prevention:   increased rounding and observation   clutter free environment maintained   increase visualization of patient   lighting adjusted   mobility aid in reach   nonskid shoes/slippers when out of bed   patient and family education     Problem: Plan of Care - These are the overarching goals to be used throughout the patient stay.    Goal: Absence of Hospital-Acquired Illness or " Injury  Intervention: Prevent Skin Injury  Recent Flowsheet Documentation  Taken 10/12/2023 1042 by Nathaly Cook RN  Body Position: position changed independently  Taken 10/12/2023 0803 by Nathaly Cook RN  Body Position: position changed independently     Problem: Plan of Care - These are the overarching goals to be used throughout the patient stay.    Goal: Absence of Hospital-Acquired Illness or Injury  Intervention: Prevent and Manage VTE (Venous Thromboembolism) Risk  Recent Flowsheet Documentation  Taken 10/12/2023 0803 by Nathaly Cook RN  VTE Prevention/Management: SCDs (sequential compression devices) off     Problem: Plan of Care - These are the overarching goals to be used throughout the patient stay.    Goal: Absence of Hospital-Acquired Illness or Injury  Intervention: Prevent Infection  Recent Flowsheet Documentation  Taken 10/12/2023 0803 by Nathaly Cook RN  Infection Prevention:   cohorting utilized   environmental surveillance performed   equipment surfaces disinfected   hand hygiene promoted   personal protective equipment utilized     Problem: Plan of Care - These are the overarching goals to be used throughout the patient stay.    Goal: Optimal Comfort and Wellbeing  Intervention: Provide Person-Centered Care  Recent Flowsheet Documentation  Taken 10/12/2023 0803 by Nathaly Cook RN  Trust Relationship/Rapport:   care explained   choices provided   emotional support provided   empathic listening provided   questions answered   questions encouraged   reassurance provided   thoughts/feelings acknowledged     Problem: Stem Cell/Bone Marrow Transplant  Goal: Diarrhea Symptom Control  Intervention: Manage Diarrhea  Recent Flowsheet Documentation  Taken 10/12/2023 0803 by Nathaly Cook RN  Perineal Care: perineal hygiene encouraged     Problem: Stem Cell/Bone Marrow Transplant  Goal: Improved Activity Tolerance  Intervention: Promote  Improved Energy  Recent Flowsheet Documentation  Taken 10/12/2023 1042 by Nathaly Cook RN  Activity Management:   activity adjusted per tolerance   activity encouraged  Taken 10/12/2023 0803 by Nathaly Cook RN  Activity Management:   activity adjusted per tolerance   activity encouraged     Problem: Stem Cell/Bone Marrow Transplant  Goal: Blood Counts Within Acceptable Range  Intervention: Monitor and Manage Hematologic Symptoms  Recent Flowsheet Documentation  Taken 10/12/2023 0803 by Nathaly Cook RN  Medication Review/Management: medications reviewed     Problem: Stem Cell/Bone Marrow Transplant  Goal: Absence of Infection  Intervention: Prevent and Manage Infection  Recent Flowsheet Documentation  Taken 10/12/2023 0803 by Nathaly Cook RN  Infection Prevention:   cohorting utilized   environmental surveillance performed   equipment surfaces disinfected   hand hygiene promoted   personal protective equipment utilized  Isolation Precautions: protective environment maintained     Problem: Stem Cell/Bone Marrow Transplant  Goal: Improved Oral Mucous Membrane Health and Integrity  Intervention: Promote Oral Comfort and Health  Recent Flowsheet Documentation  Taken 10/12/2023 0803 by Nathaly Cook RN  Oral Care:   lip/mouth moisturizer applied   mouth wash rinse   oral rinse provided   teeth brushed   tongue brushed     Problem: Stem Cell/Bone Marrow Transplant  Goal: Nausea and Vomiting Symptom Relief  Intervention: Prevent and Manage Nausea and Vomiting  Recent Flowsheet Documentation  Taken 10/12/2023 0803 by Nathaly Cook RN  Nausea/Vomiting Interventions:   antiemetic   sips of clear liquids given   slow deep breathing encouraged                                   Goal Outcome Evaluation:      Plan of Care Reviewed With: patient, caregiver    Overall Patient Progress: no changeOverall Patient Progress: no change

## 2023-10-12 NOTE — PROGRESS NOTES
"BMT/Cell Therapy Daily Progress Note   10/12/2023    Patient ID:  Jennifer Ward is a 48 year old female, currently day 0 of her therapy.    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 PET showed lymphoma limited to left cervical, supraclavicular, and axillary lymph nodes. BmBx negative.      Late relapse in May 2023 presenting with right neck swelling. Right cervical LN biopsy showed recurrent vs new primary cHL (nodular sclerosing subtype). PET with FDG-avid right level II and level V cervical lymph nodes only (SUVmax 13.2)     Date Treatment Name Response Side Effects / Toxicities   2008 to 2009 ABVD x 6 cycles followed by adjuvant radiation CR Fatigue, N/V   2023 to 2023 Brentuximab/nivolumab x 2 cycles, nivolumab alone x 2 cycles CR Brentuximab infusion reaction (difficulty breathing)        Admission date: 10/6/2023    INTERVAL  HISTORY   Ongoing facial flushing.  Headache last night that resolved with tramadol.  Nausea with movement, though was able to move around her room today without nausea.  No vomiting.  No diarrhea.  Stools are small and hard, but they are moving. Very fatigued.      Review of Systems: ROS negative except as noted above.    PHYSICAL EXAM     Weight In/Out     Wt Readings from Last 3 Encounters:   10/12/23 98.8 kg (217 lb 12.8 oz)   10/04/23 99.4 kg (219 lb 2.2 oz)   10/04/23 98.4 kg (217 lb)      I/O last 3 completed shifts:  In: 3960 [P.O.:2090; I.V.:1720; IV Piggyback:150]  Out: 4250 [Urine:4250]       KPS:  80  ECO    /79 (BP Location: Left arm)   Pulse 82   Temp 97.8  F (36.6  C) (Axillary)   Resp 16   Ht 1.64 m (5' 4.57\")   Wt 98.8 kg (217 lb 12.8 oz)   SpO2 96%   BMI 36.73 kg/m       General: NAD   Eyes: : ARMEN, sclera anicteric   Lungs: CTA bilaterally  Cardiovascular: RRR, no M/R/G "   Abdominal/Rectal: +BS, soft, NT, ND  Lymphatics: no edema  Skin: Flushed face  Neuro: A&O   Additional Findings: Louis site NT, no drainage.      LABS AND IMAGING: I have assessed all abnormal lab values for their clinical significance and any values considered clinically significant have been addressed in the assessment and plan.        Lab Results   Component Value Date    WBC 2.3 (L) 10/12/2023    ANEU 2.2 10/12/2023    HGB 10.1 (L) 10/12/2023    HCT 29.7 (L) 10/12/2023    PLT 64 (L) 10/12/2023     (L) 10/12/2023    POTASSIUM 4.5 10/12/2023    CHLORIDE 101 10/12/2023    CO2 21 (L) 10/12/2023     (H) 10/12/2023    BUN 16.8 10/12/2023    CR 0.76 10/12/2023    MAG 2.2 10/12/2023    INR 1.07 10/09/2023     Oral/GI regimen related toxicities (per NCI CTCAE v 5.0):    Oral Mucositis: none  Nausea: Grade 1  Vomiting: none  Diarrhea: none    *diarrhea secondary to C. Diff is excluded from the definition of oral/GI RRT      CTCAE Terms     Vomiting: A disorder characterized by the reflexive act of ejecting the contents of the stomach through the mouth   Grade 1 Intervention not indicated   Grade 2 Outpatient IV hydration; medical intervention indicated   Grade 3 Tube feeding, TPN, or hospitalization indicated   Grade 4 Life-threatening consequences   Grade 5 Death     Oral Mucositis: A disorder characterized by ulceration or inflammation of the oral mucosal.   Grade 1 Asymptomatic or mild symptoms; intervention not indicated   Grade 2 Moderate pain or ulcer that does not interfere with oral intake; modified diet indicated   Grade 3 Severe pain; interfering with oral intake   Grade 4 Life-threatening consequences; urgent intervention indicated   Grade 5 Death     Nausea: A disorder characterized by a queasy sensation and/or the urge to vomit.   Grade 1 Loss of appetite without alteration in eating habits  Grade 2 Oral intake decreased without significant weight loss, dehydration or malnutrition   Grade 3  Inadequate oral caloric or fluid intake; tube feeding, TPN, or hospitalization indicated   Grade 4 NA  Grade 5 NA    Diarrhea: A disorder characterized by an increase in frequency and/or loose or watery bowel movements.   Grade 1 Increase of <4 stools per day over baseline; mild increase in ostomy output compared to baseline   Grade 2 Increase of 4 - 6 stools per day over baseline; moderate increase in ostomy output compared to baseline; limiting instrumental ADL   Grade 3 Increase of >=7 stools per day over baseline; hospitalization indicated; severe increase in ostomy output compared to baseline; limiting self-care ADL    Grade 4 Life-threatening consequences; urgent intervention indicated    Grade 5 Death       Source: https://ctep.cancer.gov/protocoldevelopment/electronic_applications/docs/CTCAE_v5_Quick_Reference_8.5x11.pdf       SYSTEMS-BASED ASSESSMENT AND PLAN   Jennifer Ward is a 48 year old female, currently day 0 of her autologous hematopoietic cell transplant as rescue after BEAM therapy for Hodgkin Lymphoma. Co-enrolled on E-CELERATE study.      Prep: BEAM     BMT  - BMT doc/Coordinator: Nay  - MT2016-11 with co-enrollment on MT2022-40 (E-CELERATE)  - Cell dose 7 million CD34+ cells/kg   - Prep as above. Flush and pre-meds prior to transplant.  - GCSF starts day +5, continue until ANC > 500 for 3 consecutive days.   - Restaging: next restage at day +28  - Allopurinol started Day -6     HEME/COAG  - Transfusion parameters: hgb <7g/dL and plts <10,000  - Pancytopenia secondary to Hodgkin Lymphoma and chemotherapy     ID  Prophy: note that antimicrobial dosing is specific to protocol  Viral: ACV 400mg PO bid  Bacterial: levofloxacin 500 mg daily  Fungal: fluconazole 400mg daily  PCP: Bactrim or other PCP prophy to start at day +28     GI/NUTRITION  - Anti-emetics: zofran/dex prior to transplant to prevent chemotherapy induced n/v.  Ativan and compazine prn.  Single dose of emend 30 minutes prior to  "melphalan on last day of chemo prep.  - Ulcer prophy: Protonix 40mg daily.   - E-CELERATE vs placebo one hour after transplant 10/12.     #Transaminitis:  likely secondary to chemotherapy; recheck 10/12 shows resolution.      ENDOCRINE  # hypothyroidism: continue PTA synthroid     PSYCH  # depression: continue PTA bupropion, venlafaxine     NEURO  # migraines: daily topamax with prn naratriptan    FEN  # fluid overload: diuresing as needed; last lasix (20mg IV bid) on 10/10.   # high phill/high protein diet. Dietician to support as indicated to prevent malnutrition.  # transplant flush ordered for 10/12    SKIN  # Flushed face: possibly 2/2 to chemo; no further interventions indicated.      SUPPORTIVE CARE  - PT/OT to evaluate on admission and follow as indicated.   - BMT Social work to follow.      Dispo/Discharge: anticipate discharge on day +1.  Note that patient already has plenty of her PTA medications and will only need prescriptions for any new meds that we start this admission. She [place]([address]       Clinically Significant Risk Factors                  # Thrombocytopenia: Lowest platelets = 58 in last 2 days, will monitor for bleeding          # Obesity: Estimated body mass index is 36.73 kg/m  as calculated from the following:    Height as of this encounter: 1.64 m (5' 4.57\").    Weight as of this encounter: 98.8 kg (217 lb 12.8 oz).                 Known issues that I take into account for medical decisions, with salient changes to the plan considering these complexities noted above.    Patient Active Problem List   Diagnosis    Hodgkin's disease of lymph nodes of multiple sites (H)    Autologous donor of stem cells    Nodular sclerosis Hodgkin lymphoma of lymph nodes of multiple regions (H)       I spent 30 minutes in the care of this patient today, which included time necessary for preparation for the visit, obtaining history, ordering " medications/tests/procedures as medically indicated, review of pertinent medical literature, counseling of the patient, communication of recommendations to the care team, and documentation time.    Helen Felix PA-C    Securely message with the Vocera Web Console

## 2023-10-12 NOTE — PLAN OF CARE
VSS. Afebrile. Showered and CHG wipes completed. Reports nausea, compazine given with some relief, nausea continues to be present when pt is moving around. Tramadol given for headache, reports this is improving. Up independently. Plan for transplant tomorrow.     Problem: Plan of Care - These are the overarching goals to be used throughout the patient stay.    Goal: Plan of Care Review  Description: The Plan of Care Review/Shift note should be completed every shift.  The Outcome Evaluation is a brief statement about your assessment that the patient is improving, declining, or no change.  This information will be displayed automatically on your shift note.  Outcome: Not Progressing  Goal: Absence of Hospital-Acquired Illness or Injury  Outcome: Not Progressing  Intervention: Identify and Manage Fall Risk  Recent Flowsheet Documentation  Taken 10/11/2023 2100 by Argelia Ellsworth RN  Safety Promotion/Fall Prevention:   clutter free environment maintained   lighting adjusted   safety round/check completed   nonskid shoes/slippers when out of bed   patient and family education  Intervention: Prevent Skin Injury  Recent Flowsheet Documentation  Taken 10/11/2023 2100 by Argelia Ellsworth RN  Body Position: position changed independently  Intervention: Prevent Infection  Recent Flowsheet Documentation  Taken 10/11/2023 2100 by Argelia Ellsworth RN  Infection Prevention:   rest/sleep promoted   single patient room provided  Goal: Optimal Comfort and Wellbeing  Outcome: Not Progressing  Intervention: Monitor Pain and Promote Comfort  Recent Flowsheet Documentation  Taken 10/11/2023 2003 by Argelia Ellsworth, RN  Pain Management Interventions: medication (see MAR)  Intervention: Provide Person-Centered Care  Recent Flowsheet Documentation  Taken 10/11/2023 2100 by Argelia Ellsworth RN  Trust Relationship/Rapport:   care explained   choices provided  Goal: Readiness for Transition of Care  Outcome: Not Progressing     Problem: Stem Cell/Bone Marrow  Transplant  Goal: Symptom-Free Urinary Elimination  Outcome: Not Progressing  Intervention: Prevent or Manage Bladder Irritation  Recent Flowsheet Documentation  Taken 10/11/2023 2003 by Argelia Ellsworth RN  Pain Management Interventions: medication (see MAR)  Goal: Improved Activity Tolerance  Outcome: Not Progressing  Intervention: Promote Improved Energy  Recent Flowsheet Documentation  Taken 10/11/2023 2100 by Argelia Ellsworth RN  Activity Management: up ad layla  Goal: Blood Counts Within Acceptable Range  Outcome: Not Progressing  Intervention: Monitor and Manage Hematologic Symptoms  Recent Flowsheet Documentation  Taken 10/11/2023 2100 by Argelia Ellsworth RN  Medication Review/Management: medications reviewed  Goal: Absence of Hypersensitivity Reaction  Outcome: Not Progressing  Goal: Improved Oral Mucous Membrane Health and Integrity  Outcome: Not Progressing  Intervention: Promote Oral Comfort and Health  Recent Flowsheet Documentation  Taken 10/11/2023 2100 by Argelia Ellsworth RN  Oral Care: oral rinse provided  Goal: Nausea and Vomiting Symptom Relief  Outcome: Not Progressing  Intervention: Prevent and Manage Nausea and Vomiting  Recent Flowsheet Documentation  Taken 10/11/2023 2100 by Argelia Ellsworth RN  Nausea/Vomiting Interventions: antiemetic  Goal: Optimal Nutrition Intake  Outcome: Not Progressing     Problem: Stem Cell/Bone Marrow Transplant  Goal: Optimal Coping with Transplant  Outcome: Progressing  Goal: Diarrhea Symptom Control  Outcome: Progressing  Goal: Absence of Infection  Outcome: Progressing  Intervention: Prevent and Manage Infection  Recent Flowsheet Documentation  Taken 10/11/2023 2100 by Argelia Ellsworth RN  Infection Prevention:   rest/sleep promoted   single patient room provided  Isolation Precautions:   cytotoxic precautions maintained   protective environment maintained   Goal Outcome Evaluation:

## 2023-10-13 ENCOUNTER — CARE COORDINATION (OUTPATIENT)
Dept: TRANSPLANT | Facility: CLINIC | Age: 48
End: 2023-10-13
Payer: COMMERCIAL

## 2023-10-13 ENCOUNTER — HOSPITAL ENCOUNTER (OUTPATIENT)
Facility: AMBULATORY SURGERY CENTER | Age: 48
End: 2023-10-13
Attending: PHYSICIAN ASSISTANT
Payer: COMMERCIAL

## 2023-10-13 ENCOUNTER — APPOINTMENT (OUTPATIENT)
Dept: PHYSICAL THERAPY | Facility: CLINIC | Age: 48
End: 2023-10-13
Attending: STUDENT IN AN ORGANIZED HEALTH CARE EDUCATION/TRAINING PROGRAM
Payer: COMMERCIAL

## 2023-10-13 VITALS
WEIGHT: 222.8 LBS | DIASTOLIC BLOOD PRESSURE: 86 MMHG | OXYGEN SATURATION: 100 % | TEMPERATURE: 98.6 F | HEIGHT: 65 IN | RESPIRATION RATE: 16 BRPM | SYSTOLIC BLOOD PRESSURE: 140 MMHG | BODY MASS INDEX: 37.12 KG/M2 | HEART RATE: 76 BPM

## 2023-10-13 LAB
ANION GAP SERPL CALCULATED.3IONS-SCNC: 10 MMOL/L (ref 7–15)
BASO+EOS+MONOS # BLD AUTO: ABNORMAL 10*3/UL
BASO+EOS+MONOS NFR BLD AUTO: ABNORMAL %
BASOPHILS # BLD AUTO: ABNORMAL 10*3/UL
BASOPHILS # BLD MANUAL: 0 10E3/UL (ref 0–0.2)
BASOPHILS NFR BLD AUTO: ABNORMAL %
BASOPHILS NFR BLD MANUAL: 0 %
BUN SERPL-MCNC: 19.4 MG/DL (ref 6–20)
CALCIUM SERPL-MCNC: 8.3 MG/DL (ref 8.6–10)
CHLORIDE SERPL-SCNC: 105 MMOL/L (ref 98–107)
CREAT SERPL-MCNC: 0.7 MG/DL (ref 0.51–0.95)
DEPRECATED HCO3 PLAS-SCNC: 19 MMOL/L (ref 22–29)
EGFRCR SERPLBLD CKD-EPI 2021: >90 ML/MIN/1.73M2
EOSINOPHIL # BLD AUTO: ABNORMAL 10*3/UL
EOSINOPHIL # BLD MANUAL: 0 10E3/UL (ref 0–0.7)
EOSINOPHIL NFR BLD AUTO: ABNORMAL %
EOSINOPHIL NFR BLD MANUAL: 0 %
ERYTHROCYTE [DISTWIDTH] IN BLOOD BY AUTOMATED COUNT: 13.2 % (ref 10–15)
FRAGMENTS BLD QL SMEAR: SLIGHT
GLUCOSE SERPL-MCNC: 131 MG/DL (ref 70–99)
HCT VFR BLD AUTO: 23.9 % (ref 35–47)
HGB BLD-MCNC: 8 G/DL (ref 11.7–15.7)
IMM GRANULOCYTES # BLD: ABNORMAL 10*3/UL
IMM GRANULOCYTES NFR BLD: ABNORMAL %
LYMPHOCYTES # BLD AUTO: ABNORMAL 10*3/UL
LYMPHOCYTES # BLD MANUAL: 0.1 10E3/UL (ref 0.8–5.3)
LYMPHOCYTES NFR BLD AUTO: ABNORMAL %
LYMPHOCYTES NFR BLD MANUAL: 5 %
MAGNESIUM SERPL-MCNC: 2.1 MG/DL (ref 1.7–2.3)
MCH RBC QN AUTO: 27.8 PG (ref 26.5–33)
MCHC RBC AUTO-ENTMCNC: 33.5 G/DL (ref 31.5–36.5)
MCV RBC AUTO: 83 FL (ref 78–100)
MONOCYTES # BLD AUTO: ABNORMAL 10*3/UL
MONOCYTES # BLD MANUAL: 0 10E3/UL (ref 0–1.3)
MONOCYTES NFR BLD AUTO: ABNORMAL %
MONOCYTES NFR BLD MANUAL: 0 %
NEUTROPHILS # BLD AUTO: ABNORMAL 10*3/UL
NEUTROPHILS # BLD MANUAL: 1 10E3/UL (ref 1.6–8.3)
NEUTROPHILS NFR BLD AUTO: ABNORMAL %
NEUTROPHILS NFR BLD MANUAL: 95 %
NRBC # BLD AUTO: 0 10E3/UL
NRBC BLD AUTO-RTO: 0 /100
PHOSPHATE SERPL-MCNC: 2.4 MG/DL (ref 2.5–4.5)
PLAT MORPH BLD: ABNORMAL
PLATELET # BLD AUTO: 36 10E3/UL (ref 150–450)
POTASSIUM SERPL-SCNC: 4.4 MMOL/L (ref 3.4–5.3)
RBC # BLD AUTO: 2.88 10E6/UL (ref 3.8–5.2)
RBC MORPH BLD: ABNORMAL
RETICS # AUTO: 0.01 10E6/UL (ref 0.03–0.1)
RETICS/RBC NFR AUTO: 0.4 % (ref 0.5–2)
SODIUM SERPL-SCNC: 134 MMOL/L (ref 135–145)
WBC # BLD AUTO: 1 10E3/UL (ref 4–11)

## 2023-10-13 PROCEDURE — 250N000013 HC RX MED GY IP 250 OP 250 PS 637: Performed by: STUDENT IN AN ORGANIZED HEALTH CARE EDUCATION/TRAINING PROGRAM

## 2023-10-13 PROCEDURE — 258N000003 HC RX IP 258 OP 636: Performed by: STUDENT IN AN ORGANIZED HEALTH CARE EDUCATION/TRAINING PROGRAM

## 2023-10-13 PROCEDURE — 85027 COMPLETE CBC AUTOMATED: CPT | Performed by: STUDENT IN AN ORGANIZED HEALTH CARE EDUCATION/TRAINING PROGRAM

## 2023-10-13 PROCEDURE — 85045 AUTOMATED RETICULOCYTE COUNT: CPT | Performed by: STUDENT IN AN ORGANIZED HEALTH CARE EDUCATION/TRAINING PROGRAM

## 2023-10-13 PROCEDURE — 85007 BL SMEAR W/DIFF WBC COUNT: CPT | Performed by: STUDENT IN AN ORGANIZED HEALTH CARE EDUCATION/TRAINING PROGRAM

## 2023-10-13 PROCEDURE — 250N000011 HC RX IP 250 OP 636: Mod: JZ | Performed by: PHYSICIAN ASSISTANT

## 2023-10-13 PROCEDURE — 250N000009 HC RX 250: Performed by: STUDENT IN AN ORGANIZED HEALTH CARE EDUCATION/TRAINING PROGRAM

## 2023-10-13 PROCEDURE — 97530 THERAPEUTIC ACTIVITIES: CPT | Mod: GP

## 2023-10-13 PROCEDURE — 80048 BASIC METABOLIC PNL TOTAL CA: CPT | Performed by: PHYSICIAN ASSISTANT

## 2023-10-13 PROCEDURE — 250N000013 HC RX MED GY IP 250 OP 250 PS 637: Performed by: PHYSICIAN ASSISTANT

## 2023-10-13 PROCEDURE — 99239 HOSP IP/OBS DSCHRG MGMT >30: CPT | Mod: FS | Performed by: PHYSICIAN ASSISTANT

## 2023-10-13 PROCEDURE — 83735 ASSAY OF MAGNESIUM: CPT | Performed by: PHYSICIAN ASSISTANT

## 2023-10-13 PROCEDURE — 84100 ASSAY OF PHOSPHORUS: CPT | Performed by: STUDENT IN AN ORGANIZED HEALTH CARE EDUCATION/TRAINING PROGRAM

## 2023-10-13 RX ORDER — HEPARIN SODIUM,PORCINE 10 UNIT/ML
5-20 VIAL (ML) INTRAVENOUS DAILY PRN
Status: CANCELLED | OUTPATIENT
Start: 2023-10-16

## 2023-10-13 RX ORDER — HEPARIN SODIUM (PORCINE) LOCK FLUSH IV SOLN 100 UNIT/ML 100 UNIT/ML
5 SOLUTION INTRAVENOUS
Status: CANCELLED | OUTPATIENT
Start: 2023-10-16

## 2023-10-13 RX ADMIN — LEVOFLOXACIN 500 MG: 250 TABLET, FILM COATED ORAL at 09:54

## 2023-10-13 RX ADMIN — Medication 5 ML: at 09:48

## 2023-10-13 RX ADMIN — Medication 5 ML: at 04:07

## 2023-10-13 RX ADMIN — SODIUM PHOSPHATE, MONOBASIC, MONOHYDRATE AND SODIUM PHOSPHATE, DIBASIC, ANHYDROUS 15 MMOL: 142; 276 INJECTION, SOLUTION INTRAVENOUS at 05:28

## 2023-10-13 RX ADMIN — PROPRANOLOL HYDROCHLORIDE 20 MG: 10 TABLET ORAL at 08:20

## 2023-10-13 RX ADMIN — LEVOTHYROXINE SODIUM 75 MCG: 0.05 TABLET ORAL at 08:20

## 2023-10-13 RX ADMIN — FLUCONAZOLE 400 MG: 200 TABLET ORAL at 08:19

## 2023-10-13 RX ADMIN — ACYCLOVIR 400 MG: 200 CAPSULE ORAL at 08:20

## 2023-10-13 RX ADMIN — PANTOPRAZOLE SODIUM 40 MG: 40 TABLET, DELAYED RELEASE ORAL at 08:20

## 2023-10-13 RX ADMIN — BUPROPION HYDROCHLORIDE 150 MG: 150 TABLET, FILM COATED, EXTENDED RELEASE ORAL at 08:19

## 2023-10-13 ASSESSMENT — ACTIVITIES OF DAILY LIVING (ADL)
ADLS_ACUITY_SCORE: 18

## 2023-10-13 NOTE — PROGRESS NOTES
BMT Post Infusion Documentation    Data   Patient Vitals for the past 72 hrs:   Temp Temp src Pulse Resp BP   10/10/23 1607 96.9  F (36.1  C) Axillary 54 16 127/86   10/10/23 1935 97.9  F (36.6  C) Axillary 59 16 131/81   10/10/23 1941 -- -- 60 -- --   10/10/23 2331 97.6  F (36.4  C) Axillary 57 16 133/81   10/11/23 0334 98.9  F (37.2  C) Axillary 67 16 121/74   10/11/23 0750 97.6  F (36.4  C) Axillary 68 18 126/86   10/11/23 1107 97.5  F (36.4  C) Axillary 52 16 101/61   10/11/23 1532 96.8  F (36  C) Axillary 61 18 108/79   10/11/23 1933 98.6  F (37  C) Axillary 78 20 125/82   10/11/23 2328 97.6  F (36.4  C) Axillary 65 18 137/78   10/12/23 0404 97.7  F (36.5  C) Axillary 73 18 139/78   10/12/23 0715 97.8  F (36.6  C) Axillary 82 16 128/79   10/12/23 1042 97.7  F (36.5  C) Axillary 82 18 128/79   10/12/23 1054 97.8  F (36.6  C) Axillary 66 16 124/73   10/12/23 1101 97.7  F (36.5  C) Axillary 68 16 124/77   10/12/23 1107 97.3  F (36.3  C) Axillary 69 18 123/75   10/12/23 1112 97.3  F (36.3  C) Axillary 60 18 122/82   10/12/23 1120 97.7  F (36.5  C) Axillary 68 18 131/79   10/12/23 1130 98  F (36.7  C) Axillary 66 18 126/74   10/12/23 1137 97.5  F (36.4  C) Axillary 62 18 --   10/12/23 1158 97.5  F (36.4  C) Axillary 59 16 (!) 141/82   10/12/23 1344 97.6  F (36.4  C) Axillary -- -- --   10/12/23 1346 97.6  F (36.4  C) Axillary 55 18 (!) 143/89   10/12/23 1404 97.6  F (36.4  C) Axillary -- 18 --   10/12/23 1416 97.5  F (36.4  C) Axillary 53 16 (!) 152/91   10/12/23 1431 97.5  F (36.4  C) Axillary 62 18 (!) 154/89   10/12/23 1449 97.5  F (36.4  C) Axillary 52 18 (!) 152/91   10/12/23 1520 97.5  F (36.4  C) Axillary -- 16 --   10/12/23 1600 -- -- -- -- (!) 140/82   10/12/23 2001 98.8  F (37.1  C) Oral 72 16 (!) 144/92   10/12/23 2332 97.9  F (36.6  C) Oral 69 16 139/87   10/13/23 0400 97.9  F (36.6  C) Oral 74 16 135/83   10/13/23 0800 98.6  F (37  C) Oral 76 16 (!) 140/86     BMT INFUSION DOCUMENTATION (last 24 hours)        BMT/Cellular Product Infusion       Row Name                  Cell Therapy Documentation    Product Release Date --       Recipient Study ID --       Donor --       Donor MRN/ID --       Donor ABO/Rh --       Allogeneic Donor Eligibility Determination and Summary of Records --       Type of Infusion --       Total Volume Dispensed (mL) --       Total NC Dose --       Total CD34 Dose --       Total CD3 dose --       Total NC Dose Left in Storage --       Comments for Product Issues --       Donor ABO/Rh --       Product Types --       Product Numbers --       Product Types and Numbers --       Volume --       ABO Mismatch --       ZZTotal NC Dose --       ZZTotal CD34 Dose --       ZZTotal NC Dose Left in Storage --          [REMOVED] Product 10/12/23 1245 Other (specify in comment)    Product Details Product Release Date: 10/12/23  - Product Release Time: 1245  -LL Product Type: Other (specify in comment)  - DIN: [id number]  - Product Description Code: [id number]  - Volume Dispensed (mL): 398.8 mL  -LL Completion Date (RN to complete): 10/12/23  -FA Completion Time (RN to complete): 1449  -FA    Checked by (Patient RN) --       Checked by (Witness) --       Product Volume Infused (mL) --       Flush Volume (mL) --       Volume Dispensed (mL) --          RN Documentation    Patient was premedicated as ordered --       Line Type --       Patient Stable Prior to Infusion --       Time Infusion Started --       Checked by (Patient RN) --       Checked by (RN 2) --       Broken Bag? --       Immediate suspected transfusion reaction to the product --       Time Infusion Stopped --       Total Flush Volume (mL) --       Checked by (Witness) --       Date Infusion Started --       Date Infusion Stopped --       Volume Infused (mL) --       Total Volume Infused (cc) --          Patient tolerance of product infusion    Immediate suspected transfusion reaction to the product --       Did patient have prior  history of similar signs/symptoms during this hospitalization? --       Symptoms during/after infusion --       Did the patient tolerate the infusion well --       Medications and treatment for symptoms --       Did the symptoms resolve? --       Enter comments if clots, leaks, broken bag, infusion delays, other issues with bag/infusion --       Describe symptoms --                 User Key  (r) = Recorded By, (t) = Taken By, (c) = Cosigned By      Initials Name Effective Dates    FA Nathaly Cook RN 04/29/14 -     Consuelo Basurto 07/11/21 -                       Post-Infusion Evaluation:   Infusion Related Reaction: Grade 0 - none  Dyspnea: Grade 0 - none  Hypoxia: Grade 0 - not present  Fever: Grade 0 - afebrile  Chills: Grade 0 - none  Febrile Neutropenia: Grade 0 - not present  Sinus Bradycardia: Grade 0 - none  Hypertension: Grade 2 - stage 1 hypertension (systolic -159 mm Hg or diastolic BP 90-99 mm Hg); medical intervention indicated; recurrent or persistent (>/ 24 hours); symptomatic increase by >/ 20 mm Hg (diastolic) or to > 140/90 mm Hg if previously WNL; monotherapy indicated  Hypotension: Grade 0 - none  Chest Pain: Grade 0 - none  Bronchospasm: Grade 0 - none  Pain: Grade 0 - none  Rash: Grade 0 - None  Neurologic Specify: none    If this was a cord blood transplant, was more than one cord blood unit infused? no    Helen Felix PA-C

## 2023-10-13 NOTE — PLAN OF CARE
"Day +1 auto for hodgkins lymphoma.  AVSS throughout shift on room air. A&Ox4, independent.  No pain or nausea reported.  No PRNs given.  No blood products needed after AM labs. Phosphorus replaced.  Discharge today.  Will continue to monitor.  Problem: Plan of Care - These are the overarching goals to be used throughout the patient stay.    Goal: Plan of Care Review  Description: The Plan of Care Review/Shift note should be completed every shift.  The Outcome Evaluation is a brief statement about your assessment that the patient is improving, declining, or no change.  This information will be displayed automatically on your shift note.  Outcome: Progressing  Goal: Patient-Specific Goal (Individualized)  Description: You can add care plan individualizations to a care plan. Examples of Individualization might be:  \"Parent requests to be called daily at 9am for status\", \"I have a hard time hearing out of my right ear\", or \"Do not touch me to wake me up as it startles me\".  Outcome: Progressing  Goal: Absence of Hospital-Acquired Illness or Injury  Outcome: Progressing  Intervention: Identify and Manage Fall Risk  Recent Flowsheet Documentation  Taken 10/13/2023 0400 by Moon Kirk RN  Safety Promotion/Fall Prevention:   assistive device/personal items within reach   clutter free environment maintained   nonskid shoes/slippers when out of bed   safety round/check completed  Taken 10/12/2023 2332 by Moon Kirk RN  Safety Promotion/Fall Prevention:   assistive device/personal items within reach   clutter free environment maintained   nonskid shoes/slippers when out of bed   safety round/check completed  Intervention: Prevent Skin Injury  Recent Flowsheet Documentation  Taken 10/12/2023 2332 by Moon Kirk RN  Body Position: position changed independently  Intervention: Prevent and Manage VTE (Venous Thromboembolism) Risk  Recent Flowsheet Documentation  Taken 10/12/2023 2332 by Moon Kirk RN  VTE " Prevention/Management: SCDs (sequential compression devices) off  Intervention: Prevent Infection  Recent Flowsheet Documentation  Taken 10/13/2023 0400 by Moon Kirk, RN  Infection Prevention:   cohorting utilized   rest/sleep promoted  Taken 10/12/2023 2332 by Moon Kirk, RN  Infection Prevention:   cohorting utilized   rest/sleep promoted  Goal: Optimal Comfort and Wellbeing  Outcome: Progressing  Goal: Readiness for Transition of Care  Outcome: Progressing   Goal Outcome Evaluation:

## 2023-10-13 NOTE — PLAN OF CARE
"BP (!) 140/86 (BP Location: Left arm)   Pulse 76   Temp 98.6  F (37  C) (Oral)   Resp 16   Ht 1.64 m (5' 4.57\")   Wt 101.1 kg (222 lb 12.8 oz)   SpO2 100%   BMI 37.57 kg/m  . Central line dressing is C/D/I and HL. Patient is A & O x 4. No c/o pain or nausea or emesis this shift. Patient is eating and drinking good. Patient and her  at the bedside, discharge teachings with medications and follow up appointment were explained to the both, verbalized understanding. Patient stated he will fill her own pill box when she gets home due to most of her medications were at home. Continue with plan of care and notify MD for status changes.     Problem: Plan of Care - These are the overarching goals to be used throughout the patient stay.    Goal: Plan of Care Review  Description: The Plan of Care Review/Shift note should be completed every shift.  The Outcome Evaluation is a brief statement about your assessment that the patient is improving, declining, or no change.  This information will be displayed automatically on your shift note.  Outcome: Met  Flowsheets (Taken 10/13/2023 1136)  Plan of Care Reviewed With:   patient   spouse   caregiver  Overall Patient Progress: improving     Problem: Plan of Care - These are the overarching goals to be used throughout the patient stay.    Goal: Absence of Hospital-Acquired Illness or Injury  Intervention: Identify and Manage Fall Risk  Recent Flowsheet Documentation  Taken 10/13/2023 0818 by Nathaly Cook RN  Safety Promotion/Fall Prevention:   increased rounding and observation   clutter free environment maintained   increase visualization of patient   lighting adjusted   check orthostatic blood pressure   nonskid shoes/slippers when out of bed   mobility aid in reach   patient and family education     Problem: Plan of Care - These are the overarching goals to be used throughout the patient stay.    Goal: Absence of Hospital-Acquired Illness or " Injury  Intervention: Prevent Skin Injury  Recent Flowsheet Documentation  Taken 10/13/2023 0818 by Nathaly Cook RN  Body Position: position changed independently     Problem: Plan of Care - These are the overarching goals to be used throughout the patient stay.    Goal: Absence of Hospital-Acquired Illness or Injury  Intervention: Prevent and Manage VTE (Venous Thromboembolism) Risk  Recent Flowsheet Documentation  Taken 10/13/2023 0818 by Nathaly Cook RN  VTE Prevention/Management: SCDs (sequential compression devices) off     Problem: Plan of Care - These are the overarching goals to be used throughout the patient stay.    Goal: Absence of Hospital-Acquired Illness or Injury  Intervention: Prevent Infection  Recent Flowsheet Documentation  Taken 10/13/2023 0818 by Nathaly Cook RN  Infection Prevention:   cohorting utilized   environmental surveillance performed   equipment surfaces disinfected   hand hygiene promoted   personal protective equipment utilized     Problem: Plan of Care - These are the overarching goals to be used throughout the patient stay.    Goal: Optimal Comfort and Wellbeing  Intervention: Provide Person-Centered Care  Recent Flowsheet Documentation  Taken 10/13/2023 0818 by Nathaly Cook RN  Trust Relationship/Rapport:   care explained   choices provided   emotional support provided   empathic listening provided   questions answered   questions encouraged   reassurance provided   thoughts/feelings acknowledged     Problem: Stem Cell/Bone Marrow Transplant  Goal: Diarrhea Symptom Control  Intervention: Manage Diarrhea  Recent Flowsheet Documentation  Taken 10/13/2023 0818 by Nathaly Cook RN  Perineal Care: perineal hygiene encouraged     Problem: Stem Cell/Bone Marrow Transplant  Goal: Improved Activity Tolerance  Intervention: Promote Improved Energy  Recent Flowsheet Documentation  Taken 10/13/2023 0818 by Nathaly Cook  RN  Activity Management:   activity adjusted per tolerance   activity encouraged     Problem: Stem Cell/Bone Marrow Transplant  Goal: Absence of Infection  Intervention: Prevent and Manage Infection  Recent Flowsheet Documentation  Taken 10/13/2023 0818 by Nathaly Cook RN  Infection Prevention:   cohorting utilized   environmental surveillance performed   equipment surfaces disinfected   hand hygiene promoted   personal protective equipment utilized  Isolation Precautions: protective environment maintained     Problem: Stem Cell/Bone Marrow Transplant  Goal: Improved Oral Mucous Membrane Health and Integrity  Intervention: Promote Oral Comfort and Health  Recent Flowsheet Documentation  Taken 10/13/2023 0818 by Nathaly Cook RN  Oral Care:   lip/mouth moisturizer applied   mouth wash rinse   oral rinse provided   teeth brushed   tongue brushed  Goal Outcome Evaluation:      Plan of Care Reviewed With: patient, spouse, caregiver    Overall Patient Progress: improvingOverall Patient Progress: improving

## 2023-10-13 NOTE — PROGRESS NOTES
Pt discharged to: Home.  Via: Car.   Time:11:00 am.   Reason not before 11am: None.   Accompanied by: -caregiver.   Belongings: With the patient and .  Teaching: Done by RN and patient with her  verbalized understanding of the teaching. Patient her  were happy going home today.   Clinic appointment:Tomorrow at 10:30 am.   Report called/faxed: None.   Local housing: Near by.

## 2023-10-13 NOTE — DISCHARGE SUMMARY
[facility] Discharge Summary   Jennifer Ward MRN# [id number]   Age: 48 year old  YOB: 1975   Date of Admission: 10/6/2023  Date of Discharge:  10/13/2023   Admitting Physician: Tammy Gonzalez MD  Discharge Physician:  Tammy Gonzalez MD  Discharge Diagnoses:    Hodgkin Lymphoma, s/p BEAM chemotherapy followed by autologous peripheral blood stem cell transplant.  Pancytopenia secondary to chemotherapy and Hodgkin Lymphoma  Chemotherapy induced nausea  Transient transaminitis  Hypothyroidism  Depression  Migraines  Fluid volume overload  Facial flushing  Hyponatremia secondary to fluid shifts  Hypocalcemia secondary to fluid shifts  Hypophosphatemia secondary to fluid shifts  Obesity: BMI of 37.57; not appropriate to address at this time during transplant course  Discharge Medications:         Medication List        Started      acyclovir 200 MG capsule  Commonly known as: ZOVIRAX  400 mg, Oral, 2 TIMES DAILY     fluconazole 200 MG tablet  Commonly known as: DIFLUCAN  400 mg, Oral, DAILY     levofloxacin 500 MG tablet  Commonly known as: LEVAQUIN  500 mg, Oral, DAILY     LORazepam 0.5 MG tablet  Commonly known as: ATIVAN  0.5-1 mg, Oral, EVERY 4 HOURS PRN     pantoprazole 40 MG EC tablet  Commonly known as: PROTONIX  40 mg, Oral, DAILY     prochlorperazine 5 MG tablet  Commonly known as: COMPAZINE  5-10 mg, Oral, EVERY 6 HOURS PRN            Brief History of Illness:    Patient ID:  Jennifer Ward is a 48 year old female, currently being admited for an autologous hematopoietic cell transplant as rescue after BEAM therapy for Hodgkin Lymphoma. Co-enrolled on eMagin study.   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 PET showed lymphoma limited to left cervical, supraclavicular, and axillary lymph nodes. BmBx negative.      Late relapse in  "May 2023 presenting with right neck swelling. Right cervical LN biopsy showed recurrent vs new primary cHL (nodular sclerosing subtype). PET with FDG-avid right level II and level V cervical lymph nodes only (SUVmax 13.2)     Date Treatment Name Response Side Effects / Toxicities   12/2008 to 5/2009 ABVD x 6 cycles followed by adjuvant radiation CR Fatigue, N/V   6/2023 to 8/2023 Brentuximab/nivolumab x 2 cycles, nivolumab alone x 2 cycles CR Brentuximab infusion reaction (difficulty breathing)         Physical Exam:    BP (!) 140/86 (BP Location: Left arm)   Pulse 76   Temp 98.6  F (37  C) (Oral)   Resp 16   Ht 1.64 m (5' 4.57\")   Wt 101.1 kg (222 lb 12.8 oz)   SpO2 100%   BMI 37.57 kg/m    # Discharge Pain Plan:    - Patient currently has NO PAIN and is not being prescribed pain medications on discharge.    General: NAD   Eyes: : ARMEN, sclera anicteric   Lungs: CTA bilaterally  Cardiovascular: RRR, no M/R/G   Abdominal/Rectal: +BS, soft, NT, ND  Lymphatics: no edema  Skin: Flushed face  Neuro: A&O   Additional Findings: Louis site NT, no drainage.     Lab Values     Lab Results   Component Value Date    WBC 1.0 (L) 10/13/2023    ANEU 1.0 (L) 10/13/2023    HGB 8.0 (L) 10/13/2023    HCT 23.9 (L) 10/13/2023    PLT 36 (LL) 10/13/2023     (L) 10/13/2023    POTASSIUM 4.4 10/13/2023    CHLORIDE 105 10/13/2023    CO2 19 (L) 10/13/2023     (H) 10/13/2023    BUN 19.4 10/13/2023    CR 0.70 10/13/2023    MAG 2.1 10/13/2023    INR 1.07 10/09/2023    BILITOTAL 0.9 10/12/2023    AST 19 10/12/2023    ALT 43 10/12/2023    ALKPHOS 83 10/12/2023    PROTTOTAL 7.2 10/12/2023    ALBUMIN 4.4 10/12/2023       I have assessed all abnormal lab values for their clinical significance and any values considered clinically significant have been addressed in the assessment and plan.     Hospital Course:    Jennifer BURRELL Custer is a 48 year old female, currently day day +1 of her autologous hematopoietic cell transplant as rescue " after BEAM therapy for Hodgkin Lymphoma. Co-enrolled on E-CELERATE study.   Prep: BEAM  BMT  - BMT doc/Coordinator: Nay  - MT2016-11 with co-enrollment on MT2022-40 (E-CELERATE)  - Cell dose 7 million CD34+ cells/kg; all cell infused.   - GCSF starts day +5 (10/17), continue until ANC > 500 for 3 consecutive days.   - Restaging: next restage at day +28  HEME/COAG  - Transfusion parameters: hgb <7g/dL and plts <10,000  - Pancytopenia secondary to Hodgkin Lymphoma and chemotherapy  ID  Prophy: note that antimicrobial dosing is specific to protocol  Viral: ACV 400mg PO bid  Bacterial: levofloxacin 500 mg daily  Fungal: fluconazole 400mg daily  PCP: Bactrim or other PCP prophy to start at day +28  GI/NUTRITION  - Anti-emetics: zofran/dex prior to transplant to prevent chemotherapy induced n/v.  Ativan and compazine prn.  Single dose of emend 30 minutes prior to melphalan on last day of chemo prep.  - Ulcer prophy: Protonix 40mg daily.   #Transaminitis:  likely secondary to chemotherapy; recheck 10/12 shows resolution.   ENDOCRINE  # hypothyroidism: continue PTA synthroid  PSYCH  # depression: continue PTA bupropion, venlafaxine  NEURO  # migraines: daily topamax with prn naratriptan  FEN  # fluid overload: diuresing as needed  # high phill/high protein diet. Dietician to support as indicated to prevent malnutrition.  # elyte disregulation secondary to fluid shifts (hypocalcemia, hypophosphatemia, hyponatremia)  SKIN  # Flushed face: possibly 2/2 to chemo; no further interventions indicated.    SUPPORTIVE CARE  - PT/OT to evaluate on admission and follow as indicated.   - BMT Social work to follow.    Dispo/Discharge: discharge 10/13.  Note that patient already has plenty of her PTA medications and will only need prescriptions for any new meds that we start this admission. She will be staying with her caregiver(s) at [address].   She will have her line flushed in the clinic over the weekend and will be seen by  a provider and the BMT pharmacist on 10/16/2023.   CODE STATUS: FULL   Discharge Instructions and Follow-Up:    Discharge diet: Regular diet as tolerated  Discharge activity: Activity as tolerated   Discharge follow-up: Follow up with BMT Clinic as scheduled.     Discharge Disposition:    Discharged to [facility] [address] [facility].    Helen Felix PA-C  10/13/2023

## 2023-10-13 NOTE — PLAN OF CARE
Goal Outcome Evaluation:       No complaints of nausea. Reports headache resolved with po tramadol times one. Frequent vitals done for research, all stable with hypertension not within notifying parameters. Good appetite. Reports some fatigue. Ambulating in room independently. Post hydration IVF for 12 hours should complete at 2330.           Problem: Plan of Care - These are the overarching goals to be used throughout the patient stay.    Goal: Absence of Hospital-Acquired Illness or Injury  Outcome: Progressing  Intervention: Identify and Manage Fall Risk  Recent Flowsheet Documentation  Taken 10/12/2023 1545 by Tasia Braden RN  Safety Promotion/Fall Prevention:   clutter free environment maintained   nonskid shoes/slippers when out of bed   patient and family education   assistive device/personal items within reach   safety round/check completed   room organization consistent  Intervention: Prevent Skin Injury  Recent Flowsheet Documentation  Taken 10/12/2023 1545 by Tasia Braden RN  Body Position: position changed independently  Intervention: Prevent and Manage VTE (Venous Thromboembolism) Risk  Recent Flowsheet Documentation  Taken 10/12/2023 1545 by Tasia Braden RN  VTE Prevention/Management: SCDs (sequential compression devices) off  Intervention: Prevent Infection  Recent Flowsheet Documentation  Taken 10/12/2023 1545 by Tasia Braden RN  Infection Prevention:   environmental surveillance performed   hand hygiene promoted   personal protective equipment utilized   single patient room provided  Goal: Optimal Comfort and Wellbeing  Outcome: Progressing  Intervention: Monitor Pain and Promote Comfort  Recent Flowsheet Documentation  Taken 10/12/2023 1759 by Tasia Braden RN  Pain Management Interventions: medication (see MAR)  Intervention: Provide Person-Centered Care  Recent Flowsheet Documentation  Taken 10/12/2023 1545 by Tasia Braden RN  Trust Relationship/Rapport:   care explained   choices  provided   empathic listening provided   questions encouraged   reassurance provided     Problem: Stem Cell/Bone Marrow Transplant  Goal: Optimal Coping with Transplant  Outcome: Progressing  Goal: Symptom-Free Urinary Elimination  Outcome: Progressing  Intervention: Prevent or Manage Bladder Irritation  Recent Flowsheet Documentation  Taken 10/12/2023 1759 by Tasia Braden, RN  Pain Management Interventions: medication (see MAR)  Goal: Improved Activity Tolerance  Outcome: Progressing  Intervention: Promote Improved Energy  Recent Flowsheet Documentation  Taken 10/12/2023 1545 by Tasia Braden, RN  Activity Management:   activity adjusted per tolerance   activity encouraged  Goal: Absence of Hypersensitivity Reaction  Outcome: Progressing  Goal: Absence of Infection  Outcome: Progressing  Intervention: Prevent and Manage Infection  Recent Flowsheet Documentation  Taken 10/12/2023 1545 by Tasia Braden, RN  Infection Prevention:   environmental surveillance performed   hand hygiene promoted   personal protective equipment utilized   single patient room provided  Isolation Precautions: protective environment maintained

## 2023-10-14 ENCOUNTER — MEDICAL CORRESPONDENCE (OUTPATIENT)
Dept: HEALTH INFORMATION MANAGEMENT | Facility: CLINIC | Age: 48
End: 2023-10-14
Payer: COMMERCIAL

## 2023-10-14 ENCOUNTER — LAB (OUTPATIENT)
Dept: LAB | Facility: CLINIC | Age: 48
End: 2023-10-14
Attending: PHYSICIAN ASSISTANT
Payer: COMMERCIAL

## 2023-10-14 PROCEDURE — 250N000011 HC RX IP 250 OP 636: Mod: JZ | Performed by: PHYSICIAN ASSISTANT

## 2023-10-14 RX ORDER — HEPARIN SODIUM,PORCINE 10 UNIT/ML
5 VIAL (ML) INTRAVENOUS
Status: DISCONTINUED | OUTPATIENT
Start: 2023-10-14 | End: 2023-10-19 | Stop reason: HOSPADM

## 2023-10-14 RX ADMIN — Medication 5 ML: at 10:31

## 2023-10-14 NOTE — PLAN OF CARE
Physical Therapy Discharge Summary    Reason for therapy discharge:    Discharged to home.    Progress towards therapy goal(s). See goals on Care Plan in [facility] electronic health record for goal details.  Goals met    Therapy recommendation(s):    No further therapy is recommended.

## 2023-10-14 NOTE — NURSING NOTE
Chief Complaint   Patient presents with    Lab Only     CVC flushed with heparin by RN.      Dorene Mobley, RN

## 2023-10-15 ENCOUNTER — LAB (OUTPATIENT)
Dept: LAB | Facility: CLINIC | Age: 48
End: 2023-10-15
Attending: PHYSICIAN ASSISTANT
Payer: COMMERCIAL

## 2023-10-15 ENCOUNTER — TELEPHONE (OUTPATIENT)
Dept: ONCOLOGY | Facility: CLINIC | Age: 48
End: 2023-10-15
Payer: COMMERCIAL

## 2023-10-15 LAB
ABO/RH(D): NORMAL
ABO/RH(D): NORMAL
ANTIBODY SCREEN: NEGATIVE
ANTIBODY SCREEN: NEGATIVE
BLD PROD TYP BPU: NORMAL
BLD PROD TYP BPU: NORMAL
BLOOD COMPONENT TYPE: NORMAL
BLOOD COMPONENT TYPE: NORMAL
CODING SYSTEM: NORMAL
CODING SYSTEM: NORMAL
CROSSMATCH: NORMAL
ISSUE DATE AND TIME: NORMAL
SPECIMEN EXPIRATION DATE: NORMAL
SPECIMEN EXPIRATION DATE: NORMAL
UNIT ABO/RH: NORMAL
UNIT ABO/RH: NORMAL
UNIT NUMBER: NORMAL
UNIT NUMBER: NORMAL
UNIT STATUS: NORMAL
UNIT STATUS: NORMAL
UNIT TYPE ISBT: 7300
UNIT TYPE ISBT: 7300

## 2023-10-15 PROCEDURE — 36415 COLL VENOUS BLD VENIPUNCTURE: CPT | Performed by: PHYSICIAN ASSISTANT

## 2023-10-15 PROCEDURE — 86901 BLOOD TYPING SEROLOGIC RH(D): CPT | Performed by: PHYSICIAN ASSISTANT

## 2023-10-15 PROCEDURE — 250N000011 HC RX IP 250 OP 636: Mod: JZ | Performed by: PHYSICIAN ASSISTANT

## 2023-10-15 RX ORDER — HEPARIN SODIUM,PORCINE 10 UNIT/ML
5 VIAL (ML) INTRAVENOUS
Status: DISCONTINUED | OUTPATIENT
Start: 2023-10-15 | End: 2023-10-20 | Stop reason: HOSPADM

## 2023-10-15 RX ORDER — HEPARIN SODIUM (PORCINE) LOCK FLUSH IV SOLN 100 UNIT/ML 100 UNIT/ML
5 SOLUTION INTRAVENOUS
OUTPATIENT
Start: 2023-10-15

## 2023-10-15 RX ORDER — HEPARIN SODIUM,PORCINE 10 UNIT/ML
5-20 VIAL (ML) INTRAVENOUS DAILY PRN
OUTPATIENT
Start: 2023-10-15

## 2023-10-15 RX ADMIN — Medication 5 ML: at 10:43

## 2023-10-15 RX ADMIN — Medication 5 ML: at 10:42

## 2023-10-15 NOTE — TELEPHONE ENCOUNTER
On call Hematology/oncology fellow note.     The family paged me asking if the pt can take her nausea meds together (ativan and compazine).    -> I told them that they work differently and that there is not issue taking them together, as long as she stick to the instructions for each of the meds.     Go Tanvi  Hem/onc fellow  Pager [phone]

## 2023-10-16 ENCOUNTER — ONCOLOGY VISIT (OUTPATIENT)
Dept: TRANSPLANT | Facility: CLINIC | Age: 48
End: 2023-10-16
Attending: INTERNAL MEDICINE
Payer: COMMERCIAL

## 2023-10-16 ENCOUNTER — APPOINTMENT (OUTPATIENT)
Dept: LAB | Facility: CLINIC | Age: 48
End: 2023-10-16
Attending: INTERNAL MEDICINE
Payer: COMMERCIAL

## 2023-10-16 ENCOUNTER — APPOINTMENT (OUTPATIENT)
Dept: CT IMAGING | Facility: CLINIC | Age: 48
End: 2023-10-16
Attending: INTERNAL MEDICINE
Payer: COMMERCIAL

## 2023-10-16 ENCOUNTER — APPOINTMENT (OUTPATIENT)
Dept: GENERAL RADIOLOGY | Facility: CLINIC | Age: 48
End: 2023-10-16
Attending: PHYSICIAN ASSISTANT
Payer: COMMERCIAL

## 2023-10-16 ENCOUNTER — HOSPITAL ENCOUNTER (INPATIENT)
Facility: CLINIC | Age: 48
LOS: 11 days | Discharge: HOME | End: 2023-10-27
Attending: INTERNAL MEDICINE | Admitting: INTERNAL MEDICINE
Payer: COMMERCIAL

## 2023-10-16 ENCOUNTER — TELEPHONE (OUTPATIENT)
Dept: TRANSPLANT | Facility: CLINIC | Age: 48
End: 2023-10-16

## 2023-10-16 VITALS
TEMPERATURE: 99.9 F | OXYGEN SATURATION: 100 % | DIASTOLIC BLOOD PRESSURE: 73 MMHG | SYSTOLIC BLOOD PRESSURE: 108 MMHG | RESPIRATION RATE: 16 BRPM | HEART RATE: 79 BPM

## 2023-10-16 VITALS
BODY MASS INDEX: 37.14 KG/M2 | OXYGEN SATURATION: 99 % | HEART RATE: 77 BPM | WEIGHT: 220.2 LBS | RESPIRATION RATE: 16 BRPM | SYSTOLIC BLOOD PRESSURE: 107 MMHG | DIASTOLIC BLOOD PRESSURE: 67 MMHG | TEMPERATURE: 98.7 F

## 2023-10-16 DIAGNOSIS — C81.18 NODULAR SCLEROSIS HODGKIN LYMPHOMA OF LYMPH NODES OF MULTIPLE REGIONS (H): Primary | ICD-10-CM

## 2023-10-16 DIAGNOSIS — C81.98 HODGKIN LYMPHOMA OF LYMPH NODES OF MULTIPLE REGIONS, UNSPECIFIED HODGKIN LYMPHOMA TYPE (H): ICD-10-CM

## 2023-10-16 DIAGNOSIS — Z52.011 AUTOLOGOUS DONOR OF STEM CELLS: Primary | ICD-10-CM

## 2023-10-16 PROBLEM — D70.9 FEBRILE NEUTROPENIA (H): Status: ACTIVE | Noted: 2023-10-16

## 2023-10-16 PROBLEM — R50.81 FEBRILE NEUTROPENIA (H): Status: ACTIVE | Noted: 2023-10-16

## 2023-10-16 LAB
ALBUMIN SERPL BCG-MCNC: 3.7 G/DL (ref 3.5–5.2)
ALP SERPL-CCNC: 71 U/L (ref 35–104)
ALT SERPL W P-5'-P-CCNC: 17 U/L (ref 0–50)
AMYLASE SERPL-CCNC: 30 U/L (ref 28–100)
ANION GAP SERPL CALCULATED.3IONS-SCNC: 10 MMOL/L (ref 7–15)
APTT PPP: 43 SECONDS (ref 22–38)
AST SERPL W P-5'-P-CCNC: 15 U/L (ref 0–45)
BASO+EOS+MONOS # BLD AUTO: ABNORMAL 10*3/UL
BASO+EOS+MONOS NFR BLD AUTO: ABNORMAL %
BASOPHILS # BLD AUTO: ABNORMAL 10*3/UL
BASOPHILS NFR BLD AUTO: ABNORMAL %
BILIRUB DIRECT SERPL-MCNC: 0.35 MG/DL (ref 0–0.3)
BILIRUB SERPL-MCNC: 0.8 MG/DL
BILIRUB SERPL-MCNC: 0.8 MG/DL
BUN SERPL-MCNC: 14.2 MG/DL (ref 6–20)
CALCIUM SERPL-MCNC: 8.6 MG/DL (ref 8.6–10)
CHLORIDE SERPL-SCNC: 99 MMOL/L (ref 98–107)
CREAT SERPL-MCNC: 0.85 MG/DL (ref 0.51–0.95)
DEPRECATED HCO3 PLAS-SCNC: 21 MMOL/L (ref 22–29)
EGFRCR SERPLBLD CKD-EPI 2021: 84 ML/MIN/1.73M2
EOSINOPHIL # BLD AUTO: ABNORMAL 10*3/UL
EOSINOPHIL NFR BLD AUTO: ABNORMAL %
ERYTHROCYTE [DISTWIDTH] IN BLOOD BY AUTOMATED COUNT: 12.9 % (ref 10–15)
GLUCOSE SERPL-MCNC: 131 MG/DL (ref 70–99)
HCT VFR BLD AUTO: 23.9 % (ref 35–47)
HGB BLD-MCNC: 8.1 G/DL (ref 11.7–15.7)
IMM GRANULOCYTES # BLD: ABNORMAL 10*3/UL
IMM GRANULOCYTES NFR BLD: ABNORMAL %
INR PPP: 1.19 (ref 0.85–1.15)
LDH SERPL L TO P-CCNC: 156 U/L (ref 0–250)
LIPASE SERPL-CCNC: 17 U/L (ref 13–60)
LYMPHOCYTES # BLD AUTO: ABNORMAL 10*3/UL
LYMPHOCYTES NFR BLD AUTO: ABNORMAL %
MCH RBC QN AUTO: 27.9 PG (ref 26.5–33)
MCHC RBC AUTO-ENTMCNC: 33.9 G/DL (ref 31.5–36.5)
MCV RBC AUTO: 82 FL (ref 78–100)
MONOCYTES # BLD AUTO: ABNORMAL 10*3/UL
MONOCYTES NFR BLD AUTO: ABNORMAL %
NEUTROPHILS # BLD AUTO: ABNORMAL 10*3/UL
NEUTROPHILS NFR BLD AUTO: ABNORMAL %
PHOSPHATE SERPL-MCNC: 2.8 MG/DL (ref 2.5–4.5)
PLATELET # BLD AUTO: 8 10E3/UL (ref 150–450)
POTASSIUM SERPL-SCNC: 3.5 MMOL/L (ref 3.4–5.3)
PROT SERPL-MCNC: 6.1 G/DL (ref 6.4–8.3)
RBC # BLD AUTO: 2.9 10E6/UL (ref 3.8–5.2)
RETICS # AUTO: 0.01 10E6/UL (ref 0.03–0.1)
RETICS/RBC NFR AUTO: 0.2 % (ref 0.5–2)
SODIUM SERPL-SCNC: 130 MMOL/L (ref 135–145)
WBC # BLD AUTO: <0.1 10E3/UL (ref 4–11)

## 2023-10-16 PROCEDURE — 99223 1ST HOSP IP/OBS HIGH 75: CPT | Mod: GC | Performed by: INTERNAL MEDICINE

## 2023-10-16 PROCEDURE — 87040 BLOOD CULTURE FOR BACTERIA: CPT | Performed by: PHYSICIAN ASSISTANT

## 2023-10-16 PROCEDURE — P9037 PLATE PHERES LEUKOREDU IRRAD: HCPCS | Performed by: PHYSICIAN ASSISTANT

## 2023-10-16 PROCEDURE — 85045 AUTOMATED RETICULOCYTE COUNT: CPT

## 2023-10-16 PROCEDURE — 258N000003 HC RX IP 258 OP 636: Performed by: INTERNAL MEDICINE

## 2023-10-16 PROCEDURE — 250N000011 HC RX IP 250 OP 636: Performed by: PHYSICIAN ASSISTANT

## 2023-10-16 PROCEDURE — 86923 COMPATIBILITY TEST ELECTRIC: CPT | Performed by: PHYSICIAN ASSISTANT

## 2023-10-16 PROCEDURE — 85610 PROTHROMBIN TIME: CPT | Performed by: PHYSICIAN ASSISTANT

## 2023-10-16 PROCEDURE — 87103 BLOOD FUNGUS CULTURE: CPT | Performed by: PHYSICIAN ASSISTANT

## 2023-10-16 PROCEDURE — 300N000004 RESEARCH KIT COLLECTION

## 2023-10-16 PROCEDURE — 99214 OFFICE O/P EST MOD 30 MIN: CPT

## 2023-10-16 PROCEDURE — 85730 THROMBOPLASTIN TIME PARTIAL: CPT | Performed by: PHYSICIAN ASSISTANT

## 2023-10-16 PROCEDURE — 206N000001 HC R&B BMT UMMC

## 2023-10-16 PROCEDURE — 250N000013 HC RX MED GY IP 250 OP 250 PS 637: Performed by: PHYSICIAN ASSISTANT

## 2023-10-16 PROCEDURE — 86850 RBC ANTIBODY SCREEN: CPT

## 2023-10-16 PROCEDURE — 82150 ASSAY OF AMYLASE: CPT | Performed by: INTERNAL MEDICINE

## 2023-10-16 PROCEDURE — 71045 X-RAY EXAM CHEST 1 VIEW: CPT | Mod: 26 | Performed by: RADIOLOGY

## 2023-10-16 PROCEDURE — 87633 RESP VIRUS 12-25 TARGETS: CPT | Performed by: INTERNAL MEDICINE

## 2023-10-16 PROCEDURE — 250N000011 HC RX IP 250 OP 636: Performed by: HOSPITALIST

## 2023-10-16 PROCEDURE — 250N000011 HC RX IP 250 OP 636: Mod: JZ | Performed by: INTERNAL MEDICINE

## 2023-10-16 PROCEDURE — 84100 ASSAY OF PHOSPHORUS: CPT

## 2023-10-16 PROCEDURE — 74176 CT ABD & PELVIS W/O CONTRAST: CPT | Mod: 26 | Performed by: RADIOLOGY

## 2023-10-16 PROCEDURE — 36415 COLL VENOUS BLD VENIPUNCTURE: CPT

## 2023-10-16 PROCEDURE — 250N000013 HC RX MED GY IP 250 OP 250 PS 637: Performed by: INTERNAL MEDICINE

## 2023-10-16 PROCEDURE — 99213 OFFICE O/P EST LOW 20 MIN: CPT

## 2023-10-16 PROCEDURE — 83690 ASSAY OF LIPASE: CPT | Performed by: INTERNAL MEDICINE

## 2023-10-16 PROCEDURE — 82248 BILIRUBIN DIRECT: CPT

## 2023-10-16 PROCEDURE — 85027 COMPLETE CBC AUTOMATED: CPT

## 2023-10-16 PROCEDURE — 74176 CT ABD & PELVIS W/O CONTRAST: CPT

## 2023-10-16 PROCEDURE — 83615 LACTATE (LD) (LDH) ENZYME: CPT

## 2023-10-16 PROCEDURE — 87641 MR-STAPH DNA AMP PROBE: CPT | Performed by: INTERNAL MEDICINE

## 2023-10-16 PROCEDURE — 80053 COMPREHEN METABOLIC PANEL: CPT

## 2023-10-16 PROCEDURE — 71045 X-RAY EXAM CHEST 1 VIEW: CPT

## 2023-10-16 PROCEDURE — 87486 CHLMYD PNEUM DNA AMP PROBE: CPT | Performed by: INTERNAL MEDICINE

## 2023-10-16 PROCEDURE — 86901 BLOOD TYPING SEROLOGIC RH(D): CPT

## 2023-10-16 RX ORDER — ACYCLOVIR 400 MG/1
400 TABLET ORAL 2 TIMES DAILY
Status: DISCONTINUED | OUTPATIENT
Start: 2023-10-16 | End: 2023-10-24

## 2023-10-16 RX ORDER — HEPARIN SODIUM,PORCINE 10 UNIT/ML
5-10 VIAL (ML) INTRAVENOUS EVERY 24 HOURS
Status: DISCONTINUED | OUTPATIENT
Start: 2023-10-16 | End: 2023-10-27 | Stop reason: HOSPADM

## 2023-10-16 RX ORDER — ONDANSETRON 4 MG/1
4-8 TABLET, ORALLY DISINTEGRATING ORAL EVERY 6 HOURS PRN
Status: DISCONTINUED | OUTPATIENT
Start: 2023-10-16 | End: 2023-10-25

## 2023-10-16 RX ORDER — PANTOPRAZOLE SODIUM 40 MG/1
40 TABLET, DELAYED RELEASE ORAL DAILY
Status: DISCONTINUED | OUTPATIENT
Start: 2023-10-17 | End: 2023-10-23

## 2023-10-16 RX ORDER — ACETAMINOPHEN 325 MG/1
975 TABLET ORAL EVERY 8 HOURS
Status: DISCONTINUED | OUTPATIENT
Start: 2023-10-17 | End: 2023-10-16

## 2023-10-16 RX ORDER — PROCHLORPERAZINE MALEATE 5 MG
5-10 TABLET ORAL EVERY 6 HOURS PRN
Status: DISCONTINUED | OUTPATIENT
Start: 2023-10-16 | End: 2023-10-16

## 2023-10-16 RX ORDER — BENZOCAINE/MENTHOL 6 MG-10 MG
LOZENGE MUCOUS MEMBRANE 3 TIMES DAILY
Status: DISCONTINUED | OUTPATIENT
Start: 2023-10-16 | End: 2023-10-16

## 2023-10-16 RX ORDER — NARATRIPTAN 2.5 MG/1
2.5 TABLET ORAL
Status: DISCONTINUED | OUTPATIENT
Start: 2023-10-16 | End: 2023-10-27 | Stop reason: HOSPADM

## 2023-10-16 RX ORDER — ACETAMINOPHEN 325 MG/1
325-650 TABLET ORAL EVERY 4 HOURS PRN
Status: DISCONTINUED | OUTPATIENT
Start: 2023-10-16 | End: 2023-10-16

## 2023-10-16 RX ORDER — HEPARIN SODIUM,PORCINE 10 UNIT/ML
5-10 VIAL (ML) INTRAVENOUS
Status: DISCONTINUED | OUTPATIENT
Start: 2023-10-16 | End: 2023-10-27 | Stop reason: HOSPADM

## 2023-10-16 RX ORDER — ACETAMINOPHEN 325 MG/1
325-650 TABLET ORAL EVERY 4 HOURS PRN
Status: DISCONTINUED | OUTPATIENT
Start: 2023-10-16 | End: 2023-10-27

## 2023-10-16 RX ORDER — TOPIRAMATE 50 MG/1
50 TABLET, FILM COATED ORAL EVERY EVENING
Status: DISCONTINUED | OUTPATIENT
Start: 2023-10-16 | End: 2023-10-27 | Stop reason: HOSPADM

## 2023-10-16 RX ORDER — VENLAFAXINE HYDROCHLORIDE 37.5 MG/1
150 CAPSULE, EXTENDED RELEASE ORAL AT BEDTIME
Status: DISCONTINUED | OUTPATIENT
Start: 2023-10-16 | End: 2023-10-22

## 2023-10-16 RX ORDER — BUPROPION HYDROCHLORIDE 150 MG/1
150 TABLET ORAL EVERY MORNING
Status: DISCONTINUED | OUTPATIENT
Start: 2023-10-17 | End: 2023-10-27 | Stop reason: HOSPADM

## 2023-10-16 RX ORDER — CEFEPIME HYDROCHLORIDE 2 G/1
2 INJECTION, POWDER, FOR SOLUTION INTRAVENOUS
Status: DISCONTINUED | OUTPATIENT
Start: 2023-10-17 | End: 2023-10-18

## 2023-10-16 RX ORDER — LORAZEPAM 0.5 MG/1
.5-1 TABLET ORAL EVERY 4 HOURS PRN
Status: DISCONTINUED | OUTPATIENT
Start: 2023-10-16 | End: 2023-10-16

## 2023-10-16 RX ORDER — PROPRANOLOL HYDROCHLORIDE 20 MG/1
20 TABLET ORAL 2 TIMES DAILY
Status: DISCONTINUED | OUTPATIENT
Start: 2023-10-17 | End: 2023-10-27 | Stop reason: HOSPADM

## 2023-10-16 RX ORDER — HEPARIN SODIUM,PORCINE 10 UNIT/ML
5 VIAL (ML) INTRAVENOUS
Status: DISCONTINUED | OUTPATIENT
Start: 2023-10-16 | End: 2023-10-16 | Stop reason: HOSPADM

## 2023-10-16 RX ORDER — HEPARIN SODIUM,PORCINE 10 UNIT/ML
5 VIAL (ML) INTRAVENOUS
Status: DISCONTINUED | OUTPATIENT
Start: 2023-10-16 | End: 2023-11-02 | Stop reason: HOSPADM

## 2023-10-16 RX ORDER — LEVOTHYROXINE SODIUM 75 UG/1
75 TABLET ORAL EVERY MORNING
Status: DISCONTINUED | OUTPATIENT
Start: 2023-10-17 | End: 2023-10-27 | Stop reason: HOSPADM

## 2023-10-16 RX ORDER — METRONIDAZOLE 500 MG/100ML
500 INJECTION, SOLUTION INTRAVENOUS EVERY 8 HOURS
Status: DISCONTINUED | OUTPATIENT
Start: 2023-10-16 | End: 2023-10-21

## 2023-10-16 RX ORDER — NALOXONE HYDROCHLORIDE 0.4 MG/ML
0.4 INJECTION, SOLUTION INTRAMUSCULAR; INTRAVENOUS; SUBCUTANEOUS
Status: DISCONTINUED | OUTPATIENT
Start: 2023-10-16 | End: 2023-10-25

## 2023-10-16 RX ORDER — LEVOFLOXACIN 250 MG/1
500 TABLET, FILM COATED ORAL DAILY
Status: DISCONTINUED | OUTPATIENT
Start: 2023-10-16 | End: 2023-10-17

## 2023-10-16 RX ORDER — HEPARIN SODIUM (PORCINE) LOCK FLUSH IV SOLN 100 UNIT/ML 100 UNIT/ML
5-10 SOLUTION INTRAVENOUS
Status: DISCONTINUED | OUTPATIENT
Start: 2023-10-16 | End: 2023-10-27 | Stop reason: HOSPADM

## 2023-10-16 RX ORDER — LORAZEPAM 2 MG/ML
.5-1 INJECTION INTRAMUSCULAR EVERY 4 HOURS PRN
Status: DISCONTINUED | OUTPATIENT
Start: 2023-10-16 | End: 2023-10-27 | Stop reason: HOSPADM

## 2023-10-16 RX ORDER — PROCHLORPERAZINE MALEATE 5 MG
5-10 TABLET ORAL EVERY 6 HOURS PRN
Status: DISCONTINUED | OUTPATIENT
Start: 2023-10-16 | End: 2023-10-27 | Stop reason: HOSPADM

## 2023-10-16 RX ORDER — LORAZEPAM 0.5 MG/1
.5-1 TABLET ORAL EVERY 4 HOURS PRN
Status: DISCONTINUED | OUTPATIENT
Start: 2023-10-16 | End: 2023-10-27 | Stop reason: HOSPADM

## 2023-10-16 RX ORDER — NALOXONE HYDROCHLORIDE 0.4 MG/ML
0.2 INJECTION, SOLUTION INTRAMUSCULAR; INTRAVENOUS; SUBCUTANEOUS
Status: DISCONTINUED | OUTPATIENT
Start: 2023-10-16 | End: 2023-10-25

## 2023-10-16 RX ORDER — CEFEPIME HYDROCHLORIDE 2 G/1
2 INJECTION, POWDER, FOR SOLUTION INTRAVENOUS EVERY 8 HOURS
Status: DISCONTINUED | OUTPATIENT
Start: 2023-10-16 | End: 2023-10-24

## 2023-10-16 RX ORDER — ONDANSETRON 2 MG/ML
4-8 INJECTION INTRAMUSCULAR; INTRAVENOUS EVERY 6 HOURS PRN
Status: DISCONTINUED | OUTPATIENT
Start: 2023-10-16 | End: 2023-10-27 | Stop reason: HOSPADM

## 2023-10-16 RX ORDER — PROPRANOLOL HYDROCHLORIDE 20 MG/1
20 TABLET ORAL 2 TIMES DAILY
Status: DISCONTINUED | OUTPATIENT
Start: 2023-10-16 | End: 2023-10-16

## 2023-10-16 RX ORDER — HYDROMORPHONE HYDROCHLORIDE 2 MG/1
2-4 TABLET ORAL EVERY 4 HOURS PRN
Status: DISCONTINUED | OUTPATIENT
Start: 2023-10-16 | End: 2023-10-25

## 2023-10-16 RX ORDER — FLUCONAZOLE 200 MG/1
400 TABLET ORAL DAILY
Status: DISCONTINUED | OUTPATIENT
Start: 2023-10-17 | End: 2023-10-25

## 2023-10-16 RX ADMIN — HYDROMORPHONE HYDROCHLORIDE 2 MG: 2 TABLET ORAL at 21:09

## 2023-10-16 RX ADMIN — SODIUM CHLORIDE 1000 ML: 9 INJECTION, SOLUTION INTRAVENOUS at 08:30

## 2023-10-16 RX ADMIN — METRONIDAZOLE 500 MG: 5 INJECTION, SOLUTION INTRAVENOUS at 22:50

## 2023-10-16 RX ADMIN — ACETAMINOPHEN 650 MG: 325 TABLET, FILM COATED ORAL at 18:01

## 2023-10-16 RX ADMIN — Medication 5 ML: at 07:54

## 2023-10-16 RX ADMIN — CEFEPIME HYDROCHLORIDE 2 G: 2 INJECTION, POWDER, FOR SOLUTION INTRAVENOUS at 17:55

## 2023-10-16 RX ADMIN — SODIUM CHLORIDE 1000 ML: 9 INJECTION, SOLUTION INTRAVENOUS at 20:16

## 2023-10-16 RX ADMIN — Medication 5 ML: at 07:53

## 2023-10-16 RX ADMIN — TOPIRAMATE 50 MG: 50 TABLET, FILM COATED ORAL at 20:04

## 2023-10-16 RX ADMIN — ACYCLOVIR 400 MG: 400 TABLET ORAL at 20:04

## 2023-10-16 ASSESSMENT — PAIN SCALES - GENERAL
PAINLEVEL: MODERATE PAIN (4)
PAINLEVEL: WORST PAIN (10)

## 2023-10-16 ASSESSMENT — ACTIVITIES OF DAILY LIVING (ADL)
CONCENTRATING,_REMEMBERING_OR_MAKING_DECISIONS_DIFFICULTY: NO
CHANGE_IN_FUNCTIONAL_STATUS_SINCE_ONSET_OF_CURRENT_ILLNESS/INJURY: NO
ADLS_ACUITY_SCORE: 18
DIFFICULTY_EATING/SWALLOWING: NO
TOILETING_ISSUES: NO
ADLS_ACUITY_SCORE: 18
DOING_ERRANDS_INDEPENDENTLY_DIFFICULTY: NO
DRESSING/BATHING_DIFFICULTY: NO
WEAR_GLASSES_OR_BLIND: NO
ADLS_ACUITY_SCORE: 18
HEARING_DIFFICULTY_OR_DEAF: NO
ADLS_ACUITY_SCORE: 35
FALL_HISTORY_WITHIN_LAST_SIX_MONTHS: NO
WALKING_OR_CLIMBING_STAIRS_DIFFICULTY: NO
DIFFICULTY_COMMUNICATING: NO

## 2023-10-16 NOTE — PROGRESS NOTES
I met with Jennifer Ward to review her medication list after hospital discharge post-transplant. Jennifer Ward and her caregiver had the opportunity to ask questions regarding her therapy which were answered to their satisfaction.     Changes made to scheduled medication list by today's provider include:  Added: none  Deleted: none  Changed: none    I assessed her med box which was filled correctly except the omission of topiramate.  She states she has a full bottle at home and will add to the box.  We discussed estimated durations, indications.      Current Outpatient Medications   Medication Sig Dispense Refill    acetaminophen (TYLENOL) 325 MG tablet Take 650 mg by mouth every 6 hours as needed for mild pain      acyclovir (ZOVIRAX) 200 MG capsule Take 2 capsules (400 mg) by mouth 2 times daily 60 capsule 0    buPROPion (WELLBUTRIN XL) 150 MG 24 hr tablet Take 150 mg by mouth every morning      fluconazole (DIFLUCAN) 200 MG tablet Take 2 tablets (400 mg) by mouth daily 60 tablet 0    hydrocortisone (CORTAID) 1 % external cream Apply topically 3 times daily      levofloxacin (LEVAQUIN) 500 MG tablet Take 1 tablet (500 mg) by mouth daily 15 tablet 0    levothyroxine (SYNTHROID/LEVOTHROID) 75 MCG tablet Take 1 tablet by mouth every morning      LORazepam (ATIVAN) 0.5 MG tablet Take 1-2 tablets (0.5-1 mg) by mouth every 4 hours as needed for anxiety (nausea/vomiting/sleep) 30 tablet 0    naratriptan (AMERGE) 2.5 MG tablet Take 2.5 mg by mouth at onset of headache      pantoprazole (PROTONIX) 40 MG EC tablet Take 1 tablet (40 mg) by mouth daily 30 tablet 0    prochlorperazine (COMPAZINE) 5 MG tablet Take 1-2 tablets (5-10 mg) by mouth every 6 hours as needed for nausea or vomiting 30 tablet 1    propranolol (INDERAL) 20 MG tablet Take 20 mg by mouth 2 times daily      topiramate (TOPAMAX) 50 MG tablet Take 1 tablet by mouth every evening      venlafaxine (EFFEXOR XR) 150 MG 24 hr capsule Take 150 mg by mouth daily           Pertinent labs considered today:  Lab Results   Component Value Date     10/16/2023                 normal sodium 136-148  Lab Results   Component Value Date    POTASSIUM 3.5 10/16/2023       normal potassium 3.5-5.2   Lab Results   Component Value Date    CHLORIDE 99 10/16/2023           normal chloride    Lab Results   Component Value Date    CO2 21 10/16/2023                normal CO2 20-32  Lab Results   Component Value Date    BUN 14.2 10/16/2023                 normal BUN 5-24  Lab Results   Component Value Date     10/16/2023                 normal glucose   Lab Results   Component Value Date    CR 0.85 10/16/2023                 normal cr 0.8-1.5  Lab Results   Component Value Date    PHYLICIA 8.6 10/16/2023               normal calcium  8.5-10.4  Lab Results   Component Value Date    BILITOTAL 0.8 10/16/2023    BILITOTAL 0.8 10/16/2023          normal bilirubin 0.2-1.3  Lab Results   Component Value Date    PROTTOTAL 6.1 10/16/2023          normal total protein 6.0-8.2  Lab Results   Component Value Date    ALBUMIN 3.7 10/16/2023             normal albumin 3.2-4.5  Lab Results   Component Value Date    ALKPHOS 71 10/16/2023            normal alkphos   Lab Results   Component Value Date    ALT 17 10/16/2023         normal ALT 0-65  Lab Results   Component Value Date    AST 15 10/16/2023         normal AST 0-37  Lab Results   Component Value Date    HGB 8.1 10/16/2023     Lab Results   Component Value Date    WBC <0.1 10/16/2023      Lab Results   Component Value Date    PLT 8 10/16/2023     Estimated Creatinine Clearance: 93 mL/min (based on SCr of 0.85 mg/dL).      Johnnie Andersen, [facility], PharmD

## 2023-10-16 NOTE — TELEPHONE ENCOUNTER
BMT Nurse Triage - Fever with or without neutropenia:     Treating Provider:   Tammy Gonzalez MD    Date of last office visit: 10/16/23    Onset of symptoms: This morning in clinic     Duration of symptoms: two and a half hours    Labs:     *For BMT patients with fever of 100.5 or greater, immediately page on call provider*  ** If neutropenic, patient needs to be assessed by provider ASAP. Patient will need same day provider visit, lab, and infusion appointment. If calling later in day, ie 4pm or later, direct patient to ED.    Lab Results   Component Value Date/Time    WBC <0.1 (LL) 10/16/2023 08:10 AM       Has patient received recent chemotherapy (if yes, when and drug): Yes: BEAM - finished 10/11    Has patient had any recent known exposures to COVID 19: No    Recent travel: No    Recent blood transfusion: Yes     Symptom assessment    Fever (maximum temperature recorded in last 24 hours): 101.1     Chills: Yes    Any of the following signs of infection present:     Diarrhea: Yes    Any red or draining sore present (if yes, where): Yes: mouth sores     Urinary symptoms:   No changes in voiding. Normal frequency and characteristics.    Sore throat: Yes    Cough present: No    Oral intake:   Not tolerating oral intake due to associated symptoms.  - due to nausea    Additional information (if necessary):     N/A    Current interventions patient is using:     Nothing at this point.    Grades for reference:     Grade 1 - Educate and  patient to continue monitoring temperature. Avoid Tylenol or other antipyretic medications that could mask a fever.    Temperature is 100.4 and lower  Feels feverish, but no temperature measured    Grade 2 - If not neutropenic and BMT patient with any of the following symptoms, schedule patient for a provider visit within 24 hours if any apply:    Temperature of 100.5 or greater  Signs and or symptoms of infection present  Periods of increased diaphoresis  Decreased PO  intake      Grade 3 - Patient needs immediate care if any of the following apply:    Neutropenic BMT patient  In active treatment and temperature is 100.5 and higher  Change in mental status  Rapid breathing  Signs of dehydration including decreased output, sunken eyes, dry mouth  Signs of shock including rapid pulse, light-headedness, pale, cold or moist skin    Recommendations:     Triage page sent 12:45pm    Patient to be admitted for antibiotics and monitoring.     Laurie Bui RN, MSN  BMT Nurse Coordinator  Phone: [phone]

## 2023-10-16 NOTE — LETTER
10/16/2023         RE: Jennifer Ward  [address] [id number]        Dear Colleague,    Thank you for referring your patient, Jennifer Ward, to the [facility] BLOOD AND [facility]ARROW TRANSPLANT [facility] [place]. Please see a copy of my visit note below.    BMT/Cell Therapy Daily Progress Note        Patient ID:  Jennifer Ward is a 48 year old female, currently day 4 of her therapy.       INTERVAL  HISTORY   Jennifer presents today for follow up in a wheelchair. She fells weak and fatigue. Didn't sleep well last night due to achy legs and she didn't think she could take tylenol. Nausea but not vomiting. Took a compazine this morning. Eating okay and drinking water. No dizziness. Very dry mouth.      Review of Systems: ROS negative except as noted above.     PHYSICAL EXAM         KPS:  80  ECO     Blood pressure 107/67, pulse 77, temperature 98.7  F (37.1  C), temperature source Oral, resp. rate 16, weight 99.9 kg (220 lb 3.2 oz), SpO2 99%.    General: +fatigue  Eyes: : ARMEN, sclera anicteric   Lungs: CTA bilaterally  Cardiovascular: RRR, no M/R/G   Abdominal/Rectal: +BS, soft, NT, ND  Lymphatics: no edema  Skin: Flushed face  Neuro: A&O   Additional Findings: Louis site NT, no drainage.        LABS AND IMAGING: I have assessed all abnormal lab values for their clinical significance and any values considered clinically significant have been addressed in the assessment and plan.          Lab Results   Component Value Date    WBC 1.0 (L) 10/13/2023    ANEU 1.0 (L) 10/13/2023    HGB 8.0 (L) 10/13/2023    HCT 23.9 (L) 10/13/2023    PLT 36 (LL) 10/13/2023     (L) 10/13/2023    POTASSIUM 4.4 10/13/2023    CHLORIDE 105 10/13/2023    CO2 19 (L) 10/13/2023     (H) 10/13/2023    BUN 19.4 10/13/2023    CR 0.70 10/13/2023    MAG 2.1 10/13/2023    INR 1.07 10/09/2023       Oral/GI regimen related toxicities (per NCI CTCAE v 5.0):     Oral Mucositis: none  Nausea: Grade 1  Vomiting:  none  Diarrhea: none     *diarrhea secondary to C. Diff is excluded from the definition of oral/GI RRT        CTCAE Terms      Vomiting: A disorder characterized by the reflexive act of ejecting the contents of the stomach through the mouth   Grade 1 Intervention not indicated   Grade 2 Outpatient IV hydration; medical intervention indicated   Grade 3 Tube feeding, TPN, or hospitalization indicated   Grade 4 Life-threatening consequences   Grade 5 Death      Oral Mucositis: A disorder characterized by ulceration or inflammation of the oral mucosal.   Grade 1 Asymptomatic or mild symptoms; intervention not indicated   Grade 2 Moderate pain or ulcer that does not interfere with oral intake; modified diet indicated   Grade 3 Severe pain; interfering with oral intake   Grade 4 Life-threatening consequences; urgent intervention indicated   Grade 5 Death      Nausea: A disorder characterized by a queasy sensation and/or the urge to vomit.   Grade 1 Loss of appetite without alteration in eating habits  Grade 2 Oral intake decreased without significant weight loss, dehydration or malnutrition   Grade 3 Inadequate oral caloric or fluid intake; tube feeding, TPN, or hospitalization indicated   Grade 4 NA  Grade 5 NA     Diarrhea: A disorder characterized by an increase in frequency and/or loose or watery bowel movements.   Grade 1 Increase of <4 stools per day over baseline; mild increase in ostomy output compared to baseline   Grade 2 Increase of 4 - 6 stools per day over baseline; moderate increase in ostomy output compared to baseline; limiting instrumental ADL   Grade 3 Increase of >=7 stools per day over baseline; hospitalization indicated; severe increase in ostomy output compared to baseline; limiting self-care ADL    Grade 4 Life-threatening consequences; urgent intervention indicated    Grade 5 Death         Source:  https://ctep.cancer.gov/protocoldevelopment/electronic_applications/docs/CTCAE_v5_Quick_Reference_8.5x11.pdf         SYSTEMS-BASED ASSESSMENT AND PLAN   Jennifer Ward is a 48 year old female, currently day 4 of her autologous hematopoietic cell transplant as rescue after BEAM therapy for Hodgkin Lymphoma. Co-enrolled on E-CELERATE study.      Prep: BEAM     BMT  - BMT doc/Coordinator: Nay  - MT2016-11 with co-enrollment on MT2022-40 (E-CELERATE)  - Cell dose 7 million CD34+ cells/kg   - GCSF starts day +5, continue until ANC > 500 for 3 consecutive days.   - Restaging: next restage at day +28  - Allopurinol started Day -6     HEME/COAG  - Transfusion parameters: hgb <7g/dL and plts <10,000. Plts today  - Pancytopenia secondary to Hodgkin Lymphoma and chemotherapy     ID  Prophy: note that antimicrobial dosing is specific to protocol  Viral: ACV 400mg PO bid  Bacterial: levofloxacin 500 mg daily  Fungal: fluconazole 400mg daily  PCP: Bactrim or other PCP prophy to start at day +28     GI/NUTRITION  - Anti-emetics: zofran/dex prior to transplant to prevent chemotherapy induced n/v.  Ativan and compazine prn.  Single dose of emend 30 minutes prior to melphalan on last day of chemo prep.  - Ulcer prophy: Protonix 40mg daily.   - E-CELERATE vs placebo one hour after transplant 10/12.      #Transaminitis:  likely secondary to chemotherapy; recheck 10/12 shows resolution.      ENDOCRINE  # hypothyroidism: continue PTA synthroid     PSYCH  # depression: continue PTA bupropion, venlafaxine     NEURO  # migraines: daily topamax with prn naratriptan     FEN  1L NS todau     SKIN  # Flushed face: possibly 2/2 to chemo; no further interventions indicated.      SUPPORTIVE CARE  - PT/OT to evaluate on admission and follow as indicated.   - BMT Social work to follow.       Dispo: [place] [address]     Plts, 1L NS today    RETURN TO CLINIC:  Daily for gcsf. Add possible infusion  tomorrow.     Known issues that I take into account for medical decisions, with salient changes to the plan considering these complexities noted above.     I spent 30 minutes in the care of this patient today, which included time necessary for preparation for the visit, obtaining history, ordering medications/tests/procedures as medically indicated, review of pertinent medical literature, counseling of the patient, communication of recommendations to the care team, and documentation time.    Vivian Velez NP

## 2023-10-16 NOTE — PROGRESS NOTES
"Infusion Nursing Note:  Jennifer Ward presents today for add-on infusion.    Patient seen by provider today: Yes: Vivian Velez CNP   present during visit today: Not Applicable.    Note: Pt here today for add-on IVF/transfusion. Pt assessed by provider prior to transfusion. Labs monitored; Plt 8- meets parameters for transfusion. Pt received 1L 0.9% NS per provider order and 1 unit platelets. Temp 99.9 at end of transfusion and pt reported new midline abdominal pain rated 5/10. Pt reports feeling like \"being punched in the stomach\". No nausea or other associated symptoms. Vivian Velez CNP notified of symptoms- no new orders. Instructed patient to continue to monitor and reinforced need to go to ER if febrile. Pt verbalized understanding.      Intravenous Access:  Louis.    Treatment Conditions:  Lab Results   Component Value Date    HGB 8.1 (L) 10/16/2023    WBC <0.1 (LL) 10/16/2023    ANEU 1.0 (L) 10/13/2023    ANEUTAUTO 3.0 10/09/2023    PLT 8 (LL) 10/16/2023        Results reviewed, labs MET treatment parameters, ok to proceed with treatment.      Post Infusion Assessment:  Patient tolerated infusion without incident.  Blood return noted pre and post infusion.  Site patent and intact, free from redness, edema or discomfort.  No evidence of extravasations.       Discharge Plan:   Patient discharged in stable condition accompanied by: mother.  Departure Mode: Wheelchair.      Arielle Garrett RN    "

## 2023-10-16 NOTE — NURSING NOTE
"Oncology Rooming Note    October 16, 2023 8:13 AM   Jennifer Ward is a 48 year old female who presents for:    Chief Complaint   Patient presents with    Oncology Clinic Visit     BMT; nodular sclerosis Hodgkin lymphoma of lymph nodes of multiple regions      Initial Vitals: /67 (BP Location: Left arm, Patient Position: Sitting, Cuff Size: Adult Large)   Pulse 77   Temp 98.7  F (37.1  C) (Oral)   Resp 16   Wt 99.9 kg (220 lb 3.2 oz)   SpO2 99%   BMI 37.14 kg/m   Estimated body mass index is 37.14 kg/m  as calculated from the following:    Height as of 10/6/23: 1.64 m (5' 4.57\").    Weight as of this encounter: 99.9 kg (220 lb 3.2 oz). Body surface area is 2.13 meters squared.  Worst Pain (10) Comment: all over and weak   No LMP recorded. Patient has had a hysterectomy.  Allergies reviewed: Yes  Medications reviewed: Yes    Medications: Medication refills not needed today.  Pharmacy name entered into [facility]: [id number] - [address] [place] AT [facility] [address]    Clinical concerns: none       Zahida Diaz              "

## 2023-10-16 NOTE — H&P
Physician Attestation   I, Tracy Archibald MD, was present with the medical/LOVE student who participated in the service and in the documentation of the note.  I have verified the history and personally performed the physical exam and medical decision making.  I agree with the assessment and plan of care as documented in the note.        Tracy Archibald MD  Date of Service (when I saw the patient): 10/16/23    [facility]    History and Physical - Hospitalist Service       Date of Admission:  10/16/2023    Assessment & Plan      Jennifer Ward is a 48 year old female, currently day 4 of her autologous hematopoietic cell transplant as rescue after BEAM therapy for Hodgkin Lymphoma. Co-enrolled on E-CELERATE study. Admitted 10/16/23 with concern for neutropenic fever, Tmax in hospital 102.2F. Exam with no evidence of acute infection.  HD stable, however requires ongoing work-up for neutropenic fever.     Prep: BEAM     BMT  - BMT doc/Coordinator: Nay  - MT2016-11 with co-enrollment on MT2022-40 (E-CELERATE)  - Cell dose 7 million CD34+ cells/kg   - GCSF starts day +5, continue until ANC > 500 for 3 consecutive days.   - Restaging: next restage at day +28  - Allopurinol started Day -6     HEME/COAG  - Transfusion parameters: hgb <7g/dL and plts <10,000. Received plt today PTA  --- Consent obtained for plt and RBC transfusions  - Pancytopenia secondary to Hodgkin Lymphoma and chemotherapy     ID  Prophy: note that antimicrobial dosing is specific to protocol  Viral: ACV 400mg PO bid  Bacterial: levofloxacin 500 mg daily  Fungal: fluconazole 400mg daily  PCP: Bactrim or other PCP prophy to start at day +28    Neutropenic Fever of Unclear Etiology  Pt presented with new onset fevers that began during infusion today. 101.3F at home, Tmax during admission 102.2F. WBC <0.1. On exam, no evidence of respiratory, urinary, or GI source of infection. No sick  contacts. Despite absence of resp findings, with new aches, should r/o influenza or viral infx given the time of year and increasing incidence within the community. Bcx pending. On broad spectrum abx as above, holding levofloxacin. MRSA nare pending; if positive, would consider adding vanco. She is HD stable at this time.  Abdominal pain with rebound tenderness, concerning for peritonitis.  - Ordered respiratory/viral panel  - MRSA nare  - Bcx pending  - Continue cefepime, fluconazole, acyclovir; holding levofloxacin  - Ordering CT abdomen for possible intraabdominal pathology     GI/NUTRITION  - Anti-emetics: zofran/dex prior to transplant to prevent chemotherapy induced n/v.  Ativan and compazine prn.  Single dose of emend 30 minutes prior to melphalan on last day of chemo prep.  - Ulcer prophy: Protonix 40mg daily.   - E-CELERATE vs placebo one hour after transplant 10/12.   - Epigastric tenderness on abd exam, ordered lipase and amylase which were normal     #Transaminitis:  likely secondary to chemotherapy; recheck 10/12 shows resolution.      ENDOCRINE  # hypothyroidism: continue PTA synthroid     PSYCH  # depression: continue PTA bupropion, venlafaxine     NEURO  # migraines: daily topamax with prn naratriptan     FEN  -1 L NS today PTA; no further intervention     SKIN  # Flushed face: possibly 2/2 to chemo; no further interventions indicated.      SUPPORTIVE CARE  - PT/OT consult, will follow as indicated  - BMT Social work to follow.    - Ordered AquaK Heating pad         Diet: Snacks/Supplements Adult: Other - Please comment; Between Meals  High Kcal/High Protein Diet, ADULT    DVT Prophylaxis: Pneumatic Compression Devices  Estrada Catheter: Not present  Fluids: None  Lines: PRESENT      CVC Double Lumen Left Internal jugular Tunneled-Site Assessment: WDL      Cardiac Monitoring: None  Code Status: Full Code      Clinically Significant Risk Factors Present on Admission               # Coagulation Defect:  "INR = 1.19 (Ref range: 0.85 - 1.15) and/or PTT = 43 Seconds (Ref range: 22 - 38 Seconds), will monitor for bleeding    # Thrombocytopenia: Lowest platelets = 8 in last 2 days, will monitor for bleeding        # Obesity: Estimated body mass index is 37.34 kg/m  as calculated from the following:    Height as of this encounter: 1.64 m (5' 4.57\").    Weight as of this encounter: 100.4 kg (221 lb 6.4 oz).              Disposition Plan      Expected Discharge Date: 10/20/2023                The patient's care was discussed with the Attending Physician, Dr. Archibald .    SHELBIE BATROM  Medical Student  [facility]  [facility]  Securely message with [employer] (more info)  Text page via [facility] Paging/Directory   ______________________________________________________________________    Chief Complaint   New onset fevers    History is obtained from the patient and patient's mother.    History of Present Illness   Jennifer Ward is a 48 year old female who has a history of autologous hematopoietic cell transplant as rescue after BEAM therapy for Hodgkin Lymphoma and is admitted for neutropenic fevers.  Fevers began during infusion this morning.  Also reports body aches ongoing for 1 to 2 days prior to admission.  Denies any respiratory symptoms at this time (cough, shortness of breath).  Temperature measured at home 101.3 F.  Denies any sick contacts.  No chest pain, vomiting, or diarrhea.  Does report nausea and abdominal pain in the epigastric region.  Denies any changes in vision.  Does not seem more confused per mother.    Abdominal pain feels like \"bowels being grabbed\" and also describes a sense of bloating.  Is continuing to pass gas and stool regularly.  States that stools are typically soft.  Has been nauseous, but has not vomited.    Most recent admission 10/06/2023 for autologous hematopoietic stem cell transplant as rescue after BEAM therapy for Hodgkin lymphoma.  Was " started on broad-spectrum antibiotic therapy at that time, however Bactrim was planned for day 28 and not started.  Also on E-CELERATE  study. Discharged 10/13.  For discharge, tolerated infusion well.  Facial flushing.  G-CSF starting.  Already neutropenic at time of discharge.    No changes in family or social history.    Past Medical History    No past medical history on file.    Past Surgical History   Past Surgical History:   Procedure Laterality Date    BONE MARROW BIOPSY, BONE SPECIMEN, NEEDLE/TROCAR Left 9/25/2023    Procedure: BIOPSY, BONE MARROW;  Surgeon: Patricia Salcedo PA-C;  Location: [facility] OR    INSERT CATHETER VASCULAR ACCESS Left 10/4/2023    Procedure: cvc tunnled line Insert Catheter Vascular Access;  Surgeon: Yobani Reeves MD;  Location: [facility] OR    IR CVC TUNNEL PLACEMENT > 5 YRS OF AGE  10/4/2023       Prior to Admission Medications   Prior to Admission Medications   Prescriptions Last Dose Informant Patient Reported? Taking?   LORazepam (ATIVAN) 0.5 MG tablet 10/15/2023 at 1900  No Yes   Sig: Take 1-2 tablets (0.5-1 mg) by mouth every 4 hours as needed for anxiety (nausea/vomiting/sleep)   acetaminophen (TYLENOL) 325 MG tablet 10/12/2023  Yes No   Sig: Take 650 mg by mouth every 6 hours as needed for mild pain   acyclovir (ZOVIRAX) 200 MG capsule 10/16/2023 at 0800  No Yes   Sig: Take 2 capsules (400 mg) by mouth 2 times daily   buPROPion (WELLBUTRIN XL) 150 MG 24 hr tablet 10/16/2023 at 0800  Yes Yes   Sig: Take 150 mg by mouth every morning   fluconazole (DIFLUCAN) 200 MG tablet 10/16/2023 at 0800  No Yes   Sig: Take 2 tablets (400 mg) by mouth daily   levofloxacin (LEVAQUIN) 500 MG tablet 10/16/2023 at 0800  No Yes   Sig: Take 1 tablet (500 mg) by mouth daily   levothyroxine (SYNTHROID/LEVOTHROID) 75 MCG tablet 10/16/2023 at 0800  Yes Yes   Sig: Take 1 tablet by mouth every morning   naratriptan (AMERGE) 2.5 MG tablet 10/12/2023  Yes No   Sig: Take 2.5 mg by mouth at onset of  headache   pantoprazole (PROTONIX) 40 MG EC tablet 10/16/2023 at 0800  No Yes   Sig: Take 1 tablet (40 mg) by mouth daily   prochlorperazine (COMPAZINE) 5 MG tablet 10/16/2023 at 0630  No Yes   Sig: Take 1-2 tablets (5-10 mg) by mouth every 6 hours as needed for nausea or vomiting   propranolol (INDERAL) 20 MG tablet 10/16/2023 at 0800  Yes Yes   Sig: Take 20 mg by mouth 2 times daily   topiramate (TOPAMAX) 50 MG tablet 10/15/2023 at 2100  Yes Yes   Sig: Take 1 tablet by mouth every evening   venlafaxine (EFFEXOR XR) 150 MG 24 hr capsule 10/15/2023 at 2100  Yes Yes   Sig: Take 150 mg by mouth daily      Facility-Administered Medications: None        Allergies   Allergies   Allergen Reactions    Ceclor [Cefaclor] Hives     Hives as a child    Imitrex [Sumatriptan] Palpitations     Tolerated for a long period, then has one time tachycardia.  Tolerates naratriptan.         Physical Exam   Vital Signs: Temp: 99.9  F (37.7  C) Temp src: Axillary BP: (!) 89/47 (On call provider notified.) Pulse: 78   Resp: 20 SpO2: 100 % O2 Device: None (Room air)    Weight: 221 lbs 6.4 oz    Constitutional: awake, alert, cooperative, no apparent distress, and appears stated age  Respiratory: CTAB, breathing comfortably on room air, no retractions, speaking in full sentences  Cardiovascular: RRR, no murmurs, rubs, or gallops, peripheral pulses intact  GI: Tender to palpation in the epigastric region with rebound tenderness, otherwise nontender nondistended, Ellison sign negative, bowel sounds present in all quadrants, no guarding, not hyperresonant to percussion  Genitounirinary: No suprapubic tenderness  Skin: No bruising or bleeding, scar noted at port site however no erythema or warmth    Data     I have personally reviewed the following data over the past 24 hrs:    <0.1 (LL)  \   8.1 (L)   / 8 (LL)     130 (L) 99 14.2 /  131 (H)   3.5 21 (L) 0.85 \     ALT: 17 AST: 15 AP: 71 TBILI: 0.8; 0.8   ALB: 3.7 TOT PROTEIN: 6.1 (L) LIPASE: 17      INR:  1.19 (H) PTT:  43 (H)   D-dimer:  N/A Fibrinogen:  N/A     Ferritin:  N/A % Retic:  0.2 (L) LDH:  156

## 2023-10-16 NOTE — PROGRESS NOTES
BMT/Cell Therapy Daily Progress Note        Patient ID:  Jennifer Ward is a 48 year old female, currently day 4 of her therapy.       INTERVAL  HISTORY   Jennifer presents today for follow up in a wheelchair. She fells weak and fatigue. Didn't sleep well last night due to achy legs and she didn't think she could take tylenol. Nausea but not vomiting. Took a compazine this morning. Eating okay and drinking water. No dizziness. Very dry mouth.      Review of Systems: ROS negative except as noted above.     PHYSICAL EXAM         KPS:  80  ECO     Blood pressure 107/67, pulse 77, temperature 98.7  F (37.1  C), temperature source Oral, resp. rate 16, weight 99.9 kg (220 lb 3.2 oz), SpO2 99%.    General: +fatigue  Eyes: : ARMEN, sclera anicteric   Lungs: CTA bilaterally  Cardiovascular: RRR, no M/R/G   Abdominal/Rectal: +BS, soft, NT, ND  Lymphatics: no edema  Skin: Flushed face  Neuro: A&O   Additional Findings: Louis site NT, no drainage.        LABS AND IMAGING: I have assessed all abnormal lab values for their clinical significance and any values considered clinically significant have been addressed in the assessment and plan.          Lab Results   Component Value Date    WBC 1.0 (L) 10/13/2023    ANEU 1.0 (L) 10/13/2023    HGB 8.0 (L) 10/13/2023    HCT 23.9 (L) 10/13/2023    PLT 36 (LL) 10/13/2023     (L) 10/13/2023    POTASSIUM 4.4 10/13/2023    CHLORIDE 105 10/13/2023    CO2 19 (L) 10/13/2023     (H) 10/13/2023    BUN 19.4 10/13/2023    CR 0.70 10/13/2023    MAG 2.1 10/13/2023    INR 1.07 10/09/2023       Oral/GI regimen related toxicities (per NCI CTCAE v 5.0):     Oral Mucositis: none  Nausea: Grade 1  Vomiting: none  Diarrhea: none     *diarrhea secondary to C. Diff is excluded from the definition of oral/GI RRT        CTCAE Terms      Vomiting: A disorder characterized by the reflexive act of ejecting the contents of the stomach through the mouth   Grade 1 Intervention not indicated   Grade 2  Outpatient IV hydration; medical intervention indicated   Grade 3 Tube feeding, TPN, or hospitalization indicated   Grade 4 Life-threatening consequences   Grade 5 Death      Oral Mucositis: A disorder characterized by ulceration or inflammation of the oral mucosal.   Grade 1 Asymptomatic or mild symptoms; intervention not indicated   Grade 2 Moderate pain or ulcer that does not interfere with oral intake; modified diet indicated   Grade 3 Severe pain; interfering with oral intake   Grade 4 Life-threatening consequences; urgent intervention indicated   Grade 5 Death      Nausea: A disorder characterized by a queasy sensation and/or the urge to vomit.   Grade 1 Loss of appetite without alteration in eating habits  Grade 2 Oral intake decreased without significant weight loss, dehydration or malnutrition   Grade 3 Inadequate oral caloric or fluid intake; tube feeding, TPN, or hospitalization indicated   Grade 4 NA  Grade 5 NA     Diarrhea: A disorder characterized by an increase in frequency and/or loose or watery bowel movements.   Grade 1 Increase of <4 stools per day over baseline; mild increase in ostomy output compared to baseline   Grade 2 Increase of 4 - 6 stools per day over baseline; moderate increase in ostomy output compared to baseline; limiting instrumental ADL   Grade 3 Increase of >=7 stools per day over baseline; hospitalization indicated; severe increase in ostomy output compared to baseline; limiting self-care ADL    Grade 4 Life-threatening consequences; urgent intervention indicated    Grade 5 Death         Source: https://ctep.cancer.gov/protocoldevelopment/electronic_applications/docs/CTCAE_v5_Quick_Reference_8.5x11.pdf         SYSTEMS-BASED ASSESSMENT AND PLAN   Jennifer Ward is a 48 year old female, currently day 4 of her autologous hematopoietic cell transplant as rescue after BEAM therapy for Hodgkin Lymphoma. Co-enrolled on [employer] study.      Prep: BEAM     BMT  - BMT doc/Coordinator:  Carlos/Hao  - MT2016-11 with co-enrollment on MT2022-40 (E-CELERATE)  - Cell dose 7 million CD34+ cells/kg   - GCSF starts day +5, continue until ANC > 500 for 3 consecutive days.   - Restaging: next restage at day +28  - Allopurinol started Day -6     HEME/COAG  - Transfusion parameters: hgb <7g/dL and plts <10,000. Plts today  - Pancytopenia secondary to Hodgkin Lymphoma and chemotherapy     ID  Prophy: note that antimicrobial dosing is specific to protocol  Viral: ACV 400mg PO bid  Bacterial: levofloxacin 500 mg daily  Fungal: fluconazole 400mg daily  PCP: Bactrim or other PCP prophy to start at day +28     GI/NUTRITION  - Anti-emetics: zofran/dex prior to transplant to prevent chemotherapy induced n/v.  Ativan and compazine prn.  Single dose of emend 30 minutes prior to melphalan on last day of chemo prep.  - Ulcer prophy: Protonix 40mg daily.   - E-CELERATE vs placebo one hour after transplant 10/12.      #Transaminitis:  likely secondary to chemotherapy; recheck 10/12 shows resolution.      ENDOCRINE  # hypothyroidism: continue PTA synthroid     PSYCH  # depression: continue PTA bupropion, venlafaxine     NEURO  # migraines: daily topamax with prn naratriptan     FEN  1L NS todau     SKIN  # Flushed face: possibly 2/2 to chemo; no further interventions indicated.      SUPPORTIVE CARE  - PT/OT to evaluate on admission and follow as indicated.   - BMT Social work to follow.       Dispo: She [place]     Plts, 1L NS today    RETURN TO CLINIC:  Daily for gcsf. Add possible infusion tomorrow.     Known issues that I take into account for medical decisions, with salient changes to the plan considering these complexities noted above.     I spent 30 minutes in the care of this patient today, which included time necessary for preparation for the visit, obtaining history, ordering medications/tests/procedures as medically indicated, review of pertinent medical literature,  counseling of the patient, communication of recommendations to the care team, and documentation time.    Vivian Velez NP

## 2023-10-16 NOTE — NURSING NOTE
Chief Complaint   Patient presents with    Oncology Clinic Visit     BMT; nodular sclerosis Hodgkin lymphoma of lymph nodes of multiple regions     Blood Draw     Vitals taken, CVC labs drawn, heparin locked, checked into next billy     /67 (BP Location: Left arm, Patient Position: Sitting, Cuff Size: Adult Large)   Pulse 77   Temp 98.7  F (37.1  C) (Oral)   Resp 16   Wt 99.9 kg (220 lb 3.2 oz)   SpO2 99%   BMI 37.14 kg/m    Orlando Garcia RN on 10/16/2023 at 8:16 AM

## 2023-10-17 ENCOUNTER — APPOINTMENT (OUTPATIENT)
Dept: PHYSICAL THERAPY | Facility: CLINIC | Age: 48
End: 2023-10-17
Attending: PHYSICIAN ASSISTANT
Payer: COMMERCIAL

## 2023-10-17 LAB
ACANTHOCYTES BLD QL SMEAR: NORMAL
ACANTHOCYTES BLD QL SMEAR: NORMAL
ALBUMIN SERPL BCG-MCNC: 3.1 G/DL (ref 3.5–5.2)
ALBUMIN UR-MCNC: 20 MG/DL
ALP SERPL-CCNC: 55 U/L (ref 35–104)
ALT SERPL W P-5'-P-CCNC: 13 U/L (ref 0–50)
ANION GAP SERPL CALCULATED.3IONS-SCNC: 10 MMOL/L (ref 7–15)
ANION GAP SERPL CALCULATED.3IONS-SCNC: 7 MMOL/L (ref 7–15)
APPEARANCE UR: CLEAR
AST SERPL W P-5'-P-CCNC: 12 U/L (ref 0–45)
AUER BODIES BLD QL SMEAR: NORMAL
AUER BODIES BLD QL SMEAR: NORMAL
BASO STIPL BLD QL SMEAR: NORMAL
BASO STIPL BLD QL SMEAR: NORMAL
BASO+EOS+MONOS # BLD AUTO: ABNORMAL 10*3/UL
BASO+EOS+MONOS # BLD AUTO: ABNORMAL 10*3/UL
BASO+EOS+MONOS NFR BLD AUTO: ABNORMAL %
BASO+EOS+MONOS NFR BLD AUTO: ABNORMAL %
BASOPHILS # BLD AUTO: ABNORMAL 10*3/UL
BASOPHILS # BLD AUTO: ABNORMAL 10*3/UL
BASOPHILS NFR BLD AUTO: ABNORMAL %
BASOPHILS NFR BLD AUTO: ABNORMAL %
BILIRUB DIRECT SERPL-MCNC: <0.2 MG/DL (ref 0–0.3)
BILIRUB SERPL-MCNC: 0.5 MG/DL
BILIRUB UR QL STRIP: NEGATIVE
BITE CELLS BLD QL SMEAR: NORMAL
BITE CELLS BLD QL SMEAR: NORMAL
BLD PROD TYP BPU: NORMAL
BLD PROD TYP BPU: NORMAL
BLISTER CELLS BLD QL SMEAR: NORMAL
BLISTER CELLS BLD QL SMEAR: NORMAL
BLOOD COMPONENT TYPE: NORMAL
BLOOD COMPONENT TYPE: NORMAL
BUN SERPL-MCNC: 10.8 MG/DL (ref 6–20)
BUN SERPL-MCNC: 11 MG/DL (ref 6–20)
BURR CELLS BLD QL SMEAR: NORMAL
BURR CELLS BLD QL SMEAR: NORMAL
C DIFF TOX B STL QL: NEGATIVE
C PNEUM DNA SPEC QL NAA+PROBE: NOT DETECTED
CALCIUM SERPL-MCNC: 7.8 MG/DL (ref 8.6–10)
CALCIUM SERPL-MCNC: 7.8 MG/DL (ref 8.6–10)
CHLORIDE SERPL-SCNC: 104 MMOL/L (ref 98–107)
CHLORIDE SERPL-SCNC: 104 MMOL/L (ref 98–107)
CODING SYSTEM: NORMAL
CODING SYSTEM: NORMAL
COLOR UR AUTO: YELLOW
CREAT SERPL-MCNC: 0.74 MG/DL (ref 0.51–0.95)
CREAT SERPL-MCNC: 0.75 MG/DL (ref 0.51–0.95)
CROSSMATCH: NORMAL
DACRYOCYTES BLD QL SMEAR: NORMAL
DACRYOCYTES BLD QL SMEAR: NORMAL
DEPRECATED HCO3 PLAS-SCNC: 19 MMOL/L (ref 22–29)
DEPRECATED HCO3 PLAS-SCNC: 21 MMOL/L (ref 22–29)
EGFRCR SERPLBLD CKD-EPI 2021: >90 ML/MIN/1.73M2
EGFRCR SERPLBLD CKD-EPI 2021: >90 ML/MIN/1.73M2
ELLIPTOCYTES BLD QL SMEAR: NORMAL
ELLIPTOCYTES BLD QL SMEAR: NORMAL
EOSINOPHIL # BLD AUTO: ABNORMAL 10*3/UL
EOSINOPHIL # BLD AUTO: ABNORMAL 10*3/UL
EOSINOPHIL NFR BLD AUTO: ABNORMAL %
EOSINOPHIL NFR BLD AUTO: ABNORMAL %
ERYTHROCYTE [DISTWIDTH] IN BLOOD BY AUTOMATED COUNT: 13.1 % (ref 10–15)
ERYTHROCYTE [DISTWIDTH] IN BLOOD BY AUTOMATED COUNT: 13.3 % (ref 10–15)
FLUAV H1 2009 PAND RNA SPEC QL NAA+PROBE: NOT DETECTED
FLUAV H1 RNA SPEC QL NAA+PROBE: NOT DETECTED
FLUAV H3 RNA SPEC QL NAA+PROBE: NOT DETECTED
FLUAV RNA SPEC QL NAA+PROBE: NOT DETECTED
FLUBV RNA SPEC QL NAA+PROBE: NOT DETECTED
FRAGMENTS BLD QL SMEAR: NORMAL
FRAGMENTS BLD QL SMEAR: NORMAL
GLUCOSE SERPL-MCNC: 107 MG/DL (ref 70–99)
GLUCOSE SERPL-MCNC: 109 MG/DL (ref 70–99)
GLUCOSE UR STRIP-MCNC: NEGATIVE MG/DL
HADV DNA SPEC QL NAA+PROBE: NOT DETECTED
HCOV PNL SPEC NAA+PROBE: NOT DETECTED
HCT VFR BLD AUTO: 19.2 % (ref 35–47)
HCT VFR BLD AUTO: 21.4 % (ref 35–47)
HGB BLD-MCNC: 6.5 G/DL (ref 11.7–15.7)
HGB BLD-MCNC: 7.1 G/DL (ref 11.7–15.7)
HGB C CRYSTALS: NORMAL
HGB C CRYSTALS: NORMAL
HGB UR QL STRIP: NEGATIVE
HMPV RNA SPEC QL NAA+PROBE: NOT DETECTED
HOWELL-JOLLY BOD BLD QL SMEAR: NORMAL
HOWELL-JOLLY BOD BLD QL SMEAR: NORMAL
HPIV1 RNA SPEC QL NAA+PROBE: NOT DETECTED
HPIV2 RNA SPEC QL NAA+PROBE: NOT DETECTED
HPIV3 RNA SPEC QL NAA+PROBE: NOT DETECTED
HPIV4 RNA SPEC QL NAA+PROBE: NOT DETECTED
IMM GRANULOCYTES # BLD: ABNORMAL 10*3/UL
IMM GRANULOCYTES # BLD: ABNORMAL 10*3/UL
IMM GRANULOCYTES NFR BLD: ABNORMAL %
IMM GRANULOCYTES NFR BLD: ABNORMAL %
ISSUE DATE AND TIME: NORMAL
ISSUE DATE AND TIME: NORMAL
KETONES UR STRIP-MCNC: NEGATIVE MG/DL
LDH SERPL L TO P-CCNC: 120 U/L (ref 0–250)
LEUKOCYTE ESTERASE UR QL STRIP: NEGATIVE
LYMPHOCYTES # BLD AUTO: ABNORMAL 10*3/UL
LYMPHOCYTES # BLD AUTO: ABNORMAL 10*3/UL
LYMPHOCYTES NFR BLD AUTO: ABNORMAL %
LYMPHOCYTES NFR BLD AUTO: ABNORMAL %
M PNEUMO DNA SPEC QL NAA+PROBE: NOT DETECTED
MAGNESIUM SERPL-MCNC: 1.7 MG/DL (ref 1.7–2.3)
MCH RBC QN AUTO: 28 PG (ref 26.5–33)
MCH RBC QN AUTO: 28.1 PG (ref 26.5–33)
MCHC RBC AUTO-ENTMCNC: 33.2 G/DL (ref 31.5–36.5)
MCHC RBC AUTO-ENTMCNC: 33.9 G/DL (ref 31.5–36.5)
MCV RBC AUTO: 83 FL (ref 78–100)
MCV RBC AUTO: 85 FL (ref 78–100)
MONOCYTES # BLD AUTO: ABNORMAL 10*3/UL
MONOCYTES # BLD AUTO: ABNORMAL 10*3/UL
MONOCYTES NFR BLD AUTO: ABNORMAL %
MONOCYTES NFR BLD AUTO: ABNORMAL %
MRSA DNA SPEC QL NAA+PROBE: NEGATIVE
MUCOUS THREADS #/AREA URNS LPF: PRESENT /LPF
NEUTROPHILS # BLD AUTO: ABNORMAL 10*3/UL
NEUTROPHILS # BLD AUTO: ABNORMAL 10*3/UL
NEUTROPHILS NFR BLD AUTO: ABNORMAL %
NEUTROPHILS NFR BLD AUTO: ABNORMAL %
NEUTS HYPERSEG BLD QL SMEAR: NORMAL
NEUTS HYPERSEG BLD QL SMEAR: NORMAL
NITRATE UR QL: NEGATIVE
PH UR STRIP: 6 [PH] (ref 5–7)
PHOSPHATE SERPL-MCNC: 1.9 MG/DL (ref 2.5–4.5)
PHOSPHATE SERPL-MCNC: 3 MG/DL (ref 2.5–4.5)
PLAT MORPH BLD: NORMAL
PLAT MORPH BLD: NORMAL
PLATELET # BLD AUTO: 11 10E3/UL (ref 150–450)
PLATELET # BLD AUTO: 7 10E3/UL (ref 150–450)
POLYCHROMASIA BLD QL SMEAR: NORMAL
POLYCHROMASIA BLD QL SMEAR: NORMAL
POTASSIUM SERPL-SCNC: 3.2 MMOL/L (ref 3.4–5.3)
POTASSIUM SERPL-SCNC: 3.2 MMOL/L (ref 3.4–5.3)
POTASSIUM SERPL-SCNC: 4.1 MMOL/L (ref 3.4–5.3)
PROT SERPL-MCNC: 5.1 G/DL (ref 6.4–8.3)
RADIOLOGIST FLAGS: ABNORMAL
RBC # BLD AUTO: 2.32 10E6/UL (ref 3.8–5.2)
RBC # BLD AUTO: 2.53 10E6/UL (ref 3.8–5.2)
RBC AGGLUT BLD QL: NORMAL
RBC AGGLUT BLD QL: NORMAL
RBC MORPH BLD: NORMAL
RBC MORPH BLD: NORMAL
RBC URINE: 1 /HPF
RETICS # AUTO: 0.01 10E6/UL (ref 0.03–0.1)
RETICS/RBC NFR AUTO: 0.3 % (ref 0.5–2)
ROULEAUX BLD QL SMEAR: NORMAL
ROULEAUX BLD QL SMEAR: NORMAL
RSV RNA SPEC QL NAA+PROBE: NOT DETECTED
RSV RNA SPEC QL NAA+PROBE: NOT DETECTED
RV+EV RNA SPEC QL NAA+PROBE: NOT DETECTED
SA TARGET DNA: NEGATIVE
SARS-COV-2 RNA RESP QL NAA+PROBE: NEGATIVE
SICKLE CELLS BLD QL SMEAR: NORMAL
SICKLE CELLS BLD QL SMEAR: NORMAL
SMUDGE CELLS BLD QL SMEAR: NORMAL
SMUDGE CELLS BLD QL SMEAR: NORMAL
SODIUM SERPL-SCNC: 132 MMOL/L (ref 135–145)
SODIUM SERPL-SCNC: 133 MMOL/L (ref 135–145)
SP GR UR STRIP: 1.02 (ref 1–1.03)
SPHEROCYTES BLD QL SMEAR: NORMAL
SPHEROCYTES BLD QL SMEAR: NORMAL
SQUAMOUS EPITHELIAL: 7 /HPF
STOMATOCYTES BLD QL SMEAR: NORMAL
STOMATOCYTES BLD QL SMEAR: NORMAL
TARGETS BLD QL SMEAR: NORMAL
TARGETS BLD QL SMEAR: NORMAL
TOXIC GRANULES BLD QL SMEAR: NORMAL
TOXIC GRANULES BLD QL SMEAR: NORMAL
UNIT ABO/RH: NORMAL
UNIT ABO/RH: NORMAL
UNIT NUMBER: NORMAL
UNIT NUMBER: NORMAL
UNIT STATUS: NORMAL
UNIT STATUS: NORMAL
UNIT TYPE ISBT: 7300
UNIT TYPE ISBT: 7300
UROBILINOGEN UR STRIP-MCNC: NORMAL MG/DL
VARIANT LYMPHS BLD QL SMEAR: NORMAL
VARIANT LYMPHS BLD QL SMEAR: NORMAL
WBC # BLD AUTO: <0.1 10E3/UL (ref 4–11)
WBC # BLD AUTO: <0.1 10E3/UL (ref 4–11)
WBC URINE: 3 /HPF

## 2023-10-17 PROCEDURE — 250N000013 HC RX MED GY IP 250 OP 250 PS 637: Performed by: PHYSICIAN ASSISTANT

## 2023-10-17 PROCEDURE — 85027 COMPLETE CBC AUTOMATED: CPT | Performed by: PHYSICIAN ASSISTANT

## 2023-10-17 PROCEDURE — P9037 PLATE PHERES LEUKOREDU IRRAD: HCPCS | Performed by: PHYSICIAN ASSISTANT

## 2023-10-17 PROCEDURE — 250N000011 HC RX IP 250 OP 636: Performed by: PHYSICIAN ASSISTANT

## 2023-10-17 PROCEDURE — 85045 AUTOMATED RETICULOCYTE COUNT: CPT | Performed by: PHYSICIAN ASSISTANT

## 2023-10-17 PROCEDURE — 87493 C DIFF AMPLIFIED PROBE: CPT | Performed by: PHYSICIAN ASSISTANT

## 2023-10-17 PROCEDURE — 84100 ASSAY OF PHOSPHORUS: CPT | Performed by: INTERNAL MEDICINE

## 2023-10-17 PROCEDURE — 82247 BILIRUBIN TOTAL: CPT | Performed by: PHYSICIAN ASSISTANT

## 2023-10-17 PROCEDURE — 250N000009 HC RX 250: Performed by: HOSPITALIST

## 2023-10-17 PROCEDURE — 84100 ASSAY OF PHOSPHORUS: CPT | Performed by: PHYSICIAN ASSISTANT

## 2023-10-17 PROCEDURE — 97530 THERAPEUTIC ACTIVITIES: CPT | Mod: GP | Performed by: PHYSICAL THERAPIST

## 2023-10-17 PROCEDURE — 87081 CULTURE SCREEN ONLY: CPT | Performed by: PHYSICIAN ASSISTANT

## 2023-10-17 PROCEDURE — 250N000011 HC RX IP 250 OP 636: Performed by: HOSPITALIST

## 2023-10-17 PROCEDURE — 81001 URINALYSIS AUTO W/SCOPE: CPT | Performed by: PHYSICIAN ASSISTANT

## 2023-10-17 PROCEDURE — 84132 ASSAY OF SERUM POTASSIUM: CPT | Performed by: HOSPITALIST

## 2023-10-17 PROCEDURE — 206N000001 HC R&B BMT UMMC

## 2023-10-17 PROCEDURE — 99233 SBSQ HOSP IP/OBS HIGH 50: CPT | Mod: FS | Performed by: PHYSICIAN ASSISTANT

## 2023-10-17 PROCEDURE — 258N000003 HC RX IP 258 OP 636: Performed by: PHYSICIAN ASSISTANT

## 2023-10-17 PROCEDURE — 80053 COMPREHEN METABOLIC PANEL: CPT | Performed by: PHYSICIAN ASSISTANT

## 2023-10-17 PROCEDURE — 83615 LACTATE (LD) (LDH) ENZYME: CPT | Performed by: PHYSICIAN ASSISTANT

## 2023-10-17 PROCEDURE — 87635 SARS-COV-2 COVID-19 AMP PRB: CPT | Performed by: PHYSICIAN ASSISTANT

## 2023-10-17 PROCEDURE — 258N000003 HC RX IP 258 OP 636: Performed by: HOSPITALIST

## 2023-10-17 PROCEDURE — 250N000013 HC RX MED GY IP 250 OP 250 PS 637: Performed by: INTERNAL MEDICINE

## 2023-10-17 PROCEDURE — 97162 PT EVAL MOD COMPLEX 30 MIN: CPT | Mod: GP | Performed by: PHYSICAL THERAPIST

## 2023-10-17 PROCEDURE — 82248 BILIRUBIN DIRECT: CPT | Performed by: PHYSICIAN ASSISTANT

## 2023-10-17 PROCEDURE — P9040 RBC LEUKOREDUCED IRRADIATED: HCPCS | Performed by: PHYSICIAN ASSISTANT

## 2023-10-17 PROCEDURE — 83735 ASSAY OF MAGNESIUM: CPT | Performed by: INTERNAL MEDICINE

## 2023-10-17 RX ORDER — POTASSIUM CHLORIDE 29.8 MG/ML
20 INJECTION INTRAVENOUS
Status: COMPLETED | OUTPATIENT
Start: 2023-10-17 | End: 2023-10-17

## 2023-10-17 RX ORDER — POTASSIUM PHOS IN 0.9 % NACL 15MMOL/250
15 PLASTIC BAG, INJECTION (ML) INTRAVENOUS
Qty: 500 ML | Refills: 0 | Status: COMPLETED | OUTPATIENT
Start: 2023-10-17 | End: 2023-10-17

## 2023-10-17 RX ORDER — SODIUM CHLORIDE AND POTASSIUM CHLORIDE 150; 900 MG/100ML; MG/100ML
INJECTION, SOLUTION INTRAVENOUS CONTINUOUS
Status: DISCONTINUED | OUTPATIENT
Start: 2023-10-17 | End: 2023-10-20

## 2023-10-17 RX ORDER — DIPHENHYDRAMINE HYDROCHLORIDE AND LIDOCAINE HYDROCHLORIDE AND ALUMINUM HYDROXIDE AND MAGNESIUM HYDRO
5 KIT EVERY 6 HOURS PRN
Status: DISCONTINUED | OUTPATIENT
Start: 2023-10-17 | End: 2023-10-27 | Stop reason: HOSPADM

## 2023-10-17 RX ORDER — DEXTROSE MONOHYDRATE 50 MG/ML
10-20 INJECTION, SOLUTION INTRAVENOUS
Status: DISCONTINUED | OUTPATIENT
Start: 2023-10-17 | End: 2023-10-23

## 2023-10-17 RX ADMIN — CEFEPIME HYDROCHLORIDE 2 G: 2 INJECTION, POWDER, FOR SOLUTION INTRAVENOUS at 01:08

## 2023-10-17 RX ADMIN — CEFEPIME HYDROCHLORIDE 2 G: 2 INJECTION, POWDER, FOR SOLUTION INTRAVENOUS at 17:39

## 2023-10-17 RX ADMIN — POTASSIUM CHLORIDE 20 MEQ: 29.8 INJECTION, SOLUTION INTRAVENOUS at 05:02

## 2023-10-17 RX ADMIN — METRONIDAZOLE 500 MG: 5 INJECTION, SOLUTION INTRAVENOUS at 23:24

## 2023-10-17 RX ADMIN — TOPIRAMATE 50 MG: 50 TABLET, FILM COATED ORAL at 20:16

## 2023-10-17 RX ADMIN — BUPROPION HYDROCHLORIDE 150 MG: 150 TABLET, FILM COATED, EXTENDED RELEASE ORAL at 08:28

## 2023-10-17 RX ADMIN — METRONIDAZOLE 500 MG: 5 INJECTION, SOLUTION INTRAVENOUS at 07:21

## 2023-10-17 RX ADMIN — FLUCONAZOLE 400 MG: 200 TABLET ORAL at 08:28

## 2023-10-17 RX ADMIN — HYDROMORPHONE HYDROCHLORIDE 4 MG: 2 TABLET ORAL at 01:07

## 2023-10-17 RX ADMIN — DEXTROSE MONOHYDRATE 20 ML: 50 INJECTION, SOLUTION INTRAVENOUS at 20:00

## 2023-10-17 RX ADMIN — DEXTROSE MONOHYDRATE 480 MCG: 50 INJECTION, SOLUTION INTRAVENOUS at 20:22

## 2023-10-17 RX ADMIN — HYDROMORPHONE HYDROCHLORIDE 2 MG: 2 TABLET ORAL at 09:34

## 2023-10-17 RX ADMIN — POTASSIUM CHLORIDE AND SODIUM CHLORIDE: 900; 150 INJECTION, SOLUTION INTRAVENOUS at 08:30

## 2023-10-17 RX ADMIN — DEXTROSE MONOHYDRATE 20 ML: 50 INJECTION, SOLUTION INTRAVENOUS at 20:23

## 2023-10-17 RX ADMIN — VENLAFAXINE HYDROCHLORIDE 150 MG: 37.5 CAPSULE, EXTENDED RELEASE ORAL at 23:18

## 2023-10-17 RX ADMIN — POTASSIUM PHOSPHATE, MONOBASIC POTASSIUM PHOSPHATE, DIBASIC 15 MMOL: 224; 236 INJECTION, SOLUTION, CONCENTRATE INTRAVENOUS at 09:34

## 2023-10-17 RX ADMIN — HYDROMORPHONE HYDROCHLORIDE 2 MG: 2 TABLET ORAL at 08:36

## 2023-10-17 RX ADMIN — MICONAZOLE NITRATE: 20 POWDER TOPICAL at 20:16

## 2023-10-17 RX ADMIN — PANTOPRAZOLE SODIUM 40 MG: 40 TABLET, DELAYED RELEASE ORAL at 08:28

## 2023-10-17 RX ADMIN — POTASSIUM PHOSPHATE, MONOBASIC POTASSIUM PHOSPHATE, DIBASIC 15 MMOL: 224; 236 INJECTION, SOLUTION, CONCENTRATE INTRAVENOUS at 07:21

## 2023-10-17 RX ADMIN — POTASSIUM CHLORIDE AND SODIUM CHLORIDE: 900; 150 INJECTION, SOLUTION INTRAVENOUS at 08:31

## 2023-10-17 RX ADMIN — METRONIDAZOLE 500 MG: 5 INJECTION, SOLUTION INTRAVENOUS at 14:59

## 2023-10-17 RX ADMIN — HYDROMORPHONE HYDROCHLORIDE 2 MG: 2 TABLET ORAL at 21:37

## 2023-10-17 RX ADMIN — CEFEPIME HYDROCHLORIDE 2 G: 2 INJECTION, POWDER, FOR SOLUTION INTRAVENOUS at 08:29

## 2023-10-17 RX ADMIN — HYDROMORPHONE HYDROCHLORIDE 2 MG: 2 TABLET ORAL at 20:25

## 2023-10-17 RX ADMIN — ACYCLOVIR 400 MG: 400 TABLET ORAL at 20:16

## 2023-10-17 RX ADMIN — ACYCLOVIR 400 MG: 400 TABLET ORAL at 08:28

## 2023-10-17 RX ADMIN — LEVOTHYROXINE SODIUM 75 MCG: 75 TABLET ORAL at 08:28

## 2023-10-17 RX ADMIN — POTASSIUM CHLORIDE 20 MEQ: 29.8 INJECTION, SOLUTION INTRAVENOUS at 06:08

## 2023-10-17 ASSESSMENT — ACTIVITIES OF DAILY LIVING (ADL)
ADLS_ACUITY_SCORE: 18

## 2023-10-17 NOTE — CONSULTS
Care Management Initial Consult    General Information  Assessment completed with: Patient, Patient  Type of CM/SW Visit: Compliance    Primary Care Provider verified and updated as needed: No   Readmission within the last 30 days:  (BMT complication)      Reason for Consult: other (see comments) (BMT)  Advance Care Planning: Advance Care Planning Reviewed: no concerns identified       Communication Assessment  Patient's communication style: spoken language (English or Bilingual)    Hearing Difficulty or Deaf: no   Wear Glasses or Blind: no    Cognitive  Cognitive/Neuro/Behavioral: WDL                      Living Environment:   People in home: child(louis), adult, spouse   (Philip and Jennifer)  Current living Arrangements: house      Able to return to prior arrangements: yes  Living Arrangement Comments: Currently staying local at [address] [facility]    Family/Social Support:  Care provided by: spouse/significant other, parent(s)  Provides care for: no one  Marital Status:     Philip       Description of Support System: Supportive    Support Assessment: Adequate family and caregiver support    Current Resources:   Patient receiving home care services: No     Community Resources: None  Equipment currently used at home: none  Supplies currently used at home: None    Employment/Financial:  Employment Status: employed full-time        Financial Concerns: none   Referral to Financial Worker: No     Does the patient's insurance plan have a 3 day qualifying hospital stay waiver?  No    Lifestyle & Psychosocial Needs:  Social Determinants of Health     Food Insecurity: Not on file   Depression: Not at risk (9/21/2023)    PHQ-2     PHQ-2 Score: 2   Housing Stability: Not on file   Tobacco Use: Low Risk  (10/5/2023)    Patient History     Smoking Tobacco Use: Never     Smokeless Tobacco Use: Never     Passive Exposure: Never   Financial Resource Strain: Not on file   Alcohol Use: Not on file   Transportation Needs:  Not on file   Physical Activity: Not on file   Interpersonal Safety: Not on file   Stress: Not on file   Social Connections: Not on file       Functional Status:  Prior to admission patient needed assistance:   Dependent ADLs:: Independent  Dependent IADLs::  (IADL assist required post bmt)  Assesssment of Functional Status: Not at  functional baseline    Mental Health Status:  Mental Health Status: Past Concern       Chemical Dependency Status:  Chemical Dependency Status: No Current Concerns           Values/Beliefs:  Spiritual, Cultural Beliefs, Sikhism Practices, Values that affect care: yes          Values/Beliefs Comment:  (Mosque)    Additional Information:  Pt is a 49 y/o female s/p autologous transplant, day +5 , readmitted due to neutropenic fever.    Please see initial BMT psychosocial assessment for additional details. Pt is well known to SW and BMT program in the transplant process.    DUONG Baker, [facility]  Adult Blood & Marrow Transplant   Phone: [phone]  Pager: [phone]

## 2023-10-17 NOTE — PROGRESS NOTES
10/17/23 1532   Appointment Info   Signing Clinician's Name / Credentials (PT) Fern Astudillo, PT, DPT   Rehab Comments (PT) auto   Living Environment   People in Home child(louis), adult;spouse   Current Living Arrangements house   Home Accessibility stairs within home   Number of Stairs, Within Home, Primary ten   Stair Railings, Within Home, Primary railings safe and in good condition;railings on both sides of stairs   Transportation Anticipated family or friend will provide   Living Environment Comments Pt reports she lives with her spouse and 21 y.o. son in a split-level home with ~ 2 sets of 5 stairs, bilateral railings. States she has a standard toilet, standard bed, and tub-shower with no grab bars. Pt planning to discharge to local [facility] on [facility]. Pt reports ramp enterance and elevator access. Pt has a walk in shower and tub shower in apt. Pt reports having access to a wheelchair at apartments, will also have a vehicle for family to transport if needed.   Self-Care   Usual Activity Tolerance good   Current Activity Tolerance fair   Regular Exercise No   Equipment Currently Used at Home none   Fall history within last six months no   Activity/Exercise/Self-Care Comment Pt reports a 3-4 day decline in activity and LE strength.Prior to hospital re-admission, needed wheelchair transport from apt to car. No longer able to ambulate community distances to clinic appointments.   General Information   Onset of Illness/Injury or Date of Surgery 10/16/23   Referring Physician MARIE Lindo CNP   Patient/Family Therapy Goals Statement (PT) none stated   Pertinent History of Current Problem (include personal factors and/or comorbidities that impact the POC) Pt is a 48 year old female with history of relapsed Hodgkin lymphoma, currently day +5 of consolidative BEAM auto. Re-admitted 10/16 with neutropenic fever, mucositis, and abdominal pain.   Existing Precautions/Restrictions  immunosuppressed;fall  (thrombocytopenia)   General Observations Activity: Ambulate BMT Adult- Autologous, Allogeneic or Cellular Therapy   Cognition   Affect/Mental Status (Cognition) other (see comments)   Orientation Status (Cognition) other (see comments)   Follows Commands (Cognition) WNL   Memory Deficit (Cognition) short-term memory   Cognitive Status Comments Pt initially states her birth year as 1974, able to self correct to state correct year (1975). Pt initially stating current year as 1920, self corrects to correct year. Able to state month/sate correctly. Difficulty with short term memory when discussing the role of hgb in the body. Pt reporting increased fatigues and naps, snoring and talking in her sleep,may be due to medication side effect.   Pain Assessment   Patient Currently in Pain No   Integumentary/Edema   Integumentary/Edema no deficits were identifed   Posture    Posture Protracted shoulders;Forward head position   Range of Motion (ROM)   ROM Comment BUE/BLE WFL   Strength (Manual Muscle Testing)   Strength Comments BUE/BLE strength >3/5 per observation of functional mobility, no MMT 2/2 low plt count.   Bed Mobility   Comment, (Bed Mobility) Supine>sitting EOB with mod I, use of bed railing   Transfers   Comment, (Transfers) Sit<>stand SBA, pt pulling on IV pole to stand for support   Gait/Stairs (Locomotion)   Comment, (Gait/Stairs) Pt ambulated 20 ft with no AD, CGA. Pt ambulates with decreased step length, foot clearance and behzad.   Balance   Balance Comments Normal ELIGIO EO/EC x 10 seconds with SBA, mild postural sway without LOB with EC. Pt able to march in place at bedside with SBA.   Sensory Examination   Sensory Perception patient reports no sensory changes   Coordination   Coordination no deficits were identified   Clinical Impression   Criteria for Skilled Therapeutic Intervention Yes, treatment indicated   PT Diagnosis (PT) Impaired functional mobility   Influenced by the  following impairments Decreased strength and activity tolerance, impaired balance   Functional limitations due to impairments Difficulty with bed mobility, transfers, ambulation, stairs.   Clinical Presentation (PT Evaluation Complexity) evolving   Clinical Presentation Rationale medical status, clinical judgement   Clinical Decision Making (Complexity) moderate complexity   Planned Therapy Interventions (PT) balance training;bed mobility training;gait training;home exercise program;neuromuscular re-education;strengthening;stair training;patient/family education;transfer training;progressive activity/exercise;risk factor education;home program guidelines   Risk & Benefits of therapy have been explained evaluation/treatment results reviewed;care plan/treatment goals reviewed;risks/benefits reviewed;participants voiced agreement with care plan;participants included;patient   PT Total Evaluation Time   PT Eval, Moderate Complexity Minutes (95791) 15   Physical Therapy Goals   PT Frequency 4x/week   PT Predicted Duration/Target Date for Goal Attainment 10/31/23   PT Goals Bed Mobility;Transfers;Gait;Stairs;Aerobic Activity;PT Goal 1;PT Goal 2   PT: Bed Mobility Independent;Supine to/from sit   PT: Transfers Independent;Sit to/from stand   PT: Gait Independent;Greater than 200 feet   PT: Stairs Modified independent;10 stairs   PT: Perform aerobic activity with stable cardiovascular response continuous activity;20 minutes;ambulation;NuStep   PT: Goal 1 Independently verbalize and demonstrate awareness of platelet, hemoglobin and neutropenic precautions as they impact exercises and functional mobility   PT: Goal 2 Demonstrate low falls risk with a balance assessment score of < 13.5 seconds on TUG,  > 19/24 on Dynamic Gait Index, > 9/12 on 4-Item Dynamic Gait Index, >22/30 on Functional Gait Assessment, or > 45/56 on Gruber Balance Assessment.   PT Discharge Planning   PT Plan Re-assess orientation/cognition, progress  ambulation, stairs, ask pt to demonstrate HEPs, review lab value education.   PT Discharge Recommendation (DC Rec) home with assist;home with outpatient physical therapy   PT Rationale for DC Rec Pt is below baseline due to decreased activity tolerance and LE strength. Anticipate safe discharge to local lodging with assist. Would benefit from OP cancer rehab.   PT Brief overview of current status IND in room, SBA when symptomatic with dizziness. CGA with GB for hallway ambulation   PT Equipment Needed at Discharge shower chair   Total Session Time   Total Session Time (sum of timed and untimed services) 15

## 2023-10-17 NOTE — PROGRESS NOTES
"[id number]  Pt noted to be alert and oriented x4.  Pt noted to be hypotensive during orthostatic b/p , on call provider notified. 1000cc n/s over 4hrs initiated.  Pt c/o abd pain, per pt pain located mid abdomen, per pt the pain feels as if her \"guts are being torn apart\".  On call provider notified, abd CT ordered.  Pt c/o a little nausea, on call  provider notified since pt currently has ativan, and it has potential for hypotension.  Report given to CORNEL Patel.  Pt indicated she would like to perform double skin check tomorrow.  Pt aware she is to provide a stool sample.    "

## 2023-10-17 NOTE — PROGRESS NOTES
"BMT/Cell Therapy Daily Progress Note   10/17/2023    Patient ID:  Jennifer Ward is a 48 year old female, currently day 5 of her therapy.    Admission date: 10/16/2023    INTERVAL  HISTORY   Jennifer is having significant abdominal pain.  Stools are loose, but not watery.  Her throat hurts.  Since getting oral dilaudid for abdominal pain, she has noticed that she is waking herself up snoring, and she wakes up talking to people in the room.  When she opens her eyes, the person/people she thought she was talking to aren't there.      Review of Systems: ROS negative except as noted above.      PHYSICAL EXAM     Weight In/Out     Wt Readings from Last 3 Encounters:   10/17/23 101.5 kg (223 lb 11.2 oz)   10/16/23 99.9 kg (220 lb 3.2 oz)   10/13/23 101.1 kg (222 lb 12.8 oz)      I/O last 3 completed shifts:  In: 1640 [P.O.:240; I.V.:400; IV Piggyback:1000]  Out: 400 [Urine:400]       KPS:  70    /73 (BP Location: Left arm, Cuff Size: Adult Regular)   Pulse 79   Temp 99.1  F (37.3  C) (Axillary)   Resp 18   Ht 1.64 m (5' 4.57\")   Wt 101.5 kg (223 lb 11.2 oz)   SpO2 99%   BMI 37.73 kg/m       General: NAD   Eyes: : ARMEN, sclera anicteric   Nose/Mouth/Throat: sore on left anterolateral tongue  Lungs: CTA bilaterally  Cardiovascular: RRR, no M/R/G   Abdominal/Rectal: +BS, soft but pain with palpation of LUQ, LLQ, RLQ; rebound pain in bilateral lower quadrants  Lymphatics: no edema  Skin: no rashes or petechiae  Neuro: A&O   Additional Findings: Louis site NT, no drainage.    LABS AND IMAGING: I have assessed all abnormal lab values for their clinical significance and any values considered clinically significant have been addressed in the assessment and plan.        Lab Results   Component Value Date    WBC <0.1 (LL) 10/17/2023    ANEU 1.0 (L) 10/13/2023    HGB 6.5 (LL) 10/17/2023    HCT 19.2 (L) 10/17/2023    PLT 11 (LL) 10/17/2023     (L) 10/17/2023    POTASSIUM 3.2 (L) 10/17/2023    CHLORIDE 104 10/17/2023 "    CO2 21 (L) 10/17/2023     (H) 10/17/2023    BUN 10.8 10/17/2023    CR 0.75 10/17/2023    MAG 1.7 10/17/2023    INR 1.19 (H) 10/16/2023       SYSTEMS-BASED ASSESSMENT AND PLAN     Jennifer Ward is a 48 year old female, currently day 5  of her autologous hematopoietic cell transplant as rescue after BEAM therapy for Hodgkin Lymphoma. Co-enrolled on E-CELERATE study.     Admitted 10/16/23 with neutropenic fever.   Prep: BEAM     BMT  - BMT doc/Coordinator: Nay  - MT2016-11 with co-enrollment on MT2022-40 (E-CELERATE)  - Cell dose 7 million CD34+ cells/kg   - GCSF starts day +5, continue until ANC > 500 for 3 consecutive days.   - Restaging: next restage at day +28  - Allopurinol started Day -6     HEME/COAG  - Transfusion parameters: hgb <7g/dL and plts <10,000.   - Pancytopenia secondary to Hodgkin Lymphoma and chemotherapy     ID  Prophy: note that antimicrobial dosing is specific to protocol  Viral: ACV 400mg PO bid  Fungal: fluconazole 400mg daily  PCP: Bactrim or other PCP prophy to start at day +28     Neutropenic Fever of Unclear Etiology  ID workup unrevealing thusfar. Send covid 19 testing 10/17.  Monitor cultures until final.   Continue empiric cefepime + flagyl, given abdominal pain.  Final read of CT abd/pelvis is pending.  Surgical consult ordered overnight, as overnight concern for evolving SBO.     GI  - Anti-emetics: ativan, compazine prn.   - Ulcer prophy: Protonix 40mg daily.   - E-CELERATE vs placebo administered 10/12.  - oropharyngeal mucositis: chloraseptic spray; dab MMW on sores.     NUTRITION/FLUIDS  - NPO overnight; advance to clear liquid diet  - NS with 20meq potassium at 100ml/hour for hydration, hypotension    ENDOCRINE  # hypothyroidism: continue PTA synthroid     PSYCH  # depression: continue PTA bupropion, venlafaxine     NEURO  # migraines: daily topamax with prn naratriptan; propranolol prophy placed on hold with hypotension after admission.      FEN  -1 L NS today  PTA; no further intervention     SKIN  # Flushed face: possibly 2/2 to chemo; no further interventions indicated.      SUPPORTIVE CARE  - PT/OT consult, will follow as indicated  - BMT Social work to follow.    - Ordered AquaK Heating pad      DVT Prophylaxis: none, thrombocytopenic  Estrada Catheter: Not present  Fluids: None  Lines: PRESENT      CVC Double Lumen Left Internal jugular Tunneled-Site Assessment: WDL      Cardiac Monitoring: None  Code Status: Full Code         Known issues that I take into account for medical decisions, with salient changes to the plan considering these complexities noted above.    Patient Active Problem List   Diagnosis    Hodgkin's disease of lymph nodes of multiple sites (H)    Autologous donor of stem cells    Nodular sclerosis Hodgkin lymphoma of lymph nodes of multiple regions (H)    Febrile neutropenia          I spent __30__ minutes face-to-face or coordinating care of Jennifer Ward. Over 50% of our time on the unit was spent counseling the patient and/or coordinating care.       Helen Felix PA-C

## 2023-10-17 NOTE — PROGRESS NOTES
Oral/GI regimen related toxicities (per NCI CTCAE v 5.0):    Oral Mucositis: Grade 2  Nausea: none  Vomiting: none  Diarrhea: none    *diarrhea secondary to C. Diff is excluded from the definition of oral/GI RRT      CTCAE Terms     Vomiting: A disorder characterized by the reflexive act of ejecting the contents of the stomach through the mouth   Grade 1 Intervention not indicated   Grade 2 Outpatient IV hydration; medical intervention indicated   Grade 3 Tube feeding, TPN, or hospitalization indicated   Grade 4 Life-threatening consequences   Grade 5 Death     Oral Mucositis: A disorder characterized by ulceration or inflammation of the oral mucosal.   Grade 1 Asymptomatic or mild symptoms; intervention not indicated   Grade 2 Moderate pain or ulcer that does not interfere with oral intake; modified diet indicated   Grade 3 Severe pain; interfering with oral intake   Grade 4 Life-threatening consequences; urgent intervention indicated   Grade 5 Death     Nausea: A disorder characterized by a queasy sensation and/or the urge to vomit.   Grade 1 Loss of appetite without alteration in eating habits  Grade 2 Oral intake decreased without significant weight loss, dehydration or malnutrition   Grade 3 Inadequate oral caloric or fluid intake; tube feeding, TPN, or hospitalization indicated   Grade 4 NA  Grade 5 NA    Diarrhea: A disorder characterized by an increase in frequency and/or loose or watery bowel movements.   Grade 1 Increase of <4 stools per day over baseline; mild increase in ostomy output compared to baseline   Grade 2 Increase of 4 - 6 stools per day over baseline; moderate increase in ostomy output compared to baseline; limiting instrumental ADL   Grade 3 Increase of >=7 stools per day over baseline; hospitalization indicated; severe increase in ostomy output compared to baseline; limiting self-care ADL    Grade 4 Life-threatening consequences; urgent intervention indicated    Grade 5 Death       Source:  https://ctep.cancer.gov/protocoldevelopment/electronic_applications/docs/CTCAE_v5_Quick_Reference_8.5x11.pdf

## 2023-10-17 NOTE — PLAN OF CARE
"Tmax: 100.3. Intermittently having soft BPs, provider notified. OVSS. CT abdomen/pelvis done last evening. Per provider, there may be a small bowel obstruction starting, patient in now NPO. Reporting sharp abdomen pain, worse with movement, dilaudid given x2. Abdominal pain has improved this morning. Also reporting sore throat, interventions offered, patient declined, ice chips are helping. Denies n/v/d. RVP negative. Potassium replaced, recheck scheduled for this morning. Hgb 6.5, 1 unit of PRBCs infusing. Will need phos replaced and a recheck later. Independent. Continue to monitor.           Problem: Adult Inpatient Plan of Care  Goal: Plan of Care Review  Description: The Plan of Care Review/Shift note should be completed every shift.  The Outcome Evaluation is a brief statement about your assessment that the patient is improving, declining, or no change.  This information will be displayed automatically on your shift  note.  Outcome: Not Progressing  Goal: Patient-Specific Goal (Individualized)  Description: You can add care plan individualizations to a care plan. Examples of Individualization might be:  \"Parent requests to be called daily at 9am for status\", \"I have a hard time hearing out of my right ear\", or \"Do not touch me to wake me up as it startles  me\".  Outcome: Not Progressing  Goal: Absence of Hospital-Acquired Illness or Injury  Outcome: Not Progressing  Intervention: Identify and Manage Fall Risk  Recent Flowsheet Documentation  Taken 10/17/2023 0346 by Amanda Fuentes, RN  Safety Promotion/Fall Prevention:   assistive device/personal items within reach   clutter free environment maintained   nonskid shoes/slippers when out of bed   patient and family education   room organization consistent   safety round/check completed  Taken 10/17/2023 0000 by Amanda Fuentes, RN  Safety Promotion/Fall Prevention:   assistive device/personal items within reach   clutter free environment maintained   nonskid " shoes/slippers when out of bed   patient and family education   room organization consistent   safety round/check completed  Intervention: Prevent Skin Injury  Recent Flowsheet Documentation  Taken 10/17/2023 0000 by Amanda Fuentes RN  Body Position: position changed independently  Intervention: Prevent Infection  Recent Flowsheet Documentation  Taken 10/17/2023 0000 by Amanda Fuentes RN  Infection Prevention:   cohorting utilized   equipment surfaces disinfected   hand hygiene promoted   personal protective equipment utilized   rest/sleep promoted   single patient room provided   visitors restricted/screened  Goal: Optimal Comfort and Wellbeing  Outcome: Not Progressing  Intervention: Monitor Pain and Promote Comfort  Recent Flowsheet Documentation  Taken 10/17/2023 0346 by Amanda Fuentes RN  Pain Management Interventions: declines  Taken 10/17/2023 0107 by Amanda Fuentes RN  Pain Management Interventions: medication (see MAR)  Taken 10/16/2023 2109 by Amanda Fuentes RN  Pain Management Interventions: medication (see MAR)  Intervention: Provide Person-Centered Care  Recent Flowsheet Documentation  Taken 10/17/2023 0000 by Amanda Fuentes RN  Trust Relationship/Rapport:   care explained   choices provided   questions answered   reassurance provided   thoughts/feelings acknowledged  Goal: Readiness for Transition of Care  Outcome: Not Progressing     Problem: Stem Cell/Bone Marrow Transplant  Goal: Optimal Coping with Transplant  Outcome: Not Progressing  Intervention: Optimize Patient/Family Adjustment to Transplant  Recent Flowsheet Documentation  Taken 10/17/2023 0000 by Amanda Fuentes RN  Supportive Measures: active listening utilized  Goal: Symptom-Free Urinary Elimination  Outcome: Not Progressing  Intervention: Prevent or Manage Bladder Irritation  Recent Flowsheet Documentation  Taken 10/17/2023 0346 by Amanda Fuentes RN  Pain Management Interventions: declines  Taken 10/17/2023 0107 by  Amanda Fuentes RN  Pain Management Interventions: medication (see MAR)  Taken 10/16/2023 2109 by Amanda Fuentes RN  Pain Management Interventions: medication (see MAR)  Goal: Diarrhea Symptom Control  Outcome: Not Progressing  Goal: Improved Activity Tolerance  Outcome: Not Progressing  Intervention: Promote Improved Energy  Recent Flowsheet Documentation  Taken 10/17/2023 0000 by Amanda Fuentes RN  Sleep/Rest Enhancement:   awakenings minimized   medication   noise level reduced   regular sleep/rest pattern promoted  Activity Management: activity adjusted per tolerance  Environmental Support: calm environment promoted  Goal: Blood Counts Within Acceptable Range  Outcome: Not Progressing  Intervention: Monitor and Manage Hematologic Symptoms  Recent Flowsheet Documentation  Taken 10/17/2023 0346 by Amanda Fuentes RN  Bleeding Precautions: monitored for signs of bleeding  Medication Review/Management: medications reviewed  Taken 10/17/2023 0000 by Amanda Fuentes RN  Sleep/Rest Enhancement:   awakenings minimized   medication   noise level reduced   regular sleep/rest pattern promoted  Bleeding Precautions: monitored for signs of bleeding  Medication Review/Management: medications reviewed  Goal: Absence of Hypersensitivity Reaction  Outcome: Not Progressing  Goal: Absence of Infection  Outcome: Not Progressing  Intervention: Prevent and Manage Infection  Recent Flowsheet Documentation  Taken 10/17/2023 0346 by Amanda Fuentes RN  Infection Management: aseptic technique maintained  Taken 10/17/2023 0000 by Amanda Fuentes RN  Infection Prevention:   cohorting utilized   equipment surfaces disinfected   hand hygiene promoted   personal protective equipment utilized   rest/sleep promoted   single patient room provided   visitors restricted/screened  Infection Management: aseptic technique maintained  Isolation Precautions:   enteric precautions maintained   protective environment maintained  Goal: Improved  Oral Mucous Membrane Health and Integrity  Outcome: Not Progressing  Intervention: Promote Oral Comfort and Health  Recent Flowsheet Documentation  Taken 10/17/2023 0000 by Amanda Fuentes, RN  Oral Care: oral rinse provided  Goal: Nausea and Vomiting Symptom Relief  Outcome: Not Progressing  Goal: Optimal Nutrition Intake  Outcome: Not Progressing   Goal Outcome Evaluation:

## 2023-10-17 NOTE — PLAN OF CARE
Problem: Adult Inpatient Plan of Care  Goal: Optimal Comfort and Wellbeing  Outcome: Not Progressing   Goal Outcome Evaluation  Temp max 102.2. Blood cultures done. Received tylenol and started cefepime. Xray done. Complained of some abdominal pain. Eating some toast. Independent. Needs a c-diff culture sent.

## 2023-10-17 NOTE — PLAN OF CARE
Goal Outcome Evaluation:      Plan of Care Reviewed With: patient    Overall Patient Progress: improvingOverall Patient Progress: improving    Outcome Evaluation: IDT will continue to follow for discharge planning needs pending clinical course post-BMT.

## 2023-10-17 NOTE — PLAN OF CARE
"AVSS. Dilaudid x2 for abdominal pain with relief. Advanced from NPO to clear liquid diet and tolerated that well. Had stool x2. Platelets given. Continue POC.   Problem: Adult Inpatient Plan of Care  Goal: Plan of Care Review  Description: The Plan of Care Review/Shift note should be completed every shift.  The Outcome Evaluation is a brief statement about your assessment that the patient is improving, declining, or no change.  This information will be displayed automatically on your shift  note.  Outcome: Progressing  Goal: Patient-Specific Goal (Individualized)  Description: You can add care plan individualizations to a care plan. Examples of Individualization might be:  \"Parent requests to be called daily at 9am for status\", \"I have a hard time hearing out of my right ear\", or \"Do not touch me to wake me up as it startles  me\".  Outcome: Progressing  Goal: Absence of Hospital-Acquired Illness or Injury  Outcome: Progressing  Intervention: Identify and Manage Fall Risk  Recent Flowsheet Documentation  Taken 10/17/2023 0800 by Nadine Hood RN  Safety Promotion/Fall Prevention: assistive device/personal items within reach  Intervention: Prevent Skin Injury  Recent Flowsheet Documentation  Taken 10/17/2023 0800 by Nadine Hood RN  Body Position: position changed independently  Goal: Optimal Comfort and Wellbeing  Outcome: Progressing  Intervention: Monitor Pain and Promote Comfort  Recent Flowsheet Documentation  Taken 10/17/2023 0836 by Nadine Hood RN  Pain Management Interventions: medication (see MAR)  Intervention: Provide Person-Centered Care  Recent Flowsheet Documentation  Taken 10/17/2023 0800 by Nadine Hood RN  Trust Relationship/Rapport:   care explained   choices provided  Goal: Readiness for Transition of Care  Outcome: Progressing     Problem: Stem Cell/Bone Marrow Transplant  Goal: Optimal Coping with Transplant  Outcome: Progressing  Intervention: Optimize Patient/Family Adjustment to " Transplant  Recent Flowsheet Documentation  Taken 10/17/2023 0800 by Nadine Hood RN  Supportive Measures: active listening utilized  Goal: Symptom-Free Urinary Elimination  Outcome: Progressing  Intervention: Prevent or Manage Bladder Irritation  Recent Flowsheet Documentation  Taken 10/17/2023 0836 by Nadine Hood RN  Pain Management Interventions: medication (see MAR)  Goal: Diarrhea Symptom Control  Outcome: Progressing  Goal: Improved Activity Tolerance  Outcome: Progressing  Intervention: Promote Improved Energy  Recent Flowsheet Documentation  Taken 10/17/2023 0800 by Nadine Hood RN  Activity Management: activity adjusted per tolerance  Environmental Support: calm environment promoted  Goal: Blood Counts Within Acceptable Range  Outcome: Progressing  Intervention: Monitor and Manage Hematologic Symptoms  Recent Flowsheet Documentation  Taken 10/17/2023 0800 by Nadine Hood RN  Bleeding Precautions: monitored for signs of bleeding  Medication Review/Management: medications reviewed  Goal: Absence of Hypersensitivity Reaction  Outcome: Progressing  Goal: Absence of Infection  Outcome: Progressing  Intervention: Prevent and Manage Infection  Recent Flowsheet Documentation  Taken 10/17/2023 0800 by Nadine Hood RN  Infection Management: aseptic technique maintained  Goal: Improved Oral Mucous Membrane Health and Integrity  Outcome: Progressing  Intervention: Promote Oral Comfort and Health  Recent Flowsheet Documentation  Taken 10/17/2023 0800 by Nadine Hood RN  Oral Care: oral rinse provided  Goal: Nausea and Vomiting Symptom Relief  Outcome: Progressing  Goal: Optimal Nutrition Intake  Outcome: Progressing   Goal Outcome Evaluation:

## 2023-10-17 NOTE — PROGRESS NOTES
On call provider (Tracy Archibald MD) notified    Hypotensive, please look at orthostatic b/p.  Would you please order heparin to lock central lines?  I will be holding Propranolol.  Thanks,

## 2023-10-17 NOTE — CONSULTS
General surgery    Paged regarding possible SBO on imaging.    I reviewed the imaging. She is having bowel function per notes.  I do not think the imaging is consistent with a mechanical obstruction/small bowel obstruction and the dilation is related to enteritis or other process.

## 2023-10-17 NOTE — PHARMACY-ADMISSION MEDICATION HISTORY
Pharmacist Admission Medication History    Admission medication history is complete. The information provided in this note is only as accurate as the sources available at the time of the update.    Information Source(s): Clinic pharmacist and admission RN, recent admission    Changes made to PTA medication list:  Added: None  Deleted: hydrocortisone cream  Changed: None    Medication History Completed By: Amber Price [facility] 10/16/2023 7:15 PM    Prior to Admission medications    Medication Sig Last Dose Taking? Auth Provider Long Term End Date   acyclovir (ZOVIRAX) 200 MG capsule Take 2 capsules (400 mg) by mouth 2 times daily 10/16/2023 at 0800 Yes Helen Felix PA-C Yes    buPROPion (WELLBUTRIN XL) 150 MG 24 hr tablet Take 150 mg by mouth every morning 10/16/2023 at 0800 Yes Reported, Patient Yes    fluconazole (DIFLUCAN) 200 MG tablet Take 2 tablets (400 mg) by mouth daily 10/16/2023 at 0800 Yes Helen Felix PA-C     levofloxacin (LEVAQUIN) 500 MG tablet Take 1 tablet (500 mg) by mouth daily 10/16/2023 at 0800 Yes Helen Felix PA-C     levothyroxine (SYNTHROID/LEVOTHROID) 75 MCG tablet Take 1 tablet by mouth every morning 10/16/2023 at 0800 Yes Reported, Patient Yes    LORazepam (ATIVAN) 0.5 MG tablet Take 1-2 tablets (0.5-1 mg) by mouth every 4 hours as needed for anxiety (nausea/vomiting/sleep) 10/15/2023 at 1900 Yes Helen Felix PA-C     pantoprazole (PROTONIX) 40 MG EC tablet Take 1 tablet (40 mg) by mouth daily 10/16/2023 at 0800 Yes Helen Felix PA-C     prochlorperazine (COMPAZINE) 5 MG tablet Take 1-2 tablets (5-10 mg) by mouth every 6 hours as needed for nausea or vomiting 10/16/2023 at 0630 Yes Helen Felix PA-C     propranolol (INDERAL) 20 MG tablet Take 20 mg by mouth 2 times daily 10/16/2023 at 0800 Yes Reported, Patient Yes    topiramate (TOPAMAX) 50 MG tablet Take 1 tablet by mouth every evening 10/15/2023 at 2100 Yes Reported, Patient Yes    venlafaxine  (EFFEXOR XR) 150 MG 24 hr capsule Take 150 mg by mouth daily 10/15/2023 at 2100 Yes Reported, Patient Yes    acetaminophen (TYLENOL) 325 MG tablet Take 650 mg by mouth every 6 hours as needed for mild pain 10/12/2023  Reported, Patient     naratriptan (AMERGE) 2.5 MG tablet Take 2.5 mg by mouth at onset of headache 10/12/2023  Reported, Patient

## 2023-10-17 NOTE — PROGRESS NOTES
Skin check completed by: Nadine Hood RN/Pauline Morgan RN-multiple bruises noted on arms/legs, rash in supra pubic area with small skin tear

## 2023-10-17 NOTE — PROGRESS NOTES
Spoke w radiology resident re.CTAP read- no evidence of infectious etiology such as cholecystitis vs colitis. No evidence of perforation either. However, concern for developing SBO. No evidence of mechanical obstruction. Stomach is not enlarged.   I spoke w RN who states patient is resting comfortably after receiving dilaudid earlier in the night. Almost done with IVF bolus and her BPs have improved. No concern for nausea/emesis at this time.  Notified RN of findings and that I made her NPO. Risk>benefit for NG tube given no nausea and abd pain is improving. RN will relay this information to patient and call me if any Qs or need for bedside assessment.  Gen Surg c/s in AM

## 2023-10-17 NOTE — PROGRESS NOTES
..Patient admitted to: unit 5C  Admitted from:home   Arrived by:wheelchair  Reason for admission:fevers  Patient accompanied by:self   Belongings:with patient in room  Teaching:nursing routines  Skin double check completed by:

## 2023-10-18 LAB
ACANTHOCYTES BLD QL SMEAR: NORMAL
ALBUMIN SERPL BCG-MCNC: 2.9 G/DL (ref 3.5–5.2)
ALP SERPL-CCNC: 49 U/L (ref 35–104)
ALT SERPL W P-5'-P-CCNC: 9 U/L (ref 0–50)
ANION GAP SERPL CALCULATED.3IONS-SCNC: 9 MMOL/L (ref 7–15)
AST SERPL W P-5'-P-CCNC: 11 U/L (ref 0–45)
AUER BODIES BLD QL SMEAR: NORMAL
BASO STIPL BLD QL SMEAR: NORMAL
BASO+EOS+MONOS # BLD AUTO: ABNORMAL 10*3/UL
BASO+EOS+MONOS NFR BLD AUTO: ABNORMAL %
BASOPHILS # BLD AUTO: ABNORMAL 10*3/UL
BASOPHILS NFR BLD AUTO: ABNORMAL %
BILIRUB DIRECT SERPL-MCNC: 0.21 MG/DL (ref 0–0.3)
BILIRUB SERPL-MCNC: 0.5 MG/DL
BITE CELLS BLD QL SMEAR: NORMAL
BLISTER CELLS BLD QL SMEAR: NORMAL
BUN SERPL-MCNC: 7.1 MG/DL (ref 6–20)
BURR CELLS BLD QL SMEAR: NORMAL
CALCIUM SERPL-MCNC: 7.8 MG/DL (ref 8.6–10)
CHLORIDE SERPL-SCNC: 105 MMOL/L (ref 98–107)
CREAT SERPL-MCNC: 0.62 MG/DL (ref 0.51–0.95)
DACRYOCYTES BLD QL SMEAR: NORMAL
DEPRECATED HCO3 PLAS-SCNC: 19 MMOL/L (ref 22–29)
EGFRCR SERPLBLD CKD-EPI 2021: >90 ML/MIN/1.73M2
ELLIPTOCYTES BLD QL SMEAR: NORMAL
EOSINOPHIL # BLD AUTO: ABNORMAL 10*3/UL
EOSINOPHIL NFR BLD AUTO: ABNORMAL %
ERYTHROCYTE [DISTWIDTH] IN BLOOD BY AUTOMATED COUNT: 13.4 % (ref 10–15)
FRAGMENTS BLD QL SMEAR: NORMAL
GLUCOSE SERPL-MCNC: 100 MG/DL (ref 70–99)
HCT VFR BLD AUTO: 19.3 % (ref 35–47)
HGB BLD-MCNC: 6.4 G/DL (ref 11.7–15.7)
HGB C CRYSTALS: NORMAL
HOWELL-JOLLY BOD BLD QL SMEAR: NORMAL
IMM GRANULOCYTES # BLD: ABNORMAL 10*3/UL
IMM GRANULOCYTES NFR BLD: ABNORMAL %
LDH SERPL L TO P-CCNC: 126 U/L (ref 0–250)
LYMPHOCYTES # BLD AUTO: ABNORMAL 10*3/UL
LYMPHOCYTES NFR BLD AUTO: ABNORMAL %
MAGNESIUM SERPL-MCNC: 1.7 MG/DL (ref 1.7–2.3)
MCH RBC QN AUTO: 27.7 PG (ref 26.5–33)
MCHC RBC AUTO-ENTMCNC: 33.2 G/DL (ref 31.5–36.5)
MCV RBC AUTO: 84 FL (ref 78–100)
MONOCYTES # BLD AUTO: ABNORMAL 10*3/UL
MONOCYTES NFR BLD AUTO: ABNORMAL %
NEUTROPHILS # BLD AUTO: ABNORMAL 10*3/UL
NEUTROPHILS NFR BLD AUTO: ABNORMAL %
NEUTS HYPERSEG BLD QL SMEAR: NORMAL
PHOSPHATE SERPL-MCNC: 1.5 MG/DL (ref 2.5–4.5)
PHOSPHATE SERPL-MCNC: 1.7 MG/DL (ref 2.5–4.5)
PLAT MORPH BLD: NORMAL
PLATELET # BLD AUTO: 13 10E3/UL (ref 150–450)
POLYCHROMASIA BLD QL SMEAR: NORMAL
POTASSIUM SERPL-SCNC: 3.6 MMOL/L (ref 3.4–5.3)
PROT SERPL-MCNC: 5 G/DL (ref 6.4–8.3)
RBC # BLD AUTO: 2.31 10E6/UL (ref 3.8–5.2)
RBC AGGLUT BLD QL: NORMAL
RBC MORPH BLD: NORMAL
RETICS # AUTO: 0 10E6/UL (ref 0.03–0.1)
RETICS/RBC NFR AUTO: 0.2 % (ref 0.5–2)
ROULEAUX BLD QL SMEAR: NORMAL
SICKLE CELLS BLD QL SMEAR: NORMAL
SMUDGE CELLS BLD QL SMEAR: NORMAL
SODIUM SERPL-SCNC: 133 MMOL/L (ref 135–145)
SPHEROCYTES BLD QL SMEAR: NORMAL
STOMATOCYTES BLD QL SMEAR: NORMAL
TARGETS BLD QL SMEAR: NORMAL
TOXIC GRANULES BLD QL SMEAR: NORMAL
VARIANT LYMPHS BLD QL SMEAR: NORMAL
WBC # BLD AUTO: <0.1 10E3/UL (ref 4–11)

## 2023-10-18 PROCEDURE — 250N000011 HC RX IP 250 OP 636: Performed by: HOSPITALIST

## 2023-10-18 PROCEDURE — 82248 BILIRUBIN DIRECT: CPT | Performed by: PHYSICIAN ASSISTANT

## 2023-10-18 PROCEDURE — 80053 COMPREHEN METABOLIC PANEL: CPT | Performed by: PHYSICIAN ASSISTANT

## 2023-10-18 PROCEDURE — 258N000003 HC RX IP 258 OP 636: Performed by: INTERNAL MEDICINE

## 2023-10-18 PROCEDURE — 258N000003 HC RX IP 258 OP 636: Performed by: PHYSICIAN ASSISTANT

## 2023-10-18 PROCEDURE — 82947 ASSAY GLUCOSE BLOOD QUANT: CPT | Performed by: PHYSICIAN ASSISTANT

## 2023-10-18 PROCEDURE — 250N000013 HC RX MED GY IP 250 OP 250 PS 637: Performed by: INTERNAL MEDICINE

## 2023-10-18 PROCEDURE — 85045 AUTOMATED RETICULOCYTE COUNT: CPT | Performed by: PHYSICIAN ASSISTANT

## 2023-10-18 PROCEDURE — 206N000001 HC R&B BMT UMMC

## 2023-10-18 PROCEDURE — 250N000011 HC RX IP 250 OP 636: Performed by: PHYSICIAN ASSISTANT

## 2023-10-18 PROCEDURE — 250N000009 HC RX 250: Performed by: INTERNAL MEDICINE

## 2023-10-18 PROCEDURE — 250N000011 HC RX IP 250 OP 636: Mod: JZ | Performed by: PHYSICIAN ASSISTANT

## 2023-10-18 PROCEDURE — 84100 ASSAY OF PHOSPHORUS: CPT | Performed by: INTERNAL MEDICINE

## 2023-10-18 PROCEDURE — P9040 RBC LEUKOREDUCED IRRADIATED: HCPCS | Performed by: PHYSICIAN ASSISTANT

## 2023-10-18 PROCEDURE — 84100 ASSAY OF PHOSPHORUS: CPT | Performed by: PHYSICIAN ASSISTANT

## 2023-10-18 PROCEDURE — 83615 LACTATE (LD) (LDH) ENZYME: CPT | Performed by: PHYSICIAN ASSISTANT

## 2023-10-18 PROCEDURE — 250N000013 HC RX MED GY IP 250 OP 250 PS 637: Performed by: PHYSICIAN ASSISTANT

## 2023-10-18 PROCEDURE — 99233 SBSQ HOSP IP/OBS HIGH 50: CPT | Mod: FS | Performed by: PHYSICIAN ASSISTANT

## 2023-10-18 PROCEDURE — 83735 ASSAY OF MAGNESIUM: CPT | Performed by: HOSPITALIST

## 2023-10-18 PROCEDURE — 85027 COMPLETE CBC AUTOMATED: CPT | Performed by: PHYSICIAN ASSISTANT

## 2023-10-18 PROCEDURE — 250N000011 HC RX IP 250 OP 636: Mod: JZ | Performed by: INTERNAL MEDICINE

## 2023-10-18 RX ORDER — LOPERAMIDE HCL 2 MG
2 CAPSULE ORAL 4 TIMES DAILY PRN
Status: DISCONTINUED | OUTPATIENT
Start: 2023-10-18 | End: 2023-10-21

## 2023-10-18 RX ORDER — POTASSIUM PHOS IN 0.9 % NACL 15MMOL/250
15 PLASTIC BAG, INJECTION (ML) INTRAVENOUS
Status: COMPLETED | OUTPATIENT
Start: 2023-10-18 | End: 2023-10-19

## 2023-10-18 RX ORDER — SIMETHICONE 80 MG
80 TABLET,CHEWABLE ORAL EVERY 6 HOURS PRN
Status: DISCONTINUED | OUTPATIENT
Start: 2023-10-18 | End: 2023-10-27 | Stop reason: HOSPADM

## 2023-10-18 RX ORDER — HYDROMORPHONE HCL IN WATER/PF 6 MG/30 ML
.2-.3 PATIENT CONTROLLED ANALGESIA SYRINGE INTRAVENOUS EVERY 4 HOURS PRN
Status: DISCONTINUED | OUTPATIENT
Start: 2023-10-18 | End: 2023-10-25

## 2023-10-18 RX ADMIN — POTASSIUM & SODIUM PHOSPHATES POWDER PACK 280-160-250 MG 2 PACKET: 280-160-250 PACK at 05:02

## 2023-10-18 RX ADMIN — METRONIDAZOLE 500 MG: 5 INJECTION, SOLUTION INTRAVENOUS at 06:10

## 2023-10-18 RX ADMIN — LOPERAMIDE HYDROCHLORIDE 2 MG: 2 CAPSULE ORAL at 11:12

## 2023-10-18 RX ADMIN — POTASSIUM & SODIUM PHOSPHATES POWDER PACK 280-160-250 MG 2 PACKET: 280-160-250 PACK at 13:45

## 2023-10-18 RX ADMIN — Medication 5 ML: at 19:58

## 2023-10-18 RX ADMIN — METRONIDAZOLE 500 MG: 5 INJECTION, SOLUTION INTRAVENOUS at 13:45

## 2023-10-18 RX ADMIN — CEFEPIME HYDROCHLORIDE 2 G: 2 INJECTION, POWDER, FOR SOLUTION INTRAVENOUS at 00:01

## 2023-10-18 RX ADMIN — Medication 5 ML: at 17:03

## 2023-10-18 RX ADMIN — HYDROMORPHONE HYDROCHLORIDE 4 MG: 2 TABLET ORAL at 19:50

## 2023-10-18 RX ADMIN — POTASSIUM & SODIUM PHOSPHATES POWDER PACK 280-160-250 MG 2 PACKET: 280-160-250 PACK at 08:00

## 2023-10-18 RX ADMIN — ACYCLOVIR 400 MG: 400 TABLET ORAL at 19:52

## 2023-10-18 RX ADMIN — LOPERAMIDE HYDROCHLORIDE 2 MG: 2 CAPSULE ORAL at 17:32

## 2023-10-18 RX ADMIN — DEXTROSE MONOHYDRATE 480 MCG: 50 INJECTION, SOLUTION INTRAVENOUS at 19:54

## 2023-10-18 RX ADMIN — POTASSIUM CHLORIDE AND SODIUM CHLORIDE: 900; 150 INJECTION, SOLUTION INTRAVENOUS at 03:18

## 2023-10-18 RX ADMIN — LEVOTHYROXINE SODIUM 75 MCG: 75 TABLET ORAL at 07:59

## 2023-10-18 RX ADMIN — TOPIRAMATE 50 MG: 50 TABLET, FILM COATED ORAL at 19:52

## 2023-10-18 RX ADMIN — CEFEPIME HYDROCHLORIDE 2 G: 2 INJECTION, POWDER, FOR SOLUTION INTRAVENOUS at 17:01

## 2023-10-18 RX ADMIN — SIMETHICONE 80 MG: 80 TABLET, CHEWABLE ORAL at 18:14

## 2023-10-18 RX ADMIN — HYDROMORPHONE HYDROCHLORIDE 4 MG: 2 TABLET ORAL at 08:09

## 2023-10-18 RX ADMIN — NARATRIPTAN 2.5 MG: 2.5 TABLET ORAL at 18:11

## 2023-10-18 RX ADMIN — CEFEPIME HYDROCHLORIDE 2 G: 2 INJECTION, POWDER, FOR SOLUTION INTRAVENOUS at 08:01

## 2023-10-18 RX ADMIN — LOPERAMIDE HYDROCHLORIDE 2 MG: 2 CAPSULE ORAL at 21:36

## 2023-10-18 RX ADMIN — POTASSIUM CHLORIDE AND SODIUM CHLORIDE: 900; 150 INJECTION, SOLUTION INTRAVENOUS at 08:10

## 2023-10-18 RX ADMIN — NARATRIPTAN 2.5 MG: 2.5 TABLET ORAL at 00:01

## 2023-10-18 RX ADMIN — METRONIDAZOLE 500 MG: 5 INJECTION, SOLUTION INTRAVENOUS at 21:28

## 2023-10-18 RX ADMIN — BUPROPION HYDROCHLORIDE 150 MG: 150 TABLET, FILM COATED, EXTENDED RELEASE ORAL at 08:00

## 2023-10-18 RX ADMIN — DEXTROSE MONOHYDRATE 20 ML: 50 INJECTION, SOLUTION INTRAVENOUS at 19:54

## 2023-10-18 RX ADMIN — DEXTROSE MONOHYDRATE 10 ML: 50 INJECTION, SOLUTION INTRAVENOUS at 19:53

## 2023-10-18 RX ADMIN — ACYCLOVIR 400 MG: 400 TABLET ORAL at 08:00

## 2023-10-18 RX ADMIN — VENLAFAXINE HYDROCHLORIDE 150 MG: 37.5 CAPSULE, EXTENDED RELEASE ORAL at 21:27

## 2023-10-18 RX ADMIN — POTASSIUM PHOSPHATE, MONOBASIC POTASSIUM PHOSPHATE, DIBASIC 15 MMOL: 224; 236 INJECTION, SOLUTION, CONCENTRATE INTRAVENOUS at 22:04

## 2023-10-18 RX ADMIN — FLUCONAZOLE 400 MG: 200 TABLET ORAL at 08:00

## 2023-10-18 RX ADMIN — SIMETHICONE 80 MG: 80 TABLET, CHEWABLE ORAL at 11:12

## 2023-10-18 RX ADMIN — PANTOPRAZOLE SODIUM 40 MG: 40 TABLET, DELAYED RELEASE ORAL at 08:00

## 2023-10-18 ASSESSMENT — ACTIVITIES OF DAILY LIVING (ADL)
ADLS_ACUITY_SCORE: 18

## 2023-10-18 NOTE — PROGRESS NOTES
"BMT/Cell Therapy Daily Progress Note   10/18/2023    Patient ID:  Jennifer Ward is a 48 year old female, currently day +6 s/p autologous hematopoietic cell transplant as rescue after BEAM therapy for Hodgkin Lymphoma. Co-enrolled on [employer] study.     Readmitted 10/16 with neutropenic fever and abdominal pain.    INTERVAL  HISTORY   No fevers. Still with intermittent abdominal pain/cramping and frequent loose urgent stools. She feels bloated. No n/v. Mouth/throat discomfort. No didi dizziness with standing but knows to move position slowly. No bleeding.    Review of Systems: ROS negative except as noted above.      PHYSICAL EXAM     Weight In/Out     Wt Readings from Last 3 Encounters:   10/18/23 105 kg (231 lb 8 oz)   10/16/23 99.9 kg (220 lb 3.2 oz)   10/13/23 101.1 kg (222 lb 12.8 oz)      I/O last 3 completed shifts:  In: 4314 [P.O.:500; I.V.:3295]  Out: 600 [Urine:600]       KPS:  70    BP (!) 146/85 (BP Location: Left arm)   Pulse 86   Temp 97.9  F (36.6  C) (Axillary)   Resp 18   Ht 1.64 m (5' 4.57\")   Wt 105 kg (231 lb 8 oz)   SpO2 99%   BMI 39.04 kg/m       General: NAD   Eyes:  sclera anicteric   Nose/Mouth/Throat: sore on left anterolateral tongue and some ridging upper palate and along gum line  Lungs: CTAB  Cardiovascular: RRR, no M/R/G   Abdominal: hyperactive bs, soft, mildly ttp no r/g.   Lymphatics: no edema  Skin: no rash  Neuro: A&O ; non-focal  Access: L CVC NT, dressing cdi. R PAC not accessed.    LABS AND IMAGING: I have assessed all abnormal lab values for their clinical significance and any values considered clinically significant have been addressed in the assessment and plan.        Lab Results   Component Value Date    WBC <0.1 (LL) 10/18/2023    ANEU 1.0 (L) 10/13/2023    HGB 6.4 (LL) 10/18/2023    HCT 19.3 (L) 10/18/2023    PLT 13 (LL) 10/18/2023     (L) 10/18/2023    POTASSIUM 3.6 10/18/2023    CHLORIDE 105 10/18/2023    CO2 19 (L) 10/18/2023     (H) 10/18/2023 "    BUN 7.1 10/18/2023    CR 0.62 10/18/2023    MAG 1.7 10/18/2023    INR 1.19 (H) 10/16/2023    BILITOTAL 0.5 10/18/2023    AST 11 10/18/2023    ALT 9 10/18/2023    ALKPHOS 49 10/18/2023    PROTTOTAL 5.0 (L) 10/18/2023    ALBUMIN 2.9 (L) 10/18/2023     CT Abd/Pelvis 10/16:   1. Distended loops of small bowel without a discrete transition point  associated with areas of mild wall thickening, vascular engorgement  and mesenteric edema. Findings may represent enteritis, posttreatment  changes or hyperacute GVHD in the appropriate clinical setting.     CXR portable (10/16): no aso  Oral/GI regimen related toxicities (per NCI CTCAE v 5.0):    Oral Mucositis: Grade 2  Nausea: none  Vomiting: none  Diarrhea: Grade 2    *diarrhea secondary to C. Diff is excluded from the definition of oral/GI RRT      CTCAE Terms     Vomiting: A disorder characterized by the reflexive act of ejecting the contents of the stomach through the mouth   Grade 1 Intervention not indicated   Grade 2 Outpatient IV hydration; medical intervention indicated   Grade 3 Tube feeding, TPN, or hospitalization indicated   Grade 4 Life-threatening consequences   Grade 5 Death     Oral Mucositis: A disorder characterized by ulceration or inflammation of the oral mucosal.   Grade 1 Asymptomatic or mild symptoms; intervention not indicated   Grade 2 Moderate pain or ulcer that does not interfere with oral intake; modified diet indicated   Grade 3 Severe pain; interfering with oral intake   Grade 4 Life-threatening consequences; urgent intervention indicated   Grade 5 Death     Nausea: A disorder characterized by a queasy sensation and/or the urge to vomit.   Grade 1 Loss of appetite without alteration in eating habits  Grade 2 Oral intake decreased without significant weight loss, dehydration or malnutrition   Grade 3 Inadequate oral caloric or fluid intake; tube feeding, TPN, or hospitalization indicated   Grade 4 NA  Grade 5 NA    Diarrhea: A disorder  characterized by an increase in frequency and/or loose or watery bowel movements.   Grade 1 Increase of <4 stools per day over baseline; mild increase in ostomy output compared to baseline   Grade 2 Increase of 4 - 6 stools per day over baseline; moderate increase in ostomy output compared to baseline; limiting instrumental ADL   Grade 3 Increase of >=7 stools per day over baseline; hospitalization indicated; severe increase in ostomy output compared to baseline; limiting self-care ADL    Grade 4 Life-threatening consequences; urgent intervention indicated    Grade 5 Death       Source: https://ctep.cancer.gov/protocoldevelopment/electronic_applications/docs/CTCAE_v5_Quick_Reference_8.5x11.pdf     SYSTEMS-BASED ASSESSMENT AND PLAN     Jennifer Ward is a 48 year old female, currently day + 6  s/p autologous hematopoietic cell transplant as rescue after BEAM therapy for Hodgkin Lymphoma. Co-enrolled on [employer] study.   - readmitted 10/16/23 with neutropenic fever.     Prep: BEAM     BMT  - BMT MD/Coordinator: Nay  -  with co-enrollment on  (E-CELERATE)  - Cell dose 7 million CD34+ cells/kg   - GCSF starts day +5, continue until ANC > 500 for 3 consecutive days.   - Restaging: next restage at day +28  - Allopurinol started Day -6     HEME/COAG  - Transfusion parameters: hgb <7g/dL and plts <10,000.   - Pancytopenia secondary to Hodgkin Lymphoma and chemotherapy     ID  Prophy: note that antimicrobial dosing is specific to protocol  Viral: ACV 400mg PO bid  Fungal: fluconazole 400mg daily  PCP: Bactrim or other PCP prophy to start at day +28     Neutropenic Fever of Unclear Etiology  ID workup unrevealing thusfar. Covid neg 10/17.     Continue empiric cefepime + flagyl, given abdominal pain.  CT abd/pelvis as above; surgical consult ruled out sbo.      GI  #diarrhea, abdominal crampin/2 chemo/colitis.  C.dif neg 10/17.   Imodium prn, Simethicone prn, Dilaudid PO/IV, heating pad  "prn.  #risk of CINV: prn ativan, compazine prn.   #Ulcer prophy: Protonix 40mg daily.   #E-CELERATE vs placebo administered 10/12.  - oropharyngeal mucositis: 2/2 chemo. Chloraseptic spray; MMW, good oral cares.     NUTRITION/FLUIDS  - advance to high phill/protein diet 10/18  - W up 9lb since admission; change MIVF to tko (previously 100cc/hr since admission).     ENDOCRINE  # hypothyroidism: continue PTA synthroid     PSYCH  # depression: continue PTA bupropion, venlafaxine     NEURO  # migraines: daily topamax with prn naratriptan; propranolol prophy placed on hold with hypotension after admission.      FEN  - creat, lytes wnl  - replete prn per ss        SUPPORTIVE CARE  - PT/OT consult, will follow as indicated  - BMT Social work to follow.       Code Status: Full Code       Dispo: remain admitted pending engraftment and resolution of GI symptoms.    Known issues that I take into account for medical decisions, with salient changes to the plan considering these complexities noted above.    Patient Active Problem List   Diagnosis    Hodgkin's disease of lymph nodes of multiple sites (H)    Autologous donor of stem cells    Nodular sclerosis Hodgkin lymphoma of lymph nodes of multiple regions (H)    Febrile neutropenia        Clinically Significant Risk Factors        # Hypokalemia: Lowest K = 3.2 mmol/L in last 2 days, will replace as needed       # Hypoalbuminemia: Lowest albumin = 2.9 g/dL at 10/18/2023  3:22 AM, will monitor as appropriate  # Coagulation Defect: INR = 1.19 (Ref range: 0.85 - 1.15) and/or PTT = 43 Seconds (Ref range: 22 - 38 Seconds), will monitor for bleeding  # Thrombocytopenia: Lowest platelets = 7 in last 2 days, will monitor for bleeding          # Obesity: Estimated body mass index is 39.04 kg/m  as calculated from the following:    Height as of this encounter: 1.64 m (5' 4.57\").    Weight as of this encounter: 105 kg (231 lb 8 oz)., PRESENT ON ADMISSION     # Financial/Environmental " Concerns: none         I spent 40 minutes face-to-face and/or coordinating care. Over 50% of our time on the unit was spent counseling the patient and/or coordinating care and the plan detailed on today's notes.        Patricia Salcedo PA-C

## 2023-10-18 NOTE — PLAN OF CARE
Goal Outcome Evaluation:      Plan of Care Reviewed With: patient    Overall Patient Progress: improvingOverall Patient Progress: improving    Outcome Evaluation: diet order advanced today, general lack of appetite w/ abdominal pain and bloating and mucositis but willing to try supplements

## 2023-10-18 NOTE — PLAN OF CARE
"A x O, VSS on RA. Endorses migraine headache and abdominal pain. Dilaudid and naratriptan given w/ some relief. Denies n/v. Hgb 6.4. Currently infusing 1 unit RBC. Phos replaced this shift. +Loose stools overnight. Voiding adequately. Resting between cares. Continue POC.    Problem: Adult Inpatient Plan of Care  Goal: Plan of Care Review  Description: The Plan of Care Review/Shift note should be completed every shift.  The Outcome Evaluation is a brief statement about your assessment that the patient is improving, declining, or no change.  This information will be displayed automatically on your shift  note.  Outcome: Progressing  Goal: Patient-Specific Goal (Individualized)  Description: You can add care plan individualizations to a care plan. Examples of Individualization might be:  \"Parent requests to be called daily at 9am for status\", \"I have a hard time hearing out of my right ear\", or \"Do not touch me to wake me up as it startles  me\".  Outcome: Progressing  Goal: Absence of Hospital-Acquired Illness or Injury  Outcome: Progressing  Intervention: Identify and Manage Fall Risk  Recent Flowsheet Documentation  Taken 10/18/2023 0315 by Gricelda Heath, RN  Safety Promotion/Fall Prevention: safety round/check completed  Taken 10/17/2023 2321 by Gricelda Heath, RN  Safety Promotion/Fall Prevention: safety round/check completed  Taken 10/17/2023 2022 by Gricelda Heath, RN  Safety Promotion/Fall Prevention: safety round/check completed  Intervention: Prevent Skin Injury  Recent Flowsheet Documentation  Taken 10/17/2023 2022 by Gricelda Heath, RN  Body Position: position changed independently  Intervention: Prevent Infection  Recent Flowsheet Documentation  Taken 10/18/2023 0315 by Gricelda Heath, RN  Infection Prevention:   cohorting utilized   equipment surfaces disinfected   hand hygiene promoted   personal protective equipment utilized   rest/sleep promoted   single patient room provided   visitors " restricted/screened  Taken 10/17/2023 2321 by Gricelda Heath, RN  Infection Prevention:   cohorting utilized   equipment surfaces disinfected   hand hygiene promoted   personal protective equipment utilized   rest/sleep promoted   single patient room provided   visitors restricted/screened  Taken 10/17/2023 2022 by Gricelda Heath RN  Infection Prevention:   cohorting utilized   equipment surfaces disinfected   hand hygiene promoted   personal protective equipment utilized   rest/sleep promoted   single patient room provided   visitors restricted/screened  Goal: Optimal Comfort and Wellbeing  Outcome: Progressing  Intervention: Monitor Pain and Promote Comfort  Recent Flowsheet Documentation  Taken 10/17/2023 2353 by Gricelda Heath RN  Pain Management Interventions: medication (see MAR)  Taken 10/17/2023 2022 by Gricelda Heath RN  Pain Management Interventions:   medication (see MAR)   repositioned  Intervention: Provide Person-Centered Care  Recent Flowsheet Documentation  Taken 10/17/2023 2022 by Gricelda Heath RN  Trust Relationship/Rapport:   care explained   choices provided  Goal: Readiness for Transition of Care  Outcome: Progressing     Problem: Stem Cell/Bone Marrow Transplant  Goal: Optimal Coping with Transplant  Outcome: Progressing  Goal: Symptom-Free Urinary Elimination  Outcome: Progressing  Intervention: Prevent or Manage Bladder Irritation  Recent Flowsheet Documentation  Taken 10/17/2023 2353 by Gricelda Heath RN  Pain Management Interventions: medication (see MAR)  Taken 10/17/2023 2022 by Gricelda Heath RN  Pain Management Interventions:   medication (see MAR)   repositioned  Goal: Diarrhea Symptom Control  Outcome: Progressing  Goal: Improved Activity Tolerance  Outcome: Progressing  Intervention: Promote Improved Energy  Recent Flowsheet Documentation  Taken 10/17/2023 2022 by Gricelda Heath RN  Activity Management: up ad layla  Goal: Blood Counts Within Acceptable  Range  Outcome: Progressing  Intervention: Monitor and Manage Hematologic Symptoms  Recent Flowsheet Documentation  Taken 10/18/2023 0315 by Gricelda Heath RN  Bleeding Precautions: monitored for signs of bleeding  Medication Review/Management: medications reviewed  Taken 10/17/2023 2321 by Gricelda Heath RN  Bleeding Precautions: monitored for signs of bleeding  Medication Review/Management: medications reviewed  Taken 10/17/2023 2022 by Gricelda Heath RN  Bleeding Precautions: monitored for signs of bleeding  Medication Review/Management: medications reviewed  Goal: Absence of Hypersensitivity Reaction  Outcome: Progressing  Goal: Absence of Infection  Outcome: Progressing  Intervention: Prevent and Manage Infection  Recent Flowsheet Documentation  Taken 10/18/2023 0315 by Gricelda Heath RN  Infection Prevention:   cohorting utilized   equipment surfaces disinfected   hand hygiene promoted   personal protective equipment utilized   rest/sleep promoted   single patient room provided   visitors restricted/screened  Infection Management: aseptic technique maintained  Isolation Precautions: protective environment maintained  Taken 10/17/2023 2321 by Gricelda Heath RN  Infection Prevention:   cohorting utilized   equipment surfaces disinfected   hand hygiene promoted   personal protective equipment utilized   rest/sleep promoted   single patient room provided   visitors restricted/screened  Infection Management: aseptic technique maintained  Isolation Precautions: protective environment maintained  Taken 10/17/2023 2022 by Gricelda Heath RN  Infection Prevention:   cohorting utilized   equipment surfaces disinfected   hand hygiene promoted   personal protective equipment utilized   rest/sleep promoted   single patient room provided   visitors restricted/screened  Infection Management: aseptic technique maintained  Isolation Precautions: protective environment maintained  Goal: Improved Oral Mucous  Membrane Health and Integrity  Outcome: Progressing  Goal: Nausea and Vomiting Symptom Relief  Outcome: Progressing  Goal: Optimal Nutrition Intake  Outcome: Progressing   Goal Outcome Evaluation:

## 2023-10-18 NOTE — PLAN OF CARE
"AVSS. Imodium x1, simethicone x2, dilaudid x1, naratriptan x1. Patients diet advanced to regular. Didn't eat much today. Having some more abdominal pain/cramping this evening. Showered this afternoon. Tearful about losing her hair. Phos replaced throughout the day. Will need recheck this evening. Continue POC.   Problem: Adult Inpatient Plan of Care  Goal: Plan of Care Review  Description: The Plan of Care Review/Shift note should be completed every shift.  The Outcome Evaluation is a brief statement about your assessment that the patient is improving, declining, or no change.  This information will be displayed automatically on your shift  note.  Outcome: Progressing  Goal: Patient-Specific Goal (Individualized)  Description: You can add care plan individualizations to a care plan. Examples of Individualization might be:  \"Parent requests to be called daily at 9am for status\", \"I have a hard time hearing out of my right ear\", or \"Do not touch me to wake me up as it startles  me\".  Outcome: Progressing  Goal: Absence of Hospital-Acquired Illness or Injury  Outcome: Progressing  Intervention: Identify and Manage Fall Risk  Recent Flowsheet Documentation  Taken 10/18/2023 0800 by Nadine Hood RN  Safety Promotion/Fall Prevention: safety round/check completed  Goal: Optimal Comfort and Wellbeing  Outcome: Progressing  Intervention: Monitor Pain and Promote Comfort  Recent Flowsheet Documentation  Taken 10/18/2023 1811 by Nadine Hood RN  Pain Management Interventions: medication (see MAR)  Taken 10/18/2023 0809 by Nadine Hood RN  Pain Management Interventions: medication (see MAR)  Intervention: Provide Person-Centered Care  Recent Flowsheet Documentation  Taken 10/18/2023 0800 by Nadine Hood RN  Trust Relationship/Rapport: care explained  Goal: Readiness for Transition of Care  Outcome: Progressing     Problem: Stem Cell/Bone Marrow Transplant  Goal: Optimal Coping with Transplant  Outcome: " Progressing  Intervention: Optimize Patient/Family Adjustment to Transplant  Recent Flowsheet Documentation  Taken 10/18/2023 0800 by Nadine Hood RN  Supportive Measures: active listening utilized  Goal: Symptom-Free Urinary Elimination  Outcome: Progressing  Intervention: Prevent or Manage Bladder Irritation  Recent Flowsheet Documentation  Taken 10/18/2023 1811 by Nadine Hood RN  Pain Management Interventions: medication (see MAR)  Taken 10/18/2023 0809 by Nadine Hood RN  Pain Management Interventions: medication (see MAR)  Goal: Diarrhea Symptom Control  Outcome: Progressing  Goal: Improved Activity Tolerance  Outcome: Progressing  Intervention: Promote Improved Energy  Recent Flowsheet Documentation  Taken 10/18/2023 0800 by Nadine Hood RN  Activity Management: up ad layla  Goal: Blood Counts Within Acceptable Range  Outcome: Progressing  Intervention: Monitor and Manage Hematologic Symptoms  Recent Flowsheet Documentation  Taken 10/18/2023 0800 by Nadine Hood RN  Bleeding Precautions: monitored for signs of bleeding  Medication Review/Management: medications reviewed  Goal: Absence of Hypersensitivity Reaction  Outcome: Progressing  Goal: Absence of Infection  Outcome: Progressing  Intervention: Prevent and Manage Infection  Recent Flowsheet Documentation  Taken 10/18/2023 0800 by Nadine Hood RN  Infection Management: aseptic technique maintained  Goal: Improved Oral Mucous Membrane Health and Integrity  Outcome: Progressing  Intervention: Promote Oral Comfort and Health  Recent Flowsheet Documentation  Taken 10/18/2023 1500 by Nadine Hood RN  Oral Care: oral rinse provided  Goal: Nausea and Vomiting Symptom Relief  Outcome: Progressing  Goal: Optimal Nutrition Intake  Outcome: Progressing   Goal Outcome Evaluation:

## 2023-10-18 NOTE — PROGRESS NOTES
"CLINICAL NUTRITION SERVICES - ASSESSMENT NOTE     Nutrition Prescription    RECOMMENDATIONS FOR MDs/PROVIDERS TO ORDER:  Continue to encourage oral intake   Optimize medications for diarrhea    Malnutrition Status:    Patient does not meet two criteria at this time but is at risk     Recommendations already ordered by Registered Dietitian (RD):  Continue high kcal/protein diet + snacks/supplements PRN- encouraged trial of boost soothe with mucositis     Future/Additional Recommendations:  Monitor appetite, oral intake and use of nutrition supplements   Monitor GI side effects/improvement in bloating/diarrhea and ability to increase PO     REASON FOR ASSESSMENT  Jennifer Ward is a/an 48 year old female assessed by the dietitian for Nutrition Risk Monitoring    CLINICAL HISTORY   S/p autologous hematopoietic cell transplant as rescue after BEAM therapy for Hodgkin Lymphoma on 10/12. Co-enrolled on [employer] study. Admitted 10/16/23 with concern for neutropenic fever, Tmax in hospital 102.2F. Exam with no evidence of acute infection.  HD stable, however requires ongoing work-up for neutropenic fever.     NUTRITION HISTORY  Jennifer reported her appetite was decreasing at home and in the past couple days it has been extremely difficult to eat due to mucositis, early satiety and diarrhea.   She is feeling very bloated- weight +10 lbs from admission with IVF. She did try some Jello and lemon ice.     CURRENT NUTRITION ORDERS  Diet: Clear Liquid (10/17)- > High kcal/high protein (10/18)   -Snacks/supplements PRN   Intake/Tolerance: Not documented     LABS  Na 12 133 (L), K+ 3.6, Mg 1.7 (WNL), Po4 1.5 (L)  Glucose 100 (H)    MEDICATIONS  Acyclovir  Levothyroxine  Protonix  Neutra-Phos    ANTHROPOMETRICS  Height: 164 cm (5' 4.567\")  Most Recent Weight: 105 kg (231 lb 8 oz)    IBW: 56.8 kg  BMI: Obesity Grade II BMI 35-39.9  Weight History:   Wt Readings from Last 25 Encounters:   10/18/23 105 kg (231 lb 8 oz)   10/16/23 99.9 " kg (220 lb 3.2 oz)   10/13/23 101.1 kg (222 lb 12.8 oz)   10/04/23 99.4 kg (219 lb 2.2 oz)   10/04/23 98.4 kg (217 lb)   10/03/23 99.7 kg (219 lb 14.4 oz)   09/28/23 98.6 kg (217 lb 4.8 oz)   09/26/23 98.7 kg (217 lb 9.5 oz)   09/25/23 98.4 kg (217 lb)   09/25/23 98.7 kg (217 lb 11.2 oz)   09/20/23 100.2 kg (221 lb)   06/08/23 102.1 kg (225 lb)   +5 kg from admission     Dosing Weight: 67.5 kg (adjusted based on weight 99.9 kg and IBW)     ASSESSED NUTRITION NEEDS  Estimated Energy Needs: 5994-9995 kcals/day (25 - 30 kcals/kg)  Justification: Maintenance  Estimated Protein Needs: 101 grams protein/day (1.5 g/kg)  Justification: Increased needs  Estimated Fluid Needs: 8487-2764 mL/day (1 mL/kcal)   Justification: Maintenance    PHYSICAL FINDINGS  See malnutrition section below.    MALNUTRITION  % Intake: </= 50% for >/= 5 days (severe)  % Weight Loss: Unable to assess  Subcutaneous Fat Loss: None observed  Muscle Loss: None observed  Fluid Accumulation/Edema: Does not meet criteria  Malnutrition Diagnosis: Patient does not meet two of the established criteria necessary for diagnosing malnutrition but is at risk for malnutrition    NUTRITION DIAGNOSIS  Inadequate oral intake related to GI symptoms post chemotherapy limiting ability to eat as evidenced by report of mucositis, clear liquid diet and increased loses from diarrhea     INTERVENTIONS  Implementation  Nutrition Education: RD role in care, supplement options available, coping with diarrhea    Collaboration with other providers- 5c rounds  Medical food supplement therapy - PRN     Goals  Patient to consume 50-75% of nutritionally adequate meals or snacks/supplements showing increase in intake over next 2-3 days      Monitoring/Evaluation  Progress toward goals will be monitored and evaluated per protocol.    Sloane Cabrera RD, LD  5C/BMT pager: [phone]

## 2023-10-19 ENCOUNTER — APPOINTMENT (OUTPATIENT)
Dept: PHYSICAL THERAPY | Facility: CLINIC | Age: 48
End: 2023-10-19
Attending: INTERNAL MEDICINE
Payer: COMMERCIAL

## 2023-10-19 LAB
ABO/RH(D): NORMAL
ACANTHOCYTES BLD QL SMEAR: NORMAL
ALBUMIN SERPL BCG-MCNC: 3 G/DL (ref 3.5–5.2)
ALP SERPL-CCNC: 46 U/L (ref 35–104)
ALT SERPL W P-5'-P-CCNC: 9 U/L (ref 0–50)
ANION GAP SERPL CALCULATED.3IONS-SCNC: 9 MMOL/L (ref 7–15)
ANTIBODY SCREEN: NEGATIVE
AST SERPL W P-5'-P-CCNC: 10 U/L (ref 0–45)
AUER BODIES BLD QL SMEAR: NORMAL
BACTERIA SPEC CULT: NORMAL
BASO STIPL BLD QL SMEAR: NORMAL
BASO+EOS+MONOS # BLD AUTO: ABNORMAL 10*3/UL
BASO+EOS+MONOS NFR BLD AUTO: ABNORMAL %
BASOPHILS # BLD AUTO: ABNORMAL 10*3/UL
BASOPHILS NFR BLD AUTO: ABNORMAL %
BILIRUB DIRECT SERPL-MCNC: 0.2 MG/DL (ref 0–0.3)
BILIRUB SERPL-MCNC: 0.5 MG/DL
BITE CELLS BLD QL SMEAR: NORMAL
BLD PROD TYP BPU: NORMAL
BLISTER CELLS BLD QL SMEAR: NORMAL
BLOOD COMPONENT TYPE: NORMAL
BUN SERPL-MCNC: 3.8 MG/DL (ref 6–20)
BURR CELLS BLD QL SMEAR: NORMAL
CA-I BLD-MCNC: 4.5 MG/DL (ref 4.4–5.2)
CALCIUM SERPL-MCNC: 8 MG/DL (ref 8.6–10)
CHLORIDE SERPL-SCNC: 104 MMOL/L (ref 98–107)
CODING SYSTEM: NORMAL
CREAT SERPL-MCNC: 0.58 MG/DL (ref 0.51–0.95)
DACRYOCYTES BLD QL SMEAR: NORMAL
DEPRECATED HCO3 PLAS-SCNC: 21 MMOL/L (ref 22–29)
EGFRCR SERPLBLD CKD-EPI 2021: >90 ML/MIN/1.73M2
ELLIPTOCYTES BLD QL SMEAR: NORMAL
EOSINOPHIL # BLD AUTO: ABNORMAL 10*3/UL
EOSINOPHIL NFR BLD AUTO: ABNORMAL %
ERYTHROCYTE [DISTWIDTH] IN BLOOD BY AUTOMATED COUNT: 13.3 % (ref 10–15)
FRAGMENTS BLD QL SMEAR: NORMAL
GLUCOSE SERPL-MCNC: 101 MG/DL (ref 70–99)
HCT VFR BLD AUTO: 21.5 % (ref 35–47)
HGB BLD-MCNC: 7.4 G/DL (ref 11.7–15.7)
HGB C CRYSTALS: NORMAL
HOWELL-JOLLY BOD BLD QL SMEAR: NORMAL
IMM GRANULOCYTES # BLD: ABNORMAL 10*3/UL
IMM GRANULOCYTES NFR BLD: ABNORMAL %
ISSUE DATE AND TIME: NORMAL
LACTATE SERPL-SCNC: 1.1 MMOL/L (ref 0.7–2)
LDH SERPL L TO P-CCNC: 132 U/L (ref 0–250)
LYMPHOCYTES # BLD AUTO: ABNORMAL 10*3/UL
LYMPHOCYTES NFR BLD AUTO: ABNORMAL %
MAGNESIUM SERPL-MCNC: 1.6 MG/DL (ref 1.7–2.3)
MCH RBC QN AUTO: 28.8 PG (ref 26.5–33)
MCHC RBC AUTO-ENTMCNC: 34.4 G/DL (ref 31.5–36.5)
MCV RBC AUTO: 84 FL (ref 78–100)
MONOCYTES # BLD AUTO: ABNORMAL 10*3/UL
MONOCYTES NFR BLD AUTO: ABNORMAL %
NEUTROPHILS # BLD AUTO: ABNORMAL 10*3/UL
NEUTROPHILS NFR BLD AUTO: ABNORMAL %
NEUTS HYPERSEG BLD QL SMEAR: NORMAL
PHOSPHATE SERPL-MCNC: 2.7 MG/DL (ref 2.5–4.5)
PLAT MORPH BLD: NORMAL
PLATELET # BLD AUTO: 7 10E3/UL (ref 150–450)
POLYCHROMASIA BLD QL SMEAR: NORMAL
POTASSIUM SERPL-SCNC: 3.4 MMOL/L (ref 3.4–5.3)
POTASSIUM SERPL-SCNC: 3.8 MMOL/L (ref 3.4–5.3)
PROT SERPL-MCNC: 4.9 G/DL (ref 6.4–8.3)
RBC # BLD AUTO: 2.57 10E6/UL (ref 3.8–5.2)
RBC AGGLUT BLD QL: NORMAL
RBC MORPH BLD: NORMAL
RETICS # AUTO: 0.01 10E6/UL (ref 0.03–0.1)
RETICS/RBC NFR AUTO: 0.2 % (ref 0.5–2)
ROULEAUX BLD QL SMEAR: NORMAL
SICKLE CELLS BLD QL SMEAR: NORMAL
SMUDGE CELLS BLD QL SMEAR: NORMAL
SODIUM SERPL-SCNC: 134 MMOL/L (ref 135–145)
SPECIMEN EXPIRATION DATE: NORMAL
SPHEROCYTES BLD QL SMEAR: NORMAL
STOMATOCYTES BLD QL SMEAR: NORMAL
TARGETS BLD QL SMEAR: NORMAL
TOXIC GRANULES BLD QL SMEAR: NORMAL
UNIT ABO/RH: NORMAL
UNIT NUMBER: NORMAL
UNIT STATUS: NORMAL
UNIT TYPE ISBT: 7300
VARIANT LYMPHS BLD QL SMEAR: NORMAL
WBC # BLD AUTO: <0.1 10E3/UL (ref 4–11)

## 2023-10-19 PROCEDURE — 250N000011 HC RX IP 250 OP 636: Mod: JZ

## 2023-10-19 PROCEDURE — 250N000011 HC RX IP 250 OP 636: Performed by: PHYSICIAN ASSISTANT

## 2023-10-19 PROCEDURE — 250N000013 HC RX MED GY IP 250 OP 250 PS 637

## 2023-10-19 PROCEDURE — 258N000003 HC RX IP 258 OP 636: Performed by: PHYSICIAN ASSISTANT

## 2023-10-19 PROCEDURE — 250N000013 HC RX MED GY IP 250 OP 250 PS 637: Performed by: PHYSICIAN ASSISTANT

## 2023-10-19 PROCEDURE — 86901 BLOOD TYPING SEROLOGIC RH(D): CPT | Performed by: PHYSICIAN ASSISTANT

## 2023-10-19 PROCEDURE — 97110 THERAPEUTIC EXERCISES: CPT | Mod: GP | Performed by: PHYSICAL THERAPIST

## 2023-10-19 PROCEDURE — 82248 BILIRUBIN DIRECT: CPT | Performed by: PHYSICIAN ASSISTANT

## 2023-10-19 PROCEDURE — 84132 ASSAY OF SERUM POTASSIUM: CPT | Performed by: INTERNAL MEDICINE

## 2023-10-19 PROCEDURE — 83735 ASSAY OF MAGNESIUM: CPT | Performed by: INTERNAL MEDICINE

## 2023-10-19 PROCEDURE — 85027 COMPLETE CBC AUTOMATED: CPT | Performed by: PHYSICIAN ASSISTANT

## 2023-10-19 PROCEDURE — 250N000009 HC RX 250: Performed by: INTERNAL MEDICINE

## 2023-10-19 PROCEDURE — 250N000011 HC RX IP 250 OP 636: Mod: JZ | Performed by: PHYSICIAN ASSISTANT

## 2023-10-19 PROCEDURE — 82330 ASSAY OF CALCIUM: CPT

## 2023-10-19 PROCEDURE — 250N000011 HC RX IP 250 OP 636: Mod: JZ | Performed by: INTERNAL MEDICINE

## 2023-10-19 PROCEDURE — 84100 ASSAY OF PHOSPHORUS: CPT | Performed by: INTERNAL MEDICINE

## 2023-10-19 PROCEDURE — 80053 COMPREHEN METABOLIC PANEL: CPT | Performed by: PHYSICIAN ASSISTANT

## 2023-10-19 PROCEDURE — 87103 BLOOD FUNGUS CULTURE: CPT | Performed by: PHYSICIAN ASSISTANT

## 2023-10-19 PROCEDURE — 250N000013 HC RX MED GY IP 250 OP 250 PS 637: Performed by: INTERNAL MEDICINE

## 2023-10-19 PROCEDURE — 206N000001 HC R&B BMT UMMC

## 2023-10-19 PROCEDURE — 258N000003 HC RX IP 258 OP 636: Performed by: INTERNAL MEDICINE

## 2023-10-19 PROCEDURE — 83615 LACTATE (LD) (LDH) ENZYME: CPT | Performed by: PHYSICIAN ASSISTANT

## 2023-10-19 PROCEDURE — P9037 PLATE PHERES LEUKOREDU IRRAD: HCPCS | Performed by: PHYSICIAN ASSISTANT

## 2023-10-19 PROCEDURE — 83605 ASSAY OF LACTIC ACID: CPT | Performed by: INTERNAL MEDICINE

## 2023-10-19 PROCEDURE — 85045 AUTOMATED RETICULOCYTE COUNT: CPT | Performed by: PHYSICIAN ASSISTANT

## 2023-10-19 PROCEDURE — 250N000011 HC RX IP 250 OP 636: Performed by: HOSPITALIST

## 2023-10-19 PROCEDURE — 97530 THERAPEUTIC ACTIVITIES: CPT | Mod: GP | Performed by: PHYSICAL THERAPIST

## 2023-10-19 PROCEDURE — 86923 COMPATIBILITY TEST ELECTRIC: CPT | Performed by: PHYSICIAN ASSISTANT

## 2023-10-19 RX ORDER — POTASSIUM CHLORIDE 29.8 MG/ML
20 INJECTION INTRAVENOUS
Status: COMPLETED | OUTPATIENT
Start: 2023-10-19 | End: 2023-10-19

## 2023-10-19 RX ORDER — FUROSEMIDE 10 MG/ML
20 INJECTION INTRAMUSCULAR; INTRAVENOUS ONCE
Status: COMPLETED | OUTPATIENT
Start: 2023-10-19 | End: 2023-10-19

## 2023-10-19 RX ORDER — BUTALBITAL, ACETAMINOPHEN AND CAFFEINE 50; 325; 40 MG/1; MG/1; MG/1
1 TABLET ORAL EVERY 4 HOURS PRN
Status: DISCONTINUED | OUTPATIENT
Start: 2023-10-19 | End: 2023-10-27 | Stop reason: HOSPADM

## 2023-10-19 RX ADMIN — HYDROMORPHONE HYDROCHLORIDE 4 MG: 2 TABLET ORAL at 20:33

## 2023-10-19 RX ADMIN — POTASSIUM CHLORIDE 20 MEQ: 29.8 INJECTION, SOLUTION INTRAVENOUS at 06:01

## 2023-10-19 RX ADMIN — TOPIRAMATE 50 MG: 50 TABLET, FILM COATED ORAL at 21:06

## 2023-10-19 RX ADMIN — LOPERAMIDE HYDROCHLORIDE 2 MG: 2 CAPSULE ORAL at 04:30

## 2023-10-19 RX ADMIN — Medication 5 ML: at 21:45

## 2023-10-19 RX ADMIN — FUROSEMIDE 20 MG: 10 INJECTION, SOLUTION INTRAMUSCULAR; INTRAVENOUS at 14:18

## 2023-10-19 RX ADMIN — LEVOTHYROXINE SODIUM 75 MCG: 75 TABLET ORAL at 07:44

## 2023-10-19 RX ADMIN — POTASSIUM CHLORIDE 20 MEQ: 29.8 INJECTION, SOLUTION INTRAVENOUS at 07:44

## 2023-10-19 RX ADMIN — Medication 5 ML: at 20:48

## 2023-10-19 RX ADMIN — SIMETHICONE 80 MG: 80 TABLET, CHEWABLE ORAL at 06:48

## 2023-10-19 RX ADMIN — CEFEPIME HYDROCHLORIDE 2 G: 2 INJECTION, POWDER, FOR SOLUTION INTRAVENOUS at 17:10

## 2023-10-19 RX ADMIN — METRONIDAZOLE 500 MG: 5 INJECTION, SOLUTION INTRAVENOUS at 14:18

## 2023-10-19 RX ADMIN — METRONIDAZOLE 500 MG: 5 INJECTION, SOLUTION INTRAVENOUS at 05:57

## 2023-10-19 RX ADMIN — BUTALBITAL, ACETAMINOPHEN, AND CAFFEINE 1 TABLET: 50; 325; 40 TABLET ORAL at 14:18

## 2023-10-19 RX ADMIN — DEXTROSE MONOHYDRATE 480 MCG: 50 INJECTION, SOLUTION INTRAVENOUS at 20:48

## 2023-10-19 RX ADMIN — VENLAFAXINE HYDROCHLORIDE 150 MG: 37.5 CAPSULE, EXTENDED RELEASE ORAL at 21:06

## 2023-10-19 RX ADMIN — CEFEPIME HYDROCHLORIDE 2 G: 2 INJECTION, POWDER, FOR SOLUTION INTRAVENOUS at 08:03

## 2023-10-19 RX ADMIN — FUROSEMIDE 20 MG: 10 INJECTION, SOLUTION INTRAMUSCULAR; INTRAVENOUS at 09:17

## 2023-10-19 RX ADMIN — Medication 5 ML: at 14:18

## 2023-10-19 RX ADMIN — ACYCLOVIR 400 MG: 400 TABLET ORAL at 07:44

## 2023-10-19 RX ADMIN — HYDROMORPHONE HYDROCHLORIDE 4 MG: 2 TABLET ORAL at 07:48

## 2023-10-19 RX ADMIN — DEXTROSE MONOHYDRATE 20 ML: 50 INJECTION, SOLUTION INTRAVENOUS at 20:48

## 2023-10-19 RX ADMIN — ACYCLOVIR 400 MG: 400 TABLET ORAL at 20:48

## 2023-10-19 RX ADMIN — BUPROPION HYDROCHLORIDE 150 MG: 150 TABLET, FILM COATED, EXTENDED RELEASE ORAL at 07:44

## 2023-10-19 RX ADMIN — FLUCONAZOLE 400 MG: 200 TABLET ORAL at 07:44

## 2023-10-19 RX ADMIN — POTASSIUM CHLORIDE AND SODIUM CHLORIDE: 900; 150 INJECTION, SOLUTION INTRAVENOUS at 21:45

## 2023-10-19 RX ADMIN — CEFEPIME HYDROCHLORIDE 2 G: 2 INJECTION, POWDER, FOR SOLUTION INTRAVENOUS at 00:22

## 2023-10-19 RX ADMIN — ACETAMINOPHEN 650 MG: 325 TABLET, FILM COATED ORAL at 20:08

## 2023-10-19 RX ADMIN — PANTOPRAZOLE SODIUM 40 MG: 40 TABLET, DELAYED RELEASE ORAL at 07:44

## 2023-10-19 RX ADMIN — POTASSIUM PHOSPHATE, MONOBASIC POTASSIUM PHOSPHATE, DIBASIC 15 MMOL: 224; 236 INJECTION, SOLUTION, CONCENTRATE INTRAVENOUS at 00:22

## 2023-10-19 RX ADMIN — METRONIDAZOLE 500 MG: 5 INJECTION, SOLUTION INTRAVENOUS at 21:14

## 2023-10-19 ASSESSMENT — ACTIVITIES OF DAILY LIVING (ADL)
ADLS_ACUITY_SCORE: 18

## 2023-10-19 NOTE — PROGRESS NOTES
"BMT/Cell Therapy Daily Progress Note   10/19/2023    Patient ID:  Jennifer Ward is a 48 year old female, currently day +7 s/p autologous hematopoietic cell transplant as rescue after BEAM therapy for Hodgkin Lymphoma. Co-enrolled on Angiod[employer] study.     Readmitted 10/16 with neutropenic fever and abdominal pain.    INTERVAL  HISTORY   Jennifer endorses feeling very bloated today. States abdominal pain/cramping continues; has not changed in any way over the last few days, but is not improving. Stool waxes and wanes from liquid to soft consistency. Denies N/V. No bleeding or new rashes. No new infectious symptoms.        Review of Systems: ROS negative except as noted above.      PHYSICAL EXAM     Weight In/Out     Wt Readings from Last 3 Encounters:   10/18/23 105 kg (231 lb 8 oz)   10/16/23 99.9 kg (220 lb 3.2 oz)   10/13/23 101.1 kg (222 lb 12.8 oz)      I/O last 3 completed shifts:  In: 3533 [P.O.:1600; I.V.:1400]  Out: 2125 [Urine:1625; Other:500]       KPS:  70    /75   Pulse 84   Temp 98.4  F (36.9  C)   Resp 16   Ht 1.64 m (5' 4.57\")   Wt 105 kg (231 lb 8 oz)   SpO2 97%   BMI 39.04 kg/m       General: NAD   Eyes: sclera anicteric   Nose/Mouth/Throat: sore on left anterolateral tongue and some ridging upper palate and along gum line (not examined 10/19)  Lungs: CTAB  Cardiovascular: RRR, no M/R/G   Abdominal: +BS, soft, mildly tender to palpation (diffusely).   Lymphatics: no edema  Skin: no rash  Neuro: A&O; non-focal  Access: L CVC NT, dressing cdi. R PAC not accessed.    LABS AND IMAGING: I have assessed all abnormal lab values for their clinical significance and any values considered clinically significant have been addressed in the assessment and plan.        Lab Results   Component Value Date    WBC <0.1 (LL) 10/19/2023    ANEU 1.0 (L) 10/13/2023    HGB 7.4 (L) 10/19/2023    HCT 21.5 (L) 10/19/2023    PLT 7 (LL) 10/19/2023     (L) 10/19/2023    POTASSIUM 3.4 10/19/2023    CHLORIDE 104 " 10/19/2023    CO2 21 (L) 10/19/2023     (H) 10/19/2023    BUN 3.8 (L) 10/19/2023    CR 0.58 10/19/2023    MAG 1.6 (L) 10/19/2023    INR 1.19 (H) 10/16/2023    BILITOTAL 0.5 10/19/2023    AST 10 10/19/2023    ALT 9 10/19/2023    ALKPHOS 46 10/19/2023    PROTTOTAL 4.9 (L) 10/19/2023    ALBUMIN 3.0 (L) 10/19/2023     CT Abd/Pelvis 10/16:   1. Distended loops of small bowel without a discrete transition point  associated with areas of mild wall thickening, vascular engorgement  and mesenteric edema. Findings may represent enteritis, posttreatment  changes or hyperacute GVHD in the appropriate clinical setting.     CXR portable (10/16): no aso   Oral/GI regimen related toxicities (per NCI CTCAE v 5.0):    Oral Mucositis: Grade 2  Nausea: none  Vomiting: none  Diarrhea: Grade 2    *diarrhea secondary to C. Diff is excluded from the definition of oral/GI RRT      CTCAE Terms     Vomiting: A disorder characterized by the reflexive act of ejecting the contents of the stomach through the mouth   Grade 1 Intervention not indicated   Grade 2 Outpatient IV hydration; medical intervention indicated   Grade 3 Tube feeding, TPN, or hospitalization indicated   Grade 4 Life-threatening consequences   Grade 5 Death     Oral Mucositis: A disorder characterized by ulceration or inflammation of the oral mucosal.   Grade 1 Asymptomatic or mild symptoms; intervention not indicated   Grade 2 Moderate pain or ulcer that does not interfere with oral intake; modified diet indicated   Grade 3 Severe pain; interfering with oral intake   Grade 4 Life-threatening consequences; urgent intervention indicated   Grade 5 Death     Nausea: A disorder characterized by a queasy sensation and/or the urge to vomit.   Grade 1 Loss of appetite without alteration in eating habits  Grade 2 Oral intake decreased without significant weight loss, dehydration or malnutrition   Grade 3 Inadequate oral caloric or fluid intake; tube feeding, TPN, or  hospitalization indicated   Grade 4 NA  Grade 5 NA    Diarrhea: A disorder characterized by an increase in frequency and/or loose or watery bowel movements.   Grade 1 Increase of <4 stools per day over baseline; mild increase in ostomy output compared to baseline   Grade 2 Increase of 4 - 6 stools per day over baseline; moderate increase in ostomy output compared to baseline; limiting instrumental ADL   Grade 3 Increase of >=7 stools per day over baseline; hospitalization indicated; severe increase in ostomy output compared to baseline; limiting self-care ADL    Grade 4 Life-threatening consequences; urgent intervention indicated    Grade 5 Death       Source: https://ctep.cancer.gov/protocoldevelopment/electronic_applications/docs/CTCAE_v5_Quick_Reference_8.5x11.pdf     SYSTEMS-BASED ASSESSMENT AND PLAN     Jennifer Ward is a 48 year old female, currently day + 7  s/p autologous hematopoietic cell transplant as rescue after BEAM therapy for Hodgkin Lymphoma. Co-enrolled on [employer] study.   Readmitted 10/16/23 with neutropenic fever.     Prep: BEAM     BMT  - BMT MD/Coordinator: Nay  -  with co-enrollment on  (E-CELERATE)  - Cell dose 7 million CD34+ cells/kg   - GCSF started day +5, continue until ANC > 500 for 3 consecutive days.   - Restaging: next restage at day +28  - Allopurinol started Day -6     HEME/COAG  - Transfusion parameters: hgb <7g/dL and plts <10,000.   - Pancytopenia secondary to Hodgkin Lymphoma and chemotherapy     ID  Prophy: note that antimicrobial dosing is specific to protocol  Viral: ACV 400mg PO bid  Fungal: fluconazole 400mg daily  PCP: Bactrim or other PCP prophy to start at day +28     Neutropenic Fever of Unclear Etiology  ID workup unrevealing thusfar. Covid neg 10/17.     Continue empiric cefepime + flagyl, given abdominal pain.  CT abd/pelvis as above; surgical consult ruled out sbo.      GI  #diarrhea, abdominal crampin/2 chemo/colitis.  C.dif neg  "10/17.   Imodium prn, Simethicone prn, Dilaudid PO/IV, heating pad prn.  #risk of CINV: prn ativan, compazine prn.   #Ulcer prophy: Protonix 40mg daily.   #E-CELERATE vs placebo administered 10/12.  - oropharyngeal mucositis: 2/2 chemo. Chloraseptic spray; MMW, good oral cares.     NUTRITION/FLUIDS  - advance to high phill/protein diet 10/18  - Wt up 12lb since admission; change MIVF to tko on 10/18 (previously 100cc/hr since admission).   - Give Lasix 20mg IV x2 today.   - I/O goal of net negative 1L over 24 hours.     ENDOCRINE  # hypothyroidism: continue PTA synthroid     PSYCH  # depression: continue PTA bupropion, venlafaxine     NEURO  # migraines: daily topamax with prn naratriptan; propranolol prophy placed on hold with hypotension after admission.      FEN  - creat, lytes wnl  - replete prn per ss        SUPPORTIVE CARE  - PT/OT consult, will follow as indicated  - BMT Social work to follow.       Code Status: Full Code       Dispo: remain admitted pending engraftment and resolution of GI symptoms.    Known issues that I take into account for medical decisions, with salient changes to the plan considering these complexities noted above.    Patient Active Problem List   Diagnosis    Hodgkin's disease of lymph nodes of multiple sites (H)    Autologous donor of stem cells    Nodular sclerosis Hodgkin lymphoma of lymph nodes of multiple regions (H)    Febrile neutropenia        Clinically Significant Risk Factors            # Hypomagnesemia: Lowest Mg = 1.6 mg/dL in last 2 days, will replace as needed   # Hypoalbuminemia: Lowest albumin = 2.9 g/dL at 10/18/2023  3:22 AM, will monitor as appropriate       # Thrombocytopenia: Lowest platelets = 7 in last 2 days, will monitor for bleeding          # Obesity: Estimated body mass index is 39.04 kg/m  as calculated from the following:    Height as of this encounter: 1.64 m (5' 4.57\").    Weight as of this encounter: 105 kg (231 lb 8 oz)., PRESENT ON ADMISSION       # " Financial/Environmental Concerns: none         I spent 40 minutes face-to-face and/or coordinating care. Over 50% of our time on the unit was spent counseling the patient and/or coordinating care and the plan detailed on today's notes.        Fadumo Giullaume PA-C

## 2023-10-19 NOTE — PLAN OF CARE
"100.0 temp max. Dilaudid x1 for abdominal pain. Fioricet for headache. Potassium replaced and recheck was 3.8. Recheck again tomorrow. Lasix x2. Mom and sister here for a short visit. Continue POC.   Problem: Adult Inpatient Plan of Care  Goal: Plan of Care Review  Description: The Plan of Care Review/Shift note should be completed every shift.  The Outcome Evaluation is a brief statement about your assessment that the patient is improving, declining, or no change.  This information will be displayed automatically on your shift  note.  10/19/2023 1724 by Nadine Hood RN  Outcome: Progressing  10/19/2023 1723 by Nadine Hood RN  Outcome: Progressing  Goal: Patient-Specific Goal (Individualized)  Description: You can add care plan individualizations to a care plan. Examples of Individualization might be:  \"Parent requests to be called daily at 9am for status\", \"I have a hard time hearing out of my right ear\", or \"Do not touch me to wake me up as it startles  me\".  10/19/2023 1724 by Nadine Hood RN  Outcome: Progressing  10/19/2023 1723 by Nadine Hood RN  Outcome: Progressing  Goal: Absence of Hospital-Acquired Illness or Injury  10/19/2023 1724 by Nadine Hood RN  Outcome: Progressing  10/19/2023 1723 by Nadine Hood RN  Outcome: Progressing  Intervention: Identify and Manage Fall Risk  Recent Flowsheet Documentation  Taken 10/19/2023 0800 by Nadine Hood RN  Safety Promotion/Fall Prevention: assistive device/personal items within reach  Intervention: Prevent Skin Injury  Recent Flowsheet Documentation  Taken 10/19/2023 0800 by Nadine Hood RN  Body Position: position changed independently  Goal: Optimal Comfort and Wellbeing  10/19/2023 1724 by Nadine Hood RN  Outcome: Progressing  10/19/2023 1723 by Nadine Hood RN  Outcome: Progressing  Intervention: Monitor Pain and Promote Comfort  Recent Flowsheet Documentation  Taken 10/19/2023 1418 by Nadine Hood RN  Pain Management Interventions: medication " (see MAR)  Taken 10/19/2023 0748 by Nadine Hood RN  Pain Management Interventions: medication (see MAR)  Intervention: Provide Person-Centered Care  Recent Flowsheet Documentation  Taken 10/19/2023 0800 by Nadine Hood RN  Trust Relationship/Rapport:   care explained   choices provided  Goal: Readiness for Transition of Care  10/19/2023 1724 by Nadine Hood RN  Outcome: Progressing  10/19/2023 1723 by Nadine Hodo RN  Outcome: Progressing     Problem: Stem Cell/Bone Marrow Transplant  Goal: Optimal Coping with Transplant  10/19/2023 1724 by Nadine Hood RN  Outcome: Progressing  10/19/2023 1723 by Nadine Hood RN  Outcome: Progressing  Intervention: Optimize Patient/Family Adjustment to Transplant  Recent Flowsheet Documentation  Taken 10/19/2023 0800 by Nadine Hood RN  Supportive Measures: active listening utilized  Goal: Symptom-Free Urinary Elimination  10/19/2023 1724 by Nadine Hood RN  Outcome: Progressing  10/19/2023 1723 by Nadine Hood RN  Outcome: Progressing  Intervention: Prevent or Manage Bladder Irritation  Recent Flowsheet Documentation  Taken 10/19/2023 1418 by Nadine Hood RN  Pain Management Interventions: medication (see MAR)  Taken 10/19/2023 0748 by Nadine Hood RN  Pain Management Interventions: medication (see MAR)  Goal: Diarrhea Symptom Control  10/19/2023 1724 by Nadine Hood RN  Outcome: Progressing  10/19/2023 1723 by Nadine Hood RN  Outcome: Progressing  Goal: Improved Activity Tolerance  10/19/2023 1724 by Nadine Hood RN  Outcome: Progressing  10/19/2023 1723 by Nadine Hood RN  Outcome: Progressing  Intervention: Promote Improved Energy  Recent Flowsheet Documentation  Taken 10/19/2023 0800 by Nadine Hood RN  Activity Management: activity adjusted per tolerance  Environmental Support: calm environment promoted  Goal: Blood Counts Within Acceptable Range  10/19/2023 1724 by Nadine Hood RN  Outcome: Progressing  10/19/2023 1723 by Nadine Hood RN  Outcome:  Progressing  Intervention: Monitor and Manage Hematologic Symptoms  Recent Flowsheet Documentation  Taken 10/19/2023 0800 by Nadine Hood RN  Bleeding Precautions: monitored for signs of bleeding  Medication Review/Management: medications reviewed  Goal: Absence of Hypersensitivity Reaction  10/19/2023 1724 by Nadine Hood RN  Outcome: Progressing  10/19/2023 1723 by Nadine Hood RN  Outcome: Progressing  Goal: Absence of Infection  10/19/2023 1724 by Nadine Hood RN  Outcome: Progressing  10/19/2023 1723 by Nadine Hood RN  Outcome: Progressing  Intervention: Prevent and Manage Infection  Recent Flowsheet Documentation  Taken 10/19/2023 0800 by Nadine Hood RN  Infection Management: aseptic technique maintained  Goal: Improved Oral Mucous Membrane Health and Integrity  10/19/2023 1724 by Nadine Hood RN  Outcome: Progressing  10/19/2023 1723 by Nadine Hood RN  Outcome: Progressing  Intervention: Promote Oral Comfort and Health  Recent Flowsheet Documentation  Taken 10/19/2023 0800 by Nadine Hood RN  Oral Mucous Membrane Protection:   nonirritating oral fluids promoted   nonirritating oral foods promoted  Oral Care: oral rinse provided  Goal: Nausea and Vomiting Symptom Relief  10/19/2023 1724 by Nadine Hood RN  Outcome: Progressing  10/19/2023 1723 by Nadine Hood RN  Outcome: Progressing  Goal: Optimal Nutrition Intake  10/19/2023 1724 by Nadine Hood RN  Outcome: Progressing  10/19/2023 1723 by Nadine Hood RN  Outcome: Progressing   Goal Outcome Evaluation:

## 2023-10-19 NOTE — PROGRESS NOTES
CLINICAL NUTRITION SERVICES    RN Consult: Nutrition jos less than 3.   RD is already following pt and will continue to follow per protocol. Please see most recent progress note dated 10/18 for details.       Sloane Cabrera RD, LD  5C/BMT pager: [phone]

## 2023-10-19 NOTE — PLAN OF CARE
"/72 (BP Location: Left arm)   Pulse 94   Temp 98.8  F (37.1  C) (Axillary)   Resp 16   Ht 1.64 m (5' 4.57\")   Wt 105 kg (231 lb 8 oz)   SpO2 97%   BMI 39.04 kg/m       Afebrile, T max 99.3. VSS on RA. C/o abdominal pain at start of shift, rated 5/10, PRN dilaudid given x1 w good relief. Drinking some fluids overnight, otherwise no other oral intake. Voiding well. X5 loose stools this AM, PRN imodium given x2, PRN simethicone given x1.    Phos recheck last evening 1.7, 15 mmol Kphos given x2, AM recheck 2.7, no further replacement needed this AM. Platelets 7, 1 unit infused, tolerated well. K+ 3.4, 20 mEq infusing, will need another 20 mEq and recheck 1-2 hours post. No other replacements needed.    Continue w plan of care.     Problem: Adult Inpatient Plan of Care  Goal: Absence of Hospital-Acquired Illness or Injury  Outcome: Progressing  Intervention: Identify and Manage Fall Risk  Recent Flowsheet Documentation  Taken 10/19/2023 0400 by Bartolo Mari, RN  Safety Promotion/Fall Prevention:   assistive device/personal items within reach   clutter free environment maintained   nonskid shoes/slippers when out of bed   patient and family education   room organization consistent   safety round/check completed  Taken 10/19/2023 0000 by Bartolo Mari, RN  Safety Promotion/Fall Prevention:   assistive device/personal items within reach   clutter free environment maintained   nonskid shoes/slippers when out of bed   patient and family education   room organization consistent   safety round/check completed  Taken 10/18/2023 2000 by Bartolo Mari, RN  Safety Promotion/Fall Prevention:   assistive device/personal items within reach   clutter free environment maintained   nonskid shoes/slippers when out of bed   patient and family education   room organization consistent   safety round/check completed  Intervention: Prevent Skin Injury  Recent Flowsheet Documentation  Taken 10/18/2023 2000 by " Bartolo Mari RN  Body Position: position changed independently  Intervention: Prevent and Manage VTE (Venous Thromboembolism) Risk  Recent Flowsheet Documentation  Taken 10/18/2023 2000 by Bartolo Mari RN  VTE Prevention/Management: (pt ambulates) SCDs (sequential compression devices) off  Intervention: Prevent Infection  Recent Flowsheet Documentation  Taken 10/19/2023 0400 by Bartolo Mari RN  Infection Prevention:   cohorting utilized   environmental surveillance performed   equipment surfaces disinfected   hand hygiene promoted   personal protective equipment utilized   rest/sleep promoted   single patient room provided   visitors restricted/screened  Taken 10/19/2023 0000 by Bartolo Mair RN  Infection Prevention:   cohorting utilized   environmental surveillance performed   equipment surfaces disinfected   hand hygiene promoted   personal protective equipment utilized   rest/sleep promoted   single patient room provided   visitors restricted/screened  Taken 10/18/2023 2000 by Bartolo Mari RN  Infection Prevention:   cohorting utilized   environmental surveillance performed   equipment surfaces disinfected   hand hygiene promoted   personal protective equipment utilized   rest/sleep promoted   single patient room provided   visitors restricted/screened  Goal: Optimal Comfort and Wellbeing  Outcome: Progressing  Intervention: Monitor Pain and Promote Comfort  Recent Flowsheet Documentation  Taken 10/18/2023 2000 by Bartolo Mari RN  Pain Management Interventions: medication (see MAR)  Intervention: Provide Person-Centered Care  Recent Flowsheet Documentation  Taken 10/18/2023 2000 by Bartolo Mari RN  Trust Relationship/Rapport:   care explained   choices provided   emotional support provided   empathic listening provided   questions answered   questions encouraged   reassurance provided   thoughts/feelings acknowledged     Problem: Stem Cell/Bone Marrow  Transplant  Goal: Symptom-Free Urinary Elimination  Outcome: Progressing  Intervention: Prevent or Manage Bladder Irritation  Recent Flowsheet Documentation  Taken 10/18/2023 2000 by Bartolo Mari RN  Pain Management Interventions: medication (see MAR)  Goal: Diarrhea Symptom Control  Outcome: Progressing  Intervention: Manage Diarrhea  Recent Flowsheet Documentation  Taken 10/18/2023 2000 by Bartolo Mari RN  Perineal Care: perineal hygiene encouraged  Goal: Improved Activity Tolerance  Outcome: Progressing  Intervention: Promote Improved Energy  Recent Flowsheet Documentation  Taken 10/18/2023 2000 by Bartolo Mari RN  Sleep/Rest Enhancement:   awakenings minimized   noise level reduced   regular sleep/rest pattern promoted   relaxation techniques promoted   room darkened  Activity Management:   activity adjusted per tolerance   up ad layla  Environmental Support:   calm environment promoted   caregiver consistency promoted   distractions minimized   environmental consistency promoted   rest periods encouraged  Goal: Absence of Infection  Outcome: Progressing  Intervention: Prevent and Manage Infection  Recent Flowsheet Documentation  Taken 10/19/2023 0400 by Bartolo Mari RN  Infection Prevention:   cohorting utilized   environmental surveillance performed   equipment surfaces disinfected   hand hygiene promoted   personal protective equipment utilized   rest/sleep promoted   single patient room provided   visitors restricted/screened  Infection Management: aseptic technique maintained  Isolation Precautions: protective environment maintained  Taken 10/19/2023 0000 by Bartolo Mari RN  Infection Prevention:   cohorting utilized   environmental surveillance performed   equipment surfaces disinfected   hand hygiene promoted   personal protective equipment utilized   rest/sleep promoted   single patient room provided   visitors restricted/screened  Infection Management: aseptic  technique maintained  Isolation Precautions: protective environment maintained  Taken 10/18/2023 2000 by Bartolo Mari RN  Infection Prevention:   cohorting utilized   environmental surveillance performed   equipment surfaces disinfected   hand hygiene promoted   personal protective equipment utilized   rest/sleep promoted   single patient room provided   visitors restricted/screened  Infection Management: aseptic technique maintained  Isolation Precautions: protective environment maintained  Goal: Improved Oral Mucous Membrane Health and Integrity  Outcome: Progressing  Intervention: Promote Oral Comfort and Health  Recent Flowsheet Documentation  Taken 10/18/2023 2000 by Bartolo Mari RN  Oral Mucous Membrane Protection:   nonirritating oral fluids promoted   lip/mouth moisturizer applied  Oral Care: oral rinse provided  Goal: Nausea and Vomiting Symptom Relief  Outcome: Progressing   Goal Outcome Evaluation:

## 2023-10-20 ENCOUNTER — APPOINTMENT (OUTPATIENT)
Dept: ULTRASOUND IMAGING | Facility: CLINIC | Age: 48
End: 2023-10-20
Attending: INTERNAL MEDICINE
Payer: COMMERCIAL

## 2023-10-20 ENCOUNTER — APPOINTMENT (OUTPATIENT)
Dept: CT IMAGING | Facility: CLINIC | Age: 48
End: 2023-10-20
Payer: COMMERCIAL

## 2023-10-20 LAB
ACANTHOCYTES BLD QL SMEAR: NORMAL
ALBUMIN SERPL BCG-MCNC: 2.7 G/DL (ref 3.5–5.2)
ALP SERPL-CCNC: 46 U/L (ref 35–104)
ALT SERPL W P-5'-P-CCNC: 6 U/L (ref 0–50)
ANION GAP SERPL CALCULATED.3IONS-SCNC: 12 MMOL/L (ref 7–15)
AST SERPL W P-5'-P-CCNC: 10 U/L (ref 0–45)
ATRIAL RATE - MUSE: 109 BPM
AUER BODIES BLD QL SMEAR: NORMAL
BASO STIPL BLD QL SMEAR: NORMAL
BASO+EOS+MONOS # BLD AUTO: ABNORMAL 10*3/UL
BASO+EOS+MONOS NFR BLD AUTO: ABNORMAL %
BASOPHILS # BLD AUTO: ABNORMAL 10*3/UL
BASOPHILS NFR BLD AUTO: ABNORMAL %
BILIRUB DIRECT SERPL-MCNC: 0.37 MG/DL (ref 0–0.3)
BILIRUB SERPL-MCNC: 0.7 MG/DL
BITE CELLS BLD QL SMEAR: NORMAL
BLISTER CELLS BLD QL SMEAR: NORMAL
BUN SERPL-MCNC: 4.7 MG/DL (ref 6–20)
BURR CELLS BLD QL SMEAR: NORMAL
CALCIUM SERPL-MCNC: 8.1 MG/DL (ref 8.6–10)
CHLORIDE SERPL-SCNC: 96 MMOL/L (ref 98–107)
CREAT SERPL-MCNC: 0.8 MG/DL (ref 0.51–0.95)
D DIMER PPP FEU-MCNC: 3.24 UG/ML FEU (ref 0–0.5)
DACRYOCYTES BLD QL SMEAR: NORMAL
DEPRECATED HCO3 PLAS-SCNC: 23 MMOL/L (ref 22–29)
DIASTOLIC BLOOD PRESSURE - MUSE: NORMAL MMHG
EGFRCR SERPLBLD CKD-EPI 2021: 90 ML/MIN/1.73M2
ELLIPTOCYTES BLD QL SMEAR: NORMAL
EOSINOPHIL # BLD AUTO: ABNORMAL 10*3/UL
EOSINOPHIL NFR BLD AUTO: ABNORMAL %
ERYTHROCYTE [DISTWIDTH] IN BLOOD BY AUTOMATED COUNT: 13.3 % (ref 10–15)
FRAGMENTS BLD QL SMEAR: NORMAL
GLUCOSE SERPL-MCNC: 104 MG/DL (ref 70–99)
HCT VFR BLD AUTO: 22.9 % (ref 35–47)
HGB BLD-MCNC: 7.8 G/DL (ref 11.7–15.7)
HGB C CRYSTALS: NORMAL
HOLD SPECIMEN: NORMAL
HOWELL-JOLLY BOD BLD QL SMEAR: NORMAL
IMM GRANULOCYTES # BLD: ABNORMAL 10*3/UL
IMM GRANULOCYTES NFR BLD: ABNORMAL %
INTERPRETATION ECG - MUSE: NORMAL
LACTATE SERPL-SCNC: 1.9 MMOL/L (ref 0.7–2)
LDH SERPL L TO P-CCNC: 137 U/L (ref 0–250)
LYMPHOCYTES # BLD AUTO: ABNORMAL 10*3/UL
LYMPHOCYTES NFR BLD AUTO: ABNORMAL %
MAGNESIUM SERPL-MCNC: 1.1 MG/DL (ref 1.7–2.3)
MAGNESIUM SERPL-MCNC: 2.4 MG/DL (ref 1.7–2.3)
MAGNESIUM SERPL-MCNC: 2.4 MG/DL (ref 1.7–2.3)
MCH RBC QN AUTO: 28.6 PG (ref 26.5–33)
MCHC RBC AUTO-ENTMCNC: 34.1 G/DL (ref 31.5–36.5)
MCV RBC AUTO: 84 FL (ref 78–100)
MONOCYTES # BLD AUTO: ABNORMAL 10*3/UL
MONOCYTES NFR BLD AUTO: ABNORMAL %
NEUTROPHILS # BLD AUTO: ABNORMAL 10*3/UL
NEUTROPHILS NFR BLD AUTO: ABNORMAL %
NEUTS HYPERSEG BLD QL SMEAR: NORMAL
P AXIS - MUSE: 60 DEGREES
PHOSPHATE SERPL-MCNC: 1.1 MG/DL (ref 2.5–4.5)
PHOSPHATE SERPL-MCNC: 3.5 MG/DL (ref 2.5–4.5)
PHOSPHATE SERPL-MCNC: 3.6 MG/DL (ref 2.5–4.5)
PLAT MORPH BLD: NORMAL
PLATELET # BLD AUTO: 12 10E3/UL (ref 150–450)
POLYCHROMASIA BLD QL SMEAR: NORMAL
POTASSIUM SERPL-SCNC: 2.8 MMOL/L (ref 3.4–5.3)
POTASSIUM SERPL-SCNC: 4 MMOL/L (ref 3.4–5.3)
POTASSIUM SERPL-SCNC: 4.8 MMOL/L (ref 3.4–5.3)
PR INTERVAL - MUSE: 128 MS
PROT SERPL-MCNC: 4.9 G/DL (ref 6.4–8.3)
QRS DURATION - MUSE: 86 MS
QT - MUSE: 324 MS
QTC - MUSE: 436 MS
R AXIS - MUSE: 44 DEGREES
RBC # BLD AUTO: 2.73 10E6/UL (ref 3.8–5.2)
RBC AGGLUT BLD QL: NORMAL
RBC MORPH BLD: NORMAL
RETICS # AUTO: 0 10E6/UL (ref 0.03–0.1)
RETICS/RBC NFR AUTO: 0.1 % (ref 0.5–2)
ROULEAUX BLD QL SMEAR: NORMAL
SICKLE CELLS BLD QL SMEAR: NORMAL
SMUDGE CELLS BLD QL SMEAR: NORMAL
SODIUM SERPL-SCNC: 131 MMOL/L (ref 135–145)
SPHEROCYTES BLD QL SMEAR: NORMAL
STOMATOCYTES BLD QL SMEAR: NORMAL
SYSTOLIC BLOOD PRESSURE - MUSE: NORMAL MMHG
T AXIS - MUSE: 23 DEGREES
TARGETS BLD QL SMEAR: NORMAL
TOXIC GRANULES BLD QL SMEAR: NORMAL
VARIANT LYMPHS BLD QL SMEAR: NORMAL
VENTRICULAR RATE- MUSE: 109 BPM
WBC # BLD AUTO: 0.1 10E3/UL (ref 4–11)

## 2023-10-20 PROCEDURE — 83735 ASSAY OF MAGNESIUM: CPT | Performed by: INTERNAL MEDICINE

## 2023-10-20 PROCEDURE — 250N000009 HC RX 250: Performed by: INTERNAL MEDICINE

## 2023-10-20 PROCEDURE — 93005 ELECTROCARDIOGRAM TRACING: CPT

## 2023-10-20 PROCEDURE — 250N000011 HC RX IP 250 OP 636: Performed by: PHYSICIAN ASSISTANT

## 2023-10-20 PROCEDURE — 84100 ASSAY OF PHOSPHORUS: CPT | Performed by: INTERNAL MEDICINE

## 2023-10-20 PROCEDURE — 258N000003 HC RX IP 258 OP 636

## 2023-10-20 PROCEDURE — 250N000011 HC RX IP 250 OP 636: Mod: JZ | Performed by: INTERNAL MEDICINE

## 2023-10-20 PROCEDURE — 83605 ASSAY OF LACTIC ACID: CPT | Performed by: INTERNAL MEDICINE

## 2023-10-20 PROCEDURE — 80053 COMPREHEN METABOLIC PANEL: CPT | Performed by: PHYSICIAN ASSISTANT

## 2023-10-20 PROCEDURE — 84132 ASSAY OF SERUM POTASSIUM: CPT | Performed by: INTERNAL MEDICINE

## 2023-10-20 PROCEDURE — 250N000011 HC RX IP 250 OP 636: Mod: JZ | Performed by: HOSPITALIST

## 2023-10-20 PROCEDURE — 258N000003 HC RX IP 258 OP 636: Performed by: PHYSICIAN ASSISTANT

## 2023-10-20 PROCEDURE — 85045 AUTOMATED RETICULOCYTE COUNT: CPT | Performed by: PHYSICIAN ASSISTANT

## 2023-10-20 PROCEDURE — 206N000001 HC R&B BMT UMMC

## 2023-10-20 PROCEDURE — 84100 ASSAY OF PHOSPHORUS: CPT

## 2023-10-20 PROCEDURE — 93970 EXTREMITY STUDY: CPT

## 2023-10-20 PROCEDURE — 250N000011 HC RX IP 250 OP 636: Mod: JZ | Performed by: PHYSICIAN ASSISTANT

## 2023-10-20 PROCEDURE — 85379 FIBRIN DEGRADATION QUANT: CPT

## 2023-10-20 PROCEDURE — 84132 ASSAY OF SERUM POTASSIUM: CPT

## 2023-10-20 PROCEDURE — 258N000003 HC RX IP 258 OP 636: Performed by: INTERNAL MEDICINE

## 2023-10-20 PROCEDURE — 250N000011 HC RX IP 250 OP 636: Mod: JZ

## 2023-10-20 PROCEDURE — 83735 ASSAY OF MAGNESIUM: CPT

## 2023-10-20 PROCEDURE — 250N000013 HC RX MED GY IP 250 OP 250 PS 637: Performed by: PHYSICIAN ASSISTANT

## 2023-10-20 PROCEDURE — 250N000011 HC RX IP 250 OP 636: Performed by: INTERNAL MEDICINE

## 2023-10-20 PROCEDURE — 71275 CT ANGIOGRAPHY CHEST: CPT

## 2023-10-20 PROCEDURE — 93010 ELECTROCARDIOGRAM REPORT: CPT | Performed by: INTERNAL MEDICINE

## 2023-10-20 PROCEDURE — 71275 CT ANGIOGRAPHY CHEST: CPT | Mod: 26 | Performed by: RADIOLOGY

## 2023-10-20 PROCEDURE — 250N000013 HC RX MED GY IP 250 OP 250 PS 637: Performed by: INTERNAL MEDICINE

## 2023-10-20 PROCEDURE — 85027 COMPLETE CBC AUTOMATED: CPT | Performed by: PHYSICIAN ASSISTANT

## 2023-10-20 PROCEDURE — 87103 BLOOD FUNGUS CULTURE: CPT | Performed by: PHYSICIAN ASSISTANT

## 2023-10-20 PROCEDURE — 83615 LACTATE (LD) (LDH) ENZYME: CPT | Performed by: PHYSICIAN ASSISTANT

## 2023-10-20 PROCEDURE — 258N000003 HC RX IP 258 OP 636: Performed by: HOSPITALIST

## 2023-10-20 PROCEDURE — 82248 BILIRUBIN DIRECT: CPT | Performed by: PHYSICIAN ASSISTANT

## 2023-10-20 PROCEDURE — 93970 EXTREMITY STUDY: CPT | Mod: 26 | Performed by: RADIOLOGY

## 2023-10-20 RX ORDER — POTASSIUM PHOS IN 0.9 % NACL 15MMOL/250
15 PLASTIC BAG, INJECTION (ML) INTRAVENOUS
Qty: 500 ML | Refills: 0 | Status: COMPLETED | OUTPATIENT
Start: 2023-10-20 | End: 2023-10-20

## 2023-10-20 RX ORDER — MAGNESIUM SULFATE HEPTAHYDRATE 40 MG/ML
4 INJECTION, SOLUTION INTRAVENOUS ONCE
Status: COMPLETED | OUTPATIENT
Start: 2023-10-20 | End: 2023-10-20

## 2023-10-20 RX ORDER — LIDOCAINE 40 MG/G
CREAM TOPICAL
Status: DISPENSED
Start: 2023-10-20 | End: 2023-10-21

## 2023-10-20 RX ORDER — POTASSIUM CHLORIDE 29.8 MG/ML
20 INJECTION INTRAVENOUS
Status: DISCONTINUED | OUTPATIENT
Start: 2023-10-20 | End: 2023-10-20 | Stop reason: DRUGHIGH

## 2023-10-20 RX ORDER — POTASSIUM CHLORIDE 29.8 MG/ML
20 INJECTION INTRAVENOUS
Status: COMPLETED | OUTPATIENT
Start: 2023-10-20 | End: 2023-10-20

## 2023-10-20 RX ORDER — IOPAMIDOL 755 MG/ML
73 INJECTION, SOLUTION INTRAVASCULAR ONCE
Status: COMPLETED | OUTPATIENT
Start: 2023-10-20 | End: 2023-10-20

## 2023-10-20 RX ADMIN — HYDROMORPHONE HYDROCHLORIDE 0.2 MG: 0.2 INJECTION, SOLUTION INTRAMUSCULAR; INTRAVENOUS; SUBCUTANEOUS at 09:35

## 2023-10-20 RX ADMIN — DEXTROSE MONOHYDRATE 10 ML: 50 INJECTION, SOLUTION INTRAVENOUS at 20:36

## 2023-10-20 RX ADMIN — POTASSIUM CHLORIDE 20 MEQ: 29.8 INJECTION, SOLUTION INTRAVENOUS at 08:43

## 2023-10-20 RX ADMIN — PROCHLORPERAZINE EDISYLATE 10 MG: 5 INJECTION INTRAMUSCULAR; INTRAVENOUS at 08:05

## 2023-10-20 RX ADMIN — LOPERAMIDE HYDROCHLORIDE 2 MG: 2 CAPSULE ORAL at 15:11

## 2023-10-20 RX ADMIN — BUPROPION HYDROCHLORIDE 150 MG: 150 TABLET, FILM COATED, EXTENDED RELEASE ORAL at 08:59

## 2023-10-20 RX ADMIN — METRONIDAZOLE 500 MG: 5 INJECTION, SOLUTION INTRAVENOUS at 21:36

## 2023-10-20 RX ADMIN — FLUCONAZOLE 400 MG: 200 TABLET ORAL at 08:59

## 2023-10-20 RX ADMIN — ACETAMINOPHEN 650 MG: 325 TABLET, FILM COATED ORAL at 00:47

## 2023-10-20 RX ADMIN — ACYCLOVIR 400 MG: 400 TABLET ORAL at 08:59

## 2023-10-20 RX ADMIN — ALTEPLASE 2 MG: 2.2 INJECTION, POWDER, LYOPHILIZED, FOR SOLUTION INTRAVENOUS at 06:30

## 2023-10-20 RX ADMIN — DEXTROSE MONOHYDRATE 480 MCG: 50 INJECTION, SOLUTION INTRAVENOUS at 20:36

## 2023-10-20 RX ADMIN — METRONIDAZOLE 500 MG: 5 INJECTION, SOLUTION INTRAVENOUS at 06:12

## 2023-10-20 RX ADMIN — PANTOPRAZOLE SODIUM 40 MG: 40 TABLET, DELAYED RELEASE ORAL at 08:59

## 2023-10-20 RX ADMIN — SODIUM CHLORIDE 500 ML: 9 INJECTION, SOLUTION INTRAVENOUS at 08:06

## 2023-10-20 RX ADMIN — POTASSIUM PHOSPHATE, MONOBASIC POTASSIUM PHOSPHATE, DIBASIC 15 MMOL: 224; 236 INJECTION, SOLUTION, CONCENTRATE INTRAVENOUS at 14:58

## 2023-10-20 RX ADMIN — HYDROMORPHONE HYDROCHLORIDE 4 MG: 2 TABLET ORAL at 20:37

## 2023-10-20 RX ADMIN — TOPIRAMATE 50 MG: 50 TABLET, FILM COATED ORAL at 20:37

## 2023-10-20 RX ADMIN — ACYCLOVIR 400 MG: 400 TABLET ORAL at 20:37

## 2023-10-20 RX ADMIN — POTASSIUM PHOSPHATE, MONOBASIC POTASSIUM PHOSPHATE, DIBASIC 15 MMOL: 224; 236 INJECTION, SOLUTION, CONCENTRATE INTRAVENOUS at 11:06

## 2023-10-20 RX ADMIN — LEVOTHYROXINE SODIUM 75 MCG: 75 TABLET ORAL at 08:59

## 2023-10-20 RX ADMIN — CEFEPIME HYDROCHLORIDE 2 G: 2 INJECTION, POWDER, FOR SOLUTION INTRAVENOUS at 00:48

## 2023-10-20 RX ADMIN — IOPAMIDOL 73 ML: 755 INJECTION, SOLUTION INTRAVENOUS at 14:05

## 2023-10-20 RX ADMIN — CEFEPIME HYDROCHLORIDE 2 G: 2 INJECTION, POWDER, FOR SOLUTION INTRAVENOUS at 16:31

## 2023-10-20 RX ADMIN — SODIUM CHLORIDE, POTASSIUM CHLORIDE, SODIUM LACTATE AND CALCIUM CHLORIDE 500 ML: 600; 310; 30; 20 INJECTION, SOLUTION INTRAVENOUS at 05:11

## 2023-10-20 RX ADMIN — FOSAPREPITANT 150 MG: 150 INJECTION, POWDER, LYOPHILIZED, FOR SOLUTION INTRAVENOUS at 13:00

## 2023-10-20 RX ADMIN — LOPERAMIDE HYDROCHLORIDE 2 MG: 2 CAPSULE ORAL at 20:38

## 2023-10-20 RX ADMIN — POTASSIUM CHLORIDE 20 MEQ: 29.8 INJECTION, SOLUTION INTRAVENOUS at 09:48

## 2023-10-20 RX ADMIN — METRONIDAZOLE 500 MG: 5 INJECTION, SOLUTION INTRAVENOUS at 15:11

## 2023-10-20 RX ADMIN — HYDROMORPHONE HYDROCHLORIDE 0.1 MG: 0.2 INJECTION, SOLUTION INTRAMUSCULAR; INTRAVENOUS; SUBCUTANEOUS at 12:08

## 2023-10-20 RX ADMIN — MAGNESIUM SULFATE HEPTAHYDRATE 4 G: 40 INJECTION, SOLUTION INTRAVENOUS at 06:35

## 2023-10-20 RX ADMIN — CEFEPIME HYDROCHLORIDE 2 G: 2 INJECTION, POWDER, FOR SOLUTION INTRAVENOUS at 09:02

## 2023-10-20 RX ADMIN — POTASSIUM CHLORIDE 20 MEQ: 29.8 INJECTION, SOLUTION INTRAVENOUS at 10:58

## 2023-10-20 RX ADMIN — ALTEPLASE 2 MG: 2.2 INJECTION, POWDER, LYOPHILIZED, FOR SOLUTION INTRAVENOUS at 03:10

## 2023-10-20 RX ADMIN — HYDROMORPHONE HYDROCHLORIDE 4 MG: 2 TABLET ORAL at 00:48

## 2023-10-20 ASSESSMENT — ACTIVITIES OF DAILY LIVING (ADL)
ADLS_ACUITY_SCORE: 18
ADLS_ACUITY_SCORE: 24
ADLS_ACUITY_SCORE: 18

## 2023-10-20 NOTE — PROVIDER NOTIFICATION
Texted provider (ARIANA Lugo):  FYI recurrent fever of 101.7, Tylenol given, blood cx drawn from blue lumen. No blood return from brown lumen, tried all the tricks, it is infusing fine. Can we try TPA? Thanks!

## 2023-10-20 NOTE — PLAN OF CARE
"BP 99/55 (BP Location: Left arm)   Pulse 120   Temp 99.7  F (37.6  C) (Oral)   Resp 22   Ht 1.64 m (5' 4.57\")   Wt 106.1 kg (233 lb 12.8 oz)   SpO2 96%   BMI 39.43 kg/m      T max 102.9, Tylenol given x2 for fevers, blood cx drawn from blue lumen x2 - no blood return from brown lumen - second dose of TPA currently instilling. HR tachycardic into 120s, RR elevated to 22. BP dropped to 80s/40s, 500 mL LR bolus given, BP up to 99/55. Sepsis protocol triggered, lactic 1.1. Dilaudid given x2 for abdominal pain. Pt reports frequent loose stools, declined Imodium and simethicone. SOB when ambulating, SBA. Pt would like to try claritin for bone pain/achiness. 4 g mag currently infusing with recheck 2-4 post infusion. Will need 20 mEq K+ x3 with recheck 1-2 post infusions, and 15 mmol phos x2 with recheck 2-4 hrs post infusions. Pt slept well between cares but feels fatigued. Continue POC.    Problem: Stem Cell/Bone Marrow Transplant  Goal: Diarrhea Symptom Control  Outcome: Not Progressing  Intervention: Manage Diarrhea  Recent Flowsheet Documentation  Taken 10/19/2023 2030 by Taryn Ivan RN  Fluid/Electrolyte Management: fluids provided  Goal: Optimal Nutrition Intake  Outcome: Not Progressing     Problem: Adult Inpatient Plan of Care  Goal: Plan of Care Review  Description: The Plan of Care Review/Shift note should be completed every shift.  The Outcome Evaluation is a brief statement about your assessment that the patient is improving, declining, or no change.  This information will be displayed automatically on your shift  note.  Outcome: Progressing  Goal: Optimal Comfort and Wellbeing  Outcome: Progressing  Intervention: Monitor Pain and Promote Comfort  Recent Flowsheet Documentation  Taken 10/20/2023 0038 by Taryn Ivan RN  Pain Management Interventions:   medication (see MAR)   care clustered   quiet environment facilitated   rest  Taken 10/19/2023 2117 by Taryn Ivan, RN  Pain Management " Interventions:   care clustered   quiet environment facilitated   rest  Intervention: Provide Person-Centered Care  Recent Flowsheet Documentation  Taken 10/19/2023 2030 by Taryn Ivan RN  Trust Relationship/Rapport:   care explained   choices provided   emotional support provided   empathic listening provided   questions answered   questions encouraged   reassurance provided   thoughts/feelings acknowledged     Problem: Stem Cell/Bone Marrow Transplant  Goal: Optimal Coping with Transplant  Outcome: Progressing  Intervention: Optimize Patient/Family Adjustment to Transplant  Recent Flowsheet Documentation  Taken 10/19/2023 2030 by Taryn Ivan RN  Supportive Measures:   active listening utilized   self-care encouraged   verbalization of feelings encouraged  Goal: Improved Oral Mucous Membrane Health and Integrity  Outcome: Progressing  Goal: Nausea and Vomiting Symptom Relief  Outcome: Progressing

## 2023-10-20 NOTE — PROVIDER NOTIFICATION
Texted provider (ARIANA Lugo):  Hi there, pt's BP 88/55, recheck 80/48. Pt feels fine and states she was/is in a deep sleep. Temp down to 99.7.

## 2023-10-20 NOTE — PROGRESS NOTES
This morning low bp 86/53, IV flush and IV electro light replacement, BP up however Pt continue to complained about SOB and both leg pain and restless in legs, administrated pain medication, continue to have diarrhea and incontinent of stool, urine output os less more stool, reported MD, very little oral intake, no vomiting were reported Emend was ordered today, continue to monitor

## 2023-10-20 NOTE — PROGRESS NOTES
BMT CLINICAL SOCIAL WORK NOTE :    Focus: Supportive Counseling/Resources/Discharge Planning    Data: Jennifer Ward is a 48 year old female, currently day +8 s/p autologous hematopoietic cell transplant as rescue after BEAM therapy for Hodgkin Lymphoma. Co-enrolled on [employer] study.     Interventions: Clinical  (CSW) attempted to meet with pt at bedside to assess coping, provide supportive counseling and assist with resources as needed. Pt was resting upon SW visit and requested a  visit another time.  CSW provided empathic listening, validation of concerns, and encouragement. Pt was encouraged to contact CSW for support, questions, and/or resource needs.    Plan: CSW will continue to provide supportive counseling and assistance with resources as needed. CSW will continue to collaborate with multidisciplinary team regarding pt's plan of care.     DUONG Baker, [facility]  Adult Blood & Marrow Transplant   Phone: [phone]  Pager: [phone]

## 2023-10-20 NOTE — PROGRESS NOTES
Patients temp 102.9, body is achy. Sepsis protocol triggered and sent, cultures done, MD notified. Tylenol and dilaudid given.

## 2023-10-20 NOTE — PROGRESS NOTES
"BMT/Cell Therapy Daily Progress Note   10/20/2023    Patient ID:  Jennifer Ward is a 48 year old female, currently day +8 s/p autologous hematopoietic cell transplant as rescue after BEAM therapy for Hodgkin Lymphoma. Co-enrolled on [employer] study.     Readmitted 10/16 with neutropenic fever and abdominal pain.    INTERVAL  HISTORY   Jennifer spiked a temperature overnight and was hypotensive for which she received 500cc LR. This morning she is feeling less bloated, but much more tired and having significant nausea. Diarrhea continues, but abdominal pain is improved. Endorses SOB with exertion and occasionally feeling lightheaded. She is also experiencing significant right leg pain this morning.     Later this morning Jennifer is experiencing SOB. No cough or chest pain. Endorses diffuse pain in right LE that has progressed to bilateral restless legs / aching throughout bilateral LE. Non edematous.       Review of Systems: ROS negative except as noted above.      PHYSICAL EXAM     Weight In/Out     Wt Readings from Last 3 Encounters:   10/19/23 106.1 kg (233 lb 12.8 oz)   10/16/23 99.9 kg (220 lb 3.2 oz)   10/13/23 101.1 kg (222 lb 12.8 oz)      I/O last 3 completed shifts:  In: 2625 [P.O.:1460; I.V.:665; IV Piggyback:500]  Out: 2800 [Urine:2000; Other:800]       KPS:  70    BP 99/55 (BP Location: Left arm)   Pulse 120   Temp 99.7  F (37.6  C) (Oral)   Resp 22   Ht 1.64 m (5' 4.57\")   Wt 106.1 kg (233 lb 12.8 oz)   SpO2 96%   BMI 39.43 kg/m       General: NAD   Eyes: sclera anicteric   Nose/Mouth/Throat: sore on left anterolateral tongue and some ridging upper palate and along gum line (not examined 10/20)  Lungs: CTAB  Cardiovascular: RRR, no M/R/G   Abdominal: +BS, soft, mildly tender to palpation, but improved (diffusely).   Lymphatics: no edema  Skin: no rash  Neuro: A&O; non-focal  Access: L CVC NT, dressing cdi. R PAC not accessed.    LABS AND IMAGING: I have assessed all abnormal lab values for their " clinical significance and any values considered clinically significant have been addressed in the assessment and plan.        Lab Results   Component Value Date    WBC 0.1 (LL) 10/20/2023    ANEU 1.0 (L) 10/13/2023    HGB 7.8 (L) 10/20/2023    HCT 22.9 (L) 10/20/2023    PLT 12 (LL) 10/20/2023     (L) 10/20/2023    POTASSIUM 2.8 (L) 10/20/2023    CHLORIDE 96 (L) 10/20/2023    CO2 23 10/20/2023     (H) 10/20/2023    BUN 4.7 (L) 10/20/2023    CR 0.80 10/20/2023    MAG 1.1 (L) 10/20/2023    INR 1.19 (H) 10/16/2023    BILITOTAL 0.7 10/20/2023    AST 10 10/20/2023    ALT 6 10/20/2023    ALKPHOS 46 10/20/2023    PROTTOTAL 4.9 (L) 10/20/2023    ALBUMIN 2.7 (L) 10/20/2023     CT Abd/Pelvis 10/16:   1. Distended loops of small bowel without a discrete transition point  associated with areas of mild wall thickening, vascular engorgement  and mesenteric edema. Findings may represent enteritis, posttreatment  changes or hyperacute GVHD in the appropriate clinical setting.     CXR portable (10/16): no aso   Oral/GI regimen related toxicities (per NCI CTCAE v 5.0):    Oral Mucositis: Grade 2  Nausea: Grade 2  Vomiting: none  Diarrhea: Grade 2    *diarrhea secondary to C. Diff is excluded from the definition of oral/GI RRT      CTCAE Terms     Vomiting: A disorder characterized by the reflexive act of ejecting the contents of the stomach through the mouth   Grade 1 Intervention not indicated   Grade 2 Outpatient IV hydration; medical intervention indicated   Grade 3 Tube feeding, TPN, or hospitalization indicated   Grade 4 Life-threatening consequences   Grade 5 Death     Oral Mucositis: A disorder characterized by ulceration or inflammation of the oral mucosal.   Grade 1 Asymptomatic or mild symptoms; intervention not indicated   Grade 2 Moderate pain or ulcer that does not interfere with oral intake; modified diet indicated   Grade 3 Severe pain; interfering with oral intake   Grade 4 Life-threatening consequences;  urgent intervention indicated   Grade 5 Death     Nausea: A disorder characterized by a queasy sensation and/or the urge to vomit.   Grade 1 Loss of appetite without alteration in eating habits  Grade 2 Oral intake decreased without significant weight loss, dehydration or malnutrition   Grade 3 Inadequate oral caloric or fluid intake; tube feeding, TPN, or hospitalization indicated   Grade 4 NA  Grade 5 NA    Diarrhea: A disorder characterized by an increase in frequency and/or loose or watery bowel movements.   Grade 1 Increase of <4 stools per day over baseline; mild increase in ostomy output compared to baseline   Grade 2 Increase of 4 - 6 stools per day over baseline; moderate increase in ostomy output compared to baseline; limiting instrumental ADL   Grade 3 Increase of >=7 stools per day over baseline; hospitalization indicated; severe increase in ostomy output compared to baseline; limiting self-care ADL    Grade 4 Life-threatening consequences; urgent intervention indicated    Grade 5 Death       Source: https://ctep.cancer.gov/protocoldevelopment/electronic_applications/docs/CTCAE_v5_Quick_Reference_8.5x11.pdf     SYSTEMS-BASED ASSESSMENT AND PLAN     Jennifer Ward is a 48 year old female, currently day + 8  s/p autologous hematopoietic cell transplant as rescue after BEAM therapy for Hodgkin Lymphoma. Co-enrolled on [employer] study.   Readmitted 10/16/23 with neutropenic fever.     Prep: BEAM     BMT  - BMT MD/Coordinator: Nay  - MT2016-11 with co-enrollment on MT2022-40 (E-CELERATE)  - Cell dose 7 million CD34+ cells/kg   - GCSF started day +5, continue until ANC > 500 for 3 consecutive days.   - Restaging: next restage at day +28  - Allopurinol started Day -6    - Monitoring for engraftment syndrome in setting of fever, nausea, and likely rising white count. Consider dexamethasone if sx not improving.        HEME/COAG  - Transfusion parameters: hgb <7g/dL and plts <10,000.   - Pancytopenia  secondary to Hodgkin Lymphoma and chemotherapy    # SOB and bilateral leg pain without edema present 10/20. D-Dimer 3.24. CT-PE pending. If positive, page attending to discuss treatment. If negative, check bilateral LE ultrasounds to assess for clot.      ID  Prophy: note that antimicrobial dosing is specific to protocol  Viral: ACV 400mg PO bid  Fungal: fluconazole 400mg daily  PCP: Bactrim or other PCP prophy to start at day +28     Neutropenic Fever of Unclear Etiology  ID workup unrevealing thusfar. Covid neg 10/17.     Continue empiric cefepime + flagyl, given abdominal pain.  CT abd/pelvis as above; surgical consult ruled out sbo.      GI  #Diarrhea, abdominal crampin/2 chemo/colitis.  C.dif neg 10/17.   Imodium prn, Simethicone prn, Dilaudid PO/IV, heating pad prn.  #CINV: prn ativan, prn compazine. Emend 10/20.   #Ulcer prophy: Protonix 40mg daily.   #E-CELERATE vs placebo administered 10/12.  - oropharyngeal mucositis: 2/2 chemo. Chloraseptic spray; MMW, good oral cares.     CV  - Tachycardic 10/20, EKG showing sinus tach with QTc 436    NUTRITION/FLUIDS  - advance to high phill/protein diet 10/18  - Wt up 12lb since admission; change MIVF to tko on 10/18 (previously 100cc/hr since admission). Lasix 20mg IV x2 on 10/19 with good output.   - Hypotensive overnight and again this morning; feeling lightheaded -- Give 500cc NS bolus. Hold off on further diuresis at this time; consider spironolactone tomorrow for potassium sparing diuresis.      FEN  # Hypokalemia likely secondary to diuresis  # Hypophosphatemia  # Hypomagnesemia   # Hypocalcemia  - replete lytes per sliding scale    ENDOCRINE  # hypothyroidism: continue PTA synthroid     PSYCH  # depression: continue PTA bupropion, venlafaxine     NEURO  # migraines: daily topamax with prn naratriptan; propranolol prophy placed on hold with hypotension after admission.      SUPPORTIVE CARE  - PT/OT consult, will follow as indicated  - BMT Social work to  "follow.       Code Status: Full Code       Dispo: remain admitted pending engraftment and resolution of GI symptoms.      Known issues that I take into account for medical decisions, with salient changes to the plan considering these complexities noted above.    Patient Active Problem List   Diagnosis    Hodgkin's disease of lymph nodes of multiple sites (H)    Autologous donor of stem cells    Nodular sclerosis Hodgkin lymphoma of lymph nodes of multiple regions (H)    Febrile neutropenia        Clinically Significant Risk Factors        # Hypokalemia: Lowest K = 2.8 mmol/L in last 2 days, will replace as needed     # Hypomagnesemia: Lowest Mg = 1.1 mg/dL in last 2 days, will replace as needed   # Hypoalbuminemia: Lowest albumin = 2.7 g/dL at 10/20/2023  4:24 AM, will monitor as appropriate       # Thrombocytopenia: Lowest platelets = 7 in last 2 days, will monitor for bleeding          # Obesity: Estimated body mass index is 39.43 kg/m  as calculated from the following:    Height as of this encounter: 1.64 m (5' 4.57\").    Weight as of this encounter: 106.1 kg (233 lb 12.8 oz)., PRESENT ON ADMISSION       # Financial/Environmental Concerns: none         I spent 60 minutes face-to-face and/or coordinating care. Over 50% of our time on the unit was spent counseling the patient and/or coordinating care and the plan detailed on today's notes.        Fadumo Guillaume PA-C    "

## 2023-10-21 LAB
ACANTHOCYTES BLD QL SMEAR: NORMAL
ALBUMIN SERPL BCG-MCNC: 2.7 G/DL (ref 3.5–5.2)
ALP SERPL-CCNC: 46 U/L (ref 35–104)
ALT SERPL W P-5'-P-CCNC: 8 U/L (ref 0–50)
ANION GAP SERPL CALCULATED.3IONS-SCNC: 13 MMOL/L (ref 7–15)
ANION GAP SERPL CALCULATED.3IONS-SCNC: 13 MMOL/L (ref 7–15)
AST SERPL W P-5'-P-CCNC: 15 U/L (ref 0–45)
AUER BODIES BLD QL SMEAR: NORMAL
BASO STIPL BLD QL SMEAR: NORMAL
BASO+EOS+MONOS # BLD AUTO: ABNORMAL 10*3/UL
BASO+EOS+MONOS NFR BLD AUTO: ABNORMAL %
BASOPHILS # BLD AUTO: ABNORMAL 10*3/UL
BASOPHILS NFR BLD AUTO: ABNORMAL %
BILIRUB DIRECT SERPL-MCNC: 0.25 MG/DL (ref 0–0.3)
BILIRUB SERPL-MCNC: 0.5 MG/DL
BITE CELLS BLD QL SMEAR: NORMAL
BLD PROD TYP BPU: NORMAL
BLISTER CELLS BLD QL SMEAR: NORMAL
BLOOD COMPONENT TYPE: NORMAL
BUN SERPL-MCNC: 13.9 MG/DL (ref 6–20)
BUN SERPL-MCNC: 14.3 MG/DL (ref 6–20)
BURR CELLS BLD QL SMEAR: NORMAL
CALCIUM SERPL-MCNC: 8 MG/DL (ref 8.6–10)
CALCIUM SERPL-MCNC: 8 MG/DL (ref 8.6–10)
CHLORIDE SERPL-SCNC: 100 MMOL/L (ref 98–107)
CHLORIDE SERPL-SCNC: 102 MMOL/L (ref 98–107)
CODING SYSTEM: NORMAL
CREAT SERPL-MCNC: 0.9 MG/DL (ref 0.51–0.95)
CREAT SERPL-MCNC: 0.92 MG/DL (ref 0.51–0.95)
DACRYOCYTES BLD QL SMEAR: NORMAL
DEPRECATED HCO3 PLAS-SCNC: 18 MMOL/L (ref 22–29)
DEPRECATED HCO3 PLAS-SCNC: 19 MMOL/L (ref 22–29)
EGFRCR SERPLBLD CKD-EPI 2021: 76 ML/MIN/1.73M2
EGFRCR SERPLBLD CKD-EPI 2021: 78 ML/MIN/1.73M2
ELLIPTOCYTES BLD QL SMEAR: NORMAL
EOSINOPHIL # BLD AUTO: ABNORMAL 10*3/UL
EOSINOPHIL NFR BLD AUTO: ABNORMAL %
ERYTHROCYTE [DISTWIDTH] IN BLOOD BY AUTOMATED COUNT: 13.8 % (ref 10–15)
FRAGMENTS BLD QL SMEAR: NORMAL
GLUCOSE SERPL-MCNC: 91 MG/DL (ref 70–99)
GLUCOSE SERPL-MCNC: 91 MG/DL (ref 70–99)
HCT VFR BLD AUTO: 21.3 % (ref 35–47)
HGB BLD-MCNC: 7.1 G/DL (ref 11.7–15.7)
HGB C CRYSTALS: NORMAL
HOWELL-JOLLY BOD BLD QL SMEAR: NORMAL
IMM GRANULOCYTES # BLD: ABNORMAL 10*3/UL
IMM GRANULOCYTES NFR BLD: ABNORMAL %
ISSUE DATE AND TIME: NORMAL
LACTATE SERPL-SCNC: 1 MMOL/L (ref 0.7–2)
LDH SERPL L TO P-CCNC: 152 U/L (ref 0–250)
LYMPHOCYTES # BLD AUTO: ABNORMAL 10*3/UL
LYMPHOCYTES NFR BLD AUTO: ABNORMAL %
MAGNESIUM SERPL-MCNC: 2.5 MG/DL (ref 1.7–2.3)
MCH RBC QN AUTO: 28.4 PG (ref 26.5–33)
MCHC RBC AUTO-ENTMCNC: 33.3 G/DL (ref 31.5–36.5)
MCV RBC AUTO: 85 FL (ref 78–100)
MONOCYTES # BLD AUTO: ABNORMAL 10*3/UL
MONOCYTES NFR BLD AUTO: ABNORMAL %
NEUTROPHILS # BLD AUTO: ABNORMAL 10*3/UL
NEUTROPHILS NFR BLD AUTO: ABNORMAL %
NEUTS HYPERSEG BLD QL SMEAR: NORMAL
PHOSPHATE SERPL-MCNC: 2.8 MG/DL (ref 2.5–4.5)
PLAT MORPH BLD: NORMAL
PLATELET # BLD AUTO: 6 10E3/UL (ref 150–450)
POLYCHROMASIA BLD QL SMEAR: NORMAL
POTASSIUM SERPL-SCNC: 3.3 MMOL/L (ref 3.4–5.3)
POTASSIUM SERPL-SCNC: 3.3 MMOL/L (ref 3.4–5.3)
POTASSIUM SERPL-SCNC: 4.9 MMOL/L (ref 3.4–5.3)
PROT SERPL-MCNC: 5.2 G/DL (ref 6.4–8.3)
RBC # BLD AUTO: 2.5 10E6/UL (ref 3.8–5.2)
RBC AGGLUT BLD QL: NORMAL
RBC MORPH BLD: NORMAL
RETICS # AUTO: 0 10E6/UL (ref 0.03–0.1)
RETICS/RBC NFR AUTO: 0.2 % (ref 0.5–2)
ROULEAUX BLD QL SMEAR: NORMAL
SICKLE CELLS BLD QL SMEAR: NORMAL
SMUDGE CELLS BLD QL SMEAR: NORMAL
SODIUM SERPL-SCNC: 132 MMOL/L (ref 135–145)
SODIUM SERPL-SCNC: 133 MMOL/L (ref 135–145)
SPHEROCYTES BLD QL SMEAR: NORMAL
STOMATOCYTES BLD QL SMEAR: NORMAL
TARGETS BLD QL SMEAR: NORMAL
TOXIC GRANULES BLD QL SMEAR: NORMAL
UNIT ABO/RH: NORMAL
UNIT NUMBER: NORMAL
UNIT STATUS: NORMAL
UNIT TYPE ISBT: 7300
VARIANT LYMPHS BLD QL SMEAR: NORMAL
WBC # BLD AUTO: 0.3 10E3/UL (ref 4–11)

## 2023-10-21 PROCEDURE — 83605 ASSAY OF LACTIC ACID: CPT | Performed by: INTERNAL MEDICINE

## 2023-10-21 PROCEDURE — 85027 COMPLETE CBC AUTOMATED: CPT | Performed by: PHYSICIAN ASSISTANT

## 2023-10-21 PROCEDURE — 84132 ASSAY OF SERUM POTASSIUM: CPT | Performed by: INTERNAL MEDICINE

## 2023-10-21 PROCEDURE — P9037 PLATE PHERES LEUKOREDU IRRAD: HCPCS | Performed by: PHYSICIAN ASSISTANT

## 2023-10-21 PROCEDURE — 250N000011 HC RX IP 250 OP 636: Mod: JZ | Performed by: PHYSICIAN ASSISTANT

## 2023-10-21 PROCEDURE — 250N000013 HC RX MED GY IP 250 OP 250 PS 637: Performed by: PHYSICIAN ASSISTANT

## 2023-10-21 PROCEDURE — 206N000001 HC R&B BMT UMMC

## 2023-10-21 PROCEDURE — 82248 BILIRUBIN DIRECT: CPT

## 2023-10-21 PROCEDURE — 99233 SBSQ HOSP IP/OBS HIGH 50: CPT | Performed by: INTERNAL MEDICINE

## 2023-10-21 PROCEDURE — 250N000011 HC RX IP 250 OP 636: Mod: JZ

## 2023-10-21 PROCEDURE — 250N000013 HC RX MED GY IP 250 OP 250 PS 637: Performed by: INTERNAL MEDICINE

## 2023-10-21 PROCEDURE — 83735 ASSAY OF MAGNESIUM: CPT | Performed by: INTERNAL MEDICINE

## 2023-10-21 PROCEDURE — 83615 LACTATE (LD) (LDH) ENZYME: CPT

## 2023-10-21 PROCEDURE — 250N000011 HC RX IP 250 OP 636: Mod: JZ | Performed by: HOSPITALIST

## 2023-10-21 PROCEDURE — 250N000013 HC RX MED GY IP 250 OP 250 PS 637

## 2023-10-21 PROCEDURE — 84100 ASSAY OF PHOSPHORUS: CPT | Performed by: INTERNAL MEDICINE

## 2023-10-21 PROCEDURE — 80053 COMPREHEN METABOLIC PANEL: CPT | Performed by: PHYSICIAN ASSISTANT

## 2023-10-21 PROCEDURE — 250N000011 HC RX IP 250 OP 636: Mod: JZ | Performed by: INTERNAL MEDICINE

## 2023-10-21 PROCEDURE — 85045 AUTOMATED RETICULOCYTE COUNT: CPT

## 2023-10-21 PROCEDURE — 250N000011 HC RX IP 250 OP 636: Performed by: PHYSICIAN ASSISTANT

## 2023-10-21 PROCEDURE — 258N000003 HC RX IP 258 OP 636: Performed by: PHYSICIAN ASSISTANT

## 2023-10-21 RX ORDER — DEXAMETHASONE SODIUM PHOSPHATE 10 MG/ML
4 INJECTION, SOLUTION INTRAMUSCULAR; INTRAVENOUS ONCE
Status: COMPLETED | OUTPATIENT
Start: 2023-10-22 | End: 2023-10-22

## 2023-10-21 RX ORDER — POTASSIUM CHLORIDE 29.8 MG/ML
20 INJECTION INTRAVENOUS
Status: COMPLETED | OUTPATIENT
Start: 2023-10-21 | End: 2023-10-21

## 2023-10-21 RX ORDER — DEXAMETHASONE SODIUM PHOSPHATE 10 MG/ML
10 INJECTION, SOLUTION INTRAMUSCULAR; INTRAVENOUS ONCE
Status: COMPLETED | OUTPATIENT
Start: 2023-10-21 | End: 2023-10-21

## 2023-10-21 RX ORDER — LOPERAMIDE HCL 2 MG
2 CAPSULE ORAL 4 TIMES DAILY
Status: DISCONTINUED | OUTPATIENT
Start: 2023-10-21 | End: 2023-10-27 | Stop reason: HOSPADM

## 2023-10-21 RX ORDER — DEXAMETHASONE SODIUM PHOSPHATE 10 MG/ML
4 INJECTION, SOLUTION INTRAMUSCULAR; INTRAVENOUS ONCE
Status: COMPLETED | OUTPATIENT
Start: 2023-10-23 | End: 2023-10-23

## 2023-10-21 RX ADMIN — METRONIDAZOLE 500 MG: 5 INJECTION, SOLUTION INTRAVENOUS at 06:38

## 2023-10-21 RX ADMIN — ACYCLOVIR 400 MG: 400 TABLET ORAL at 20:16

## 2023-10-21 RX ADMIN — ACYCLOVIR 400 MG: 400 TABLET ORAL at 08:17

## 2023-10-21 RX ADMIN — LOPERAMIDE HYDROCHLORIDE 2 MG: 2 CAPSULE ORAL at 08:17

## 2023-10-21 RX ADMIN — CEFEPIME HYDROCHLORIDE 2 G: 2 INJECTION, POWDER, FOR SOLUTION INTRAVENOUS at 17:31

## 2023-10-21 RX ADMIN — DEXTROSE MONOHYDRATE 480 MCG: 50 INJECTION, SOLUTION INTRAVENOUS at 20:13

## 2023-10-21 RX ADMIN — LEVOTHYROXINE SODIUM 75 MCG: 75 TABLET ORAL at 08:17

## 2023-10-21 RX ADMIN — DEXTROSE MONOHYDRATE 12.5 ML: 50 INJECTION, SOLUTION INTRAVENOUS at 20:13

## 2023-10-21 RX ADMIN — DEXTROSE MONOHYDRATE 12.5 ML: 50 INJECTION, SOLUTION INTRAVENOUS at 20:17

## 2023-10-21 RX ADMIN — PANTOPRAZOLE SODIUM 40 MG: 40 TABLET, DELAYED RELEASE ORAL at 08:17

## 2023-10-21 RX ADMIN — LOPERAMIDE HYDROCHLORIDE 2 MG: 2 CAPSULE ORAL at 12:29

## 2023-10-21 RX ADMIN — LOPERAMIDE HYDROCHLORIDE 2 MG: 2 CAPSULE ORAL at 17:30

## 2023-10-21 RX ADMIN — HYDROMORPHONE HYDROCHLORIDE 4 MG: 2 TABLET ORAL at 04:37

## 2023-10-21 RX ADMIN — CEFEPIME HYDROCHLORIDE 2 G: 2 INJECTION, POWDER, FOR SOLUTION INTRAVENOUS at 00:44

## 2023-10-21 RX ADMIN — TOPIRAMATE 50 MG: 50 TABLET, FILM COATED ORAL at 20:16

## 2023-10-21 RX ADMIN — POTASSIUM CHLORIDE 20 MEQ: 29.8 INJECTION, SOLUTION INTRAVENOUS at 09:26

## 2023-10-21 RX ADMIN — HYDROMORPHONE HYDROCHLORIDE 4 MG: 2 TABLET ORAL at 00:46

## 2023-10-21 RX ADMIN — FLUCONAZOLE 400 MG: 200 TABLET ORAL at 08:17

## 2023-10-21 RX ADMIN — Medication 5 ML: at 21:00

## 2023-10-21 RX ADMIN — Medication 10 ML: at 21:00

## 2023-10-21 RX ADMIN — DEXAMETHASONE SODIUM PHOSPHATE 10 MG: 10 INJECTION, SOLUTION INTRAMUSCULAR; INTRAVENOUS at 12:29

## 2023-10-21 RX ADMIN — CEFEPIME HYDROCHLORIDE 2 G: 2 INJECTION, POWDER, FOR SOLUTION INTRAVENOUS at 09:26

## 2023-10-21 RX ADMIN — LOPERAMIDE HYDROCHLORIDE 2 MG: 2 CAPSULE ORAL at 20:16

## 2023-10-21 RX ADMIN — BUPROPION HYDROCHLORIDE 150 MG: 150 TABLET, FILM COATED, EXTENDED RELEASE ORAL at 08:17

## 2023-10-21 RX ADMIN — LOPERAMIDE HYDROCHLORIDE 2 MG: 2 CAPSULE ORAL at 00:46

## 2023-10-21 RX ADMIN — POTASSIUM CHLORIDE 20 MEQ: 29.8 INJECTION, SOLUTION INTRAVENOUS at 08:08

## 2023-10-21 ASSESSMENT — ACTIVITIES OF DAILY LIVING (ADL)
ADLS_ACUITY_SCORE: 26
ADLS_ACUITY_SCORE: 26
ADLS_ACUITY_SCORE: 28
ADLS_ACUITY_SCORE: 26
ADLS_ACUITY_SCORE: 28
ADLS_ACUITY_SCORE: 26

## 2023-10-21 NOTE — PLAN OF CARE
"BP 93/59   Pulse 101   Temp 97.6  F (36.4  C)   Resp 18   Ht 1.64 m (5' 4.57\")   Wt 104.7 kg (230 lb 14.4 oz)   SpO2 97%   BMI 38.94 kg/m      Afebrile, soft BPs, OVSS on RA. Watery BM x4, one incontinent. Generalized weakness, SBA to BSC. DEVINE has improved. A&Ox4, calls appropriately. Educated pt to cough and deep breathe while awake. Imodium given x2, pt would like this scheduled. Dilaudid given x3 for abdominal pain with moderate relief. Evening electrolyte rechecks WNR but needs 2 bags of K+ today with recheck 1-2 hrs post infusions. Platelets currently infusing. Pt slept well between cares. Continue POC.    Problem: Stem Cell/Bone Marrow Transplant  Goal: Diarrhea Symptom Control  Outcome: Not Progressing  Intervention: Manage Diarrhea  Recent Flowsheet Documentation  Taken 10/20/2023 2000 by Taryn Ivan RN  Fluid/Electrolyte Management: fluids provided  Perineal Care:   perineal hygiene encouraged   perineum cleansed   protective cream/ointment applied  Goal: Improved Activity Tolerance  Outcome: Not Progressing  Intervention: Promote Improved Energy  Recent Flowsheet Documentation  Taken 10/20/2023 2000 by Taryn Ivan RN  Fatigue Management:   activity assistance provided   frequent rest breaks encouraged  Sleep/Rest Enhancement:   awakenings minimized   family presence promoted   regular sleep/rest pattern promoted  Activity Management: activity adjusted per tolerance  Environmental Support:   calm environment promoted   environmental consistency promoted   personal routine supported  Goal: Optimal Nutrition Intake  Outcome: Not Progressing     Problem: Adult Inpatient Plan of Care  Goal: Plan of Care Review  Description: The Plan of Care Review/Shift note should be completed every shift.  The Outcome Evaluation is a brief statement about your assessment that the patient is improving, declining, or no change.  This information will be displayed automatically on your shift  note.  Outcome: " Progressing  Goal: Optimal Comfort and Wellbeing  Outcome: Progressing  Intervention: Monitor Pain and Promote Comfort  Recent Flowsheet Documentation  Taken 10/21/2023 0437 by Taryn Ivan RN  Pain Management Interventions:   medication (see MAR)   care clustered   quiet environment facilitated   rest  Taken 10/21/2023 0041 by Taryn Ivan RN  Pain Management Interventions:   medication (see MAR)   care clustered   quiet environment facilitated   rest  Taken 10/20/2023 2037 by Taryn Ivan RN  Pain Management Interventions:   medication (see MAR)   rest  Intervention: Provide Person-Centered Care  Recent Flowsheet Documentation  Taken 10/20/2023 2000 by Taryn Ivan RN  Trust Relationship/Rapport:   care explained   choices provided   emotional support provided   empathic listening provided   questions answered   questions encouraged   reassurance provided   thoughts/feelings acknowledged     Problem: Stem Cell/Bone Marrow Transplant  Goal: Optimal Coping with Transplant  Outcome: Progressing  Intervention: Optimize Patient/Family Adjustment to Transplant  Recent Flowsheet Documentation  Taken 10/20/2023 2000 by Taryn Ivan RN  Supportive Measures:   active listening utilized   decision-making supported   positive reinforcement provided   self-care encouraged   verbalization of feelings encouraged  Goal: Symptom-Free Urinary Elimination  Outcome: Progressing  Intervention: Prevent or Manage Bladder Irritation  Goal: Blood Counts Within Acceptable Range  Outcome: Progressing  Intervention: Monitor and Manage Hematologic Symptoms  Recent Flowsheet Documentation  Taken 10/21/2023 0400 by Taryn Ivan RN  Medication Review/Management: medications reviewed  Taken 10/21/2023 0030 by Taryn Ivan RN  Medication Review/Management: medications reviewed  Taken 10/20/2023 2000 by Taryn Ivan RN  Sleep/Rest Enhancement:   awakenings minimized   family presence promoted   regular sleep/rest  pattern promoted  Bleeding Precautions: monitored for signs of bleeding  Medication Review/Management: medications reviewed  Goal: Improved Oral Mucous Membrane Health and Integrity  Outcome: Progressing  Intervention: Promote Oral Comfort and Health  Recent Flowsheet Documentation  Taken 10/20/2023 2000 by Taryn Ivan RN  Oral Mucous Membrane Protection:   nonirritating oral fluids promoted   nonirritating oral foods promoted  Oral Care:   mouth wash rinse   oral rinse provided  Goal: Nausea and Vomiting Symptom Relief  Outcome: Progressing

## 2023-10-21 NOTE — PROGRESS NOTES
VS normal range, status improved, nausea is better today some oral food intake, gait is stable, no complained legs shaking, continue to have diarrhea, however she stated her pain is controlled so no pain medication was administrated

## 2023-10-21 NOTE — PROGRESS NOTES
"BMT/Cell Therapy Daily Progress Note   10/21/2023    Patient ID:  Jennifer Ward is a 48 year old female, currently day +9 s/p autologous hematopoietic cell transplant as rescue after BEAM therapy for Hodgkin Lymphoma. Co-enrolled on [facility] study.     Readmitted 10/16 with neutropenic fever and abdominal pain.    INTERVAL  HISTORY   Jennifer is feeling much better this morning. Her SOB resolved last night. CT PE and US of bilateral LE were both negative for clot. Her nausea is much improved and she would like to eat today. Abdominal pain much improved; diarrhea continues and she was incontinent of stool x2 yesterday.       Review of Systems: ROS negative except as noted above.      PHYSICAL EXAM     Weight In/Out     Wt Readings from Last 3 Encounters:   10/20/23 104.7 kg (230 lb 14.4 oz)   10/16/23 99.9 kg (220 lb 3.2 oz)   10/13/23 101.1 kg (222 lb 12.8 oz)      I/O last 3 completed shifts:  In: 2495 [P.O.:880; I.V.:1615]  Out: 1500 [Urine:150; Other:1000; Stool:350]       KPS:  70    BP 93/59   Pulse 101   Temp 97.6  F (36.4  C)   Resp 18   Ht 1.64 m (5' 4.57\")   Wt 104.7 kg (230 lb 14.4 oz)   SpO2 97%   BMI 38.94 kg/m       General: NAD, Facial erythema   Eyes: sclera anicteric   Nose/Mouth/Throat: sore on left anterolateral tongue and some ridging upper palate and along gum line (not examined 10/21)  Lungs: CTAB  Cardiovascular: RRR, no M/R/G   Abdominal: +BS, soft, mildly tender to palpation, but much improved.   Lymphatics: no edema  Skin: no rash  Neuro: A&O; non-focal  Access: L CVC NT, dressing cdi. R PAC not accessed.    LABS AND IMAGING: I have assessed all abnormal lab values for their clinical significance and any values considered clinically significant have been addressed in the assessment and plan.        Lab Results   Component Value Date    WBC 0.3 (LL) 10/21/2023    ANEU 1.0 (L) 10/13/2023    HGB 7.1 (L) 10/21/2023    HCT 21.3 (L) 10/21/2023    PLT 6 (LL) 10/21/2023     (L) " 10/21/2023    POTASSIUM 3.3 (L) 10/21/2023    CHLORIDE 100 10/21/2023    CO2 19 (L) 10/21/2023    GLC 91 10/21/2023    BUN 13.9 10/21/2023    CR 0.92 10/21/2023    MAG 2.5 (H) 10/21/2023    INR 1.19 (H) 10/16/2023    BILITOTAL 0.7 10/20/2023    AST 10 10/20/2023    ALT 6 10/20/2023    ALKPHOS 46 10/20/2023    PROTTOTAL 4.9 (L) 10/20/2023    ALBUMIN 2.7 (L) 10/20/2023     CT Abd/Pelvis 10/16:   1. Distended loops of small bowel without a discrete transition point  associated with areas of mild wall thickening, vascular engorgement  and mesenteric edema. Findings may represent enteritis, posttreatment  changes or hyperacute GVHD in the appropriate clinical setting.     CXR portable (10/16): no aso   Oral/GI regimen related toxicities (per NCI CTCAE v 5.0):    Oral Mucositis: Grade 1  Nausea: none  Vomiting: none  Diarrhea: Grade 2    *diarrhea secondary to C. Diff is excluded from the definition of oral/GI RRT    CTCAE Terms     Vomiting: A disorder characterized by the reflexive act of ejecting the contents of the stomach through the mouth   Grade 1 Intervention not indicated   Grade 2 Outpatient IV hydration; medical intervention indicated   Grade 3 Tube feeding, TPN, or hospitalization indicated   Grade 4 Life-threatening consequences   Grade 5 Death     Oral Mucositis: A disorder characterized by ulceration or inflammation of the oral mucosal.   Grade 1 Asymptomatic or mild symptoms; intervention not indicated   Grade 2 Moderate pain or ulcer that does not interfere with oral intake; modified diet indicated   Grade 3 Severe pain; interfering with oral intake   Grade 4 Life-threatening consequences; urgent intervention indicated   Grade 5 Death     Nausea: A disorder characterized by a queasy sensation and/or the urge to vomit.   Grade 1 Loss of appetite without alteration in eating habits  Grade 2 Oral intake decreased without significant weight loss, dehydration or malnutrition   Grade 3 Inadequate oral caloric  or fluid intake; tube feeding, TPN, or hospitalization indicated   Grade 4 NA  Grade 5 NA    Diarrhea: A disorder characterized by an increase in frequency and/or loose or watery bowel movements.   Grade 1 Increase of <4 stools per day over baseline; mild increase in ostomy output compared to baseline   Grade 2 Increase of 4 - 6 stools per day over baseline; moderate increase in ostomy output compared to baseline; limiting instrumental ADL   Grade 3 Increase of >=7 stools per day over baseline; hospitalization indicated; severe increase in ostomy output compared to baseline; limiting self-care ADL    Grade 4 Life-threatening consequences; urgent intervention indicated    Grade 5 Death       Source: https://ctep.cancer.gov/protocoldevelopment/electronic_applications/docs/CTCAE_v5_Quick_Reference_8.5x11.pdf     SYSTEMS-BASED ASSESSMENT AND PLAN     Jennifer Ward is a 48 year old female, currently day + 9  s/p autologous hematopoietic cell transplant as rescue after BEAM therapy for Hodgkin Lymphoma. Co-enrolled on [employer] study.   Readmitted 10/16/23 with neutropenic fever.     Prep: BEAM     BMT  - BMT MD/Coordinator: Nay  - MT2016-11 with co-enrollment on MT2022-40 (E-[employer])  - Cell dose 7 million CD34+ cells/kg   - GCSF started day +5, continue until ANC > 500 for 3 consecutive days.   - Restaging: next restage at day +28  - Allopurinol started Day -6       HEME/COAG  - Transfusion parameters: hgb <7g/dL and plts <10,000.   - Pancytopenia secondary to Hodgkin Lymphoma and chemotherapy    # SOB and bilateral leg pain 10/20. D-Dimer 3.24. CT-PE and US of bilateral LE negative for clot. 10/21 SOB and leg pain are resolved.        ID  Prophy: note that antimicrobial dosing is specific to protocol  Viral: ACV 400mg PO bid  Fungal: fluconazole 400mg daily  PCP: Bactrim or other PCP prophy to start at day +28     Neutropenic Fever of Unclear Etiology  ID workup unrevealing thusfar. Covid neg 10/17.      Empiric cefepime (10/16 - x) and flagyl (10/16-10/21), given abdominal pain. Flagyl discontinued 10/21.   CT abd/pelvis as above; surgical consult ruled out sbo.      GI  #Diarrhea, abdominal crampin/2 chemo/colitis.  C.dif neg 10/17.   Imodium prn --> scheduled starting 10/21  Simethicone prn, Dilaudid PO/IV, heating pad prn.  #CINV: prn ativan, prn compazine. Received Emend 10/20.   #Ulcer prophy: Protonix 40mg daily.   #E-CELERATE vs placebo administered 10/12.  - oropharyngeal mucositis: 2/2 chemo. Chloraseptic spray; MMW, good oral cares.    # Poor oral intake with ongoing diarrhea. Give Dexamethasone 10mg IV today (10/21), followed by dexamethasone 4mg IV x1 on 10/22 and 10/23 (ordered)     CV  - Tachycardic 10/20, EKG showing sinus tach with QTc 436    NUTRITION/FLUIDS  - advance to high phill/protein diet 10/18  - Wt up 12lb since admission; change MIVF to tko on 10/18 (previously 100cc/hr since admission). Lasix 20mg IV x2 on 10/19 with good output.   - Hypotensive 10/20 feeling lightheaded -- Give 500cc NS bolus. Hold off on further diuresis at this time as pressures remain soft; consider spironolactone for potassium sparing diuresis.      FEN  # Hypokalemia likely secondary to diuresis  # Hypophosphatemia, resolved.  # Hypomagnesemia, resolved.  # Hypocalcemia  - replete lytes per sliding scale    ENDOCRINE  # hypothyroidism: continue PTA synthroid     PSYCH  # depression: continue PTA bupropion, venlafaxine     NEURO  # migraines: daily topamax with prn naratriptan; propranolol prophy placed on hold with hypotension after admission.      SUPPORTIVE CARE  - PT/OT consult, will follow as indicated  - BMT Social work to follow.       Code Status: Full Code       Dispo: remain admitted pending engraftment and resolution of GI symptoms.      Known issues that I take into account for medical decisions, with salient changes to the plan considering these complexities noted above.    Patient Active Problem  "List   Diagnosis    Hodgkin's disease of lymph nodes of multiple sites (H)    Autologous donor of stem cells    Nodular sclerosis Hodgkin lymphoma of lymph nodes of multiple regions (H)    Febrile neutropenia        Clinically Significant Risk Factors        # Hypokalemia: Lowest K = 2.8 mmol/L in last 2 days, will replace as needed     # Hypomagnesemia: Lowest Mg = 1.1 mg/dL in last 2 days, will replace as needed   # Hypoalbuminemia: Lowest albumin = 2.7 g/dL at 10/20/2023  4:24 AM, will monitor as appropriate       # Thrombocytopenia: Lowest platelets = 6 in last 2 days, will monitor for bleeding          # Obesity: Estimated body mass index is 38.94 kg/m  as calculated from the following:    Height as of this encounter: 1.64 m (5' 4.57\").    Weight as of this encounter: 104.7 kg (230 lb 14.4 oz).        # Financial/Environmental Concerns: none         I spent 45 minutes face-to-face and/or coordinating care. Over 50% of our time on the unit was spent counseling the patient and/or coordinating care and the plan detailed on today's notes.        Fadumo Guillaume PA-C    "

## 2023-10-22 LAB
ABO/RH(D): NORMAL
ANION GAP SERPL CALCULATED.3IONS-SCNC: 10 MMOL/L (ref 7–15)
ANTIBODY SCREEN: NEGATIVE
BASO+EOS+MONOS # BLD AUTO: ABNORMAL 10*3/UL
BASO+EOS+MONOS NFR BLD AUTO: ABNORMAL %
BASOPHILS # BLD AUTO: ABNORMAL 10*3/UL
BASOPHILS # BLD MANUAL: 0 10E3/UL (ref 0–0.2)
BASOPHILS NFR BLD AUTO: ABNORMAL %
BASOPHILS NFR BLD MANUAL: 0 %
BLD PROD TYP BPU: NORMAL
BLOOD COMPONENT TYPE: NORMAL
BUN SERPL-MCNC: 16.9 MG/DL (ref 6–20)
C PNEUM DNA SPEC QL NAA+PROBE: NOT DETECTED
CALCIUM SERPL-MCNC: 8.2 MG/DL (ref 8.6–10)
CHLORIDE SERPL-SCNC: 102 MMOL/L (ref 98–107)
CODING SYSTEM: NORMAL
CREAT SERPL-MCNC: 0.73 MG/DL (ref 0.51–0.95)
CROSSMATCH: NORMAL
DEPRECATED HCO3 PLAS-SCNC: 18 MMOL/L (ref 22–29)
EGFRCR SERPLBLD CKD-EPI 2021: >90 ML/MIN/1.73M2
EOSINOPHIL # BLD AUTO: ABNORMAL 10*3/UL
EOSINOPHIL # BLD MANUAL: 0 10E3/UL (ref 0–0.7)
EOSINOPHIL NFR BLD AUTO: ABNORMAL %
EOSINOPHIL NFR BLD MANUAL: 0 %
ERYTHROCYTE [DISTWIDTH] IN BLOOD BY AUTOMATED COUNT: 13.8 % (ref 10–15)
FLUAV H1 2009 PAND RNA SPEC QL NAA+PROBE: NOT DETECTED
FLUAV H1 RNA SPEC QL NAA+PROBE: NOT DETECTED
FLUAV H3 RNA SPEC QL NAA+PROBE: NOT DETECTED
FLUAV RNA SPEC QL NAA+PROBE: NOT DETECTED
FLUBV RNA SPEC QL NAA+PROBE: NOT DETECTED
GLUCOSE SERPL-MCNC: 111 MG/DL (ref 70–99)
HADV DNA SPEC QL NAA+PROBE: NOT DETECTED
HCOV PNL SPEC NAA+PROBE: NOT DETECTED
HCT VFR BLD AUTO: 20.8 % (ref 35–47)
HGB BLD-MCNC: 6.9 G/DL (ref 11.7–15.7)
HMPV RNA SPEC QL NAA+PROBE: NOT DETECTED
HPIV1 RNA SPEC QL NAA+PROBE: NOT DETECTED
HPIV2 RNA SPEC QL NAA+PROBE: NOT DETECTED
HPIV3 RNA SPEC QL NAA+PROBE: NOT DETECTED
HPIV4 RNA SPEC QL NAA+PROBE: NOT DETECTED
IMM GRANULOCYTES # BLD: ABNORMAL 10*3/UL
IMM GRANULOCYTES NFR BLD: ABNORMAL %
ISSUE DATE AND TIME: NORMAL
LYMPHOCYTES # BLD AUTO: ABNORMAL 10*3/UL
LYMPHOCYTES # BLD MANUAL: 0.1 10E3/UL (ref 0.8–5.3)
LYMPHOCYTES NFR BLD AUTO: ABNORMAL %
LYMPHOCYTES NFR BLD MANUAL: 16 %
M PNEUMO DNA SPEC QL NAA+PROBE: NOT DETECTED
MAGNESIUM SERPL-MCNC: 2.5 MG/DL (ref 1.7–2.3)
MCH RBC QN AUTO: 28 PG (ref 26.5–33)
MCHC RBC AUTO-ENTMCNC: 33.2 G/DL (ref 31.5–36.5)
MCV RBC AUTO: 85 FL (ref 78–100)
METAMYELOCYTES # BLD MANUAL: 0 10E3/UL
METAMYELOCYTES NFR BLD MANUAL: 1 %
MONOCYTES # BLD AUTO: ABNORMAL 10*3/UL
MONOCYTES # BLD MANUAL: 0.1 10E3/UL (ref 0–1.3)
MONOCYTES NFR BLD AUTO: ABNORMAL %
MONOCYTES NFR BLD MANUAL: 14 %
MYELOCYTES # BLD MANUAL: 0 10E3/UL
MYELOCYTES NFR BLD MANUAL: 1 %
NEUTROPHILS # BLD AUTO: ABNORMAL 10*3/UL
NEUTROPHILS # BLD MANUAL: 0.6 10E3/UL (ref 1.6–8.3)
NEUTROPHILS NFR BLD AUTO: ABNORMAL %
NEUTROPHILS NFR BLD MANUAL: 68 %
NRBC # BLD AUTO: 0 10E3/UL
NRBC BLD AUTO-RTO: 0 /100
PHOSPHATE SERPL-MCNC: 2.3 MG/DL (ref 2.5–4.5)
PLAT MORPH BLD: ABNORMAL
PLATELET # BLD AUTO: 19 10E3/UL (ref 150–450)
POTASSIUM SERPL-SCNC: 4.7 MMOL/L (ref 3.4–5.3)
RBC # BLD AUTO: 2.46 10E6/UL (ref 3.8–5.2)
RBC MORPH BLD: ABNORMAL
RSV RNA SPEC QL NAA+PROBE: NOT DETECTED
RSV RNA SPEC QL NAA+PROBE: NOT DETECTED
RV+EV RNA SPEC QL NAA+PROBE: DETECTED
SARS-COV-2 RNA RESP QL NAA+PROBE: NEGATIVE
SODIUM SERPL-SCNC: 130 MMOL/L (ref 135–145)
SPECIMEN EXPIRATION DATE: NORMAL
UNIT ABO/RH: NORMAL
UNIT NUMBER: NORMAL
UNIT STATUS: NORMAL
UNIT TYPE ISBT: 7300
WBC # BLD AUTO: 0.9 10E3/UL (ref 4–11)

## 2023-10-22 PROCEDURE — 250N000013 HC RX MED GY IP 250 OP 250 PS 637: Performed by: INTERNAL MEDICINE

## 2023-10-22 PROCEDURE — 250N000013 HC RX MED GY IP 250 OP 250 PS 637

## 2023-10-22 PROCEDURE — 83735 ASSAY OF MAGNESIUM: CPT | Performed by: INTERNAL MEDICINE

## 2023-10-22 PROCEDURE — 84100 ASSAY OF PHOSPHORUS: CPT | Performed by: INTERNAL MEDICINE

## 2023-10-22 PROCEDURE — 250N000013 HC RX MED GY IP 250 OP 250 PS 637: Performed by: PHYSICIAN ASSISTANT

## 2023-10-22 PROCEDURE — 86901 BLOOD TYPING SEROLOGIC RH(D): CPT | Performed by: PHYSICIAN ASSISTANT

## 2023-10-22 PROCEDURE — P9040 RBC LEUKOREDUCED IRRADIATED: HCPCS | Performed by: PHYSICIAN ASSISTANT

## 2023-10-22 PROCEDURE — 86850 RBC ANTIBODY SCREEN: CPT | Performed by: PHYSICIAN ASSISTANT

## 2023-10-22 PROCEDURE — 87633 RESP VIRUS 12-25 TARGETS: CPT

## 2023-10-22 PROCEDURE — 80048 BASIC METABOLIC PNL TOTAL CA: CPT | Performed by: PHYSICIAN ASSISTANT

## 2023-10-22 PROCEDURE — 250N000011 HC RX IP 250 OP 636: Performed by: PHYSICIAN ASSISTANT

## 2023-10-22 PROCEDURE — 87486 CHLMYD PNEUM DNA AMP PROBE: CPT

## 2023-10-22 PROCEDURE — 250N000011 HC RX IP 250 OP 636: Mod: JZ | Performed by: INTERNAL MEDICINE

## 2023-10-22 PROCEDURE — 250N000011 HC RX IP 250 OP 636: Mod: JZ

## 2023-10-22 PROCEDURE — 250N000009 HC RX 250: Performed by: INTERNAL MEDICINE

## 2023-10-22 PROCEDURE — 206N000001 HC R&B BMT UMMC

## 2023-10-22 PROCEDURE — 258N000003 HC RX IP 258 OP 636: Performed by: PHYSICIAN ASSISTANT

## 2023-10-22 PROCEDURE — 258N000003 HC RX IP 258 OP 636: Performed by: INTERNAL MEDICINE

## 2023-10-22 PROCEDURE — 85027 COMPLETE CBC AUTOMATED: CPT | Performed by: PHYSICIAN ASSISTANT

## 2023-10-22 PROCEDURE — 85007 BL SMEAR W/DIFF WBC COUNT: CPT | Performed by: PHYSICIAN ASSISTANT

## 2023-10-22 PROCEDURE — 87635 SARS-COV-2 COVID-19 AMP PRB: CPT

## 2023-10-22 PROCEDURE — 250N000011 HC RX IP 250 OP 636: Mod: JZ | Performed by: PHYSICIAN ASSISTANT

## 2023-10-22 RX ORDER — FUROSEMIDE 10 MG/ML
20 INJECTION INTRAMUSCULAR; INTRAVENOUS ONCE
Status: COMPLETED | OUTPATIENT
Start: 2023-10-22 | End: 2023-10-22

## 2023-10-22 RX ORDER — BENZONATATE 100 MG/1
100 CAPSULE ORAL 3 TIMES DAILY PRN
Status: DISCONTINUED | OUTPATIENT
Start: 2023-10-22 | End: 2023-10-25

## 2023-10-22 RX ORDER — SPIRONOLACTONE 25 MG/1
25 TABLET ORAL DAILY
Status: DISCONTINUED | OUTPATIENT
Start: 2023-10-22 | End: 2023-10-25

## 2023-10-22 RX ADMIN — DEXAMETHASONE SODIUM PHOSPHATE 4 MG: 10 INJECTION, SOLUTION INTRAMUSCULAR; INTRAVENOUS at 08:34

## 2023-10-22 RX ADMIN — Medication 5 ML: at 21:13

## 2023-10-22 RX ADMIN — SPIRONOLACTONE 25 MG: 25 TABLET, FILM COATED ORAL at 11:21

## 2023-10-22 RX ADMIN — LOPERAMIDE HYDROCHLORIDE 2 MG: 2 CAPSULE ORAL at 20:50

## 2023-10-22 RX ADMIN — CEFEPIME HYDROCHLORIDE 2 G: 2 INJECTION, POWDER, FOR SOLUTION INTRAVENOUS at 17:17

## 2023-10-22 RX ADMIN — FLUCONAZOLE 400 MG: 200 TABLET ORAL at 08:34

## 2023-10-22 RX ADMIN — LOPERAMIDE HYDROCHLORIDE 2 MG: 2 CAPSULE ORAL at 08:34

## 2023-10-22 RX ADMIN — CEFEPIME HYDROCHLORIDE 2 G: 2 INJECTION, POWDER, FOR SOLUTION INTRAVENOUS at 02:12

## 2023-10-22 RX ADMIN — ACYCLOVIR 400 MG: 400 TABLET ORAL at 08:34

## 2023-10-22 RX ADMIN — LEVOTHYROXINE SODIUM 75 MCG: 75 TABLET ORAL at 08:34

## 2023-10-22 RX ADMIN — CEFEPIME HYDROCHLORIDE 2 G: 2 INJECTION, POWDER, FOR SOLUTION INTRAVENOUS at 09:31

## 2023-10-22 RX ADMIN — SODIUM PHOSPHATE, MONOBASIC, MONOHYDRATE AND SODIUM PHOSPHATE, DIBASIC, ANHYDROUS 15 MMOL: 142; 276 INJECTION, SOLUTION INTRAVENOUS at 04:52

## 2023-10-22 RX ADMIN — TOPIRAMATE 50 MG: 50 TABLET, FILM COATED ORAL at 19:58

## 2023-10-22 RX ADMIN — ACYCLOVIR 400 MG: 400 TABLET ORAL at 19:58

## 2023-10-22 RX ADMIN — DEXTROSE MONOHYDRATE 12.5 ML: 50 INJECTION, SOLUTION INTRAVENOUS at 20:49

## 2023-10-22 RX ADMIN — DEXTROSE MONOHYDRATE 12.5 ML: 50 INJECTION, SOLUTION INTRAVENOUS at 20:50

## 2023-10-22 RX ADMIN — DEXTROSE MONOHYDRATE 480 MCG: 50 INJECTION, SOLUTION INTRAVENOUS at 19:58

## 2023-10-22 RX ADMIN — BUPROPION HYDROCHLORIDE 150 MG: 150 TABLET, FILM COATED, EXTENDED RELEASE ORAL at 08:34

## 2023-10-22 RX ADMIN — FUROSEMIDE 20 MG: 10 INJECTION, SOLUTION INTRAMUSCULAR; INTRAVENOUS at 11:21

## 2023-10-22 RX ADMIN — PANTOPRAZOLE SODIUM 40 MG: 40 TABLET, DELAYED RELEASE ORAL at 08:34

## 2023-10-22 RX ADMIN — BENZONATATE 100 MG: 100 CAPSULE ORAL at 17:17

## 2023-10-22 RX ADMIN — LOPERAMIDE HYDROCHLORIDE 2 MG: 2 CAPSULE ORAL at 11:21

## 2023-10-22 RX ADMIN — LOPERAMIDE HYDROCHLORIDE 2 MG: 2 CAPSULE ORAL at 17:17

## 2023-10-22 ASSESSMENT — ACTIVITIES OF DAILY LIVING (ADL)
ADLS_ACUITY_SCORE: 26
ADLS_ACUITY_SCORE: 26
ADLS_ACUITY_SCORE: 24
ADLS_ACUITY_SCORE: 26
ADLS_ACUITY_SCORE: 24
ADLS_ACUITY_SCORE: 26
ADLS_ACUITY_SCORE: 24

## 2023-10-22 NOTE — PROGRESS NOTES
"BMT/Cell Therapy Daily Progress Note   10/22/2023    Patient ID:  Jennifer Ward is a 48 year old female, currently day +10 s/p autologous hematopoietic cell transplant as rescue after BEAM therapy for Hodgkin Lymphoma. Co-enrolled on [employer] study.     Readmitted 10/16 with neutropenic fever and abdominal pain.    INTERVAL  HISTORY   Jennifer continues to feel better. She was able to eat more yesterday. Her abdominal pain continues to improve and nausea has not returned. Did not need to utilize prn pain or antiemetic medications yesterday. Endorses feeling a bit more tired this morning, slightly lightheaded, and feet feel more swollen. Diarrhea is slowing down. Oral mucositis stable.       Review of Systems: ROS negative except as noted above.      PHYSICAL EXAM     Weight In/Out     Wt Readings from Last 3 Encounters:   10/21/23 106.3 kg (234 lb 6.4 oz)   10/16/23 99.9 kg (220 lb 3.2 oz)   10/13/23 101.1 kg (222 lb 12.8 oz)      I/O last 3 completed shifts:  In: 1300 [P.O.:600; I.V.:200]  Out: 1300 [Urine:800; Other:500]       KPS:  70    BP (!) 138/95 (BP Location: Left arm)   Pulse 88   Temp 98.4  F (36.9  C) (Axillary)   Resp 16   Ht 1.64 m (5' 4.57\")   Wt 106.3 kg (234 lb 6.4 oz)   SpO2 98%   BMI 39.53 kg/m       General: NAD   Eyes: sclera anicteric   Nose/Mouth/Throat: healing sores on bilateral edges of tongue  Lungs: CTAB  Cardiovascular: RRR, no M/R/G   Abdominal: +BS, soft, minimally tender to palpation   Lymphatics: Trace pedal edema  Skin: Facial flushing and chest mildly erythematous  Neuro: A&O; non-focal  Access: L CVC NT, dressing cdi. R PAC not accessed.    LABS AND IMAGING: I have assessed all abnormal lab values for their clinical significance and any values considered clinically significant have been addressed in the assessment and plan.        Lab Results   Component Value Date    WBC 0.9 (LL) 10/22/2023    ANEU 0.6 (L) 10/22/2023    HGB 6.9 (LL) 10/22/2023    HCT 20.8 (L) 10/22/2023    " PLT 19 (LL) 10/22/2023     (L) 10/22/2023    POTASSIUM 4.7 10/22/2023    CHLORIDE 102 10/22/2023    CO2 18 (L) 10/22/2023     (H) 10/22/2023    BUN 16.9 10/22/2023    CR 0.73 10/22/2023    MAG 2.5 (H) 10/22/2023    INR 1.19 (H) 10/16/2023    BILITOTAL 0.5 10/21/2023    AST 15 10/21/2023    ALT 8 10/21/2023    ALKPHOS 46 10/21/2023    PROTTOTAL 5.2 (L) 10/21/2023    ALBUMIN 2.7 (L) 10/21/2023     CT Abd/Pelvis 10/16:   1. Distended loops of small bowel without a discrete transition point  associated with areas of mild wall thickening, vascular engorgement  and mesenteric edema. Findings may represent enteritis, posttreatment  changes or hyperacute GVHD in the appropriate clinical setting.     CXR portable (10/16): no aso   Oral/GI regimen related toxicities (per NCI CTCAE v 5.0):    Oral Mucositis: Grade 1  Nausea: none  Vomiting: none  Diarrhea: Grade 1    *diarrhea secondary to C. Diff is excluded from the definition of oral/GI RRT    CTCAE Terms     Vomiting: A disorder characterized by the reflexive act of ejecting the contents of the stomach through the mouth   Grade 1 Intervention not indicated   Grade 2 Outpatient IV hydration; medical intervention indicated   Grade 3 Tube feeding, TPN, or hospitalization indicated   Grade 4 Life-threatening consequences   Grade 5 Death     Oral Mucositis: A disorder characterized by ulceration or inflammation of the oral mucosal.   Grade 1 Asymptomatic or mild symptoms; intervention not indicated   Grade 2 Moderate pain or ulcer that does not interfere with oral intake; modified diet indicated   Grade 3 Severe pain; interfering with oral intake   Grade 4 Life-threatening consequences; urgent intervention indicated   Grade 5 Death     Nausea: A disorder characterized by a queasy sensation and/or the urge to vomit.   Grade 1 Loss of appetite without alteration in eating habits  Grade 2 Oral intake decreased without significant weight loss, dehydration or  malnutrition   Grade 3 Inadequate oral caloric or fluid intake; tube feeding, TPN, or hospitalization indicated   Grade 4 NA  Grade 5 NA    Diarrhea: A disorder characterized by an increase in frequency and/or loose or watery bowel movements.   Grade 1 Increase of <4 stools per day over baseline; mild increase in ostomy output compared to baseline   Grade 2 Increase of 4 - 6 stools per day over baseline; moderate increase in ostomy output compared to baseline; limiting instrumental ADL   Grade 3 Increase of >=7 stools per day over baseline; hospitalization indicated; severe increase in ostomy output compared to baseline; limiting self-care ADL    Grade 4 Life-threatening consequences; urgent intervention indicated    Grade 5 Death       Source: https://ctep.cancer.gov/protocoldevelopment/electronic_applications/docs/CTCAE_v5_Quick_Reference_8.5x11.pdf     SYSTEMS-BASED ASSESSMENT AND PLAN     Jennifer Ward is a 48 year old female, currently day + 10  s/p autologous hematopoietic cell transplant as rescue after BEAM therapy for Hodgkin Lymphoma. Co-enrolled on [employer] study.   Readmitted 10/16/23 with neutropenic fever.     Prep: BEAM     BMT  - BMT MD/Coordinator: Nay  - MT2016-11 with co-enrollment on MT2022-40 (E-CELERATE)  - Cell dose 7 million CD34+ cells/kg   - GCSF started day +5, continue until ANC > 500 for 3 consecutive days.   - Restaging: next restage at day +28       HEME/COAG  - Transfusion parameters: hgb <7g/dL and plts <10,000.   - Pancytopenia secondary to Hodgkin Lymphoma and chemotherapy    # SOB and bilateral leg pain 10/20. D-Dimer 3.24. CT-PE and US of bilateral LE negative for clot. SOB and leg pain soon resolved thereafter.        ID  Prophy: **note that antimicrobial dosing is specific to protocol  Viral: ACV 400mg PO bid  Fungal: fluconazole 400mg daily  PCP: Bactrim or other PCP prophy to start at day +28     Neutropenic Fever of Unclear Etiology  ID workup unrevealing thusfar.  Covid neg 10/17.   Empiric cefepime (10/16 - x) and flagyl (10/16-10/21), given abdominal pain. Flagyl discontinued 10/21.   If pt remains afebrile, consider discontinuing cefepime tomorrow with counts coming in (and resume Levaquin 500).    CT abd/pelvis as above; surgical consult ruled out sbo.     # New cough 10/22: RVP and Covid pending. Tessalon available prn.      GI  #Diarrhea, abdominal cramping, improvin/2 chemo/colitis.  C.dif neg 10/17.   Imodium prn --> scheduled starting 10/21  Simethicone prn, Dilaudid PO/IV, heating pad prn.  #CINV: prn ativan, prn compazine. Received Emend 10/20.   #Ulcer prophy: Protonix 40mg daily.   #E-CELERATE vs placebo administered 10/12.  - oropharyngeal mucositis: 2/2 chemo. Chloraseptic spray; MMW, good oral cares.    # Poor oral intake with ongoing diarrhea. Give Dexamethasone 10mg IV on 10/21, then 4mg IV x1 on 10/22 and 10/23 (ordered)     CV  - Tachycardic 10/20, EKG showing sinus tach with QTc 436    NUTRITION/FLUIDS  - advance to high phill/protein diet 10/18  # Wt up since admission; stop MIVF on 10/18 (previously 100cc/hr).   Lasix 20mg IV x2 on 10/19 with good output. Then hypotensive 10/20, feeling lightheaded -- Given 500cc NS bolus.    10/22: continues to be weight up, now with trace edema. Give Lasix 20mg IV x1 and start PO Spironolactone x1 daily.       FEN  # Hyponatremia: discussed limiting free water intake with pt  # Hypokalemia likely secondary to diuresis  # Hypophosphatemia  # Hypomagnesemia, resolved.  # Hypocalcemia  - replete lytes per sliding scale    ENDOCRINE  # hypothyroidism: continue PTA synthroid     PSYCH  # depression: continue PTA bupropion   10/22 discontinue PTA venlafaxine per pt request. Pt reports she has not been taking regularly at home and occasionally declining medication while inpatient as well. Will monitor closely for withdrawal sx.      NEURO  # migraines: daily topamax with prn naratriptan; propranolol prophy placed on hold  "with hypotension after admission.      SUPPORTIVE CARE  - PT/OT consult, will follow as indicated  - BMT Social work to follow.       Code Status: Full Code       Dispo: Likely discharge later this week pending count recovery and resolution of GI symptoms.       Known issues that I take into account for medical decisions, with salient changes to the plan considering these complexities noted above.    Patient Active Problem List   Diagnosis    Hodgkin's disease of lymph nodes of multiple sites (H)    Autologous donor of stem cells    Nodular sclerosis Hodgkin lymphoma of lymph nodes of multiple regions (H)    Febrile neutropenia        Clinically Significant Risk Factors        # Hypokalemia: Lowest K = 3.3 mmol/L in last 2 days, will replace as needed       # Hypoalbuminemia: Lowest albumin = 2.7 g/dL at 10/21/2023  4:38 AM, will monitor as appropriate       # Thrombocytopenia: Lowest platelets = 6 in last 2 days, will monitor for bleeding          # Obesity: Estimated body mass index is 39.53 kg/m  as calculated from the following:    Height as of this encounter: 1.64 m (5' 4.57\").    Weight as of this encounter: 106.3 kg (234 lb 6.4 oz).        # Financial/Environmental Concerns: none         I spent 45 minutes face-to-face and/or coordinating care. Over 50% of our time on the unit was spent counseling the patient and/or coordinating care and the plan detailed on today's notes.        Fadumo Guillaume PA-C    "

## 2023-10-22 NOTE — PLAN OF CARE
"BP (!) 137/94   Pulse 94   Temp 98.2  F (36.8  C) (Axillary)   Resp 14   Ht 1.64 m (5' 4.57\")   Wt 106.3 kg (234 lb 6.4 oz)   SpO2 100%   BMI 39.53 kg/m      Pt slept comfortably during the night between cares. VSS on room air, afebrile. Denies pain and nausea. Hemoglobin and phosphorus below goal this morning, replacements currently infusing. Pt stated she feels like her feet and ankles may be swelling d/t all the fluids she has gotten over the past few days and inactivity. Encouraged pt to walk today to help improve circulation and reduce edema. Continue with current POC.    Goal Outcome Evaluation:    Problem: Adult Inpatient Plan of Care  Goal: Absence of Hospital-Acquired Illness or Injury  Intervention: Identify and Manage Fall Risk  Recent Flowsheet Documentation  Taken 10/21/2023 2000 by Fern Hdez RN  Safety Promotion/Fall Prevention:   safety round/check completed   supervised activity  Intervention: Prevent Skin Injury  Recent Flowsheet Documentation  Taken 10/22/2023 0400 by Fern Hdez RN  Body Position: position changed independently  Taken 10/22/2023 0000 by Fern Hdez RN  Body Position: position changed independently  Taken 10/21/2023 2000 by Fern Hdez RN  Body Position: position changed independently  Intervention: Prevent and Manage VTE (Venous Thromboembolism) Risk  Recent Flowsheet Documentation  Taken 10/21/2023 2000 by Fern Hdez RN  VTE Prevention/Management: SCDs (sequential compression devices) off  Intervention: Prevent Infection  Recent Flowsheet Documentation  Taken 10/21/2023 2000 by Fern Hdez RN  Infection Prevention: environmental surveillance performed     Problem: Stem Cell/Bone Marrow Transplant  Goal: Diarrhea Symptom Control  Outcome: Progressing  Goal: Improved Activity Tolerance  Intervention: Promote Improved Energy  Recent Flowsheet Documentation  Taken 10/21/2023 2000 by Fern Hdez RN  Activity Management: activity adjusted per " tolerance  Goal: Blood Counts Within Acceptable Range  Intervention: Monitor and Manage Hematologic Symptoms  Recent Flowsheet Documentation  Taken 10/21/2023 2000 by Fern Hdez RN  Medication Review/Management: medications reviewed  Goal: Absence of Infection  Intervention: Prevent and Manage Infection  Recent Flowsheet Documentation  Taken 10/21/2023 2000 by Fern Hdez RN  Infection Prevention: environmental surveillance performed  Isolation Precautions: protective environment maintained  Goal: Nausea and Vomiting Symptom Relief  Outcome: Progressing  Intervention: Prevent and Manage Nausea and Vomiting  Recent Flowsheet Documentation  Taken 10/21/2023 2000 by Fern Hdez RN  Nausea/Vomiting Interventions:   nausea triggers minimized   stimuli minimized  Goal: Optimal Nutrition Intake  Outcome: Progressing

## 2023-10-22 NOTE — PROGRESS NOTES
VS baseline, abdominal pain is controled no PRN pain medication was administrated, increased oral food intake however still poor intake, administrated lasix 20 mg IV and good response after that, continue to have diarrhea however each stool was decreased amount, Rhinovirus positive so droplet isolation

## 2023-10-23 ENCOUNTER — APPOINTMENT (OUTPATIENT)
Dept: PHYSICAL THERAPY | Facility: CLINIC | Age: 48
End: 2023-10-23
Attending: INTERNAL MEDICINE
Payer: COMMERCIAL

## 2023-10-23 LAB
ALBUMIN SERPL BCG-MCNC: 3 G/DL (ref 3.5–5.2)
ALP SERPL-CCNC: 53 U/L (ref 35–104)
ALT SERPL W P-5'-P-CCNC: 8 U/L (ref 0–50)
ANION GAP SERPL CALCULATED.3IONS-SCNC: 11 MMOL/L (ref 7–15)
AST SERPL W P-5'-P-CCNC: 10 U/L (ref 0–45)
BASO+EOS+MONOS # BLD AUTO: ABNORMAL 10*3/UL
BASO+EOS+MONOS NFR BLD AUTO: ABNORMAL %
BASOPHILS # BLD AUTO: ABNORMAL 10*3/UL
BASOPHILS # BLD MANUAL: 0 10E3/UL (ref 0–0.2)
BASOPHILS NFR BLD AUTO: ABNORMAL %
BASOPHILS NFR BLD MANUAL: 0 %
BILIRUB DIRECT SERPL-MCNC: 0.24 MG/DL (ref 0–0.3)
BILIRUB SERPL-MCNC: 0.5 MG/DL
BUN SERPL-MCNC: 24.2 MG/DL (ref 6–20)
BURR CELLS BLD QL SMEAR: SLIGHT
CALCIUM SERPL-MCNC: 8.4 MG/DL (ref 8.6–10)
CHLORIDE SERPL-SCNC: 104 MMOL/L (ref 98–107)
CREAT SERPL-MCNC: 0.86 MG/DL (ref 0.51–0.95)
DEPRECATED HCO3 PLAS-SCNC: 18 MMOL/L (ref 22–29)
EGFRCR SERPLBLD CKD-EPI 2021: 82 ML/MIN/1.73M2
EOSINOPHIL # BLD AUTO: ABNORMAL 10*3/UL
EOSINOPHIL # BLD MANUAL: 0 10E3/UL (ref 0–0.7)
EOSINOPHIL NFR BLD AUTO: ABNORMAL %
EOSINOPHIL NFR BLD MANUAL: 0 %
ERYTHROCYTE [DISTWIDTH] IN BLOOD BY AUTOMATED COUNT: 14.3 % (ref 10–15)
GLUCOSE SERPL-MCNC: 117 MG/DL (ref 70–99)
HCT VFR BLD AUTO: 24.6 % (ref 35–47)
HGB BLD-MCNC: 8.3 G/DL (ref 11.7–15.7)
IMM GRANULOCYTES # BLD: ABNORMAL 10*3/UL
IMM GRANULOCYTES NFR BLD: ABNORMAL %
INR PPP: 1.37 (ref 0.85–1.15)
LYMPHOCYTES # BLD AUTO: ABNORMAL 10*3/UL
LYMPHOCYTES # BLD MANUAL: 0.4 10E3/UL (ref 0.8–5.3)
LYMPHOCYTES NFR BLD AUTO: ABNORMAL %
LYMPHOCYTES NFR BLD MANUAL: 6 %
MAGNESIUM SERPL-MCNC: 2.3 MG/DL (ref 1.7–2.3)
MCH RBC QN AUTO: 28.5 PG (ref 26.5–33)
MCHC RBC AUTO-ENTMCNC: 33.7 G/DL (ref 31.5–36.5)
MCV RBC AUTO: 85 FL (ref 78–100)
METAMYELOCYTES # BLD MANUAL: 0.1 10E3/UL
METAMYELOCYTES NFR BLD MANUAL: 1 %
MONOCYTES # BLD AUTO: ABNORMAL 10*3/UL
MONOCYTES # BLD MANUAL: 1 10E3/UL (ref 0–1.3)
MONOCYTES NFR BLD AUTO: ABNORMAL %
MONOCYTES NFR BLD MANUAL: 16 %
NEUTROPHILS # BLD AUTO: ABNORMAL 10*3/UL
NEUTROPHILS # BLD MANUAL: 4.9 10E3/UL (ref 1.6–8.3)
NEUTROPHILS NFR BLD AUTO: ABNORMAL %
NEUTROPHILS NFR BLD MANUAL: 77 %
NRBC # BLD AUTO: 0 10E3/UL
NRBC BLD AUTO-RTO: 0 /100
PHOSPHATE SERPL-MCNC: 2.5 MG/DL (ref 2.5–4.5)
PLAT MORPH BLD: ABNORMAL
PLATELET # BLD AUTO: 26 10E3/UL (ref 150–450)
POTASSIUM SERPL-SCNC: 4 MMOL/L (ref 3.4–5.3)
PROT SERPL-MCNC: 5.3 G/DL (ref 6.4–8.3)
RBC # BLD AUTO: 2.91 10E6/UL (ref 3.8–5.2)
RBC MORPH BLD: ABNORMAL
SODIUM SERPL-SCNC: 133 MMOL/L (ref 135–145)
WBC # BLD AUTO: 6.3 10E3/UL (ref 4–11)

## 2023-10-23 PROCEDURE — 83735 ASSAY OF MAGNESIUM: CPT | Performed by: PHYSICIAN ASSISTANT

## 2023-10-23 PROCEDURE — 250N000013 HC RX MED GY IP 250 OP 250 PS 637

## 2023-10-23 PROCEDURE — 250N000013 HC RX MED GY IP 250 OP 250 PS 637: Performed by: PHYSICIAN ASSISTANT

## 2023-10-23 PROCEDURE — 85610 PROTHROMBIN TIME: CPT | Performed by: PHYSICIAN ASSISTANT

## 2023-10-23 PROCEDURE — 99233 SBSQ HOSP IP/OBS HIGH 50: CPT | Mod: FS

## 2023-10-23 PROCEDURE — 250N000011 HC RX IP 250 OP 636: Mod: JZ

## 2023-10-23 PROCEDURE — 82248 BILIRUBIN DIRECT: CPT | Performed by: PHYSICIAN ASSISTANT

## 2023-10-23 PROCEDURE — 85007 BL SMEAR W/DIFF WBC COUNT: CPT | Performed by: PHYSICIAN ASSISTANT

## 2023-10-23 PROCEDURE — 999N000111 HC STATISTIC OT IP EVAL DEFER

## 2023-10-23 PROCEDURE — 85027 COMPLETE CBC AUTOMATED: CPT | Performed by: PHYSICIAN ASSISTANT

## 2023-10-23 PROCEDURE — 84100 ASSAY OF PHOSPHORUS: CPT | Performed by: PHYSICIAN ASSISTANT

## 2023-10-23 PROCEDURE — 206N000001 HC R&B BMT UMMC

## 2023-10-23 PROCEDURE — 250N000011 HC RX IP 250 OP 636: Performed by: PHYSICIAN ASSISTANT

## 2023-10-23 PROCEDURE — 97110 THERAPEUTIC EXERCISES: CPT | Mod: GP

## 2023-10-23 RX ORDER — GUAIFENESIN 200 MG/10ML
200 LIQUID ORAL EVERY 4 HOURS PRN
Status: DISCONTINUED | OUTPATIENT
Start: 2023-10-23 | End: 2023-10-27 | Stop reason: HOSPADM

## 2023-10-23 RX ORDER — SUCRALFATE ORAL 1 G/10ML
1 SUSPENSION ORAL
Status: DISCONTINUED | OUTPATIENT
Start: 2023-10-23 | End: 2023-10-27 | Stop reason: HOSPADM

## 2023-10-23 RX ORDER — FUROSEMIDE 10 MG/ML
40 INJECTION INTRAMUSCULAR; INTRAVENOUS ONCE
Status: COMPLETED | OUTPATIENT
Start: 2023-10-23 | End: 2023-10-23

## 2023-10-23 RX ORDER — PANTOPRAZOLE SODIUM 40 MG/1
40 TABLET, DELAYED RELEASE ORAL
Status: DISCONTINUED | OUTPATIENT
Start: 2023-10-23 | End: 2023-10-25

## 2023-10-23 RX ADMIN — CEFEPIME HYDROCHLORIDE 2 G: 2 INJECTION, POWDER, FOR SOLUTION INTRAVENOUS at 16:45

## 2023-10-23 RX ADMIN — DEXAMETHASONE SODIUM PHOSPHATE 4 MG: 10 INJECTION, SOLUTION INTRAMUSCULAR; INTRAVENOUS at 08:42

## 2023-10-23 RX ADMIN — ACYCLOVIR 400 MG: 400 TABLET ORAL at 08:42

## 2023-10-23 RX ADMIN — CEFEPIME HYDROCHLORIDE 2 G: 2 INJECTION, POWDER, FOR SOLUTION INTRAVENOUS at 00:33

## 2023-10-23 RX ADMIN — SUCRALFATE 1 G: 1 SUSPENSION ORAL at 21:56

## 2023-10-23 RX ADMIN — FUROSEMIDE 40 MG: 10 INJECTION, SOLUTION INTRAMUSCULAR; INTRAVENOUS at 12:09

## 2023-10-23 RX ADMIN — LOPERAMIDE HYDROCHLORIDE 2 MG: 2 CAPSULE ORAL at 16:44

## 2023-10-23 RX ADMIN — LOPERAMIDE HYDROCHLORIDE 2 MG: 2 CAPSULE ORAL at 08:42

## 2023-10-23 RX ADMIN — CEFEPIME HYDROCHLORIDE 2 G: 2 INJECTION, POWDER, FOR SOLUTION INTRAVENOUS at 08:43

## 2023-10-23 RX ADMIN — FLUCONAZOLE 400 MG: 200 TABLET ORAL at 08:42

## 2023-10-23 RX ADMIN — LOPERAMIDE HYDROCHLORIDE 2 MG: 2 CAPSULE ORAL at 20:14

## 2023-10-23 RX ADMIN — SUCRALFATE 1 G: 1 SUSPENSION ORAL at 12:10

## 2023-10-23 RX ADMIN — SPIRONOLACTONE 25 MG: 25 TABLET, FILM COATED ORAL at 08:42

## 2023-10-23 RX ADMIN — ACYCLOVIR 400 MG: 400 TABLET ORAL at 20:14

## 2023-10-23 RX ADMIN — LEVOTHYROXINE SODIUM 75 MCG: 75 TABLET ORAL at 08:42

## 2023-10-23 RX ADMIN — LOPERAMIDE HYDROCHLORIDE 2 MG: 2 CAPSULE ORAL at 12:10

## 2023-10-23 RX ADMIN — SUCRALFATE 1 G: 1 SUSPENSION ORAL at 16:44

## 2023-10-23 RX ADMIN — TOPIRAMATE 50 MG: 50 TABLET, FILM COATED ORAL at 20:14

## 2023-10-23 ASSESSMENT — ACTIVITIES OF DAILY LIVING (ADL)
ADLS_ACUITY_SCORE: 24
ADLS_ACUITY_SCORE: 24
ADLS_ACUITY_SCORE: 23
ADLS_ACUITY_SCORE: 23
ADLS_ACUITY_SCORE: 24
ADLS_ACUITY_SCORE: 23
ADLS_ACUITY_SCORE: 24
ADLS_ACUITY_SCORE: 23
ADLS_ACUITY_SCORE: 23
ADLS_ACUITY_SCORE: 24
ADLS_ACUITY_SCORE: 24
ADLS_ACUITY_SCORE: 23

## 2023-10-23 NOTE — PLAN OF CARE
"  Problem: Adult Inpatient Plan of Care  Goal: Patient-Specific Goal (Individualized)  Description: You can add care plan individualizations to a care plan. Examples of Individualization might be:  \"Parent requests to be called daily at 9am for status\", \"I have a hard time hearing out of my right ear\", or \"Do not touch me to wake me up as it startles  me\".  Outcome: Progressing   Goal Outcome Evaluation:  Afebrile. Blood pressures 140's-150's/80's. Unable to take whole pills. meds were crushed and taken with applesauce. Discussed with provider. Started on carafate pre meals. No nausea. No pain. Cough and declined robitussion. Weight 107.1 kg and 106.4 kg. Received lasix and spironolactone and voided 1860 ml. Research lab was drawn. Independent.                       "

## 2023-10-23 NOTE — PROGRESS NOTES
"BMT/Cell Therapy Daily Progress Note   10/23/2023    Patient ID:  Jennifer Ward is a 48 year old female, currently day +11 s/p autologous hematopoietic cell transplant as rescue after BEAM therapy for Hodgkin Lymphoma. Co-enrolled on [employer] study. Readmitted 10/16 with neutropenic fever and abdominal pain.    INTERVAL  HISTORY     Remains afebrile. Denies nausea. Some diarrhea. No nausea/vomiting, but dysphagia. She thinks it feels like pills and dry food can get stuck and she needs water/apple sauce to get the pills down. Not needing pain meds in last 2 days- mouth and throat pain doing better. Feels quite swollen in BLE, did not get much fluid off with diuresis yesterday. No dyspnea. Coughing continuing, no facial pressure or sore throat.     Review of Systems: ROS negative except as noted above.      PHYSICAL EXAM     Weight In/Out     Wt Readings from Last 3 Encounters:   10/23/23 107.1 kg (236 lb 3.2 oz)   10/16/23 99.9 kg (220 lb 3.2 oz)   10/13/23 101.1 kg (222 lb 12.8 oz)      I/O last 3 completed shifts:  In: 400 [P.O.:400]  Out: 1200 [Urine:1200]       KPS:  70    BP (!) 142/91 (BP Location: Left arm, Cuff Size: Adult Regular)   Pulse 71   Temp 98  F (36.7  C) (Axillary)   Resp 16   Ht 1.64 m (5' 4.57\")   Wt 107.1 kg (236 lb 3.2 oz)   SpO2 98%   BMI 39.83 kg/m       General: NAD   Eyes: sclera anicteric   Nose/Mouth/Throat: healing sores on bilateral edges of tongue  Lungs: CTAB  Cardiovascular: RRR, no M/R/G   Abdominal: +BS, soft, no distention tenderness in epigastric and RUQ  Lymphatics: 1+ edema BLE  Skin: Facial flushing and chest mildly erythematous  Neuro: A&O; non-focal  Access: L CVC NT, dressing cdi. R PAC not accessed.    LABS AND IMAGING: I have assessed all abnormal lab values for their clinical significance and any values considered clinically significant have been addressed in the assessment and plan.        Lab Results   Component Value Date    WBC 6.3 10/23/2023    ANEU 4.9 " 10/23/2023    HGB 8.3 (L) 10/23/2023    HCT 24.6 (L) 10/23/2023    PLT 26 (LL) 10/23/2023     (L) 10/23/2023    POTASSIUM 4.0 10/23/2023    CHLORIDE 104 10/23/2023    CO2 18 (L) 10/23/2023     (H) 10/23/2023    BUN 24.2 (H) 10/23/2023    CR 0.86 10/23/2023    MAG 2.3 10/23/2023    INR 1.37 (H) 10/23/2023    BILITOTAL 0.5 10/23/2023    AST 10 10/23/2023    ALT 8 10/23/2023    ALKPHOS 53 10/23/2023    PROTTOTAL 5.3 (L) 10/23/2023    ALBUMIN 3.0 (L) 10/23/2023     CT Abd/Pelvis 10/16:   1. Distended loops of small bowel without a discrete transition point  associated with areas of mild wall thickening, vascular engorgement  and mesenteric edema. Findings may represent enteritis, posttreatment  changes or hyperacute GVHD in the appropriate clinical setting.     CXR portable (10/16): no aso   Oral/GI regimen related toxicities (per NCI CTCAE v 5.0):    Oral Mucositis: Grade 1  Nausea: none  Vomiting: none  Diarrhea: Grade 1    *diarrhea secondary to C. Diff is excluded from the definition of oral/GI RRT    CTCAE Terms     Vomiting: A disorder characterized by the reflexive act of ejecting the contents of the stomach through the mouth   Grade 1 Intervention not indicated   Grade 2 Outpatient IV hydration; medical intervention indicated   Grade 3 Tube feeding, TPN, or hospitalization indicated   Grade 4 Life-threatening consequences   Grade 5 Death     Oral Mucositis: A disorder characterized by ulceration or inflammation of the oral mucosal.   Grade 1 Asymptomatic or mild symptoms; intervention not indicated   Grade 2 Moderate pain or ulcer that does not interfere with oral intake; modified diet indicated   Grade 3 Severe pain; interfering with oral intake   Grade 4 Life-threatening consequences; urgent intervention indicated   Grade 5 Death     Nausea: A disorder characterized by a queasy sensation and/or the urge to vomit.   Grade 1 Loss of appetite without alteration in eating habits  Grade 2 Oral intake  decreased without significant weight loss, dehydration or malnutrition   Grade 3 Inadequate oral caloric or fluid intake; tube feeding, TPN, or hospitalization indicated   Grade 4 NA  Grade 5 NA    Diarrhea: A disorder characterized by an increase in frequency and/or loose or watery bowel movements.   Grade 1 Increase of <4 stools per day over baseline; mild increase in ostomy output compared to baseline   Grade 2 Increase of 4 - 6 stools per day over baseline; moderate increase in ostomy output compared to baseline; limiting instrumental ADL   Grade 3 Increase of >=7 stools per day over baseline; hospitalization indicated; severe increase in ostomy output compared to baseline; limiting self-care ADL    Grade 4 Life-threatening consequences; urgent intervention indicated    Grade 5 Death       Source: https://ctep.cancer.gov/protocoldevelopment/electronic_applications/docs/CTCAE_v5_Quick_Reference_8.5x11.pdf     SYSTEMS-BASED ASSESSMENT AND PLAN     Jennifer Ward is a 48 year old female, currently day + 11  s/p autologous hematopoietic cell transplant as rescue after BEAM therapy for Hodgkin Lymphoma. Co-enrolled on [employer] study. Readmitted 10/16/23 with neutropenic fever. Remains admitted due to difficulty to maintain PO intake, pain control, rhinovirus +.    Prep: BEAM     BMT  - BMT MD/Coordinator: Nay  - MT2016-11 with co-enrollment on MT2022-40 (E-CELERATE)  - Cell dose 7 million CD34+ cells/kg   - GCSF started day +5, continue until ANC > 500 for 3 consecutive days- discontinued 10/23  - Restaging: next restage at day +28       HEME/COAG  - Transfusion parameters: hgb <7g/dL and plts <10,000.   - Pancytopenia secondary to Hodgkin Lymphoma and chemotherapy     ID  #RHINOVIRUS + (10/22)- Symptomatic control, robitussin   #N/F: work up unremarkable, afebrile. Cefepime (10/16-x), flagyl (10/16-10/21), given abdominal pain. CTAP negative. Deescalate if remains afebrile now that engrafting.  Prophy:  **note that antimicrobial dosing is specific to protocol: ACV 400mg PO bid, fluconazole 400mg daily, Bactrim or other PCP prophy to start at day +28     GI  #Diarrhea, abdominal cramping, improvin/2 chemo/colitis.  C.dif neg 10/17.   Imodium QID  Simethicone prn, Dilaudid PO/IV (not using), heating pad prn.  #Dysphagia/Regurge/Mucositis- increased PPI BID, carafate PRN  #Anorexia: Dex 10mg x1, 4mg x2 last dose 10/23  #CINV: prn ativan, prn compazine. Received Emend 10/20.   #Ulcer prophy: Protonix 40mg BID  #E-CELERATE vs placebo administered 10/12: oropharyngeal mucositis: 2/2 chemo. Chloraseptic spray; MMW, good oral cares.    CV  - Tachycardic 10/20, EKG showing sinus tach with QTc 436    NUTRITION/FLUIDS  - advance to high phill/protein diet 10/18  # Wt up since admission; stop MIVF on 10/18 (previously 100cc/hr).   Lasix 20mg IV x2 on 10/19 with good output. Then hypotensive 10/20, feeling lightheaded -- Given 500cc NS bolus.    10/22: continues to be weight up, now with trace edema. Give Lasix 20mg IV x1 and start PO Spironolactone x1 daily.       FEN  #Hypervolemia: diuresis following MIVF on admission. Goal net negative 1-2L.   -40mg lasix 10/23, will give afternoon dose if necessary  # Hyponatremia: likely 2/2 hypervolemia, improving with lasix  # Hypokalemia likely secondary to diuresis  # Hypophosphatemia  # Hypomagnesemia, resolved.  # Hypocalcemia  - replete lytes per sliding scale    ENDOCRINE  # hypothyroidism: continue PTA synthroid     PSYCH  # depression: continue PTA bupropion   10/22 discontinue PTA venlafaxine per pt request. Pt reports she has not been taking regularly at home and occasionally declining medication while inpatient as well. Will monitor closely for withdrawal sx.      NEURO  # migraines: daily topamax with prn naratriptan; propranolol prophy placed on hold with hypotension after admission.      SUPPORTIVE CARE  - PT/OT consult, will follow as indicated  - BMT Social work to  "follow.       Code Status: Full Code       Dispo: Likely discharge later this week pending count recovery and resolution of GI symptoms.       Known issues that I take into account for medical decisions, with salient changes to the plan considering these complexities noted above.    Patient Active Problem List   Diagnosis    Hodgkin's disease of lymph nodes of multiple sites (H)    Autologous donor of stem cells    Nodular sclerosis Hodgkin lymphoma of lymph nodes of multiple regions (H)    Febrile neutropenia        Clinically Significant Risk Factors              # Hypoalbuminemia: Lowest albumin = 2.7 g/dL at 10/21/2023  4:38 AM, will monitor as appropriate    # Coagulation Defect: INR = 1.37 (Ref range: 0.85 - 1.15) and/or PTT = 43 Seconds (Ref range: 22 - 38 Seconds), will monitor for bleeding    # Thrombocytopenia: Lowest platelets = 19 in last 2 days, will monitor for bleeding          # Obesity: Estimated body mass index is 39.83 kg/m  as calculated from the following:    Height as of this encounter: 1.64 m (5' 4.57\").    Weight as of this encounter: 107.1 kg (236 lb 3.2 oz).        # Financial/Environmental Concerns: none         I spent 30 minutes face-to-face and/or coordinating care. Over 50% of our time on the unit was spent counseling the patient and/or coordinating care and the plan detailed on today's notes.        Destinee Arvizu PA-C  [phone]  "

## 2023-10-23 NOTE — PLAN OF CARE
Occupational Therapy Defer: Orders received and chart reviewed. Discussed the patient's functional performance with PT. Patient currently mobilizing and completing ADLs IND, no cognitive concerns. PT following to address inpatient therapy needs. Acute OT not indicated at this time. OT will sign off. Please re-consult if new needs arise.    MELA Rodríguez, OTR/L  Pager: [phone]

## 2023-10-23 NOTE — PLAN OF CARE
"BP (!) 142/91 (BP Location: Left arm, Cuff Size: Adult Regular)   Pulse 82   Temp 97.1  F (36.2  C) (Axillary)   Resp 16   Ht 1.64 m (5' 4.57\")   Wt 107.4 kg (236 lb 12.8 oz)   SpO2 98%   BMI 39.94 kg/m      Pt slept comfortably overnight between cares. VSS on room air, afebrile. Denies pain and nausea but states she is not feeling good d/t rhinovirus infection. Pt attempted to take PRN acetaminophen but was unable to swallow the pills. Declined PRN hydromorphone. Pt was discouraged that she is once again not feeling well after having a good day on Saturday. Heme and electrolyte levels above goal this morning, no replacements needed. Continue with current POC.    Goal Outcome Evaluation:    Problem: Adult Inpatient Plan of Care  Goal: Absence of Hospital-Acquired Illness or Injury  Intervention: Identify and Manage Fall Risk  Recent Flowsheet Documentation  Taken 10/23/2023 0400 by Fern Hdez RN  Safety Promotion/Fall Prevention: safety round/check completed  Taken 10/22/2023 2000 by Fern Hdez RN  Safety Promotion/Fall Prevention: safety round/check completed  Intervention: Prevent Skin Injury  Recent Flowsheet Documentation  Taken 10/23/2023 0415 by Fern Hdez RN  Body Position: position changed independently  Taken 10/23/2023 0400 by Fern Hdez RN  Body Position: position changed independently  Taken 10/23/2023 0020 by Fern Hdez RN  Body Position: position changed independently  Taken 10/22/2023 2000 by Fern Hdez RN  Body Position: position changed independently  Intervention: Prevent and Manage VTE (Venous Thromboembolism) Risk  Recent Flowsheet Documentation  Taken 10/22/2023 2000 by Fern Hdez RN  VTE Prevention/Management: SCDs (sequential compression devices) off  Intervention: Prevent Infection  Recent Flowsheet Documentation  Taken 10/22/2023 2000 by Fern Hdez RN  Infection Prevention: hand hygiene promoted  Goal: Optimal Comfort and Wellbeing  Outcome: " Progressing  Intervention: Monitor Pain and Promote Comfort  Recent Flowsheet Documentation  Taken 10/23/2023 0020 by Fern Hdez RN  Pain Management Interventions:   care clustered   emotional support   quiet environment facilitated   relaxation techniques promoted   rest  Taken 10/22/2023 2040 by Fern Hdez RN  Pain Management Interventions:   medication (see MAR)   care clustered   quiet environment facilitated   rest  Goal: Readiness for Transition of Care  Outcome: Progressing     Problem: Stem Cell/Bone Marrow Transplant  Goal: Symptom-Free Urinary Elimination  Intervention: Prevent or Manage Bladder Irritation  Recent Flowsheet Documentation  Taken 10/23/2023 0020 by Fern Hdez RN  Pain Management Interventions:   care clustered   emotional support   quiet environment facilitated   relaxation techniques promoted   rest  Taken 10/22/2023 2040 by Fern Hdez RN  Pain Management Interventions:   medication (see MAR)   care clustered   quiet environment facilitated   rest  Goal: Diarrhea Symptom Control  Outcome: Progressing  Goal: Improved Activity Tolerance  Intervention: Promote Improved Energy  Recent Flowsheet Documentation  Taken 10/22/2023 2000 by Fern Hdez RN  Activity Management: activity adjusted per tolerance  Goal: Blood Counts Within Acceptable Range  Outcome: Progressing  Goal: Absence of Infection  Outcome: Progressing  Intervention: Prevent and Manage Infection  Recent Flowsheet Documentation  Taken 10/22/2023 2000 by Fern Hdez RN  Infection Prevention: hand hygiene promoted  Isolation Precautions: protective environment maintained

## 2023-10-24 ENCOUNTER — APPOINTMENT (OUTPATIENT)
Dept: SPEECH THERAPY | Facility: CLINIC | Age: 48
End: 2023-10-24
Attending: PHYSICIAN ASSISTANT
Payer: COMMERCIAL

## 2023-10-24 ENCOUNTER — APPOINTMENT (OUTPATIENT)
Dept: PHYSICAL THERAPY | Facility: CLINIC | Age: 48
End: 2023-10-24
Attending: INTERNAL MEDICINE
Payer: COMMERCIAL

## 2023-10-24 LAB
ALBUMIN SERPL BCG-MCNC: 3.1 G/DL (ref 3.5–5.2)
ALP SERPL-CCNC: 68 U/L (ref 35–104)
ALT SERPL W P-5'-P-CCNC: 8 U/L (ref 0–50)
ANION GAP SERPL CALCULATED.3IONS-SCNC: 11 MMOL/L (ref 7–15)
ANION GAP SERPL CALCULATED.3IONS-SCNC: 9 MMOL/L (ref 7–15)
AST SERPL W P-5'-P-CCNC: 12 U/L (ref 0–45)
BASOPHILS # BLD AUTO: ABNORMAL 10*3/UL
BASOPHILS # BLD AUTO: ABNORMAL 10*3/UL
BASOPHILS # BLD MANUAL: 0 10E3/UL (ref 0–0.2)
BASOPHILS # BLD MANUAL: 0 10E3/UL (ref 0–0.2)
BASOPHILS NFR BLD AUTO: ABNORMAL %
BASOPHILS NFR BLD AUTO: ABNORMAL %
BASOPHILS NFR BLD MANUAL: 0 %
BASOPHILS NFR BLD MANUAL: 0 %
BILIRUB DIRECT SERPL-MCNC: <0.2 MG/DL (ref 0–0.3)
BILIRUB SERPL-MCNC: 0.4 MG/DL
BUN SERPL-MCNC: 23.1 MG/DL (ref 6–20)
BUN SERPL-MCNC: 24.3 MG/DL (ref 6–20)
BURR CELLS BLD QL SMEAR: SLIGHT
CALCIUM SERPL-MCNC: 8.4 MG/DL (ref 8.6–10)
CALCIUM SERPL-MCNC: 8.6 MG/DL (ref 8.6–10)
CHLORIDE SERPL-SCNC: 105 MMOL/L (ref 98–107)
CHLORIDE SERPL-SCNC: 106 MMOL/L (ref 98–107)
CREAT SERPL-MCNC: 0.86 MG/DL (ref 0.51–0.95)
CREAT SERPL-MCNC: 0.9 MG/DL (ref 0.51–0.95)
DEPRECATED HCO3 PLAS-SCNC: 20 MMOL/L (ref 22–29)
DEPRECATED HCO3 PLAS-SCNC: 21 MMOL/L (ref 22–29)
EGFRCR SERPLBLD CKD-EPI 2021: 78 ML/MIN/1.73M2
EGFRCR SERPLBLD CKD-EPI 2021: 82 ML/MIN/1.73M2
ELLIPTOCYTES BLD QL SMEAR: SLIGHT
EOSINOPHIL # BLD AUTO: ABNORMAL 10*3/UL
EOSINOPHIL # BLD AUTO: ABNORMAL 10*3/UL
EOSINOPHIL # BLD MANUAL: 0 10E3/UL (ref 0–0.7)
EOSINOPHIL # BLD MANUAL: 0.1 10E3/UL (ref 0–0.7)
EOSINOPHIL NFR BLD AUTO: ABNORMAL %
EOSINOPHIL NFR BLD AUTO: ABNORMAL %
EOSINOPHIL NFR BLD MANUAL: 0 %
EOSINOPHIL NFR BLD MANUAL: 1 %
ERYTHROCYTE [DISTWIDTH] IN BLOOD BY AUTOMATED COUNT: 14.4 % (ref 10–15)
ERYTHROCYTE [DISTWIDTH] IN BLOOD BY AUTOMATED COUNT: 14.6 % (ref 10–15)
GLUCOSE SERPL-MCNC: 105 MG/DL (ref 70–99)
GLUCOSE SERPL-MCNC: 109 MG/DL (ref 70–99)
HCT VFR BLD AUTO: 25.5 % (ref 35–47)
HCT VFR BLD AUTO: 26.6 % (ref 35–47)
HGB BLD-MCNC: 8.5 G/DL (ref 11.7–15.7)
HGB BLD-MCNC: 8.9 G/DL (ref 11.7–15.7)
IMM GRANULOCYTES # BLD: ABNORMAL 10*3/UL
IMM GRANULOCYTES # BLD: ABNORMAL 10*3/UL
IMM GRANULOCYTES NFR BLD: ABNORMAL %
IMM GRANULOCYTES NFR BLD: ABNORMAL %
LDH SERPL L TO P-CCNC: 272 U/L (ref 0–250)
LYMPHOCYTES # BLD AUTO: ABNORMAL 10*3/UL
LYMPHOCYTES # BLD AUTO: ABNORMAL 10*3/UL
LYMPHOCYTES # BLD MANUAL: 0.7 10E3/UL (ref 0.8–5.3)
LYMPHOCYTES # BLD MANUAL: 1 10E3/UL (ref 0.8–5.3)
LYMPHOCYTES NFR BLD AUTO: ABNORMAL %
LYMPHOCYTES NFR BLD AUTO: ABNORMAL %
LYMPHOCYTES NFR BLD MANUAL: 6 %
LYMPHOCYTES NFR BLD MANUAL: 8 %
MCH RBC QN AUTO: 28.3 PG (ref 26.5–33)
MCH RBC QN AUTO: 28.3 PG (ref 26.5–33)
MCHC RBC AUTO-ENTMCNC: 33.3 G/DL (ref 31.5–36.5)
MCHC RBC AUTO-ENTMCNC: 33.5 G/DL (ref 31.5–36.5)
MCV RBC AUTO: 85 FL (ref 78–100)
MCV RBC AUTO: 85 FL (ref 78–100)
METAMYELOCYTES # BLD MANUAL: 0.2 10E3/UL
METAMYELOCYTES NFR BLD MANUAL: 2 %
MONOCYTES # BLD AUTO: ABNORMAL 10*3/UL
MONOCYTES # BLD AUTO: ABNORMAL 10*3/UL
MONOCYTES # BLD MANUAL: 0.6 10E3/UL (ref 0–1.3)
MONOCYTES # BLD MANUAL: 0.7 10E3/UL (ref 0–1.3)
MONOCYTES NFR BLD AUTO: ABNORMAL %
MONOCYTES NFR BLD AUTO: ABNORMAL %
MONOCYTES NFR BLD MANUAL: 5 %
MONOCYTES NFR BLD MANUAL: 6 %
MYELOCYTES # BLD MANUAL: 0.2 10E3/UL
MYELOCYTES NFR BLD MANUAL: 2 %
NEUTROPHILS # BLD AUTO: ABNORMAL 10*3/UL
NEUTROPHILS # BLD AUTO: ABNORMAL 10*3/UL
NEUTROPHILS # BLD MANUAL: 10.4 10E3/UL (ref 1.6–8.3)
NEUTROPHILS # BLD MANUAL: 9.6 10E3/UL (ref 1.6–8.3)
NEUTROPHILS NFR BLD AUTO: ABNORMAL %
NEUTROPHILS NFR BLD AUTO: ABNORMAL %
NEUTROPHILS NFR BLD MANUAL: 84 %
NEUTROPHILS NFR BLD MANUAL: 84 %
NRBC # BLD AUTO: 0 10E3/UL
NRBC # BLD AUTO: 0 10E3/UL
NRBC BLD AUTO-RTO: 0 /100
NRBC BLD AUTO-RTO: 0 /100
PHOSPHATE SERPL-MCNC: 2.2 MG/DL (ref 2.5–4.5)
PLAT MORPH BLD: ABNORMAL
PLAT MORPH BLD: ABNORMAL
PLATELET # BLD AUTO: 36 10E3/UL (ref 150–450)
PLATELET # BLD AUTO: 42 10E3/UL (ref 150–450)
POTASSIUM SERPL-SCNC: 3.5 MMOL/L (ref 3.4–5.3)
POTASSIUM SERPL-SCNC: 3.8 MMOL/L (ref 3.4–5.3)
PROMYELOCYTES # BLD MANUAL: 0.2 10E3/UL
PROMYELOCYTES NFR BLD MANUAL: 2 %
PROT SERPL-MCNC: 5.4 G/DL (ref 6.4–8.3)
RBC # BLD AUTO: 3 10E6/UL (ref 3.8–5.2)
RBC # BLD AUTO: 3.14 10E6/UL (ref 3.8–5.2)
RBC MORPH BLD: ABNORMAL
RBC MORPH BLD: ABNORMAL
RETICS # AUTO: 0.01 10E6/UL (ref 0.03–0.1)
RETICS/RBC NFR AUTO: 0.3 % (ref 0.5–2)
SODIUM SERPL-SCNC: 136 MMOL/L (ref 135–145)
SODIUM SERPL-SCNC: 136 MMOL/L (ref 135–145)
WBC # BLD AUTO: 11.4 10E3/UL (ref 4–11)
WBC # BLD AUTO: 12.4 10E3/UL (ref 4–11)

## 2023-10-24 PROCEDURE — 250N000013 HC RX MED GY IP 250 OP 250 PS 637

## 2023-10-24 PROCEDURE — 85027 COMPLETE CBC AUTOMATED: CPT | Performed by: PHYSICIAN ASSISTANT

## 2023-10-24 PROCEDURE — 82248 BILIRUBIN DIRECT: CPT | Performed by: PHYSICIAN ASSISTANT

## 2023-10-24 PROCEDURE — 250N000011 HC RX IP 250 OP 636: Mod: JZ | Performed by: INTERNAL MEDICINE

## 2023-10-24 PROCEDURE — 250N000011 HC RX IP 250 OP 636: Performed by: PHYSICIAN ASSISTANT

## 2023-10-24 PROCEDURE — 85007 BL SMEAR W/DIFF WBC COUNT: CPT | Performed by: PHYSICIAN ASSISTANT

## 2023-10-24 PROCEDURE — 97530 THERAPEUTIC ACTIVITIES: CPT | Mod: GP | Performed by: PHYSICAL THERAPIST

## 2023-10-24 PROCEDURE — 84100 ASSAY OF PHOSPHORUS: CPT | Performed by: PHYSICIAN ASSISTANT

## 2023-10-24 PROCEDURE — 83615 LACTATE (LD) (LDH) ENZYME: CPT | Performed by: PHYSICIAN ASSISTANT

## 2023-10-24 PROCEDURE — 92610 EVALUATE SWALLOWING FUNCTION: CPT | Mod: GN

## 2023-10-24 PROCEDURE — 258N000003 HC RX IP 258 OP 636: Performed by: PHYSICIAN ASSISTANT

## 2023-10-24 PROCEDURE — 80053 COMPREHEN METABOLIC PANEL: CPT | Performed by: PHYSICIAN ASSISTANT

## 2023-10-24 PROCEDURE — 250N000013 HC RX MED GY IP 250 OP 250 PS 637: Performed by: PHYSICIAN ASSISTANT

## 2023-10-24 PROCEDURE — 206N000001 HC R&B BMT UMMC

## 2023-10-24 PROCEDURE — 85045 AUTOMATED RETICULOCYTE COUNT: CPT | Performed by: PHYSICIAN ASSISTANT

## 2023-10-24 PROCEDURE — 250N000009 HC RX 250: Performed by: PHYSICIAN ASSISTANT

## 2023-10-24 RX ORDER — LORAZEPAM 0.5 MG/1
0.5 TABLET ORAL 3 TIMES DAILY PRN
Status: DISCONTINUED | OUTPATIENT
Start: 2023-10-24 | End: 2023-10-24

## 2023-10-24 RX ORDER — ACYCLOVIR 200 MG/5ML
400 SUSPENSION ORAL 2 TIMES DAILY
Status: DISCONTINUED | OUTPATIENT
Start: 2023-10-24 | End: 2023-10-27 | Stop reason: HOSPADM

## 2023-10-24 RX ORDER — FLUCONAZOLE 40 MG/ML
400 POWDER, FOR SUSPENSION ORAL DAILY
Status: DISCONTINUED | OUTPATIENT
Start: 2023-10-25 | End: 2023-10-27 | Stop reason: HOSPADM

## 2023-10-24 RX ORDER — POTASSIUM PHOS IN 0.9 % NACL 15MMOL/250
15 PLASTIC BAG, INJECTION (ML) INTRAVENOUS ONCE
Qty: 250 ML | Refills: 0 | Status: COMPLETED | OUTPATIENT
Start: 2023-10-24 | End: 2023-10-24

## 2023-10-24 RX ORDER — SPIRONOLACTONE 25 MG/5ML
25 SUSPENSION ORAL DAILY
Status: DISCONTINUED | OUTPATIENT
Start: 2023-10-25 | End: 2023-10-27 | Stop reason: HOSPADM

## 2023-10-24 RX ORDER — LORAZEPAM 0.5 MG/1
0.5 TABLET ORAL
Status: DISCONTINUED | OUTPATIENT
Start: 2023-10-24 | End: 2023-10-24

## 2023-10-24 RX ADMIN — Medication 5 ML: at 08:25

## 2023-10-24 RX ADMIN — PROPRANOLOL HYDROCHLORIDE 20 MG: 20 TABLET ORAL at 20:48

## 2023-10-24 RX ADMIN — POTASSIUM PHOSPHATE, MONOBASIC POTASSIUM PHOSPHATE, DIBASIC 15 MMOL: 224; 236 INJECTION, SOLUTION, CONCENTRATE INTRAVENOUS at 14:34

## 2023-10-24 RX ADMIN — SUCRALFATE 1 G: 1 SUSPENSION ORAL at 17:30

## 2023-10-24 RX ADMIN — FLUCONAZOLE 400 MG: 200 TABLET ORAL at 14:00

## 2023-10-24 RX ADMIN — CEFEPIME HYDROCHLORIDE 2 G: 2 INJECTION, POWDER, FOR SOLUTION INTRAVENOUS at 00:11

## 2023-10-24 RX ADMIN — ACYCLOVIR 400 MG: 200 SUSPENSION ORAL at 20:52

## 2023-10-24 RX ADMIN — SUCRALFATE 1 G: 1 SUSPENSION ORAL at 14:00

## 2023-10-24 RX ADMIN — BUPROPION HYDROCHLORIDE 150 MG: 150 TABLET, FILM COATED, EXTENDED RELEASE ORAL at 14:00

## 2023-10-24 RX ADMIN — SUCRALFATE 1 G: 1 SUSPENSION ORAL at 21:04

## 2023-10-24 RX ADMIN — PROPRANOLOL HYDROCHLORIDE 20 MG: 20 TABLET ORAL at 14:00

## 2023-10-24 RX ADMIN — Medication 5 ML: at 20:58

## 2023-10-24 RX ADMIN — LORAZEPAM 0.5 MG: 2 INJECTION INTRAMUSCULAR; INTRAVENOUS at 10:08

## 2023-10-24 RX ADMIN — ACYCLOVIR 400 MG: 200 SUSPENSION ORAL at 13:19

## 2023-10-24 RX ADMIN — TOPIRAMATE 50 MG: 50 TABLET, FILM COATED ORAL at 20:48

## 2023-10-24 RX ADMIN — LEVOTHYROXINE SODIUM 75 MCG: 75 TABLET ORAL at 14:00

## 2023-10-24 RX ADMIN — LOPERAMIDE HYDROCHLORIDE 2 MG: 2 CAPSULE ORAL at 17:30

## 2023-10-24 RX ADMIN — Medication 5 ML: at 10:08

## 2023-10-24 RX ADMIN — LOPERAMIDE HYDROCHLORIDE 2 MG: 2 CAPSULE ORAL at 20:48

## 2023-10-24 RX ADMIN — LOPERAMIDE HYDROCHLORIDE 2 MG: 2 CAPSULE ORAL at 14:00

## 2023-10-24 RX ADMIN — PANTOPRAZOLE SODIUM 40 MG: 40 TABLET, DELAYED RELEASE ORAL at 14:00

## 2023-10-24 RX ADMIN — SPIRONOLACTONE 25 MG: 25 TABLET, FILM COATED ORAL at 14:00

## 2023-10-24 ASSESSMENT — ACTIVITIES OF DAILY LIVING (ADL)
ADLS_ACUITY_SCORE: 23
ADLS_ACUITY_SCORE: 23
ADLS_ACUITY_SCORE: 22
ADLS_ACUITY_SCORE: 23
ADLS_ACUITY_SCORE: 22
ADLS_ACUITY_SCORE: 23
ADLS_ACUITY_SCORE: 22
ADLS_ACUITY_SCORE: 22
ADLS_ACUITY_SCORE: 23

## 2023-10-24 NOTE — PROGRESS NOTES
10/24/23 1600   Appointment Info   Signing Clinician's Name / Credentials (SLP) Anastasiia Arias MA CCC-SLP   General Information   Onset of Illness/Injury or Date of Surgery 10/16/23   Referring Physician MARIE Lindo CNP   Pertinent History of Current Problem Pt is a 48 year old female, currently day + 12  s/p autologous hematopoietic cell transplant as rescue after BEAM therapy for Hodgkin Lymphoma. Readmitted 10/16/23 with neutropenic fever. Remains admitted due to difficulty to maintain PO intake, pain control, rhinovirus +.  Swallow eval completed per MD orders.   Pain Assessment   Patient Currently in Pain No   Type of Evaluation   Type of Evaluation Swallow Evaluation   Oral Motor   Oral Musculature generally intact   Structural Abnormalities other (see comments)  (sores on side of tongue)   Mucosal Quality dry   Dentition (Oral Motor)   Dentition (Oral Motor) natural dentition   Facial Symmetry (Oral Motor)   Facial Symmetry (Oral Motor) WNL   Lip Function (Oral Motor)   Lip Range of Motion (Oral Motor) WNL   Tongue Function (Oral Motor)   Tongue Coordination/Speed (Oral Motor) WNL   Tongue ROM (Oral Motor) WNL   Jaw Function (Oral Motor)   Jaw Function (Oral Motor) WNL   Cough/Swallow/Gag Reflex (Oral Motor)   Soft Palate/Velum (Oral Motor) WNL   Volitional Throat Clear/Cough (Oral Motor) WNL   Volitional Swallow (Oral Motor) WNL   Vocal Quality/Secretion Management (Oral Motor)   Vocal Quality (Oral Motor) WNL   Secretion Management (Oral Motor) WNL   General Swallowing Observations   Past History of Dysphagia No   Current Diet/Method of Nutritional Intake (General Swallowing Observations, NIS) regular diet;thin liquids (level 0)   Swallowing Evaluation Clinical swallow evaluation   Clinical Swallow Evaluation   Feeding Assistance no assistance needed   Clinical Swallow Evaluation Textures Trialed thin liquids;solid foods;pureed   Clinical Swallow Eval: Thin Liquid Texture Trial   Mode of  Presentation, Thin Liquids self-fed;cup;straw   Volume of Liquid or Food Presented 3 oz   Oral Phase of Swallow WFL   Pharyngeal Phase of Swallow intact   Diagnostic Statement No s/sx of aspiration   Clinical Swallow Evaluation: Puree Solid Texture Trial   Mode of Presentation, Puree self-fed;spoon   Volume of Puree Presented 2 oz   Oral Phase, Puree WFL   Pharyngeal Phase, Puree intact   Diagnostic Statement No s/sx of aspiration   Clinical Swallow Evaluation: Solid Food Texture Trial   Mode of Presentation self-fed   Volume Presented 2 bites of cheese   Oral Phase WFL   Pharyngeal Phase intact   Diagnostic Statement No s/sx of aspiration.  Pt reports difficulty swallowing d/t dryness   Esophageal Phase of Swallow   Patient reports or presents with symptoms of esophageal dysphagia No   Swallowing Recommendations   Diet Consistency Recommendations regular diet;thin liquids (level 0)   Medication Administration Recommendations, Swallowing (SLP) As tolerated   Instrumental Assessment Recommendations instrumental evaluation not recommended at this time   General Therapy Interventions   Planned Therapy Interventions   (NA)   Clinical Impression   Criteria for Skilled Therapeutic Interventions Met (SLP Eval) Evaluation only;No problems identified which require skilled intervention   Risks & Benefits of therapy have been explained evaluation/treatment results reviewed;risks/benefits reviewed;participants voiced agreement with care plan;participants included;patient;friend   Clinical Impression Comments Swallow eval completed per MD orders. Pt presents with mild oropharygngeal swallowing deficits in the setting of mucositis.  Oral mech remarkable for mild mouth sores on left side of pt's mouth. Pt reports difficulty swallowing pills orally (in water or puree) and solids and feels she needs to gag when trying to swallow because foods feel too dry  To treat mucositis, pt has been gurgling salt water which pt reports helps  "treat symptoms.  Pt observed eating bites of cheese with prolonged but effective mastication stating the food felt \"like a cotton ball\" but able to swallow with liquid wash without s/sx of aspiration.  Pt gagged when trying to take pills orally in puree and feels gagging may likely be related to anxiety that the pill won't go down. Recommend regular food textures (IDDSI 7) and thin liquids (IDDSI 0) and medications crushed or if needed, whole in puree. Recommend pt self select softer foods, cut foods into small pieces, alternate foods and liquids and avoid foods that are dry.  Continue to do liquid rinse made of water, salt and baking soda.  No further speech therapy warranted and will sign off.   SLP Plan s-o   SLP Discharge Recommendation home   SLP Rationale for DC Rec Pt's oropharyngeal swallowing abilities appear impacted solely by mucositis.   SLP Brief overview of current status  Recommend regular food textures (IDDSI 7) and thin liquids (IDDSI 0) and medications crushed or whole in puree. Recommend pt self select softer foods, cut foods into small pieces, alternate foods and liquids and avoid foods that are dry. Continue to do liquid rinse made of water, salt and baking soda. No further speech therapy warranted and will sign off.     "

## 2023-10-24 NOTE — PLAN OF CARE
"/84 (BP Location: Right arm)   Pulse 67   Temp 97  F (36.1  C) (Axillary)   Resp 18   Ht 1.64 m (5' 4.57\")   Wt 106.4 kg (234 lb 8 oz)   SpO2 98%   BMI 39.55 kg/m    Pt A&O. VSS on ra. Up with SBA. Denies pain and nausea. Med crushed with applesauce. Cough improved. No replacement. Resting between cares. Continue on POC.   Problem: Adult Inpatient Plan of Care  Goal: Plan of Care Review  Description: The Plan of Care Review/Shift note should be completed every shift.  The Outcome Evaluation is a brief statement about your assessment that the patient is improving, declining, or no change.  This information will be displayed automatically on your shift  note.  Outcome: Progressing   Goal Outcome Evaluation:                        "

## 2023-10-24 NOTE — PROGRESS NOTES
"BMT/Cell Therapy Daily Progress Note   10/24/2023    Patient ID:  Jennifer Ward is a 48 year old female, currently day +12 s/p autologous hematopoietic cell transplant as rescue after BEAM therapy for Hodgkin Lymphoma. Co-enrolled on [employer] study. Readmitted 10/16 with neutropenic fever and abdominal pain.    INTERVAL  HISTORY   Jennifer feels hungry, but she has trouble swallowing pills, food, liquids.  She noted heavy urination yesterday, and her weight has come down nicely.  She is afebrile.     Review of Systems: ROS negative except as noted above.      PHYSICAL EXAM     Weight In/Out     Wt Readings from Last 3 Encounters:   10/24/23 104.3 kg (230 lb)   10/16/23 99.9 kg (220 lb 3.2 oz)   10/13/23 101.1 kg (222 lb 12.8 oz)      I/O last 3 completed shifts:  In: 1080 [P.O.:560; I.V.:520]  Out: 2110 [Urine:2110]       KPS:  70    BP (!) 145/91 (BP Location: Right arm)   Pulse 71   Temp 97.6  F (36.4  C) (Axillary)   Resp 18   Ht 1.64 m (5' 4.57\")   Wt 104.3 kg (230 lb)   SpO2 98%   BMI 38.79 kg/m       General: NAD   Eyes: sclera anicteric   Nose/Mouth/Throat: healing sores on bilateral edges of tongue  Lungs: CTAB  Cardiovascular: RRR, no M/R/G   Abdominal: +BS, soft, no distention, no tenderness  Lymphatics: 1+ edema BLE  Skin: Facial flushing and chest mildly erythematous  Neuro: A&O; non-focal  Access: L CVC NT, dressing cdi. R PAC not accessed.    LABS AND IMAGING: I have assessed all abnormal lab values for their clinical significance and any values considered clinically significant have been addressed in the assessment and plan.        Lab Results   Component Value Date    WBC 12.4 (H) 10/24/2023    ANEU 10.4 (H) 10/24/2023    HGB 8.9 (L) 10/24/2023    HCT 26.6 (L) 10/24/2023    PLT 42 (LL) 10/24/2023     10/24/2023    POTASSIUM 3.5 10/24/2023    CHLORIDE 105 10/24/2023    CO2 20 (L) 10/24/2023     (H) 10/24/2023    BUN 23.1 (H) 10/24/2023    CR 0.86 10/24/2023    MAG 2.3 10/23/2023    " INR 1.37 (H) 10/23/2023    BILITOTAL 0.4 10/24/2023    AST 12 10/24/2023    ALT 8 10/24/2023    ALKPHOS 68 10/24/2023    PROTTOTAL 5.4 (L) 10/24/2023    ALBUMIN 3.1 (L) 10/24/2023     CT Abd/Pelvis 10/16:   1. Distended loops of small bowel without a discrete transition point  associated with areas of mild wall thickening, vascular engorgement  and mesenteric edema. Findings may represent enteritis, posttreatment  changes or hyperacute GVHD in the appropriate clinical setting.     CXR portable (10/16): no aso   Oral/GI regimen related toxicities (per NCI CTCAE v 5.0):    Oral Mucositis: grade 1  Nausea: none  Vomiting: none  Diarrhea: Grade 1    *diarrhea secondary to C. Diff is excluded from the definition of oral/GI RRT    CTCAE Terms     Vomiting: A disorder characterized by the reflexive act of ejecting the contents of the stomach through the mouth   Grade 1 Intervention not indicated   Grade 2 Outpatient IV hydration; medical intervention indicated   Grade 3 Tube feeding, TPN, or hospitalization indicated   Grade 4 Life-threatening consequences   Grade 5 Death     Oral Mucositis: A disorder characterized by ulceration or inflammation of the oral mucosal.   Grade 1 Asymptomatic or mild symptoms; intervention not indicated   Grade 2 Moderate pain or ulcer that does not interfere with oral intake; modified diet indicated   Grade 3 Severe pain; interfering with oral intake   Grade 4 Life-threatening consequences; urgent intervention indicated   Grade 5 Death     Nausea: A disorder characterized by a queasy sensation and/or the urge to vomit.   Grade 1 Loss of appetite without alteration in eating habits  Grade 2 Oral intake decreased without significant weight loss, dehydration or malnutrition   Grade 3 Inadequate oral caloric or fluid intake; tube feeding, TPN, or hospitalization indicated   Grade 4 NA  Grade 5 NA    Diarrhea: A disorder characterized by an increase in frequency and/or loose or watery bowel  movements.   Grade 1 Increase of <4 stools per day over baseline; mild increase in ostomy output compared to baseline   Grade 2 Increase of 4 - 6 stools per day over baseline; moderate increase in ostomy output compared to baseline; limiting instrumental ADL   Grade 3 Increase of >=7 stools per day over baseline; hospitalization indicated; severe increase in ostomy output compared to baseline; limiting self-care ADL    Grade 4 Life-threatening consequences; urgent intervention indicated    Grade 5 Death       Source: https://ctep.cancer.gov/protocoldevelopment/electronic_applications/docs/CTCAE_v5_Quick_Reference_8.5x11.pdf     SYSTEMS-BASED ASSESSMENT AND PLAN     Jennifer Ward is a 48 year old female, currently day + 12  s/p autologous hematopoietic cell transplant as rescue after BEAM therapy for Hodgkin Lymphoma. Co-enrolled on [employer] study. Readmitted 10/16/23 with neutropenic fever. Remains admitted due to difficulty to maintain PO intake, pain control, rhinovirus +.    Prep: BEAM     BMT  - BMT MD/Coordinator: Nay  -  with co-enrollment on  (E-CELERATE)  - Cell dose 7 million CD34+ cells/kg   - GCSF started day +5, continue until ANC > 500 for 3 consecutive days- discontinued 10/23  - Restaging: next restage at day +28       HEME/COAG  - Transfusion parameters: hgb <7g/dL and plts <10,000.   - Pancytopenia secondary to Hodgkin Lymphoma and chemotherapy     ID  #RHINOVIRUS + (10/22)- Symptomatic control, robitussin   #N/F: work up unremarkable, afebrile. Cefepime (10/16-x), flagyl (10/16-10/21), given abdominal pain. CTAP negative. Deescalate if remains afebrile now that engrafting.  Prophy: **note that antimicrobial dosing is specific to protocol: ACV 400mg PO bid, fluconazole 400mg daily, Bactrim or other PCP prophy to start at day +28     GI  #Diarrhea, abdominal cramping, improvin/2 chemo/colitis.  C.dif neg 10/17.   Imodium QID  Simethicone prn, Dilaudid PO/IV (not using),  heating pad prn.  #Dysphagia/Regurge/Mucositis- increased PPI BID, carafate PRN  #Anorexia: Dex 10mg x1, 4mg x2 last dose 10/23  #CINV: prn ativan, prn compazine. Received Emend 10/20.   #Ulcer prophy: Protonix 40mg BID  #E-CELERATE vs placebo administered 10/12: oropharyngeal mucositis: 2/2 chemo. Chloraseptic spray; MMW, good oral cares.    CV  - Tachycardic 10/20, EKG showing sinus tach with QTc 436    NUTRITION/FLUIDS  - advance to high phill/protein diet 10/18  # Wt up since admission; stop MIVF on 10/18 (previously 100cc/hr).   Lasix 20mg IV x2 on 10/19 with good output. Then hypotensive 10/20, feeling lightheaded -- Given 500cc NS bolus.    10/22: continues to be weight up, now with trace edema. Give Lasix 20mg IV x1 and start PO Spironolactone x1 daily.       FEN  #Hypervolemia: diuresis following MIVF on admission. Goal net negative 1-2L.   -40mg lasix 10/23, will give afternoon dose if necessary  # Hyponatremia: likely 2/2 hypervolemia, improving with lasix  # Hypokalemia likely secondary to diuresis  # Hypophosphatemia  # Hypomagnesemia, resolved.  # Hypocalcemia  - replete lytes per sliding scale    ENDOCRINE  # hypothyroidism: continue PTA synthroid     PSYCH  # depression: continue PTA bupropion   10/22 discontinue PTA venlafaxine per pt request. Pt reports she has not been taking regularly at home and occasionally declining medication while inpatient as well. Will monitor closely for withdrawal sx.      NEURO  # migraines: daily topamax with prn naratriptan; propranolol prophy placed on hold with hypotension after admission.      SUPPORTIVE CARE  - PT/OT consult, will follow as indicated  - BMT Social work to follow.       Code Status: Full Code       Dispo: Likely discharge later this week pending count recovery and resolution of GI symptoms.       Known issues that I take into account for medical decisions, with salient changes to the plan considering these complexities noted above.    Patient Active  "Problem List   Diagnosis    Hodgkin's disease of lymph nodes of multiple sites (H)    Autologous donor of stem cells    Nodular sclerosis Hodgkin lymphoma of lymph nodes of multiple regions (H)    Febrile neutropenia        Clinically Significant Risk Factors              # Hypoalbuminemia: Lowest albumin = 2.7 g/dL at 10/21/2023  4:38 AM, will monitor as appropriate    # Coagulation Defect: INR = 1.37 (Ref range: 0.85 - 1.15) and/or PTT = 43 Seconds (Ref range: 22 - 38 Seconds), will monitor for bleeding    # Thrombocytopenia: Lowest platelets = 26 in last 2 days, will monitor for bleeding          # Obesity: Estimated body mass index is 38.79 kg/m  as calculated from the following:    Height as of this encounter: 1.64 m (5' 4.57\").    Weight as of this encounter: 104.3 kg (230 lb).        # Financial/Environmental Concerns: none         I spent 30 minutes face-to-face and/or coordinating care. Over 50% of our time on the unit was spent counseling the patient and/or coordinating care and the plan detailed on today's notes.        Helen Felix PA-C     "

## 2023-10-25 ENCOUNTER — APPOINTMENT (OUTPATIENT)
Dept: GENERAL RADIOLOGY | Facility: CLINIC | Age: 48
End: 2023-10-25
Attending: HOSPITALIST
Payer: COMMERCIAL

## 2023-10-25 LAB
ABO/RH(D): NORMAL
ABO/RH(D): NORMAL
ALBUMIN SERPL BCG-MCNC: 3.2 G/DL (ref 3.5–5.2)
ALP SERPL-CCNC: 73 U/L (ref 35–104)
ALT SERPL W P-5'-P-CCNC: 9 U/L (ref 0–50)
ANION GAP SERPL CALCULATED.3IONS-SCNC: 9 MMOL/L (ref 7–15)
ANTIBODY SCREEN: NEGATIVE
ANTIBODY SCREEN: NEGATIVE
AST SERPL W P-5'-P-CCNC: 17 U/L (ref 0–45)
BASOPHILS # BLD AUTO: ABNORMAL 10*3/UL
BASOPHILS # BLD MANUAL: 0 10E3/UL (ref 0–0.2)
BASOPHILS NFR BLD AUTO: ABNORMAL %
BASOPHILS NFR BLD MANUAL: 0 %
BILIRUB DIRECT SERPL-MCNC: <0.2 MG/DL (ref 0–0.3)
BILIRUB SERPL-MCNC: 0.4 MG/DL
BUN SERPL-MCNC: 18.8 MG/DL (ref 6–20)
CALCIUM SERPL-MCNC: 8.5 MG/DL (ref 8.6–10)
CHLORIDE SERPL-SCNC: 105 MMOL/L (ref 98–107)
CREAT SERPL-MCNC: 0.74 MG/DL (ref 0.51–0.95)
DEPRECATED HCO3 PLAS-SCNC: 22 MMOL/L (ref 22–29)
EGFRCR SERPLBLD CKD-EPI 2021: >90 ML/MIN/1.73M2
EOSINOPHIL # BLD AUTO: ABNORMAL 10*3/UL
EOSINOPHIL # BLD MANUAL: 0 10E3/UL (ref 0–0.7)
EOSINOPHIL NFR BLD AUTO: ABNORMAL %
EOSINOPHIL NFR BLD MANUAL: 0 %
ERYTHROCYTE [DISTWIDTH] IN BLOOD BY AUTOMATED COUNT: 14.5 % (ref 10–15)
GLUCOSE SERPL-MCNC: 83 MG/DL (ref 70–99)
HCT VFR BLD AUTO: 27.1 % (ref 35–47)
HGB BLD-MCNC: 9 G/DL (ref 11.7–15.7)
IMM GRANULOCYTES # BLD: ABNORMAL 10*3/UL
IMM GRANULOCYTES NFR BLD: ABNORMAL %
LDH SERPL L TO P-CCNC: 300 U/L (ref 0–250)
LYMPHOCYTES # BLD AUTO: ABNORMAL 10*3/UL
LYMPHOCYTES # BLD MANUAL: 0.5 10E3/UL (ref 0.8–5.3)
LYMPHOCYTES NFR BLD AUTO: ABNORMAL %
LYMPHOCYTES NFR BLD MANUAL: 4 %
MCH RBC QN AUTO: 28.2 PG (ref 26.5–33)
MCHC RBC AUTO-ENTMCNC: 33.2 G/DL (ref 31.5–36.5)
MCV RBC AUTO: 85 FL (ref 78–100)
METAMYELOCYTES # BLD MANUAL: 0.2 10E3/UL
METAMYELOCYTES NFR BLD MANUAL: 2 %
MONOCYTES # BLD AUTO: ABNORMAL 10*3/UL
MONOCYTES # BLD MANUAL: 1 10E3/UL (ref 0–1.3)
MONOCYTES NFR BLD AUTO: ABNORMAL %
MONOCYTES NFR BLD MANUAL: 8 %
MYELOCYTES # BLD MANUAL: 0.6 10E3/UL
MYELOCYTES NFR BLD MANUAL: 5 %
NEUTROPHILS # BLD AUTO: ABNORMAL 10*3/UL
NEUTROPHILS # BLD MANUAL: 10 10E3/UL (ref 1.6–8.3)
NEUTROPHILS NFR BLD AUTO: ABNORMAL %
NEUTROPHILS NFR BLD MANUAL: 81 %
NRBC # BLD AUTO: 0.1 10E3/UL
NRBC BLD AUTO-RTO: 1 /100
PHOSPHATE SERPL-MCNC: 2.6 MG/DL (ref 2.5–4.5)
PLAT MORPH BLD: ABNORMAL
PLATELET # BLD AUTO: 75 10E3/UL (ref 150–450)
POTASSIUM SERPL-SCNC: 3.7 MMOL/L (ref 3.4–5.3)
PROT SERPL-MCNC: 5.4 G/DL (ref 6.4–8.3)
RBC # BLD AUTO: 3.19 10E6/UL (ref 3.8–5.2)
RBC MORPH BLD: ABNORMAL
RETICS # AUTO: 0.01 10E6/UL (ref 0.03–0.1)
RETICS/RBC NFR AUTO: 0.4 % (ref 0.5–2)
SODIUM SERPL-SCNC: 136 MMOL/L (ref 135–145)
SPECIMEN EXPIRATION DATE: NORMAL
SPECIMEN EXPIRATION DATE: NORMAL
WBC # BLD AUTO: 12.3 10E3/UL (ref 4–11)

## 2023-10-25 PROCEDURE — 85027 COMPLETE CBC AUTOMATED: CPT | Performed by: PHYSICIAN ASSISTANT

## 2023-10-25 PROCEDURE — 250N000011 HC RX IP 250 OP 636: Mod: JZ | Performed by: PHYSICIAN ASSISTANT

## 2023-10-25 PROCEDURE — 85007 BL SMEAR W/DIFF WBC COUNT: CPT | Performed by: PHYSICIAN ASSISTANT

## 2023-10-25 PROCEDURE — 36415 COLL VENOUS BLD VENIPUNCTURE: CPT | Performed by: PHYSICIAN ASSISTANT

## 2023-10-25 PROCEDURE — 250N000013 HC RX MED GY IP 250 OP 250 PS 637: Performed by: PHYSICIAN ASSISTANT

## 2023-10-25 PROCEDURE — 250N000013 HC RX MED GY IP 250 OP 250 PS 637

## 2023-10-25 PROCEDURE — 250N000013 HC RX MED GY IP 250 OP 250 PS 637: Performed by: INTERNAL MEDICINE

## 2023-10-25 PROCEDURE — 85045 AUTOMATED RETICULOCYTE COUNT: CPT | Performed by: PHYSICIAN ASSISTANT

## 2023-10-25 PROCEDURE — 80053 COMPREHEN METABOLIC PANEL: CPT | Performed by: PHYSICIAN ASSISTANT

## 2023-10-25 PROCEDURE — 71046 X-RAY EXAM CHEST 2 VIEWS: CPT

## 2023-10-25 PROCEDURE — 71046 X-RAY EXAM CHEST 2 VIEWS: CPT | Mod: 26 | Performed by: RADIOLOGY

## 2023-10-25 PROCEDURE — 82248 BILIRUBIN DIRECT: CPT | Performed by: PHYSICIAN ASSISTANT

## 2023-10-25 PROCEDURE — 258N000003 HC RX IP 258 OP 636: Performed by: PHYSICIAN ASSISTANT

## 2023-10-25 PROCEDURE — 250N000011 HC RX IP 250 OP 636: Mod: JZ | Performed by: INTERNAL MEDICINE

## 2023-10-25 PROCEDURE — 206N000001 HC R&B BMT UMMC

## 2023-10-25 PROCEDURE — 84100 ASSAY OF PHOSPHORUS: CPT | Performed by: PHYSICIAN ASSISTANT

## 2023-10-25 PROCEDURE — 83615 LACTATE (LD) (LDH) ENZYME: CPT | Performed by: PHYSICIAN ASSISTANT

## 2023-10-25 PROCEDURE — 250N000011 HC RX IP 250 OP 636: Mod: JZ

## 2023-10-25 PROCEDURE — 99233 SBSQ HOSP IP/OBS HIGH 50: CPT | Mod: FS | Performed by: PHYSICIAN ASSISTANT

## 2023-10-25 PROCEDURE — 86901 BLOOD TYPING SEROLOGIC RH(D): CPT | Performed by: PHYSICIAN ASSISTANT

## 2023-10-25 PROCEDURE — 86850 RBC ANTIBODY SCREEN: CPT | Performed by: PHYSICIAN ASSISTANT

## 2023-10-25 RX ORDER — DIPHENHYDRAMINE HYDROCHLORIDE 50 MG/ML
25 INJECTION INTRAMUSCULAR; INTRAVENOUS ONCE
Status: COMPLETED | OUTPATIENT
Start: 2023-10-25 | End: 2023-10-25

## 2023-10-25 RX ORDER — DIPHENHYDRAMINE HYDROCHLORIDE 50 MG/ML
INJECTION INTRAMUSCULAR; INTRAVENOUS
Status: COMPLETED
Start: 2023-10-25 | End: 2023-10-25

## 2023-10-25 RX ORDER — IPRATROPIUM BROMIDE AND ALBUTEROL SULFATE 2.5; .5 MG/3ML; MG/3ML
3 SOLUTION RESPIRATORY (INHALATION)
Status: DISCONTINUED | OUTPATIENT
Start: 2023-10-25 | End: 2023-10-27 | Stop reason: HOSPADM

## 2023-10-25 RX ORDER — FUROSEMIDE 10 MG/ML
40 INJECTION INTRAMUSCULAR; INTRAVENOUS ONCE
Status: COMPLETED | OUTPATIENT
Start: 2023-10-25 | End: 2023-10-25

## 2023-10-25 RX ADMIN — DIPHENHYDRAMINE HYDROCHLORIDE 25 MG: 50 INJECTION, SOLUTION INTRAMUSCULAR; INTRAVENOUS at 14:43

## 2023-10-25 RX ADMIN — DIPHENHYDRAMINE HYDROCHLORIDE 25 MG: 50 INJECTION INTRAMUSCULAR; INTRAVENOUS at 14:43

## 2023-10-25 RX ADMIN — SUCRALFATE 1 G: 1 SUSPENSION ORAL at 17:12

## 2023-10-25 RX ADMIN — ACYCLOVIR 400 MG: 200 SUSPENSION ORAL at 21:59

## 2023-10-25 RX ADMIN — PROPRANOLOL HYDROCHLORIDE 20 MG: 20 TABLET ORAL at 21:54

## 2023-10-25 RX ADMIN — Medication 5 ML: at 22:03

## 2023-10-25 RX ADMIN — SUCRALFATE 1 G: 1 SUSPENSION ORAL at 13:05

## 2023-10-25 RX ADMIN — FLUCONAZOLE 400 MG: 40 POWDER, FOR SUSPENSION ORAL at 09:18

## 2023-10-25 RX ADMIN — LOPERAMIDE HYDROCHLORIDE 2 MG: 2 CAPSULE ORAL at 13:05

## 2023-10-25 RX ADMIN — LOPERAMIDE HYDROCHLORIDE 2 MG: 2 CAPSULE ORAL at 21:56

## 2023-10-25 RX ADMIN — CAROSPIR 25 MG: 25 SUSPENSION ORAL at 09:18

## 2023-10-25 RX ADMIN — LOPERAMIDE HYDROCHLORIDE 2 MG: 2 CAPSULE ORAL at 09:17

## 2023-10-25 RX ADMIN — Medication 40 MG: at 17:12

## 2023-10-25 RX ADMIN — SUCRALFATE 1 G: 1 SUSPENSION ORAL at 21:58

## 2023-10-25 RX ADMIN — Medication 40 MG: at 09:18

## 2023-10-25 RX ADMIN — TOPIRAMATE 50 MG: 50 TABLET, FILM COATED ORAL at 21:57

## 2023-10-25 RX ADMIN — LEVOTHYROXINE SODIUM 75 MCG: 75 TABLET ORAL at 09:17

## 2023-10-25 RX ADMIN — SUCRALFATE 1 G: 1 SUSPENSION ORAL at 09:18

## 2023-10-25 RX ADMIN — FUROSEMIDE 40 MG: 10 INJECTION, SOLUTION INTRAVENOUS at 10:44

## 2023-10-25 RX ADMIN — LOPERAMIDE HYDROCHLORIDE 2 MG: 2 CAPSULE ORAL at 17:12

## 2023-10-25 RX ADMIN — Medication 5 ML: at 02:33

## 2023-10-25 RX ADMIN — BUPROPION HYDROCHLORIDE 150 MG: 150 TABLET, FILM COATED, EXTENDED RELEASE ORAL at 09:17

## 2023-10-25 RX ADMIN — ACYCLOVIR 400 MG: 200 SUSPENSION ORAL at 09:19

## 2023-10-25 RX ADMIN — PROPRANOLOL HYDROCHLORIDE 20 MG: 20 TABLET ORAL at 09:17

## 2023-10-25 RX ADMIN — ONDANSETRON 8 MG: 2 INJECTION INTRAMUSCULAR; INTRAVENOUS at 14:42

## 2023-10-25 RX ADMIN — PHYTONADIONE 5 MG: 10 INJECTION, EMULSION INTRAMUSCULAR; INTRAVENOUS; SUBCUTANEOUS at 13:53

## 2023-10-25 ASSESSMENT — ACTIVITIES OF DAILY LIVING (ADL)
ADLS_ACUITY_SCORE: 24
ADLS_ACUITY_SCORE: 22
ADLS_ACUITY_SCORE: 24
ADLS_ACUITY_SCORE: 22
ADLS_ACUITY_SCORE: 22

## 2023-10-25 NOTE — PROGRESS NOTES
"BMT/Cell Therapy Daily Progress Note   10/25/2023    Patient ID:  Jennifer Ward is a 48 year old female, currently day +13 s/p autologous hematopoietic cell transplant as rescue after BEAM therapy for Hodgkin Lymphoma. Co-enrolled on [employer] study. Readmitted 10/16 with neutropenic fever and abdominal pain.    INTERVAL  HISTORY   Jennifer is very frustrated because she wants to eat, but she continues to have sore mouth and difficulty swallowing due to extremely dry mouth.  She also continues to have diarrhea.     Review of Systems: ROS negative except as noted above.      PHYSICAL EXAM     Weight In/Out     Wt Readings from Last 3 Encounters:   10/25/23 104.6 kg (230 lb 8 oz)   10/16/23 99.9 kg (220 lb 3.2 oz)   10/13/23 101.1 kg (222 lb 12.8 oz)      I/O last 3 completed shifts:  In: 960 [P.O.:960]  Out: 800 [Urine:800]       KPS:  70    BP (!) 151/92 (BP Location: Left arm)   Pulse 69   Temp 97.5  F (36.4  C) (Axillary)   Resp 17   Ht 1.64 m (5' 4.57\")   Wt 104.6 kg (230 lb 8 oz)   SpO2 99%   BMI 38.87 kg/m       General: tearful and frustrated about current medical issues  Eyes: sclera anicteric   Nose/Mouth/Throat:white based ulcer left tongue  Lungs: CTAB  Cardiovascular: RRR, no M/R/G   Abdominal: +BS, soft, no distention, no tenderness  Lymphatics: 1+ edema BLE  Skin: Facial flushing and chest mildly erythematous  Neuro: A&O; non-focal  Access: L CVC NT, dressing cdi. R PAC not accessed.    LABS AND IMAGING: I have assessed all abnormal lab values for their clinical significance and any values considered clinically significant have been addressed in the assessment and plan.        Lab Results   Component Value Date    WBC 12.3 (H) 10/25/2023    ANEU 10.0 (H) 10/25/2023    HGB 9.0 (L) 10/25/2023    HCT 27.1 (L) 10/25/2023    PLT 75 (L) 10/25/2023     10/25/2023    POTASSIUM 3.7 10/25/2023    CHLORIDE 105 10/25/2023    CO2 22 10/25/2023    GLC 83 10/25/2023    BUN 18.8 10/25/2023    CR 0.74 " 10/25/2023    MAG 2.3 10/23/2023    INR 1.37 (H) 10/23/2023    BILITOTAL 0.4 10/25/2023    AST 17 10/25/2023    ALT 9 10/25/2023    ALKPHOS 73 10/25/2023    PROTTOTAL 5.4 (L) 10/25/2023    ALBUMIN 3.2 (L) 10/25/2023     CT Abd/Pelvis 10/16:   1. Distended loops of small bowel without a discrete transition point  associated with areas of mild wall thickening, vascular engorgement  and mesenteric edema. Findings may represent enteritis, posttreatment  changes or hyperacute GVHD in the appropriate clinical setting.     CXR portable (10/16): no aso   Oral/GI regimen related toxicities (per NCI CTCAE v 5.0):    Oral Mucositis: grade 1  Nausea: none  Vomiting: none  Diarrhea: Grade 1    *diarrhea secondary to C. Diff is excluded from the definition of oral/GI RRT    CTCAE Terms     Vomiting: A disorder characterized by the reflexive act of ejecting the contents of the stomach through the mouth   Grade 1 Intervention not indicated   Grade 2 Outpatient IV hydration; medical intervention indicated   Grade 3 Tube feeding, TPN, or hospitalization indicated   Grade 4 Life-threatening consequences   Grade 5 Death     Oral Mucositis: A disorder characterized by ulceration or inflammation of the oral mucosal.   Grade 1 Asymptomatic or mild symptoms; intervention not indicated   Grade 2 Moderate pain or ulcer that does not interfere with oral intake; modified diet indicated   Grade 3 Severe pain; interfering with oral intake   Grade 4 Life-threatening consequences; urgent intervention indicated   Grade 5 Death     Nausea: A disorder characterized by a queasy sensation and/or the urge to vomit.   Grade 1 Loss of appetite without alteration in eating habits  Grade 2 Oral intake decreased without significant weight loss, dehydration or malnutrition   Grade 3 Inadequate oral caloric or fluid intake; tube feeding, TPN, or hospitalization indicated   Grade 4 NA  Grade 5 NA    Diarrhea: A disorder characterized by an increase in frequency  and/or loose or watery bowel movements.   Grade 1 Increase of <4 stools per day over baseline; mild increase in ostomy output compared to baseline   Grade 2 Increase of 4 - 6 stools per day over baseline; moderate increase in ostomy output compared to baseline; limiting instrumental ADL   Grade 3 Increase of >=7 stools per day over baseline; hospitalization indicated; severe increase in ostomy output compared to baseline; limiting self-care ADL    Grade 4 Life-threatening consequences; urgent intervention indicated    Grade 5 Death       Source: https://ctep.cancer.gov/protocoldevelopment/electronic_applications/docs/CTCAE_v5_Quick_Reference_8.5x11.pdf     SYSTEMS-BASED ASSESSMENT AND PLAN     Jennifer Ward is a 48 year old female, currently day + 13  s/p autologous hematopoietic cell transplant as rescue after BEAM therapy for Hodgkin Lymphoma. Co-enrolled on E-CELERATE study. Readmitted 10/16/23 with neutropenic fever. Remains admitted due to difficulty to maintain PO intake, pain control, rhinovirus +.    Prep: BEAM     BMT  - BMT MD/Coordinator: Nay  -  with co-enrollment on  (E-CELERATE)  - Cell dose 7 million CD34+ cells/kg   - GCSF complete 10/23   - Restaging: next restage at day +28    HEME/COAG  - Transfusion parameters: hgb <7g/dL and plts <10,000.   - Pancytopenia secondary to Hodgkin Lymphoma and chemotherapy  - prolonged INR; vitamin K IV (due to dysphagia)     ID  #RHINOVIRUS + (10/22)- Symptomatic control, robitussin   #N/F: work up unremarkable, afebrile. Cefepime (10/16-x), flagyl (10/16-10/21), given abdominal pain. CTAP negative. Deescalate if remains afebrile now that engrafting.  Prophy: **note that antimicrobial dosing is specific to protocol: ACV 400mg PO bid, fluconazole 400mg daily, Bactrim or other PCP prophy to start at day +28     GI  #Diarrhea, abdominal cramping, improvin/2 chemo/colitis.  C.dif neg 10/17.   Imodium QID  Simethicone prn, heating pad  prn.  #Dysphagia/Regurge/Mucositis- PPI BID, carafate PRN  #Anorexia: Dex 10mg x1, 4mg x2 last dose 10/23  #CINV: prn ativan, prn compazine. Received Emend 10/20.   #Ulcer prophy: Protonix 40mg BID  #E-CELERATE vs placebo administered 10/12: oropharyngeal mucositis: 2/2 chemo. Chloraseptic spray; MMW, good oral cares.    NUTRITION/FLUIDS  - advance to high phill/protein diet 10/18  # Wt up since admission; continue daily spironolactone + lasix 40mg IV on 10/25.     FEN  # Hyponatremia: resolved  # Hypokalemia likely secondary to diuresis  # Hypophosphatemia: resolved  # Hypomagnesemia, resolved.  # Hypocalcemia resolved    ENDOCRINE  # hypothyroidism: continue PTA synthroid     PSYCH  # depression: continue PTA bupropion   10/22 discontinue PTA venlafaxine per pt request. Pt reports she has not been taking regularly at home and occasionally declining medication while inpatient as well. Will monitor closely for withdrawal sx.      NEURO  # migraines: daily topamax with prn naratriptan; propranolol prophylaxis.      SUPPORTIVE CARE  - PT/OT consult, will follow as indicated  - BMT Social work to follow.       Code Status: Full Code       Dispo: Likely discharge later this week pending count recovery and resolution of GI symptoms.       Known issues that I take into account for medical decisions, with salient changes to the plan considering these complexities noted above.    Patient Active Problem List   Diagnosis    Hodgkin's disease of lymph nodes of multiple sites (H)    Autologous donor of stem cells    Nodular sclerosis Hodgkin lymphoma of lymph nodes of multiple regions (H)    Febrile neutropenia        Clinically Significant Risk Factors              # Hypoalbuminemia: Lowest albumin = 2.7 g/dL at 10/21/2023  4:38 AM, will monitor as appropriate    # Coagulation Defect: INR = 1.37 (Ref range: 0.85 - 1.15) and/or PTT = 43 Seconds (Ref range: 22 - 38 Seconds), will monitor for bleeding    # Thrombocytopenia: Lowest  "platelets = 36 in last 2 days, will monitor for bleeding          # Obesity: Estimated body mass index is 38.87 kg/m  as calculated from the following:    Height as of this encounter: 1.64 m (5' 4.57\").    Weight as of this encounter: 104.6 kg (230 lb 8 oz).        # Financial/Environmental Concerns: none         I spent 30 minutes face-to-face and/or coordinating care. Over 50% of our time on the unit was spent counseling the patient and/or coordinating care and the plan detailed on today's notes.        Helen Felix PA-C     "

## 2023-10-25 NOTE — PLAN OF CARE
[id number]: Pt afebrile. BP slightly elevated, HR in the 100s, OVSS on RA. Pt experiencing severe abdominal distress after medication reaction given in prior shift. Symptoms including abdominal pain and nausea improved after an hour with no intervention. Pt complains of dry mouth, still not able to take evening meds. Pt continues to have diarrhea, 2 watery stools this shift. No po intake. Pt drowsy from IV benadryl, currently SBA. Calling appropriately when getting up. Continue to monitor.     Problem: Adult Inpatient Plan of Care  Goal: Plan of Care Review  Description: The Plan of Care Review/Shift note should be completed every shift.  The Outcome Evaluation is a brief statement about your assessment that the patient is improving, declining, or no change.  This information will be displayed automatically on your shift  note.  Outcome: Not Progressing  Goal: Optimal Comfort and Wellbeing  Outcome: Not Progressing  Intervention: Provide Person-Centered Care  Recent Flowsheet Documentation  Taken 10/25/2023 1600 by Porsche Aguilar RN  Trust Relationship/Rapport:   care explained   choices provided   questions answered   thoughts/feelings acknowledged     Problem: Stem Cell/Bone Marrow Transplant  Goal: Diarrhea Symptom Control  Outcome: Not Progressing   Goal Outcome Evaluation:

## 2023-10-25 NOTE — PLAN OF CARE
Take Lasix 40mg daily for 3 days and then back to 20mg daily  Increase Entresto 49/51 twice a day  If shortness of breath does not improve after taking the increased dose of Lasix, call the office and we will consider switching from Brilinta to Plavix  Follow-up with Dr. Chino Meredith as scheduled and as needed  Weigh daily and record  Limit sodium intake to 2000mg per day  Limit fluid intake to no more than  6, eight ounce glasses of any type of fluids per day (total of 48 ounces per day)  Call if you notice sudden or progressive weight gain (3-5 pounds in 2-3 days), increasing shortness of breath, abdominal bloating, increasing lower extremity edema, inability to lie flat or on your normal number of pillows, having to sit up to catch your breath, fatigue, increased somnolence (sleeping more), poor appetite Temp: 97  F (36.1  C) Temp src: Axillary BP: (!) 147/92 Pulse: 57   Resp: 18 SpO2: 100 % O2 Device: None (Room air)      A&Ox4.  AVSS.  Pt denies pain, nausea, or SOB this morning.  Pt able to take all her morning medications before 1330 today with very little gagging.  Pt able to drink apple juice and eat 50% omelet for breakfast.  Pt continues to have small volume watery diarrhea that is controlled with imodium.  At 1400, pt given IV vitamin K.  Pt called out with within minutes of infusion saying her face and ears were on fire.  IV infusion stopped immediately.  Pt c/o SOB, difficulty taking a deep breath, nausea, and feeling something was really wrong.  LOVE notified immediately about anaphylactic reaction.  25 mg IV Benadryl and IV zofran given.  Nebulizer ordered and CXR to be done.  Bed alarm on once reaction occurred.  Otherwise, pt was up independently in room.        Problem: Stem Cell/Bone Marrow Transplant  Goal: Optimal Coping with Transplant  10/25/2023 1825 by Fern Darden RN  Outcome: Progressing  10/25/2023 1825 by Fern Darden RN  Outcome: Progressing  Goal: Symptom-Free Urinary Elimination  10/25/2023 1825 by Fern Darden RN  Outcome: Progressing  10/25/2023 1825 by Fern Darden RN  Outcome: Progressing  Goal: Diarrhea Symptom Control  10/25/2023 1825 by Fern Darden RN  Outcome: Progressing  10/25/2023 1825 by Fern Darden RN  Outcome: Progressing  Goal: Improved Activity Tolerance  10/25/2023 1825 by Fern Darden RN  Outcome: Progressing  10/25/2023 1825 by Fern Darden RN  Outcome: Progressing  Goal: Blood Counts Within Acceptable Range  10/25/2023 1825 by Fern Darden RN  Outcome: Progressing  10/25/2023 1825 by Fern Darden RN  Outcome: Progressing  Goal: Absence of Hypersensitivity Reaction  10/25/2023 1825 by Fern Darden RN  Outcome: Progressing  10/25/2023 1825 by Fern Darden RN  Outcome: Progressing  Goal: Absence of Infection  10/25/2023 1825 by Fern Darden  "RN  Outcome: Progressing  10/25/2023 1825 by Fern Darden RN  Outcome: Progressing  Goal: Improved Oral Mucous Membrane Health and Integrity  10/25/2023 1825 by Fern Darden RN  Outcome: Progressing  10/25/2023 1825 by Fern Darden RN  Outcome: Progressing  Goal: Nausea and Vomiting Symptom Relief  10/25/2023 1825 by Fern Darden RN  Outcome: Progressing  10/25/2023 1825 by Fern Darden RN  Outcome: Progressing  Goal: Optimal Nutrition Intake  10/25/2023 1825 by Fern Darden RN  Outcome: Progressing  10/25/2023 1825 by Fern Darden RN  Outcome: Progressing     Problem: Adult Inpatient Plan of Care  Goal: Plan of Care Review  Description: The Plan of Care Review/Shift note should be completed every shift.  The Outcome Evaluation is a brief statement about your assessment that the patient is improving, declining, or no change.  This information will be displayed automatically on your shift  note.  10/25/2023 1825 by Fern Darden RN  Outcome: Progressing  10/25/2023 1825 by Fern Darden RN  Outcome: Progressing  Goal: Patient-Specific Goal (Individualized)  Description: You can add care plan individualizations to a care plan. Examples of Individualization might be:  \"Parent requests to be called daily at 9am for status\", \"I have a hard time hearing out of my right ear\", or \"Do not touch me to wake me up as it startles  me\".  10/25/2023 1825 by Fern Darden RN  Outcome: Progressing  10/25/2023 1825 by Fern Darden RN  Outcome: Progressing  Goal: Absence of Hospital-Acquired Illness or Injury  10/25/2023 1825 by Fern Darden RN  Outcome: Progressing  10/25/2023 1825 by Fern Darden RN  Outcome: Progressing  Goal: Optimal Comfort and Wellbeing  10/25/2023 1825 by Fern Darden RN  Outcome: Progressing  10/25/2023 1825 by Fern Darden RN  Outcome: Progressing  Goal: Readiness for Transition of Care  10/25/2023 1825 by Fern Darden RN  Outcome: Progressing  10/25/2023 1825 by Fern Darden, " RN  Outcome: Progressing

## 2023-10-25 NOTE — PLAN OF CARE
Goal Outcome Evaluation:      Plan of Care Reviewed With: patient, friend    Overall Patient Progress: no changeOverall Patient Progress: no change    Outcome Evaluation: New symptoms of dry mouth impacting ability to take PO- receptive to supplement suggestions but intake limited due to tolerance

## 2023-10-25 NOTE — PROVIDER NOTIFICATION
Provider notified that patient states they have shortness of breath when lying on their left side. Patient O2 saturation is 96%, respiration rate of 18. Patient's lungs currently sound clear.

## 2023-10-25 NOTE — PROGRESS NOTES
"CLINICAL NUTRITION SERVICES - REASSESSMENT NOTE     Nutrition Prescription    Malnutrition Status:    Moderate malnutrition in the context of acute illness     Recommendations already ordered by Registered Dietitian (RD):  Trial of Ensure Clear mixed berry & Magic Cup berry and chocolate ordered 2:00 PM 10/25    Future/Additional Recommendations:  If oral intake cannot meet >/=60% consistently, consider TPN.    Dosing weight:  67.5 kg (adjusted)  Access: Central Line Access    Initial parameters (per day)  Volume:  1080 mL (45 mL/hr)   Dextrose: 100 g  (GIR = 1)  AA: 100 g   Lipids: 250 mL 20%, 7 days per week   Dextrose titration:   Monitor lytes and if within acceptable parameters (Mg++ > or = 1.5, K+ is > or = 3, and PO4 > or = 1.9), increase dextrose by 55 g/day to goal of 265 g dextrose.    Additives: 10 mL infuvite + 1 mL MTE-4    Goal PN provides 265 g dextrose, 100 g AA, and 250 mL 20% lipids 7 days per week for total provision of 1801 Kcals (~27 Kcals/kg), 1.5 g/kg protein, GIR 2.7 mg/kg/minute, and 28% fat kcals on average daily.       EVALUATION OF THE PROGRESS TOWARD GOALS   Diet: High Kcal/High Protein  10/17 NPO ----> 10/18 Clear liquid diet ----> 10/19 Regular diet    Intake: 0-50% intake (jello, mac/cheese, potato soup, Arabic toast, applesauce)     NEW FINDINGS   SOB noted by PA beginning 10/22, resolved for a few days and reoccured 10/25.   Rhinovirus+ 10/22 per labs.   New facial flushing per PA 10/24.  10/24: SLP swallow study findings: swallowing issues present d/t xerostomia, but no mechanical or safety needs noted.  Upon encounter:  Jennifer confirmed incredibly low intake since admission, with taste alterations (could not pinpoint), and foods tasting like \"paste\" with increased thickness d/t xerostomia. She mentioned gagging with bites of food.  Jennifer was very open to exploring new options to assist with symptom management and eating- see interventions    GI:  Per PA note 10/19: bloating, " abdominal cramping, and stool consistency varies. Mucositis present.  Per PA note 10/23 & 10/24: Jennifer experiences eating and swallowing difficulty with improved abdominal pain. Water/applesauce necessary to aid in swallowing of dry food and medications, with liquids becoming increasingly difficult to swallow. SLP study ordered - see above.    Wt Readings from Last 10 Encounters:   10/24/23 104.3 kg (230 lb) -    10/23/23 106.4 kg (234 lb 8 oz) -    10/22/23 107.4 kg (236 lb 12.8 oz) - standing scale   10/21/23 106.3 kg (234 lb 6.4 oz) - standing scale   10/19/23 106.1 kg (233 lb 12.8 oz) - standing scale   10/16/23 99.9 kg (220 lb 3.2 oz)   10/13/23 101.1 kg (222 lb 12.8 oz)   10/04/23 99.4 kg (219 lb 2.2 oz)   10/04/23 98.4 kg (217 lb)   10/03/23 99.7 kg (219 lb 14.4 oz)   09/28/23 98.6 kg (217 lb 4.8 oz)   09/26/23 98.7 kg (217 lb 9.5 oz)   09/25/23 98.4 kg (217 lb)   09/25/23 98.7 kg (217 lb 11.2 oz)   Weight down trending with diuresis but still + 10 lbs from admission    Labs:  Na, K, Po4 (WNL)  WBC (12.3 - H)  Platelet (75 - L)  Absolute Neutrophil (10 - H)    Meds:  Lasix  Protonix  Levothyroxine  Imodium QID  Protonix   Spironolactone  Sacralfate  Ativan PRN  Magic Mouthwash PRN  Zofran PRN   Compazine PRN  Emend - 10/20    MALNUTRITION  % Intake: </= 50% for >/= 5 days (severe)  % Weight Loss: Weight loss does not meet criteria  Subcutaneous Fat Loss: None observed  Muscle Loss: None observed  Fluid Accumulation/Edema: Mild in bilateral lower extremities + dependent  Malnutrition Diagnosis: Moderate malnutrition in the context of acute illness     Previous Goals   Patient to consume 50-75% of nutritionally adequate meals or snacks/supplements showing increase in intake over next 2-3 days    Evaluation: Not met    Previous Nutrition Diagnosis  Inadequate oral intake related to GI symptoms post chemotherapy limiting ability to eat as evidenced by report of mucositis, clear liquid diet and increased losses  from diarrhea    Evaluation: Not met, see new diagnosis    CURRENT NUTRITION DIAGNOSIS  Inadequate oral intake related to xerostomia, taste changes of chemotherapy as evidenced by </= 50% intake for >/= 5 days and Jennifer reporting taste alterations, xerostomia, and difficulty swallowing with gagging impairing intake.     INTERVENTIONS  Implementation  Medical supplement therapy: Sending trial of Magic Cup berry and chocolate with Ensure Clear mixed berry @ 2:00 PM 10/25  Nutrition Education: Handouts provided on mucosal thickness and dry mouth for cancer patients paired with verbal strategies to mitigate side-effects. Handout on supplement lists provided with highlighted options to order.  Discussed mouth rinsing with small bites of foods, trying clear supplements and sauces/sugar free gum/lemonade to stimulate salivary glands and promote moisture, sucking on frozen fruit, lozenges, or candies.    Goals  Try 1 supplement trial at 2:00 PM 10/25.  Patient to consume 50-75% of nutritionally adequate meals or snacks/supplements showing increase in intake over next 2-3 days.      Monitoring/Evaluation  Progress toward goals will be monitored and evaluated per protocol.     Hedy Vicente  Dietetic Intern    Cosign: Sloane Cabrera RD, LD  5C/BMT pager: [phone]

## 2023-10-25 NOTE — PLAN OF CARE
Temp: 98  F (36.7  C) (The thermometer is not working.) Temp src: Axillary BP: 132/86 Pulse: 73   Resp: 18 SpO2: 99 % O2 Device: None (Room air)      A&Ox4.  BP elevated 145/91; Propranolol restarted.  OVSS.  Pt struggling to swallow food and pills.  Speech consulted and gave recommendations to help with dry mouth and eating, but no mechanical or safety needs noted.  Soft foods alternating with liquids encouraged.  Pt tried bites of smoothies, shakes, noodles, and soups throughout the day.  0.5 mg IV Ativan given at 1000 to try to help with eating, but pt slept until 1315.  Pt was groggy and took a little bit to wake up before taking morning meds at 1400.  Pt refused Ativan the rest of the day.  15 mmol KPhos replaced.  IV Cefepime stopped.  Pt was able to take small PO pills, crushed, or in solution.  Pt had 2 loose stools; scheduled imodium given.  SBA while pt sleepy from Ativan, but otherwise independent.        Problem: Stem Cell/Bone Marrow Transplant  Goal: Optimal Coping with Transplant  Outcome: Progressing  Intervention: Optimize Patient/Family Adjustment to Transplant  Recent Flowsheet Documentation  Taken 10/24/2023 1600 by Fern Darden, RN  Supportive Measures:   active listening utilized   positive reinforcement provided   decision-making supported   goal-setting facilitated   self-care encouraged  Taken 10/24/2023 0800 by Fern Darden, RN  Supportive Measures:   active listening utilized   positive reinforcement provided   decision-making supported   goal-setting facilitated   self-care encouraged  Goal: Symptom-Free Urinary Elimination  Outcome: Progressing  Goal: Diarrhea Symptom Control  Outcome: Progressing  Intervention: Manage Diarrhea  Recent Flowsheet Documentation  Taken 10/24/2023 1600 by Fern Darden, RN  Skin Protection:   incontinence pads utilized   transparent dressing maintained  Perineal Care:   perineal hygiene encouraged   perineum cleansed   protective cream/ointment  applied  Taken 10/24/2023 0800 by Fern Darden RN  Skin Protection:   incontinence pads utilized   transparent dressing maintained  Perineal Care:   perineal hygiene encouraged   perineum cleansed   protective cream/ointment applied  Goal: Improved Activity Tolerance  Outcome: Progressing  Intervention: Promote Improved Energy  Recent Flowsheet Documentation  Taken 10/24/2023 1600 by Fern Darden RN  Fatigue Management:   activity assistance provided   frequent rest breaks encouraged  Activity Management:   activity adjusted per tolerance   ambulated to bathroom  Environmental Support: calm environment promoted  Taken 10/24/2023 0800 by Fern Darden RN  Fatigue Management:   activity assistance provided   frequent rest breaks encouraged  Activity Management:   activity adjusted per tolerance   ambulated to bathroom  Environmental Support: calm environment promoted  Goal: Blood Counts Within Acceptable Range  Outcome: Progressing  Intervention: Monitor and Manage Hematologic Symptoms  Recent Flowsheet Documentation  Taken 10/24/2023 1600 by Fern Darden RN  Bleeding Precautions:   monitored for signs of bleeding   blood pressure closely monitored   coagulation study results reviewed  Medication Review/Management: medications reviewed  Taken 10/24/2023 0800 by Fern Darden RN  Bleeding Precautions:   monitored for signs of bleeding   blood pressure closely monitored   coagulation study results reviewed  Medication Review/Management: medications reviewed  Goal: Absence of Hypersensitivity Reaction  Outcome: Progressing  Goal: Absence of Infection  Outcome: Progressing  Intervention: Prevent and Manage Infection  Recent Flowsheet Documentation  Taken 10/24/2023 1600 by Fern Darden RN  Infection Prevention:   rest/sleep promoted   single patient room provided  Isolation Precautions:   droplet precautions maintained   protective environment maintained  Taken 10/24/2023 0800 by Fern Darden RN  Infection  "Prevention:   rest/sleep promoted   single patient room provided  Isolation Precautions:   droplet precautions maintained   protective environment maintained  Goal: Improved Oral Mucous Membrane Health and Integrity  Outcome: Progressing  Intervention: Promote Oral Comfort and Health  Recent Flowsheet Documentation  Taken 10/24/2023 1600 by Fern Darden RN  Oral Mucous Membrane Protection:   nonirritating oral fluids promoted   nonirritating oral foods promoted  Oral Care:   oral rinse provided   mouth wash rinse  Taken 10/24/2023 0800 by Fern Darden RN  Oral Mucous Membrane Protection:   nonirritating oral fluids promoted   nonirritating oral foods promoted  Oral Care:   oral rinse provided   mouth wash rinse  Goal: Nausea and Vomiting Symptom Relief  Outcome: Progressing  Goal: Optimal Nutrition Intake  Outcome: Progressing  Intervention: Minimize and Manage Barriers to Oral Intake  Recent Flowsheet Documentation  Taken 10/24/2023 1600 by Fern Darden RN  Oral Nutrition Promotion:   calorie-dense liquids provided   nutrition counseling provided   rest periods promoted  Taken 10/24/2023 0800 by Fern Darden RN  Oral Nutrition Promotion:   calorie-dense liquids provided   nutrition counseling provided   rest periods promoted     Problem: Adult Inpatient Plan of Care  Goal: Plan of Care Review  Description: The Plan of Care Review/Shift note should be completed every shift.  The Outcome Evaluation is a brief statement about your assessment that the patient is improving, declining, or no change.  This information will be displayed automatically on your shift  note.  Outcome: Progressing  Goal: Patient-Specific Goal (Individualized)  Description: You can add care plan individualizations to a care plan. Examples of Individualization might be:  \"Parent requests to be called daily at 9am for status\", \"I have a hard time hearing out of my right ear\", or \"Do not touch me to wake me up as it startles  me\".  Outcome: " Progressing  Goal: Absence of Hospital-Acquired Illness or Injury  Outcome: Progressing  Intervention: Identify and Manage Fall Risk  Recent Flowsheet Documentation  Taken 10/24/2023 0800 by Fern Darden RN  Safety Promotion/Fall Prevention: safety round/check completed  Intervention: Prevent Skin Injury  Recent Flowsheet Documentation  Taken 10/24/2023 1600 by Fern Darden RN  Body Position: position changed independently  Skin Protection:   incontinence pads utilized   transparent dressing maintained  Taken 10/24/2023 0800 by Fern Darden RN  Body Position: position changed independently  Skin Protection:   incontinence pads utilized   transparent dressing maintained  Intervention: Prevent Infection  Recent Flowsheet Documentation  Taken 10/24/2023 1600 by Fern Darden RN  Infection Prevention:   rest/sleep promoted   single patient room provided  Taken 10/24/2023 0800 by Fern Darden RN  Infection Prevention:   rest/sleep promoted   single patient room provided  Goal: Optimal Comfort and Wellbeing  Outcome: Progressing  Intervention: Provide Person-Centered Care  Recent Flowsheet Documentation  Taken 10/24/2023 1600 by Fern Darden RN  Trust Relationship/Rapport:   care explained   choices provided   questions answered   thoughts/feelings acknowledged  Taken 10/24/2023 0800 by Fern Darden RN  Trust Relationship/Rapport:   care explained   choices provided   questions answered   thoughts/feelings acknowledged  Goal: Readiness for Transition of Care  Outcome: Progressing

## 2023-10-25 NOTE — PLAN OF CARE
"BP (!) 146/86 (BP Location: Left arm)   Pulse 77   Temp 97.5  F (36.4  C) (Axillary)   Resp 18   Ht 1.64 m (5' 4.57\")   Wt 104.3 kg (230 lb)   SpO2 100%   BMI 38.79 kg/m       Patient afebrile, hypertensive but within parameters, other vital signs stable. Patient report shortness of breath while lying on left side, Patient had a chest x-ray completed. No replacement this morning. Other vital signs stable. Continue with plan of care.     Goal Outcome Evaluation:  Problem: Stem Cell/Bone Marrow Transplant  Goal: Diarrhea Symptom Control  Outcome: Not Progressing     Problem: Adult Inpatient Plan of Care  Goal: Optimal Comfort and Wellbeing  Outcome: Progressing  Intervention: Provide Person-Centered Care  Recent Flowsheet Documentation  Taken 10/24/2023 2023 by Tevin Enrique RN  Trust Relationship/Rapport:   care explained   choices provided   questions answered   thoughts/feelings acknowledged     Problem: Stem Cell/Bone Marrow Transplant  Goal: Optimal Coping with Transplant  Outcome: Progressing  Intervention: Optimize Patient/Family Adjustment to Transplant  Recent Flowsheet Documentation  Taken 10/24/2023 2023 by Tevin Enrique RN  Supportive Measures:   active listening utilized   positive reinforcement provided   decision-making supported   goal-setting facilitated   self-care encouraged  Goal: Blood Counts Within Acceptable Range  Outcome: Progressing  Intervention: Monitor and Manage Hematologic Symptoms  Recent Flowsheet Documentation  Taken 10/24/2023 2023 by Tevin Enrique RN  Medication Review/Management: medications reviewed  Goal: Absence of Hypersensitivity Reaction  Outcome: Progressing  Goal: Absence of Infection  Outcome: Progressing  Intervention: Prevent and Manage Infection  Recent Flowsheet Documentation  Taken 10/25/2023 0000 by Tevin Enrique RN  Infection Prevention:   rest/sleep promoted   single patient room provided  Taken 10/24/2023 2023 by Tevin Enrique, " RN  Infection Prevention:   rest/sleep promoted   single patient room provided  Isolation Precautions:   droplet precautions maintained   protective environment maintained  Goal: Improved Oral Mucous Membrane Health and Integrity  Outcome: Progressing  Goal: Nausea and Vomiting Symptom Relief  Outcome: Progressing  Goal: Optimal Nutrition Intake  Outcome: Progressing

## 2023-10-26 ENCOUNTER — APPOINTMENT (OUTPATIENT)
Dept: PHYSICAL THERAPY | Facility: CLINIC | Age: 48
End: 2023-10-26
Attending: INTERNAL MEDICINE
Payer: COMMERCIAL

## 2023-10-26 LAB
ALBUMIN SERPL BCG-MCNC: 2.8 G/DL (ref 3.5–5.2)
ALP SERPL-CCNC: 63 U/L (ref 35–104)
ALT SERPL W P-5'-P-CCNC: 9 U/L (ref 0–50)
ANION GAP SERPL CALCULATED.3IONS-SCNC: 10 MMOL/L (ref 7–15)
AST SERPL W P-5'-P-CCNC: 14 U/L (ref 0–45)
BASOPHILS # BLD AUTO: ABNORMAL 10*3/UL
BASOPHILS # BLD MANUAL: 0 10E3/UL (ref 0–0.2)
BASOPHILS NFR BLD AUTO: ABNORMAL %
BASOPHILS NFR BLD MANUAL: 0 %
BILIRUB DIRECT SERPL-MCNC: <0.2 MG/DL (ref 0–0.3)
BILIRUB SERPL-MCNC: 0.3 MG/DL
BUN SERPL-MCNC: 12.6 MG/DL (ref 6–20)
CALCIUM SERPL-MCNC: 8.4 MG/DL (ref 8.6–10)
CHLORIDE SERPL-SCNC: 103 MMOL/L (ref 98–107)
CREAT SERPL-MCNC: 0.72 MG/DL (ref 0.51–0.95)
DEPRECATED HCO3 PLAS-SCNC: 25 MMOL/L (ref 22–29)
EGFRCR SERPLBLD CKD-EPI 2021: >90 ML/MIN/1.73M2
EOSINOPHIL # BLD AUTO: ABNORMAL 10*3/UL
EOSINOPHIL # BLD MANUAL: 0 10E3/UL (ref 0–0.7)
EOSINOPHIL NFR BLD AUTO: ABNORMAL %
EOSINOPHIL NFR BLD MANUAL: 0 %
ERYTHROCYTE [DISTWIDTH] IN BLOOD BY AUTOMATED COUNT: 14.6 % (ref 10–15)
GLUCOSE SERPL-MCNC: 84 MG/DL (ref 70–99)
HCT VFR BLD AUTO: 26.3 % (ref 35–47)
HGB BLD-MCNC: 8.7 G/DL (ref 11.7–15.7)
IMM GRANULOCYTES # BLD: ABNORMAL 10*3/UL
IMM GRANULOCYTES NFR BLD: ABNORMAL %
LDH SERPL L TO P-CCNC: 269 U/L (ref 0–250)
LYMPHOCYTES # BLD AUTO: ABNORMAL 10*3/UL
LYMPHOCYTES # BLD MANUAL: 2.3 10E3/UL (ref 0.8–5.3)
LYMPHOCYTES NFR BLD AUTO: ABNORMAL %
LYMPHOCYTES NFR BLD MANUAL: 24 %
MAGNESIUM SERPL-MCNC: 1.6 MG/DL (ref 1.7–2.3)
MCH RBC QN AUTO: 28.2 PG (ref 26.5–33)
MCHC RBC AUTO-ENTMCNC: 33.1 G/DL (ref 31.5–36.5)
MCV RBC AUTO: 85 FL (ref 78–100)
MONOCYTES # BLD AUTO: ABNORMAL 10*3/UL
MONOCYTES # BLD MANUAL: 0.6 10E3/UL (ref 0–1.3)
MONOCYTES NFR BLD AUTO: ABNORMAL %
MONOCYTES NFR BLD MANUAL: 6 %
NEUTROPHILS # BLD AUTO: ABNORMAL 10*3/UL
NEUTROPHILS # BLD MANUAL: 6.7 10E3/UL (ref 1.6–8.3)
NEUTROPHILS NFR BLD AUTO: ABNORMAL %
NEUTROPHILS NFR BLD MANUAL: 70 %
NRBC # BLD AUTO: 0 10E3/UL
NRBC BLD AUTO-RTO: 0 /100
PHOSPHATE SERPL-MCNC: 3.4 MG/DL (ref 2.5–4.5)
PLAT MORPH BLD: NORMAL
PLATELET # BLD AUTO: 97 10E3/UL (ref 150–450)
POTASSIUM SERPL-SCNC: 3.3 MMOL/L (ref 3.4–5.3)
POTASSIUM SERPL-SCNC: 4 MMOL/L (ref 3.4–5.3)
PROT SERPL-MCNC: 5 G/DL (ref 6.4–8.3)
RBC # BLD AUTO: 3.08 10E6/UL (ref 3.8–5.2)
RBC MORPH BLD: NORMAL
RETICS # AUTO: 0.03 10E6/UL (ref 0.03–0.1)
RETICS/RBC NFR AUTO: 1 % (ref 0.5–2)
SODIUM SERPL-SCNC: 138 MMOL/L (ref 135–145)
WBC # BLD AUTO: 9.6 10E3/UL (ref 4–11)

## 2023-10-26 PROCEDURE — 250N000011 HC RX IP 250 OP 636: Mod: JZ | Performed by: INTERNAL MEDICINE

## 2023-10-26 PROCEDURE — 97530 THERAPEUTIC ACTIVITIES: CPT | Mod: GP | Performed by: PHYSICAL THERAPIST

## 2023-10-26 PROCEDURE — 82248 BILIRUBIN DIRECT: CPT | Performed by: PHYSICIAN ASSISTANT

## 2023-10-26 PROCEDURE — 250N000013 HC RX MED GY IP 250 OP 250 PS 637

## 2023-10-26 PROCEDURE — 206N000001 HC R&B BMT UMMC

## 2023-10-26 PROCEDURE — 85027 COMPLETE CBC AUTOMATED: CPT | Performed by: PHYSICIAN ASSISTANT

## 2023-10-26 PROCEDURE — 80053 COMPREHEN METABOLIC PANEL: CPT | Performed by: PHYSICIAN ASSISTANT

## 2023-10-26 PROCEDURE — 85007 BL SMEAR W/DIFF WBC COUNT: CPT | Performed by: PHYSICIAN ASSISTANT

## 2023-10-26 PROCEDURE — 85045 AUTOMATED RETICULOCYTE COUNT: CPT | Performed by: PHYSICIAN ASSISTANT

## 2023-10-26 PROCEDURE — 83615 LACTATE (LD) (LDH) ENZYME: CPT | Performed by: PHYSICIAN ASSISTANT

## 2023-10-26 PROCEDURE — 84132 ASSAY OF SERUM POTASSIUM: CPT | Performed by: INTERNAL MEDICINE

## 2023-10-26 PROCEDURE — 83735 ASSAY OF MAGNESIUM: CPT | Performed by: INTERNAL MEDICINE

## 2023-10-26 PROCEDURE — 97110 THERAPEUTIC EXERCISES: CPT | Mod: GP | Performed by: PHYSICAL THERAPIST

## 2023-10-26 PROCEDURE — 86901 BLOOD TYPING SEROLOGIC RH(D): CPT | Performed by: INTERNAL MEDICINE

## 2023-10-26 PROCEDURE — 99233 SBSQ HOSP IP/OBS HIGH 50: CPT | Mod: FS | Performed by: PHYSICIAN ASSISTANT

## 2023-10-26 PROCEDURE — 250N000013 HC RX MED GY IP 250 OP 250 PS 637: Performed by: PHYSICIAN ASSISTANT

## 2023-10-26 PROCEDURE — 250N000013 HC RX MED GY IP 250 OP 250 PS 637: Performed by: INTERNAL MEDICINE

## 2023-10-26 PROCEDURE — 84100 ASSAY OF PHOSPHORUS: CPT | Performed by: PHYSICIAN ASSISTANT

## 2023-10-26 RX ORDER — POTASSIUM CHLORIDE 29.8 MG/ML
20 INJECTION INTRAVENOUS
Status: COMPLETED | OUTPATIENT
Start: 2023-10-26 | End: 2023-10-26

## 2023-10-26 RX ORDER — FLUCONAZOLE 40 MG/ML
400 POWDER, FOR SUSPENSION ORAL DAILY
Qty: 300 ML | Refills: 0 | Status: SHIPPED | OUTPATIENT
Start: 2023-10-27 | End: 2023-11-09

## 2023-10-26 RX ORDER — SUCRALFATE ORAL 1 G/10ML
1 SUSPENSION ORAL
Qty: 400 ML | Refills: 0 | Status: SHIPPED | OUTPATIENT
Start: 2023-10-26 | End: 2023-11-02

## 2023-10-26 RX ORDER — ACYCLOVIR 200 MG/5ML
400 SUSPENSION ORAL 2 TIMES DAILY
Qty: 600 ML | Refills: 1 | Status: SHIPPED | OUTPATIENT
Start: 2023-10-26 | End: 2024-02-10

## 2023-10-26 RX ORDER — LOPERAMIDE HCL 2 MG
2 CAPSULE ORAL 4 TIMES DAILY
Qty: 30 CAPSULE | Refills: 1 | Status: SHIPPED | OUTPATIENT
Start: 2023-10-26 | End: 2023-11-02

## 2023-10-26 RX ADMIN — BUPROPION HYDROCHLORIDE 150 MG: 150 TABLET, FILM COATED, EXTENDED RELEASE ORAL at 08:52

## 2023-10-26 RX ADMIN — CAROSPIR 25 MG: 25 SUSPENSION ORAL at 08:49

## 2023-10-26 RX ADMIN — ACYCLOVIR 400 MG: 200 SUSPENSION ORAL at 20:44

## 2023-10-26 RX ADMIN — LOPERAMIDE HYDROCHLORIDE 2 MG: 2 CAPSULE ORAL at 16:32

## 2023-10-26 RX ADMIN — TOPIRAMATE 50 MG: 50 TABLET, FILM COATED ORAL at 20:42

## 2023-10-26 RX ADMIN — POTASSIUM CHLORIDE 20 MEQ: 29.8 INJECTION, SOLUTION INTRAVENOUS at 09:56

## 2023-10-26 RX ADMIN — SUCRALFATE 1 G: 1 SUSPENSION ORAL at 08:50

## 2023-10-26 RX ADMIN — Medication 5 ML: at 05:16

## 2023-10-26 RX ADMIN — SUCRALFATE 1 G: 1 SUSPENSION ORAL at 16:32

## 2023-10-26 RX ADMIN — LOPERAMIDE HYDROCHLORIDE 2 MG: 2 CAPSULE ORAL at 13:40

## 2023-10-26 RX ADMIN — ACYCLOVIR 400 MG: 200 SUSPENSION ORAL at 08:49

## 2023-10-26 RX ADMIN — Medication 5 ML: at 20:42

## 2023-10-26 RX ADMIN — LOPERAMIDE HYDROCHLORIDE 2 MG: 2 CAPSULE ORAL at 08:50

## 2023-10-26 RX ADMIN — Medication 5 ML: at 11:07

## 2023-10-26 RX ADMIN — SUCRALFATE 1 G: 1 SUSPENSION ORAL at 13:40

## 2023-10-26 RX ADMIN — Medication 5 ML: at 13:40

## 2023-10-26 RX ADMIN — Medication 40 MG: at 18:21

## 2023-10-26 RX ADMIN — POTASSIUM CHLORIDE 20 MEQ: 29.8 INJECTION, SOLUTION INTRAVENOUS at 08:44

## 2023-10-26 RX ADMIN — LEVOTHYROXINE SODIUM 75 MCG: 75 TABLET ORAL at 08:52

## 2023-10-26 RX ADMIN — PROPRANOLOL HYDROCHLORIDE 20 MG: 20 TABLET ORAL at 20:42

## 2023-10-26 RX ADMIN — Medication 40 MG: at 08:49

## 2023-10-26 RX ADMIN — SUCRALFATE 1 G: 1 SUSPENSION ORAL at 20:48

## 2023-10-26 RX ADMIN — PROPRANOLOL HYDROCHLORIDE 20 MG: 20 TABLET ORAL at 08:52

## 2023-10-26 RX ADMIN — LOPERAMIDE HYDROCHLORIDE 2 MG: 2 CAPSULE ORAL at 20:42

## 2023-10-26 RX ADMIN — FLUCONAZOLE 400 MG: 40 POWDER, FOR SUSPENSION ORAL at 08:49

## 2023-10-26 ASSESSMENT — ACTIVITIES OF DAILY LIVING (ADL)
ADLS_ACUITY_SCORE: 22
ADLS_ACUITY_SCORE: 21
ADLS_ACUITY_SCORE: 21
ADLS_ACUITY_SCORE: 22
ADLS_ACUITY_SCORE: 22
ADLS_ACUITY_SCORE: 21
ADLS_ACUITY_SCORE: 22
ADLS_ACUITY_SCORE: 21
ADLS_ACUITY_SCORE: 22
ADLS_ACUITY_SCORE: 23
ADLS_ACUITY_SCORE: 21
ADLS_ACUITY_SCORE: 21

## 2023-10-26 NOTE — PLAN OF CARE
"BP (!) 145/92 (BP Location: Left arm)   Pulse 65   Temp 98.4  F (36.9  C) (Axillary)   Resp 18   Ht 1.64 m (5' 4.57\")   Wt 102 kg (224 lb 12.8 oz)   SpO2 96%   BMI 37.91 kg/m    Denies nausea, some discomfort with swallowing.  Tongue ulceration improving.  Pt eating more today-[employer], jello, working on salad for lunch.  Up independently in room/bathroom, chair.  Still having diarrhea, but improving.  KCL 40meq given, recheck pending.  Visiting with family at bedside.  Continue POC.    Problem: Stem Cell/Bone Marrow Transplant  Goal: Improved Activity Tolerance  Outcome: Progressing     Problem: Stem Cell/Bone Marrow Transplant  Goal: Diarrhea Symptom Control  Outcome: Progressing     Problem: Stem Cell/Bone Marrow Transplant  Goal: Improved Oral Mucous Membrane Health and Integrity  Outcome: Progressing     Problem: Stem Cell/Bone Marrow Transplant  Goal: Nausea and Vomiting Symptom Relief  Outcome: Progressing     Problem: Stem Cell/Bone Marrow Transplant  Goal: Optimal Nutrition Intake  Outcome: Progressing   Goal Outcome Evaluation:                        "

## 2023-10-26 NOTE — PROGRESS NOTES
"BMT/Cell Therapy Daily Progress Note   10/26/2023    Patient ID:  Jennifer Ward is a 48 year old female, currently day +14 s/p autologous hematopoietic cell transplant as rescue after BEAM therapy for Hodgkin Lymphoma. Co-enrolled on [employer] study. Readmitted 10/16 with neutropenic fever and abdominal pain.    INTERVAL  HISTORY   Jennifer's diarrhea is still annoying, but it is less volume.  350ml recorded for stool output.  She was able to eat better yesterday, though still appreciates medications is solution form.      Review of Systems: ROS negative except as noted above.      PHYSICAL EXAM     Weight In/Out     Wt Readings from Last 3 Encounters:   10/26/23 102 kg (224 lb 12.8 oz)   10/16/23 99.9 kg (220 lb 3.2 oz)   10/13/23 101.1 kg (222 lb 12.8 oz)      I/O last 3 completed shifts:  In: 692.7 [P.O.:660; I.V.:32.7]  Out: 1800 [Urine:1100; Stool:700]       KPS:  70    BP (!) 145/92 (BP Location: Left arm)   Pulse 65   Temp 98.4  F (36.9  C) (Axillary)   Resp 18   Ht 1.64 m (5' 4.57\")   Wt 102 kg (224 lb 12.8 oz)   SpO2 96%   BMI 37.91 kg/m       General: NAD  Eyes: sclera anicteric   Nose/Mouth/Throat:white based ulcer left tongue; improving.  A couple red dots of blood along left lateral tongue.   Lungs: CTAB  Cardiovascular: RRR, no M/R/G   Abdominal: +BS, soft, no distention, no tenderness  Lymphatics: 1-2+ edema BLE  Skin: Facial flushing and chest mildly erythematous  Neuro: A&O; non-focal  Access: L CVC NT, dressing cdi. R PAC not accessed.    LABS AND IMAGING: I have assessed all abnormal lab values for their clinical significance and any values considered clinically significant have been addressed in the assessment and plan.        Lab Results   Component Value Date    WBC 9.6 10/26/2023    ANEU 6.7 10/26/2023    HGB 8.7 (L) 10/26/2023    HCT 26.3 (L) 10/26/2023    PLT 97 (L) 10/26/2023     10/26/2023    POTASSIUM 3.3 (L) 10/26/2023    CHLORIDE 103 10/26/2023    CO2 25 10/26/2023    GLC 84 " 10/26/2023    BUN 12.6 10/26/2023    CR 0.72 10/26/2023    MAG 1.6 (L) 10/26/2023    INR 1.37 (H) 10/23/2023    BILITOTAL 0.3 10/26/2023    AST 14 10/26/2023    ALT 9 10/26/2023    ALKPHOS 63 10/26/2023    PROTTOTAL 5.0 (L) 10/26/2023    ALBUMIN 2.8 (L) 10/26/2023     CT Abd/Pelvis 10/16:   1. Distended loops of small bowel without a discrete transition point  associated with areas of mild wall thickening, vascular engorgement  and mesenteric edema. Findings may represent enteritis, posttreatment  changes or hyperacute GVHD in the appropriate clinical setting.     CXR portable (10/16): no aso   Oral/GI regimen related toxicities (per NCI CTCAE v 5.0):    Oral Mucositis: grade 1  Nausea: none  Vomiting: none  Diarrhea: Grade 1    *diarrhea secondary to C. Diff is excluded from the definition of oral/GI RRT    CTCAE Terms     Vomiting: A disorder characterized by the reflexive act of ejecting the contents of the stomach through the mouth   Grade 1 Intervention not indicated   Grade 2 Outpatient IV hydration; medical intervention indicated   Grade 3 Tube feeding, TPN, or hospitalization indicated   Grade 4 Life-threatening consequences   Grade 5 Death     Oral Mucositis: A disorder characterized by ulceration or inflammation of the oral mucosal.   Grade 1 Asymptomatic or mild symptoms; intervention not indicated   Grade 2 Moderate pain or ulcer that does not interfere with oral intake; modified diet indicated   Grade 3 Severe pain; interfering with oral intake   Grade 4 Life-threatening consequences; urgent intervention indicated   Grade 5 Death     Nausea: A disorder characterized by a queasy sensation and/or the urge to vomit.   Grade 1 Loss of appetite without alteration in eating habits  Grade 2 Oral intake decreased without significant weight loss, dehydration or malnutrition   Grade 3 Inadequate oral caloric or fluid intake; tube feeding, TPN, or hospitalization indicated   Grade 4 NA  Grade 5 NA    Diarrhea: A  disorder characterized by an increase in frequency and/or loose or watery bowel movements.   Grade 1 Increase of <4 stools per day over baseline; mild increase in ostomy output compared to baseline   Grade 2 Increase of 4 - 6 stools per day over baseline; moderate increase in ostomy output compared to baseline; limiting instrumental ADL   Grade 3 Increase of >=7 stools per day over baseline; hospitalization indicated; severe increase in ostomy output compared to baseline; limiting self-care ADL    Grade 4 Life-threatening consequences; urgent intervention indicated    Grade 5 Death       Source: https://ctep.cancer.gov/protocoldevelopment/electronic_applications/docs/CTCAE_v5_Quick_Reference_8.5x11.pdf     SYSTEMS-BASED ASSESSMENT AND PLAN     Jennifer Ward is a 48 year old female, currently day + 14  s/p autologous hematopoietic cell transplant as rescue after BEAM therapy for Hodgkin Lymphoma. Co-enrolled on [employer] study. Readmitted 10/16/23 with neutropenic fever. Remains admitted due to difficulty to maintain PO intake, pain control, rhinovirus +.    Prep: BEAM     BMT  - BMT MD/Coordinator: Nay  - MT2016-11 with co-enrollment on MT2022-40 (E-CELERATE)  - Cell dose 7 million CD34+ cells/kg   - GCSF complete 10/23   - Restaging: next restage at day +28    HEME/COAG  - Transfusion parameters: hgb <7g/dL and plts <10,000.   - Pancytopenia secondary to Hodgkin Lymphoma and chemotherapy  - prolonged INR; had hypersensitivity reaction to IV form of vitamin K.  As her platelets and her intake are improving, we will not make further attempts to correct her INR at this time.      ID  #RHINOVIRUS + (10/22)- Symptomatic control, robitussin   #N/F: work up unremarkable.  Abx  (cefepime/flagyl) discontinued with engraftment.   Prophy: **note that antimicrobial dosing is specific to protocol: ACV 400mg PO bid, fluconazole 400mg daily, Bactrim or other PCP prophy to start at day +28     GI  #Diarrhea, abdominal  cramping 2/2 chemo/colitis.  Improving.   C.dif neg 10/17.   Imodium QID (scheduled)  Simethicone prn, heating pad prn.  #Dysphagia/Regurge/Mucositis- PPI BID, carafate PRN  #Anorexia: Dex 10mg x1, 4mg x2 last dose 10/23  #CINV: prn ativan, prn compazine. Received Emend 10/20.   #Ulcer prophy: Protonix 40mg BID  #E-CELERATE vs placebo administered 10/12; continue ctogi assessments through day +14 (10/26).     NUTRITION/FLUIDS  # Moderate malnutrition secondary to acute illness  # high calorie/high protein diet  # Wt up since admission; continue daily spironolactone with periodic lasix if needed.  She is approaching admit weight, so no lasix today.     FEN  # Hyponatremia: resolved  # Hypokalemia likely secondary to diuresis with loop diuretic on 10/25.  # Hypophosphatemia: resolved  # Hypomagnesemia related to low intake, replete per ss  # Hypocalcemia resolved    ENDOCRINE  # hypothyroidism: continue PTA synthroid     PSYCH  # depression: continue PTA bupropion; venlafaxine discontinued per patient request on 10/22.      NEURO  # migraines: daily topamax with prn naratriptan; propranolol prophylaxis.      SUPPORTIVE CARE  - PT/OT consult, will follow as indicated  - BMT Social work to follow.       Code Status: Full Code       Dispo: Anticipate discharge 10/27/2023.       Known issues that I take into account for medical decisions, with salient changes to the plan considering these complexities noted above.    Patient Active Problem List   Diagnosis    Hodgkin's disease of lymph nodes of multiple sites (H)    Autologous donor of stem cells    Nodular sclerosis Hodgkin lymphoma of lymph nodes of multiple regions (H)    Febrile neutropenia        Clinically Significant Risk Factors        # Hypokalemia: Lowest K = 3.3 mmol/L in last 2 days, will replace as needed     # Hypomagnesemia: Lowest Mg = 1.6 mg/dL in last 2 days, will replace as needed   # Hypoalbuminemia: Lowest albumin = 2.7 g/dL at 10/21/2023  4:38 AM,  "will monitor as appropriate       # Thrombocytopenia: Lowest platelets = 75 in last 2 days, will monitor for bleeding          # Obesity: Estimated body mass index is 37.91 kg/m  as calculated from the following:    Height as of this encounter: 1.64 m (5' 4.57\").    Weight as of this encounter: 102 kg (224 lb 12.8 oz).   # Moderate Malnutrition: based on nutrition assessment      # Financial/Environmental Concerns: none         I spent 45 minutes face-to-face and/or coordinating care. Over 50% of our time on the unit was spent counseling the patient and/or coordinating care and the plan detailed on today's notes.        Helen Felix PA-C     "

## 2023-10-26 NOTE — PLAN OF CARE
"Goal Outcome Evaluation:  Major Shift Events:  VSS on RA overnight. Pt denied pain or nausea. No PRNs required. Pt reported feeling hungry and ate 1 piece of toast with butter and jelly, apple juice x2, and tried a few bites of cornflakes with milk. Pt had watery, diarrheal stools x3 episodes overnight. Voiding spontaneously. AM labs notable for potassium requiring replacement; IV replacement ordered. No other replacements indicated this AM.     For vital signs and complete assessments, please see documentation flowsheets.      Problem: Adult Inpatient Plan of Care  Goal: Plan of Care Review  Description: The Plan of Care Review/Shift note should be completed every shift.  The Outcome Evaluation is a brief statement about your assessment that the patient is improving, declining, or no change.  This information will be displayed automatically on your shift  note.  Outcome: Progressing  Goal: Patient-Specific Goal (Individualized)  Description: You can add care plan individualizations to a care plan. Examples of Individualization might be:  \"Parent requests to be called daily at 9am for status\", \"I have a hard time hearing out of my right ear\", or \"Do not touch me to wake me up as it startles  me\".  Outcome: Progressing  Goal: Absence of Hospital-Acquired Illness or Injury  Outcome: Progressing  Intervention: Identify and Manage Fall Risk  Recent Flowsheet Documentation  Taken 10/26/2023 0511 by Jocelin Sanders, RN  Safety Promotion/Fall Prevention:   safety round/check completed   nonskid shoes/slippers when out of bed   lighting adjusted   clutter free environment maintained   assistive device/personal items within reach   patient and family education   room organization consistent  Taken 10/25/2023 0810 by Jocelin Sanders, RN  Safety Promotion/Fall Prevention:   safety round/check completed   nonskid shoes/slippers when out of bed   lighting adjusted   clutter free environment maintained   assistive " device/personal items within reach   patient and family education   room organization consistent  Taken 10/25/2023 2151 by Jocelin Sanders RN  Safety Promotion/Fall Prevention:   safety round/check completed   nonskid shoes/slippers when out of bed   lighting adjusted   clutter free environment maintained   assistive device/personal items within reach   patient and family education   room organization consistent  Intervention: Prevent Skin Injury  Recent Flowsheet Documentation  Taken 10/26/2023 0511 by Jocelin Sanders RN  Body Position: position changed independently  Taken 10/25/2023 2358 by Jocelin Sanders RN  Body Position: position changed independently  Taken 10/25/2023 2151 by Jocelin Sanders RN  Body Position: position changed independently  Intervention: Prevent and Manage VTE (Venous Thromboembolism) Risk  Recent Flowsheet Documentation  Taken 10/25/2023 2151 by Jocelin Sanders RN  VTE Prevention/Management: (ambulatory in room) SCDs (sequential compression devices) off  Intervention: Prevent Infection  Recent Flowsheet Documentation  Taken 10/26/2023 0511 by Jocelin Sanders RN  Infection Prevention:   cohorting utilized   environmental surveillance performed   equipment surfaces disinfected   hand hygiene promoted   personal protective equipment utilized   rest/sleep promoted   single patient room provided   visitors restricted/screened  Taken 10/25/2023 2358 by Jocelin Sanders RN  Infection Prevention:   cohorting utilized   environmental surveillance performed   equipment surfaces disinfected   hand hygiene promoted   personal protective equipment utilized   rest/sleep promoted   single patient room provided   visitors restricted/screened  Taken 10/25/2023 2151 by Jocelin Sanders RN  Infection Prevention:   cohorting utilized   environmental surveillance performed   equipment surfaces disinfected   hand hygiene promoted   personal protective equipment utilized   rest/sleep promoted    single patient room provided   visitors restricted/screened  Goal: Optimal Comfort and Wellbeing  Outcome: Not Progressing  Intervention: Provide Person-Centered Care  Recent Flowsheet Documentation  Taken 10/25/2023 2151 by Jocelin Sanders RN  Trust Relationship/Rapport:   care explained   choices provided   emotional support provided   empathic listening provided   questions answered   questions encouraged  Goal: Readiness for Transition of Care  Outcome: Not Progressing     Problem: Stem Cell/Bone Marrow Transplant  Goal: Optimal Coping with Transplant  Outcome: Progressing  Intervention: Optimize Patient/Family Adjustment to Transplant  Recent Flowsheet Documentation  Taken 10/25/2023 2151 by Jocelin Sanders RN  Supportive Measures:   active listening utilized   positive reinforcement provided   self-care encouraged  Goal: Symptom-Free Urinary Elimination  Outcome: Progressing  Goal: Diarrhea Symptom Control  Outcome: Not Progressing  Goal: Improved Activity Tolerance  Outcome: Progressing  Intervention: Promote Improved Energy  Recent Flowsheet Documentation  Taken 10/25/2023 2151 by Jocelin Sanders RN  Activity Management:   activity adjusted per tolerance   ambulated to bathroom  Environmental Support: calm environment promoted  Taken 10/25/2023 1930 by Jocelin Sanders RN  Activity Management: ambulated to bathroom  Goal: Blood Counts Within Acceptable Range  Outcome: Not Progressing  Intervention: Monitor and Manage Hematologic Symptoms  Recent Flowsheet Documentation  Taken 10/26/2023 0511 by Jocelin Sanders RN  Medication Review/Management: medications reviewed  Taken 10/25/2023 2358 by Jocelin Sanders RN  Medication Review/Management: medications reviewed  Taken 10/25/2023 2151 by Jocelin Sanders RN  Medication Review/Management: medications reviewed  Goal: Absence of Hypersensitivity Reaction  Outcome: Not Progressing  Goal: Absence of Infection  Outcome: Progressing  Intervention: Prevent  and Manage Infection  Recent Flowsheet Documentation  Taken 10/26/2023 0511 by Jocelin Sanders RN  Infection Prevention:   cohorting utilized   environmental surveillance performed   equipment surfaces disinfected   hand hygiene promoted   personal protective equipment utilized   rest/sleep promoted   single patient room provided   visitors restricted/screened  Isolation Precautions:   droplet precautions maintained   protective environment maintained  Taken 10/25/2023 2358 by Jocelin Sanders RN  Infection Prevention:   cohorting utilized   environmental surveillance performed   equipment surfaces disinfected   hand hygiene promoted   personal protective equipment utilized   rest/sleep promoted   single patient room provided   visitors restricted/screened  Isolation Precautions:   droplet precautions maintained   protective environment maintained  Taken 10/25/2023 2151 by Jocelin Sanders RN  Infection Prevention:   cohorting utilized   environmental surveillance performed   equipment surfaces disinfected   hand hygiene promoted   personal protective equipment utilized   rest/sleep promoted   single patient room provided   visitors restricted/screened  Isolation Precautions:   droplet precautions maintained   protective environment maintained  Goal: Improved Oral Mucous Membrane Health and Integrity  Outcome: Not Progressing  Intervention: Promote Oral Comfort and Health  Recent Flowsheet Documentation  Taken 10/25/2023 2151 by Jocelin Sanders RN  Oral Care: oral rinse provided  Goal: Nausea and Vomiting Symptom Relief  Outcome: Progressing  Goal: Optimal Nutrition Intake  Outcome: Progressing

## 2023-10-27 VITALS
RESPIRATION RATE: 16 BRPM | OXYGEN SATURATION: 98 % | TEMPERATURE: 98.4 F | WEIGHT: 225.8 LBS | SYSTOLIC BLOOD PRESSURE: 147 MMHG | BODY MASS INDEX: 37.62 KG/M2 | DIASTOLIC BLOOD PRESSURE: 82 MMHG | HEART RATE: 61 BPM | HEIGHT: 65 IN

## 2023-10-27 LAB
ALBUMIN SERPL BCG-MCNC: 3 G/DL (ref 3.5–5.2)
ALP SERPL-CCNC: 59 U/L (ref 35–104)
ALT SERPL W P-5'-P-CCNC: 10 U/L (ref 0–50)
ANION GAP SERPL CALCULATED.3IONS-SCNC: 10 MMOL/L (ref 7–15)
AST SERPL W P-5'-P-CCNC: 16 U/L (ref 0–45)
BASOPHILS # BLD AUTO: ABNORMAL 10*3/UL
BASOPHILS # BLD MANUAL: 0 10E3/UL (ref 0–0.2)
BASOPHILS NFR BLD AUTO: ABNORMAL %
BASOPHILS NFR BLD MANUAL: 0 %
BILIRUB DIRECT SERPL-MCNC: <0.2 MG/DL (ref 0–0.3)
BILIRUB SERPL-MCNC: 0.3 MG/DL
BUN SERPL-MCNC: 8.7 MG/DL (ref 6–20)
C DIFF TOX B STL QL: NEGATIVE
CALCIUM SERPL-MCNC: 8.3 MG/DL (ref 8.6–10)
CHLORIDE SERPL-SCNC: 103 MMOL/L (ref 98–107)
CREAT SERPL-MCNC: 0.67 MG/DL (ref 0.51–0.95)
DEPRECATED HCO3 PLAS-SCNC: 24 MMOL/L (ref 22–29)
EGFRCR SERPLBLD CKD-EPI 2021: >90 ML/MIN/1.73M2
EOSINOPHIL # BLD AUTO: ABNORMAL 10*3/UL
EOSINOPHIL # BLD MANUAL: 0 10E3/UL (ref 0–0.7)
EOSINOPHIL NFR BLD AUTO: ABNORMAL %
EOSINOPHIL NFR BLD MANUAL: 0 %
ERYTHROCYTE [DISTWIDTH] IN BLOOD BY AUTOMATED COUNT: 14.6 % (ref 10–15)
GLUCOSE SERPL-MCNC: 88 MG/DL (ref 70–99)
HCT VFR BLD AUTO: 25.6 % (ref 35–47)
HGB BLD-MCNC: 8.4 G/DL (ref 11.7–15.7)
IMM GRANULOCYTES # BLD: ABNORMAL 10*3/UL
IMM GRANULOCYTES NFR BLD: ABNORMAL %
LDH SERPL L TO P-CCNC: 247 U/L (ref 0–250)
LYMPHOCYTES # BLD AUTO: ABNORMAL 10*3/UL
LYMPHOCYTES # BLD MANUAL: 0.9 10E3/UL (ref 0.8–5.3)
LYMPHOCYTES NFR BLD AUTO: ABNORMAL %
LYMPHOCYTES NFR BLD MANUAL: 12 %
MAGNESIUM SERPL-MCNC: 1.6 MG/DL (ref 1.7–2.3)
MCH RBC QN AUTO: 28.1 PG (ref 26.5–33)
MCHC RBC AUTO-ENTMCNC: 32.8 G/DL (ref 31.5–36.5)
MCV RBC AUTO: 86 FL (ref 78–100)
METAMYELOCYTES # BLD MANUAL: 0.3 10E3/UL
METAMYELOCYTES NFR BLD MANUAL: 4 %
MONOCYTES # BLD AUTO: ABNORMAL 10*3/UL
MONOCYTES # BLD MANUAL: 1.3 10E3/UL (ref 0–1.3)
MONOCYTES NFR BLD AUTO: ABNORMAL %
MONOCYTES NFR BLD MANUAL: 17 %
MYELOCYTES # BLD MANUAL: 0.2 10E3/UL
MYELOCYTES NFR BLD MANUAL: 3 %
NEUTROPHILS # BLD AUTO: ABNORMAL 10*3/UL
NEUTROPHILS # BLD MANUAL: 5.1 10E3/UL (ref 1.6–8.3)
NEUTROPHILS NFR BLD AUTO: ABNORMAL %
NEUTROPHILS NFR BLD MANUAL: 64 %
NRBC # BLD AUTO: 0 10E3/UL
NRBC # BLD AUTO: 0.2 10E3/UL
NRBC BLD AUTO-RTO: 0 /100
NRBC BLD MANUAL-RTO: 2 %
PHOSPHATE SERPL-MCNC: 2.8 MG/DL (ref 2.5–4.5)
PLAT MORPH BLD: ABNORMAL
PLATELET # BLD AUTO: 113 10E3/UL (ref 150–450)
POTASSIUM SERPL-SCNC: 3.8 MMOL/L (ref 3.4–5.3)
PROT SERPL-MCNC: 5.1 G/DL (ref 6.4–8.3)
RBC # BLD AUTO: 2.99 10E6/UL (ref 3.8–5.2)
RBC MORPH BLD: ABNORMAL
RETICS # AUTO: 0.05 10E6/UL (ref 0.03–0.1)
RETICS/RBC NFR AUTO: 1.7 % (ref 0.5–2)
SODIUM SERPL-SCNC: 137 MMOL/L (ref 135–145)
WBC # BLD AUTO: 7.9 10E3/UL (ref 4–11)

## 2023-10-27 PROCEDURE — 82248 BILIRUBIN DIRECT: CPT | Performed by: PHYSICIAN ASSISTANT

## 2023-10-27 PROCEDURE — 84100 ASSAY OF PHOSPHORUS: CPT | Performed by: PHYSICIAN ASSISTANT

## 2023-10-27 PROCEDURE — 80053 COMPREHEN METABOLIC PANEL: CPT | Performed by: PHYSICIAN ASSISTANT

## 2023-10-27 PROCEDURE — 85007 BL SMEAR W/DIFF WBC COUNT: CPT | Performed by: PHYSICIAN ASSISTANT

## 2023-10-27 PROCEDURE — 83735 ASSAY OF MAGNESIUM: CPT | Performed by: INTERNAL MEDICINE

## 2023-10-27 PROCEDURE — 87493 C DIFF AMPLIFIED PROBE: CPT | Performed by: PHYSICIAN ASSISTANT

## 2023-10-27 PROCEDURE — 250N000011 HC RX IP 250 OP 636: Mod: JZ | Performed by: INTERNAL MEDICINE

## 2023-10-27 PROCEDURE — 83615 LACTATE (LD) (LDH) ENZYME: CPT | Performed by: PHYSICIAN ASSISTANT

## 2023-10-27 PROCEDURE — 250N000013 HC RX MED GY IP 250 OP 250 PS 637: Performed by: INTERNAL MEDICINE

## 2023-10-27 PROCEDURE — 250N000013 HC RX MED GY IP 250 OP 250 PS 637

## 2023-10-27 PROCEDURE — 85045 AUTOMATED RETICULOCYTE COUNT: CPT | Performed by: PHYSICIAN ASSISTANT

## 2023-10-27 PROCEDURE — 250N000013 HC RX MED GY IP 250 OP 250 PS 637: Performed by: PHYSICIAN ASSISTANT

## 2023-10-27 PROCEDURE — 85027 COMPLETE CBC AUTOMATED: CPT | Performed by: PHYSICIAN ASSISTANT

## 2023-10-27 RX ORDER — IBUPROFEN 100 MG/5ML
400 SUSPENSION, ORAL (FINAL DOSE FORM) ORAL EVERY 6 HOURS PRN
Status: DISCONTINUED | OUTPATIENT
Start: 2023-10-27 | End: 2023-10-27 | Stop reason: HOSPADM

## 2023-10-27 RX ORDER — ACETAMINOPHEN 325 MG/10.15ML
325-650 LIQUID ORAL EVERY 4 HOURS PRN
Status: DISCONTINUED | OUTPATIENT
Start: 2023-10-27 | End: 2023-10-27 | Stop reason: HOSPADM

## 2023-10-27 RX ORDER — PROCHLORPERAZINE MALEATE 5 MG
5-10 TABLET ORAL EVERY 6 HOURS PRN
Qty: 30 TABLET | Refills: 1 | Status: SHIPPED | OUTPATIENT
Start: 2023-10-27 | End: 2023-11-22

## 2023-10-27 RX ADMIN — FLUCONAZOLE 400 MG: 40 POWDER, FOR SUSPENSION ORAL at 09:11

## 2023-10-27 RX ADMIN — PROPRANOLOL HYDROCHLORIDE 20 MG: 20 TABLET ORAL at 09:10

## 2023-10-27 RX ADMIN — ACETAMINOPHEN 650 MG: 325 SOLUTION ORAL at 04:48

## 2023-10-27 RX ADMIN — LOPERAMIDE HYDROCHLORIDE 2 MG: 2 CAPSULE ORAL at 09:17

## 2023-10-27 RX ADMIN — LEVOTHYROXINE SODIUM 75 MCG: 75 TABLET ORAL at 09:11

## 2023-10-27 RX ADMIN — Medication 10 ML: at 04:05

## 2023-10-27 RX ADMIN — IBUPROFEN 400 MG: 100 SUSPENSION ORAL at 10:26

## 2023-10-27 RX ADMIN — ACYCLOVIR 400 MG: 200 SUSPENSION ORAL at 09:11

## 2023-10-27 RX ADMIN — SUCRALFATE 1 G: 1 SUSPENSION ORAL at 09:17

## 2023-10-27 RX ADMIN — Medication 40 MG: at 09:11

## 2023-10-27 RX ADMIN — LOPERAMIDE HYDROCHLORIDE 2 MG: 2 CAPSULE ORAL at 11:44

## 2023-10-27 RX ADMIN — BUPROPION HYDROCHLORIDE 150 MG: 150 TABLET, FILM COATED, EXTENDED RELEASE ORAL at 09:10

## 2023-10-27 RX ADMIN — CAROSPIR 25 MG: 25 SUSPENSION ORAL at 09:11

## 2023-10-27 RX ADMIN — SUCRALFATE 1 G: 1 SUSPENSION ORAL at 11:44

## 2023-10-27 ASSESSMENT — ACTIVITIES OF DAILY LIVING (ADL)
ADLS_ACUITY_SCORE: 20

## 2023-10-27 NOTE — PLAN OF CARE
"Alert and oriented x 4. Denies pain and nausea. Voiding and having loose stools. Up independently. Tylenol given for generalized body ache. No replacements indicated.  Problem: Adult Inpatient Plan of Care  Goal: Plan of Care Review  Description: The Plan of Care Review/Shift note should be completed every shift.  The Outcome Evaluation is a brief statement about your assessment that the patient is improving, declining, or no change.  This information will be displayed automatically on your shift  note.  Outcome: Progressing  Goal: Patient-Specific Goal (Individualized)  Description: You can add care plan individualizations to a care plan. Examples of Individualization might be:  \"Parent requests to be called daily at 9am for status\", \"I have a hard time hearing out of my right ear\", or \"Do not touch me to wake me up as it startles  me\".  Outcome: Progressing  Goal: Absence of Hospital-Acquired Illness or Injury  Outcome: Progressing  Intervention: Identify and Manage Fall Risk  Recent Flowsheet Documentation  Taken 10/27/2023 0018 by Ilene Robb RN  Safety Promotion/Fall Prevention:   clutter free environment maintained   assistive device/personal items within reach   nonskid shoes/slippers when out of bed   room near nurse's station   safety round/check completed  Intervention: Prevent Skin Injury  Recent Flowsheet Documentation  Taken 10/27/2023 0018 by Ilene Robb RN  Body Position: position changed independently  Intervention: Prevent Infection  Recent Flowsheet Documentation  Taken 10/27/2023 0018 by Ilene Robb RN  Infection Prevention:   personal protective equipment utilized   rest/sleep promoted   hand hygiene promoted  Goal: Optimal Comfort and Wellbeing  Outcome: Progressing  Intervention: Provide Person-Centered Care  Recent Flowsheet Documentation  Taken 10/27/2023 0018 by Ilene Robb RN  Trust Relationship/Rapport:   care explained   choices provided  Goal: Readiness " for Transition of Care  Outcome: Progressing   Goal Outcome Evaluation:

## 2023-10-27 NOTE — PLAN OF CARE
Pt discharged to: [address]  Via: Private car  Time: 1340   Reason not before 11am: awaiting meds, orders.   Accompanied by: Friend  Belongings: Sent with patient  Teaching: Completed. Questions encouraged and answered.   Clinic appointment: Scheduled   Report called/faxed: n/a  Local housing: Yes, [address]

## 2023-10-27 NOTE — PLAN OF CARE
Physical Therapy Discharge Summary    Reason for therapy discharge:    Discharged to home with outpatient therapy.    Progress towards therapy goal(s). See goals on Care Plan in [facility] electronic health record for goal details.  Goals partially met.  Barriers to achieving goals:   discharge from facility.    Therapy recommendation(s):    Continued therapy is recommended.  Rationale/Recommendations:  home safety, strength, endurance.

## 2023-10-27 NOTE — DISCHARGE SUMMARY
[facility] Discharge Summary   Jennifer Ward MRN# [id number]   Age: 48 year old  YOB: 1975   Date of Admission: 10/16/2023  Date of Discharge:  10/27/2023  Admitting Physician: Tammy Gonzalez MD  Discharge Physician:  Farhad Garcia MD  Discharge Diagnoses:    Autologous stem cell transplant for Hodgkin Lymphoma  Chemotherapy induced diarrhea  Dysphagia secondary to xerostomia  Oral mucositis secondary to chemotherapy  Pancytopenia secondary to chemotherapy  Electrolyte abnormalities including hypnatremia, hypokalemia, hypophosphatemia, hypomagnesemia, and hypocalcemia; secondary to diarrhea, low intake and cell production  Moderate malnutrition secondary to acute illness  Myalgias of unknown etiology  Rhinovirus upper respiratory infection  Chemotherapy induced nausea with vomiting  Hypothyroidism  Depression  Migraines  Discharge Medications:         Medication List        Started      acyclovir 200 MG/5ML suspension  Commonly known as: ZOVIRAX  400 mg, Oral, 2 TIMES DAILY  Replaces: acyclovir 200 MG capsule     fluconazole 40 MG/ML suspension  Commonly known as: DIFLUCAN  400 mg, Oral or Feeding Tube, DAILY  Replaces: fluconazole 200 MG tablet     loperamide 2 MG capsule  Commonly known as: IMODIUM  2 mg, Oral, 4 TIMES DAILY     sucralfate 1 GM/10ML suspension  Commonly known as: CARAFATE  1 g, Oral, 4 TIMES DAILY BEFORE MEALS & NIGHTLY            Discontinued      acyclovir 200 MG capsule  Commonly known as: ZOVIRAX  Replaced by: acyclovir 200 MG/5ML suspension     fluconazole 200 MG tablet  Commonly known as: DIFLUCAN  Replaced by: fluconazole 40 MG/ML suspension     levofloxacin 500 MG tablet  Commonly known as: LEVAQUIN     LORazepam 0.5 MG tablet  Commonly known as: ATIVAN     pantoprazole 40 MG EC tablet  Commonly known as: PROTONIX     venlafaxine 150 MG 24 hr capsule  Commonly known as: EFFEXOR XR            Brief History of Illness:    **Adopted from [facility]  Jennifer Ward is a 48 year old  "female who has a history of autologous hematopoietic cell transplant as rescue after BEAM therapy for Hodgkin Lymphoma and is admitted for neutropenic fevers.  Fevers began during infusion this morning.  Also reports body aches ongoing for 1 to 2 days prior to admission.  Denies any respiratory symptoms at this time (cough, shortness of breath).  Temperature measured at home 101.3 F.  Denies any sick contacts.  No chest pain, vomiting, or diarrhea.  Does report nausea and abdominal pain in the epigastric region.  Denies any changes in vision.  Does not seem more confused per mother.  Abdominal pain feels like \"bowels being grabbed\" and also describes a sense of bloating.  Is continuing to pass gas and stool regularly.  States that stools are typically soft.  Has been nauseous, but has not vomited.  Physical Exam:    BP (!) 147/82 (BP Location: Left arm)   Pulse 61   Temp 98.4  F (36.9  C) (Oral)   Resp 16   Ht 1.64 m (5' 4.57\")   Wt 102.4 kg (225 lb 12.8 oz)   SpO2 98%   BMI 38.08 kg/m    # Discharge Pain Plan:   - During her hospitalization, Jennifer experienced pain due to myalgias.  The pain plan for discharge was discussed with Jennifer and the plan was created in a collaborative fashion.    - anticipate she will feel better when not in hospital and not sleeping in hospital bed.     General Appearance: well appearing, NAD.  Facial flushing.   HEENT: sclera anicteric, EOMI. Moist mucus membranes; very minor, yellow based lesion on left lateral tongue, which is improving daily   CV: RRR, no murmur or rub.   RESP: slightly decreased sounds at right base  GI: +BS, soft, nontender, no masses or hepatosplenomegaly.  EXT: 2+ edema bilateral feet and lower legs  SKIN:  No rash or lesions on exposed areas; facial flushing  NEURO: A&O x3; CN II-XII grossly intact.  PSYCH: Appropriate affect  VASCULAR ACCESS: PAC and Louis catheter in place       Lab Values     Lab Results   Component Value Date    WBC 7.9 10/27/2023    " ANEU 5.1 10/27/2023    HGB 8.4 (L) 10/27/2023    HCT 25.6 (L) 10/27/2023     (L) 10/27/2023     10/27/2023    POTASSIUM 3.8 10/27/2023    CHLORIDE 103 10/27/2023    CO2 24 10/27/2023    GLC 88 10/27/2023    BUN 8.7 10/27/2023    CR 0.67 10/27/2023    MAG 1.6 (L) 10/27/2023    INR 1.37 (H) 10/23/2023    BILITOTAL 0.3 10/27/2023    AST 16 10/27/2023    ALT 10 10/27/2023    ALKPHOS 59 10/27/2023    PROTTOTAL 5.1 (L) 10/27/2023    ALBUMIN 3.0 (L) 10/27/2023       I have assessed all abnormal lab values for their clinical significance and any values considered clinically significant have been addressed in the assessment and plan.      Hospital Course:    Autologous stem cell transplant for Hodgkin Lymphoma: BEAM prep followed by autologous stem cell transplant.  Infused cell dose: 7 million CD34+ cells/kg.  Restage at day +28.  Immunosuppressed patient (secondary to stem cell transplant): continue fluconazole through day +60.  Continue acyclovir for 1 year post transplant.  Start Bactrim at day +28 as PJP ppx.   Prolonged INR secondary to vitamin K deficiency: attempted IV vitamin K, which resulted in severe itching and flushing, with some facial/ear swelling.  This resolved with IV benadryl.  Minimal vitamin K received, as infusion was stopped quickly due to her reaction.  Note that while it is rare, IV vitamin K does carry a black box warning for this type of reaction.   Her INR prolongation was < 1.5, so no further attempts at correction were made.   Chemotherapy induced diarrhea: supportive cares and scheduled imodium.  Volume is still close to 1L per day (small volume for each episode, but BM with every urination).  C diff will be tested again today prior to discharge, and has been signed out to colleague working the weekend for follow up.   Dysphagia secondary to xerostomia: seen by speech therapy, who felt dysphagia related to dry mouth/throat.  This has resolved over time, though Jennifer still prefers  meds to be solution where possible.  Anything that was switched to solution has been re-prescribed at discharge.   Oral mucositis secondary to chemotherapy: support with chloraseptic spray and MMW.  This is almost entirely resolved.  Co-enrolled on E-CELERATE; daily ctogi monitoring complete at day +14.   Pancytopenia secondary to chemotherapy: counts are recovering nicely.  Last dose GCSF on 10/23/2023.   She is no longer needing transfusions.   Electrolyte abnormalities including hypnatremia, hypokalemia, hypophosphatemia, hypomagnesemia, and hypocalcemia; secondary to diarrhea, low intake and cell production.  These have improved over time, and she is needing minimal supplementation at this time.  Will recheck CMP in clinic on 10/30 to determine if she needs any repletion, should diarrhea persist over the weekend.    Moderate malnutrition secondary to acute illness: intake is improving as she gets further from transplant.  Myalgias of unknown etiology: these started post GCSF, so not related to GCSF.  They started 10/26 pm.  Will trial ibuprofen for aches.    Rhinovirus upper respiratory infection: supportive care, symptoms resolving.   Chemotherapy induced nausea with vomiting: antiemetics prn.  Minimal upper GI symptoms at this time.   Hypothyroidism: continued PTA levothyroxine  Fluid overload: improved with diuresis (daily spironolactone while inpatient and prn lasix).   Depression: continues on bupriprion; per patient request, venlafaxine was discontinued.  Migraines: propranolol prophylaxis and naratriptan to abort migraines as needed  CODE STATUS: FULL CODE  Discharge Instructions and Follow-Up:    Discharge diet: Regular diet as tolerated  Discharge activity: Activity per discharge teaching  Discharge follow-up: Follow up with BMT Clinic as follows: daily 10/28-29 for line flushes; labs and provider visit on 10/30/23.     Discharge Disposition:    Discharged to local apartment with .     Helen BENOIT  Carrier, MALENA  10/27/2023

## 2023-10-27 NOTE — PROGRESS NOTES
Care Management Discharge Note     Anticipated Discharge Date: 10/27/2023     Discharge Disposition: [address] [facility]     Discharge Services/DME: See RNCC notes for community discharge details.     Discharge Transportation: family or friend will provide ()     Private pay costs discussed: Not applicable - See RNCC notes for needs     Does the patient's insurance plan have a 3 day qualifying hospital stay waiver?  No     PAS Confirmation Code:  N/A  Patient/family educated on Medicare website which has current facility and service quality ratings: no     Education Provided on the Discharge Plan: Yes  Persons Notified of Discharge Plans: Pt, Pt's  and mother, IDT  Patient/Family in Agreement with the Plan: yes     Handoff Referral Completed:  N/A     Additional Information:  Jennifer Ward is a 48 year old female, currently day +15 of Auto BMT.     Per medical team, Pt is medically stable for discharge today 10/27/23. SW spoke w/ Pt via phone for check-in. Pt shared that she is feeling much better than initial admission and is still recovering day by day. Her friend Rachael is her planned caregiver this week and is with her in the hospital preparing for discharge today. They will return to [address] [facility]. Pt expressed excitement to be leaving the hospital today. She denied questions or coping concerns. SW encouraged Pt to contact writer for support needs.    DUONG Baker, [facility]  Adult Blood & Marrow Transplant   Phone: [phone]  Pager: [phone]

## 2023-10-27 NOTE — PROGRESS NOTES
Care Coordination - Discharge Planning    Discharge Planning:      Discharge location: Home     [ x ] Home Infusion Company: [facility]     [ x ] Pharmacy needs: none at this time     [ x ] PT/OT recommendations: home with assist     [ x ] 1st time discharge? no     [ x ] Discharge teach     [ x ] PLC- done     [ x ] Weekly Lab Day Preference?     [ x ] D0 BAN scheduling notification     [ x ] clonoSEQ testing needed? No    I spoke with Jennifer this morning regarding discharge. I gave her another copy of the discharge guidelines and she offered no questions. She states she has line supplies at home. I re-iterated the importance of proper hand hygiene, masking, and staying away from crowds at this time. She is aware of symptoms that necessitate a call and is aware of the proper numbers to call.    Merlyn Shah  BMT Nurse Coordinator

## 2023-10-27 NOTE — PLAN OF CARE
"BP (!) 151/94 (BP Location: Left arm)   Pulse 71   Temp 98.2  F (36.8  C) (Oral)   Resp 18   Ht 1.64 m (5' 4.57\")   Wt 102 kg (224 lb 12.8 oz)   SpO2 99%   BMI 37.91 kg/m      Patient was stable vitally. Patient had her dressing changed. Patient denied pain or nausea. Patient is generally stable.     Problem: Adult Inpatient Plan of Care  Goal: Plan of Care Review  Description: The Plan of Care Review/Shift note should be completed every shift.  The Outcome Evaluation is a brief statement about your assessment that the patient is improving, declining, or no change.  This information will be displayed automatically on your shift  note.  Outcome: Progressing  Goal: Patient-Specific Goal (Individualized)  Description: You can add care plan individualizations to a care plan. Examples of Individualization might be:  \"Parent requests to be called daily at 9am for status\", \"I have a hard time hearing out of my right ear\", or \"Do not touch me to wake me up as it startles  me\".  Outcome: Progressing  Goal: Absence of Hospital-Acquired Illness or Injury  Outcome: Progressing  Intervention: Identify and Manage Fall Risk  Recent Flowsheet Documentation  Taken 10/26/2023 1645 by Saleem Wilson, RN  Safety Promotion/Fall Prevention:   lighting adjusted   clutter free environment maintained   nonskid shoes/slippers when out of bed   safety round/check completed  Intervention: Prevent Skin Injury  Recent Flowsheet Documentation  Taken 10/26/2023 1645 by Saleem Wilson, RN  Body Position: position changed independently  Intervention: Prevent Infection  Recent Flowsheet Documentation  Taken 10/26/2023 1645 by Saleem Wilson, RN  Infection Prevention:   personal protective equipment utilized   single patient room provided   rest/sleep promoted   hand hygiene promoted  Goal: Optimal Comfort and Wellbeing  Outcome: Progressing  Intervention: Provide Person-Centered Care  Recent Flowsheet Documentation  Taken 10/26/2023 1645 " by Saleem Wilson RN  Trust Relationship/Rapport:   care explained   questions answered  Goal: Readiness for Transition of Care  Outcome: Progressing     Problem: Stem Cell/Bone Marrow Transplant  Goal: Optimal Coping with Transplant  Outcome: Progressing  Intervention: Optimize Patient/Family Adjustment to Transplant  Recent Flowsheet Documentation  Taken 10/26/2023 1645 by Saleem Wilson, RN  Supportive Measures: active listening utilized  Goal: Symptom-Free Urinary Elimination  Outcome: Progressing  Goal: Diarrhea Symptom Control  Outcome: Progressing  Goal: Improved Activity Tolerance  Outcome: Progressing  Intervention: Promote Improved Energy  Recent Flowsheet Documentation  Taken 10/26/2023 1645 by Saleem Wilson, RN  Fatigue Management: activity schedule adjusted  Activity Management:   activity adjusted per tolerance   ambulated to bathroom  Environmental Support: calm environment promoted  Goal: Blood Counts Within Acceptable Range  Outcome: Progressing  Intervention: Monitor and Manage Hematologic Symptoms  Recent Flowsheet Documentation  Taken 10/26/2023 1645 by Saleem Wilson RN  Bleeding Precautions:   monitored for signs of bleeding   coagulation study results reviewed  Medication Review/Management: medications reviewed  Goal: Absence of Hypersensitivity Reaction  Outcome: Progressing  Goal: Absence of Infection  Outcome: Progressing  Intervention: Prevent and Manage Infection  Recent Flowsheet Documentation  Taken 10/26/2023 1645 by Saleem Wilson, RN  Infection Prevention:   personal protective equipment utilized   single patient room provided   rest/sleep promoted   hand hygiene promoted  Infection Management: aseptic technique maintained  Isolation Precautions:   droplet precautions maintained   protective environment maintained  Goal: Improved Oral Mucous Membrane Health and Integrity  Outcome: Progressing  Intervention: Promote Oral Comfort and Health  Recent Flowsheet  Documentation  Taken 10/26/2023 1645 by Saleem Wilson, RN  Oral Mucous Membrane Protection: lip/mouth moisturizer applied  Goal: Nausea and Vomiting Symptom Relief  Outcome: Progressing  Goal: Optimal Nutrition Intake  Outcome: Progressing  Intervention: Minimize and Manage Barriers to Oral Intake  Recent Flowsheet Documentation  Taken 10/26/2023 1645 by Saleem Wilson, RN  Oral Nutrition Promotion:   calorie-dense liquids provided   nutrition counseling provided   rest periods promoted   Goal Outcome Evaluation:

## 2023-10-28 ENCOUNTER — LAB (OUTPATIENT)
Dept: LAB | Facility: CLINIC | Age: 48
End: 2023-10-28
Attending: INTERNAL MEDICINE
Payer: COMMERCIAL

## 2023-10-28 PROCEDURE — 250N000011 HC RX IP 250 OP 636: Mod: JZ | Performed by: INTERNAL MEDICINE

## 2023-10-28 RX ORDER — HEPARIN SODIUM,PORCINE 10 UNIT/ML
5 VIAL (ML) INTRAVENOUS ONCE
Status: COMPLETED | OUTPATIENT
Start: 2023-10-28 | End: 2023-10-28

## 2023-10-28 RX ADMIN — Medication 5 ML: at 10:11

## 2023-10-28 NOTE — NURSING NOTE
Chief Complaint   Patient presents with    RECHECK     CVC flushed with saline and heparin.  Blood return noted from both lumens     Nena Whittington RN

## 2023-10-29 ENCOUNTER — LAB (OUTPATIENT)
Dept: LAB | Facility: CLINIC | Age: 48
End: 2023-10-29
Attending: INTERNAL MEDICINE
Payer: COMMERCIAL

## 2023-10-29 PROCEDURE — 96523 IRRIG DRUG DELIVERY DEVICE: CPT

## 2023-10-29 PROCEDURE — 250N000011 HC RX IP 250 OP 636: Mod: JZ | Performed by: INTERNAL MEDICINE

## 2023-10-29 RX ORDER — HEPARIN SODIUM,PORCINE 10 UNIT/ML
5 VIAL (ML) INTRAVENOUS ONCE
Status: COMPLETED | OUTPATIENT
Start: 2023-10-29 | End: 2023-10-29

## 2023-10-29 RX ADMIN — Medication 5 ML: at 10:13

## 2023-10-30 ENCOUNTER — APPOINTMENT (OUTPATIENT)
Dept: LAB | Facility: CLINIC | Age: 48
End: 2023-10-30
Attending: INTERNAL MEDICINE
Payer: COMMERCIAL

## 2023-10-30 ENCOUNTER — ONCOLOGY VISIT (OUTPATIENT)
Dept: TRANSPLANT | Facility: CLINIC | Age: 48
End: 2023-10-30
Attending: INTERNAL MEDICINE
Payer: COMMERCIAL

## 2023-10-30 VITALS
OXYGEN SATURATION: 97 % | BODY MASS INDEX: 36.98 KG/M2 | TEMPERATURE: 98.3 F | HEART RATE: 75 BPM | WEIGHT: 219.3 LBS | DIASTOLIC BLOOD PRESSURE: 88 MMHG | RESPIRATION RATE: 16 BRPM | SYSTOLIC BLOOD PRESSURE: 156 MMHG

## 2023-10-30 DIAGNOSIS — C81.18 NODULAR SCLEROSIS HODGKIN LYMPHOMA OF LYMPH NODES OF MULTIPLE REGIONS (H): ICD-10-CM

## 2023-10-30 DIAGNOSIS — Z52.011 AUTOLOGOUS DONOR OF STEM CELLS: Primary | ICD-10-CM

## 2023-10-30 LAB
ALBUMIN SERPL BCG-MCNC: 3.5 G/DL (ref 3.5–5.2)
ALP SERPL-CCNC: 71 U/L (ref 35–104)
ALT SERPL W P-5'-P-CCNC: 14 U/L (ref 0–50)
ANION GAP SERPL CALCULATED.3IONS-SCNC: 10 MMOL/L (ref 7–15)
AST SERPL W P-5'-P-CCNC: 19 U/L (ref 0–45)
BACTERIA BLD CULT: NO GROWTH
BACTERIA BLD CULT: NO GROWTH
BASOPHILS # BLD AUTO: ABNORMAL 10*3/UL
BASOPHILS # BLD MANUAL: 0.1 10E3/UL (ref 0–0.2)
BASOPHILS NFR BLD AUTO: ABNORMAL %
BASOPHILS NFR BLD MANUAL: 2 %
BILIRUB SERPL-MCNC: 0.3 MG/DL
BUN SERPL-MCNC: 3.2 MG/DL (ref 6–20)
BURR CELLS BLD QL SMEAR: SLIGHT
CALCIUM SERPL-MCNC: 8.7 MG/DL (ref 8.6–10)
CHLORIDE SERPL-SCNC: 106 MMOL/L (ref 98–107)
CREAT SERPL-MCNC: 0.58 MG/DL (ref 0.51–0.95)
DEPRECATED HCO3 PLAS-SCNC: 24 MMOL/L (ref 22–29)
EGFRCR SERPLBLD CKD-EPI 2021: >90 ML/MIN/1.73M2
ELLIPTOCYTES BLD QL SMEAR: SLIGHT
EOSINOPHIL # BLD AUTO: ABNORMAL 10*3/UL
EOSINOPHIL # BLD MANUAL: 0.1 10E3/UL (ref 0–0.7)
EOSINOPHIL NFR BLD AUTO: ABNORMAL %
EOSINOPHIL NFR BLD MANUAL: 1 %
ERYTHROCYTE [DISTWIDTH] IN BLOOD BY AUTOMATED COUNT: 14.8 % (ref 10–15)
GLUCOSE SERPL-MCNC: 113 MG/DL (ref 70–99)
HCT VFR BLD AUTO: 28 % (ref 35–47)
HGB BLD-MCNC: 9 G/DL (ref 11.7–15.7)
IMM GRANULOCYTES # BLD: ABNORMAL 10*3/UL
IMM GRANULOCYTES NFR BLD: ABNORMAL %
LYMPHOCYTES # BLD AUTO: ABNORMAL 10*3/UL
LYMPHOCYTES # BLD MANUAL: 0.9 10E3/UL (ref 0.8–5.3)
LYMPHOCYTES NFR BLD AUTO: ABNORMAL %
LYMPHOCYTES NFR BLD MANUAL: 15 %
MAGNESIUM SERPL-MCNC: 1.5 MG/DL (ref 1.7–2.3)
MCH RBC QN AUTO: 27.9 PG (ref 26.5–33)
MCHC RBC AUTO-ENTMCNC: 32.1 G/DL (ref 31.5–36.5)
MCV RBC AUTO: 87 FL (ref 78–100)
METAMYELOCYTES # BLD MANUAL: 0.4 10E3/UL
METAMYELOCYTES NFR BLD MANUAL: 6 %
MONOCYTES # BLD AUTO: ABNORMAL 10*3/UL
MONOCYTES # BLD MANUAL: 0.6 10E3/UL (ref 0–1.3)
MONOCYTES NFR BLD AUTO: ABNORMAL %
MONOCYTES NFR BLD MANUAL: 10 %
NEUTROPHILS # BLD AUTO: ABNORMAL 10*3/UL
NEUTROPHILS # BLD MANUAL: 3.9 10E3/UL (ref 1.6–8.3)
NEUTROPHILS NFR BLD AUTO: ABNORMAL %
NEUTROPHILS NFR BLD MANUAL: 66 %
NRBC # BLD AUTO: 0 10E3/UL
NRBC # BLD AUTO: 0.1 10E3/UL
NRBC BLD AUTO-RTO: 1 /100
NRBC BLD MANUAL-RTO: 1 %
PLAT MORPH BLD: ABNORMAL
PLATELET # BLD AUTO: 147 10E3/UL (ref 150–450)
POTASSIUM SERPL-SCNC: 3.4 MMOL/L (ref 3.4–5.3)
PROT SERPL-MCNC: 5.7 G/DL (ref 6.4–8.3)
RBC # BLD AUTO: 3.23 10E6/UL (ref 3.8–5.2)
RBC MORPH BLD: ABNORMAL
SODIUM SERPL-SCNC: 140 MMOL/L (ref 135–145)
WBC # BLD AUTO: 5.9 10E3/UL (ref 4–11)

## 2023-10-30 PROCEDURE — 36593 DECLOT VASCULAR DEVICE: CPT

## 2023-10-30 PROCEDURE — 85014 HEMATOCRIT: CPT

## 2023-10-30 PROCEDURE — 83735 ASSAY OF MAGNESIUM: CPT

## 2023-10-30 PROCEDURE — 36415 COLL VENOUS BLD VENIPUNCTURE: CPT

## 2023-10-30 PROCEDURE — 85007 BL SMEAR W/DIFF WBC COUNT: CPT

## 2023-10-30 PROCEDURE — 99213 OFFICE O/P EST LOW 20 MIN: CPT

## 2023-10-30 PROCEDURE — 36591 DRAW BLOOD OFF VENOUS DEVICE: CPT

## 2023-10-30 PROCEDURE — 80053 COMPREHEN METABOLIC PANEL: CPT

## 2023-10-30 PROCEDURE — 99214 OFFICE O/P EST MOD 30 MIN: CPT

## 2023-10-30 PROCEDURE — 250N000011 HC RX IP 250 OP 636: Mod: JZ | Performed by: INTERNAL MEDICINE

## 2023-10-30 RX ORDER — HEPARIN SODIUM (PORCINE) LOCK FLUSH IV SOLN 100 UNIT/ML 100 UNIT/ML
5 SOLUTION INTRAVENOUS
Status: CANCELLED | OUTPATIENT
Start: 2023-10-31

## 2023-10-30 RX ORDER — HEPARIN SODIUM,PORCINE 10 UNIT/ML
5-20 VIAL (ML) INTRAVENOUS DAILY PRN
Status: CANCELLED | OUTPATIENT
Start: 2023-10-31

## 2023-10-30 RX ORDER — HEPARIN SODIUM,PORCINE 10 UNIT/ML
5-20 VIAL (ML) INTRAVENOUS DAILY PRN
Status: DISCONTINUED | OUTPATIENT
Start: 2023-10-30 | End: 2023-10-30 | Stop reason: HOSPADM

## 2023-10-30 RX ORDER — HEPARIN SODIUM (PORCINE) LOCK FLUSH IV SOLN 100 UNIT/ML 100 UNIT/ML
5 SOLUTION INTRAVENOUS EVERY 8 HOURS
Status: DISCONTINUED | OUTPATIENT
Start: 2023-10-30 | End: 2023-10-30 | Stop reason: HOSPADM

## 2023-10-30 RX ADMIN — ALTEPLASE 2 MG: 2.2 INJECTION, POWDER, LYOPHILIZED, FOR SOLUTION INTRAVENOUS at 07:47

## 2023-10-30 RX ADMIN — Medication 10 ML: at 08:46

## 2023-10-30 RX ADMIN — Medication 5 ML: at 07:55

## 2023-10-30 RX ADMIN — ALTEPLASE 2 MG: 2.2 INJECTION, POWDER, LYOPHILIZED, FOR SOLUTION INTRAVENOUS at 07:48

## 2023-10-30 ASSESSMENT — PAIN SCALES - GENERAL: PAINLEVEL: SEVERE PAIN (6)

## 2023-10-30 NOTE — NURSING NOTE
Chief Complaint   Patient presents with    Port Draw     Gripper needle. Heparin locked, vitals checked, RAC does not draw back blood, TPA instilled     Enriqueta Melgar RN on 10/30/2023 at 7:56 AM

## 2023-10-30 NOTE — PROGRESS NOTES
Patient ID:  Jennifer Ward is a 48 year old female, currently day +18 s/p autologous hematopoietic cell transplant as rescue after BEAM therapy for Hodgkin Lymphoma. Co-enrolled on [employer] study. Readmitted 10/16 with neutropenic fever and abdominal pain.     INTERVAL  HISTORY   Jennifer was discharged from  on Friday. Doing well other than eating is still hard. Food isn't tasting good especially if it's dry. Macaroni for dinner last night worked well. No n/v. Diarrhea is pretty much resolved.      Review of Systems: ROS negative except as noted above.        PHYSICAL EXAM      Weight In/Out          Wt Readings from Last 3 Encounters:   10/26/23 102 kg (224 lb 12.8 oz)   10/16/23 99.9 kg (220 lb 3.2 oz)   10/13/23 101.1 kg (222 lb 12.8 oz)       I/O last 3 completed shifts:  In: 692.7 [P.O.:660; I.V.:32.7]  Out: 1800 [Urine:1100; Stool:700]         KPS:  70     BP (!) 156/88   Pulse 75   Temp 98.3  F (36.8  C)   Resp 16   Wt 99.5 kg (219 lb 4.8 oz)   SpO2 97%   BMI 36.98 kg/m         General: NAD  Eyes: sclera anicteric   Nose/Mouth/Throat: clear   Lungs: CTAB  Cardiovascular: RRR, no M/R/G   Abdominal: +BS, soft, no distention, no tenderness  Lymphatics: 1-2+ edema BLE  Skin: no rashes  Neuro: A&O; non-focal  Access: L CVC NT, dressing cdi. R PAC      LABS AND IMAGING: I have assessed all abnormal lab values for their clinical significance and any values considered clinically significant have been addressed in the assessment and plan.          Lab Results   Component Value Date    WBC 7.9 10/27/2023    ANEU 5.1 10/27/2023    HGB 8.4 (L) 10/27/2023    HCT 25.6 (L) 10/27/2023     (L) 10/27/2023     10/27/2023    POTASSIUM 3.8 10/27/2023    CHLORIDE 103 10/27/2023    CO2 24 10/27/2023    GLC 88 10/27/2023    BUN 8.7 10/27/2023    CR 0.67 10/27/2023    MAG 1.6 (L) 10/27/2023    INR 1.37 (H) 10/23/2023       CT Abd/Pelvis 10/16:   1. Distended loops of small bowel without a discrete transition  point  associated with areas of mild wall thickening, vascular engorgement  and mesenteric edema. Findings may represent enteritis, posttreatment  changes or hyperacute GVHD in the appropriate clinical setting.      CXR portable (10/16): no aso   Oral/GI regimen related toxicities (per NCI CTCAE v 5.0):     Oral Mucositis: grade 1  Nausea: none  Vomiting: none  Diarrhea: Grade 1     *diarrhea secondary to C. Diff is excluded from the definition of oral/GI RRT     CTCAE Terms      Vomiting: A disorder characterized by the reflexive act of ejecting the contents of the stomach through the mouth   Grade 1 Intervention not indicated   Grade 2 Outpatient IV hydration; medical intervention indicated   Grade 3 Tube feeding, TPN, or hospitalization indicated   Grade 4 Life-threatening consequences   Grade 5 Death      Oral Mucositis: A disorder characterized by ulceration or inflammation of the oral mucosal.   Grade 1 Asymptomatic or mild symptoms; intervention not indicated   Grade 2 Moderate pain or ulcer that does not interfere with oral intake; modified diet indicated   Grade 3 Severe pain; interfering with oral intake   Grade 4 Life-threatening consequences; urgent intervention indicated   Grade 5 Death      Nausea: A disorder characterized by a queasy sensation and/or the urge to vomit.   Grade 1 Loss of appetite without alteration in eating habits  Grade 2 Oral intake decreased without significant weight loss, dehydration or malnutrition   Grade 3 Inadequate oral caloric or fluid intake; tube feeding, TPN, or hospitalization indicated   Grade 4 NA  Grade 5 NA     Diarrhea: A disorder characterized by an increase in frequency and/or loose or watery bowel movements.   Grade 1 Increase of <4 stools per day over baseline; mild increase in ostomy output compared to baseline   Grade 2 Increase of 4 - 6 stools per day over baseline; moderate increase in ostomy output compared to baseline; limiting instrumental ADL   Grade 3  Increase of >=7 stools per day over baseline; hospitalization indicated; severe increase in ostomy output compared to baseline; limiting self-care ADL    Grade 4 Life-threatening consequences; urgent intervention indicated    Grade 5 Death         Source: https://ctep.cancer.gov/protocoldevelopment/electronic_applications/docs/CTCAE_v5_Quick_Reference_8.5x11.pdf      SYSTEMS-BASED ASSESSMENT AND PLAN      Jennifer Ward is a 48 year old female, currently day + 18 s/p autologous hematopoietic cell transplant as rescue after BEAM therapy for Hodgkin Lymphoma. Co-enrolled on [employer] study. Readmitted 10/16/23 with neutropenic fever;  difficulty to maintain PO intake, pain control, rhinovirus +.     Prep: BEAM     BMT  - BMT MD/Coordinator: Nay  - MT2016-11 with co-enrollment on MT2022-40 (E-CELERATE)  - Cell dose 7 million CD34+ cells/kg   - GCSF complete 10/23   - Restaging: next restage at day +28     HEME/COAG  - Transfusion parameters: hgb <7g/dL and plts <10,000.   - Pancytopenia secondary to Hodgkin Lymphoma and chemotherapy  - prolonged INR; had hypersensitivity reaction to IV form of vitamin K.  As her platelets and her intake are improving, we will not make further attempts to correct her INR at this time.      ID  #RHINOVIRUS + (10/22)- Symptomatic control, robitussin   #N/F: work up unremarkable.  Abx  (cefepime/flagyl) discontinued with engraftment.   Prophy: **note that antimicrobial dosing is specific to protocol: ACV 400mg PO bid, fluconazole 400mg daily, Bactrim or other PCP prophy to start at day +28     GI  #Diarrhea, abdominal cramping 2/2 chemo/colitis.  Improving.   C.dif neg 10/17.   Imodium QID (scheduled)  Simethicone prn, heating pad prn.  #Dysphagia/Regurge/Mucositis- PPI BID, carafate PRN. Biotene prn  #Anorexia: Dex 10mg x1, 4mg x2 last dose 10/23  #CINV: prn ativan, prn compazine. Received Emend 10/20.   #Ulcer prophy: Protonix 40mg BID  #E-CELERATE vs placebo administered 10/12;  continue ctogi assessments through day +14 (10/26).      NUTRITION/FLUIDS  # Moderate malnutrition secondary to acute illness  # high calorie/high protein diet       FEN  # Hyponatremia: resolved  # Hypokalemia likely secondary to diuresis with loop diuretic on 10/25.  # Hypophosphatemia: resolved  # Hypomagnesemia related to low intake, replete per sliding scale. Given list of Mg rich foods  # Hypocalcemia resolved     ENDOCRINE  # hypothyroidism: continue PTA synthroid     PSYCH  # depression: continue PTA bupropion; venlafaxine discontinued per patient request on 10/22.      NEURO  # migraines: daily topamax with prn naratriptan; propranolol prophylaxis.      SUPPORTIVE CARE  - PT/OT consult, will follow as indicated  - BMT Social work to follow.       Code Status: Full Code      RETURN TO CLINIC:   Thursday.  Line removal referral placed 10/27-awaiting scheduling. Also has PAC.     Vivian Velez NP

## 2023-10-30 NOTE — NURSING NOTE
RN assessed TPA instillment in both lumens of cvc, at 30+ minutes, no blood return obtained. Discussed with patient and provider, Vivian Velez. Vivian ok'd tpa to be removed and line flushed with saline and heparin, request has already been placed for line removal and patient has port for blood draws.    Gricelda Mejia, RN

## 2023-10-30 NOTE — LETTER
10/30/2023         RE: Jennifer Ward  [address] [facility] [address]        Dear Colleague,    Thank you for referring your patient, Jennifer Ward, to the [facility] [place]. Please see a copy of my visit note below.    Patient ID:  Jennifer Ward is a 48 year old female, currently day +18 s/p autologous hematopoietic cell transplant as rescue after BEAM therapy for Hodgkin Lymphoma. Co-enrolled on [employer] study. Readmitted 10/16 with neutropenic fever and abdominal pain.     INTERVAL  HISTORY   Jennifer was discharged from  on Friday. Doing well other than eating is still hard. Food isn't tasting good especially if it's dry. Macaroni for dinner last night worked well. No n/v. Diarrhea is pretty much resolved.      Review of Systems: ROS negative except as noted above.        PHYSICAL EXAM      Weight In/Out          Wt Readings from Last 3 Encounters:   10/26/23 102 kg (224 lb 12.8 oz)   10/16/23 99.9 kg (220 lb 3.2 oz)   10/13/23 101.1 kg (222 lb 12.8 oz)       I/O last 3 completed shifts:  In: 692.7 [P.O.:660; I.V.:32.7]  Out: 1800 [Urine:1100; Stool:700]         KPS:  70     BP (!) 156/88   Pulse 75   Temp 98.3  F (36.8  C)   Resp 16   Wt 99.5 kg (219 lb 4.8 oz)   SpO2 97%   BMI 36.98 kg/m         General: NAD  Eyes: sclera anicteric   Nose/Mouth/Throat: clear   Lungs: CTAB  Cardiovascular: RRR, no M/R/G   Abdominal: +BS, soft, no distention, no tenderness  Lymphatics: 1-2+ edema BLE  Skin: no rashes  Neuro: A&O; non-focal  Access: L CVC NT, dressing cdi. R PAC      LABS AND IMAGING: I have assessed all abnormal lab values for their clinical significance and any values considered clinically significant have been addressed in the assessment and plan.          Lab Results   Component Value Date    WBC 7.9 10/27/2023    ANEU 5.1 10/27/2023    HGB 8.4 (L) 10/27/2023    HCT 25.6 (L) 10/27/2023     (L) 10/27/2023     10/27/2023     POTASSIUM 3.8 10/27/2023    CHLORIDE 103 10/27/2023    CO2 24 10/27/2023    GLC 88 10/27/2023    BUN 8.7 10/27/2023    CR 0.67 10/27/2023    MAG 1.6 (L) 10/27/2023    INR 1.37 (H) 10/23/2023       CT Abd/Pelvis 10/16:   1. Distended loops of small bowel without a discrete transition point  associated with areas of mild wall thickening, vascular engorgement  and mesenteric edema. Findings may represent enteritis, posttreatment  changes or hyperacute GVHD in the appropriate clinical setting.      CXR portable (10/16): no aso   Oral/GI regimen related toxicities (per NCI CTCAE v 5.0):     Oral Mucositis: grade 1  Nausea: none  Vomiting: none  Diarrhea: Grade 1     *diarrhea secondary to C. Diff is excluded from the definition of oral/GI RRT     CTCAE Terms      Vomiting: A disorder characterized by the reflexive act of ejecting the contents of the stomach through the mouth   Grade 1 Intervention not indicated   Grade 2 Outpatient IV hydration; medical intervention indicated   Grade 3 Tube feeding, TPN, or hospitalization indicated   Grade 4 Life-threatening consequences   Grade 5 Death      Oral Mucositis: A disorder characterized by ulceration or inflammation of the oral mucosal.   Grade 1 Asymptomatic or mild symptoms; intervention not indicated   Grade 2 Moderate pain or ulcer that does not interfere with oral intake; modified diet indicated   Grade 3 Severe pain; interfering with oral intake   Grade 4 Life-threatening consequences; urgent intervention indicated   Grade 5 Death      Nausea: A disorder characterized by a queasy sensation and/or the urge to vomit.   Grade 1 Loss of appetite without alteration in eating habits  Grade 2 Oral intake decreased without significant weight loss, dehydration or malnutrition   Grade 3 Inadequate oral caloric or fluid intake; tube feeding, TPN, or hospitalization indicated   Grade 4 NA  Grade 5 NA     Diarrhea: A disorder characterized by an increase in frequency and/or loose  or watery bowel movements.   Grade 1 Increase of <4 stools per day over baseline; mild increase in ostomy output compared to baseline   Grade 2 Increase of 4 - 6 stools per day over baseline; moderate increase in ostomy output compared to baseline; limiting instrumental ADL   Grade 3 Increase of >=7 stools per day over baseline; hospitalization indicated; severe increase in ostomy output compared to baseline; limiting self-care ADL    Grade 4 Life-threatening consequences; urgent intervention indicated    Grade 5 Death         Source: https://ctep.cancer.gov/protocoldevelopment/electronic_applications/docs/CTCAE_v5_Quick_Reference_8.5x11.pdf      SYSTEMS-BASED ASSESSMENT AND PLAN      Jennifer Ward is a 48 year old female, currently day + 18 s/p autologous hematopoietic cell transplant as rescue after BEAM therapy for Hodgkin Lymphoma. Co-enrolled on E-CELERATE study. Readmitted 10/16/23 with neutropenic fever;  difficulty to maintain PO intake, pain control, rhinovirus +.     Prep: BEAM     BMT  - BMT MD/Coordinator: Nay  - MT2016-11 with co-enrollment on MT2022-40 (E-CELERATE)  - Cell dose 7 million CD34+ cells/kg   - GCSF complete 10/23   - Restaging: next restage at day +28     HEME/COAG  - Transfusion parameters: hgb <7g/dL and plts <10,000.   - Pancytopenia secondary to Hodgkin Lymphoma and chemotherapy  - prolonged INR; had hypersensitivity reaction to IV form of vitamin K.  As her platelets and her intake are improving, we will not make further attempts to correct her INR at this time.      ID  #RHINOVIRUS + (10/22)- Symptomatic control, robitussin   #N/F: work up unremarkable.  Abx  (cefepime/flagyl) discontinued with engraftment.   Prophy: **note that antimicrobial dosing is specific to protocol: ACV 400mg PO bid, fluconazole 400mg daily, Bactrim or other PCP prophy to start at day +28     GI  #Diarrhea, abdominal cramping 2/2 chemo/colitis.  Improving.   C.dif neg 10/17.   Imodium QID  (scheduled)  Simethicone prn, heating pad prn.  #Dysphagia/Regurge/Mucositis- PPI BID, carafate PRN. Biotene prn  #Anorexia: Dex 10mg x1, 4mg x2 last dose 10/23  #CINV: prn ativan, prn compazine. Received Emend 10/20.   #Ulcer prophy: Protonix 40mg BID  #E-CELERATE vs placebo administered 10/12; continue ctogi assessments through day +14 (10/26).      NUTRITION/FLUIDS  # Moderate malnutrition secondary to acute illness  # high calorie/high protein diet       FEN  # Hyponatremia: resolved  # Hypokalemia likely secondary to diuresis with loop diuretic on 10/25.  # Hypophosphatemia: resolved  # Hypomagnesemia related to low intake, replete per sliding scale. Given list of Mg rich foods  # Hypocalcemia resolved     ENDOCRINE  # hypothyroidism: continue PTA synthroid     PSYCH  # depression: continue PTA bupropion; venlafaxine discontinued per patient request on 10/22.      NEURO  # migraines: daily topamax with prn naratriptan; propranolol prophylaxis.      SUPPORTIVE CARE  - PT/OT consult, will follow as indicated  - BMT Social work to follow.       Code Status: Full Code      RETURN TO CLINIC:   Thursday.  Line removal referral placed 10/27-awaiting scheduling. Also has PAC.     Vivian Velez NP

## 2023-10-30 NOTE — NURSING NOTE
"Oncology Rooming Note    October 30, 2023 8:08 AM   Jennifer Ward is a 48 year old female who presents for:    Chief Complaint   Patient presents with    Port Draw     Gripper needle. Heparin locked, vitals checked, RAC does not draw back blood, TPA instilled    Oncology Clinic Visit     Non Hodgkin Lymphoma      Initial Vitals: BP (!) 156/88   Pulse 75   Temp 98.3  F (36.8  C)   Resp 16   Wt 99.5 kg (219 lb 4.8 oz)   SpO2 97%   BMI 36.98 kg/m   Estimated body mass index is 36.98 kg/m  as calculated from the following:    Height as of 10/16/23: 1.64 m (5' 4.57\").    Weight as of this encounter: 99.5 kg (219 lb 4.8 oz). Body surface area is 2.13 meters squared.  Severe Pain (6) Comment: Data Unavailable   No LMP recorded. Patient has had a hysterectomy.  Allergies reviewed: Yes  Medications reviewed: Yes    Medications: Medication refills not needed today.  Pharmacy name entered into GEL[facility]: [id number] - [address] AT [facility] & [address]    Clinical concerns:  Patient states there are no new concerns to discuss with provider.  Vivian was not notified.        Mikayla Platt CMA              "

## 2023-11-01 ENCOUNTER — DOCUMENTATION ONLY (OUTPATIENT)
Dept: TRANSPLANT | Facility: CLINIC | Age: 48
End: 2023-11-01
Payer: COMMERCIAL

## 2023-11-02 ENCOUNTER — ONCOLOGY VISIT (OUTPATIENT)
Dept: TRANSPLANT | Facility: CLINIC | Age: 48
End: 2023-11-02
Attending: PHYSICIAN ASSISTANT
Payer: COMMERCIAL

## 2023-11-02 ENCOUNTER — APPOINTMENT (OUTPATIENT)
Dept: LAB | Facility: CLINIC | Age: 48
End: 2023-11-02
Attending: INTERNAL MEDICINE
Payer: COMMERCIAL

## 2023-11-02 ENCOUNTER — ANCILLARY PROCEDURE (OUTPATIENT)
Dept: INTERVENTIONAL RADIOLOGY/VASCULAR | Facility: CLINIC | Age: 48
End: 2023-11-02
Attending: PHYSICIAN ASSISTANT
Payer: COMMERCIAL

## 2023-11-02 VITALS
BODY MASS INDEX: 36.44 KG/M2 | OXYGEN SATURATION: 98 % | RESPIRATION RATE: 16 BRPM | WEIGHT: 216.1 LBS | SYSTOLIC BLOOD PRESSURE: 159 MMHG | HEART RATE: 66 BPM | DIASTOLIC BLOOD PRESSURE: 89 MMHG

## 2023-11-02 DIAGNOSIS — C81.18 NODULAR SCLEROSIS HODGKIN LYMPHOMA OF LYMPH NODES OF MULTIPLE REGIONS (H): ICD-10-CM

## 2023-11-02 DIAGNOSIS — C81.18 NODULAR SCLEROSIS HODGKIN LYMPHOMA OF LYMPH NODES OF MULTIPLE REGIONS (H): Primary | ICD-10-CM

## 2023-11-02 LAB
ALBUMIN SERPL BCG-MCNC: 3.7 G/DL (ref 3.5–5.2)
ALP SERPL-CCNC: 72 U/L (ref 35–104)
ALT SERPL W P-5'-P-CCNC: 12 U/L (ref 0–50)
ANION GAP SERPL CALCULATED.3IONS-SCNC: 10 MMOL/L (ref 7–15)
AST SERPL W P-5'-P-CCNC: 17 U/L (ref 0–45)
BACTERIA BLD CULT: NO GROWTH
BILIRUB DIRECT SERPL-MCNC: <0.2 MG/DL (ref 0–0.3)
BILIRUB SERPL-MCNC: 0.3 MG/DL
BUN SERPL-MCNC: 5 MG/DL (ref 6–20)
CALCIUM SERPL-MCNC: 9.1 MG/DL (ref 8.6–10)
CHLORIDE SERPL-SCNC: 102 MMOL/L (ref 98–107)
CREAT SERPL-MCNC: 0.52 MG/DL (ref 0.51–0.95)
DEPRECATED HCO3 PLAS-SCNC: 26 MMOL/L (ref 22–29)
EGFRCR SERPLBLD CKD-EPI 2021: >90 ML/MIN/1.73M2
GLUCOSE SERPL-MCNC: 113 MG/DL (ref 70–99)
LDH SERPL L TO P-CCNC: 243 U/L (ref 0–250)
PHOSPHATE SERPL-MCNC: 3.8 MG/DL (ref 2.5–4.5)
POTASSIUM SERPL-SCNC: 3.6 MMOL/L (ref 3.4–5.3)
PROT SERPL-MCNC: 6 G/DL (ref 6.4–8.3)
RETICS # AUTO: 0.15 10E6/UL (ref 0.03–0.1)
RETICS/RBC NFR AUTO: 4.3 % (ref 0.5–2)
SODIUM SERPL-SCNC: 138 MMOL/L (ref 135–145)

## 2023-11-02 PROCEDURE — 99214 OFFICE O/P EST MOD 30 MIN: CPT

## 2023-11-02 PROCEDURE — 85045 AUTOMATED RETICULOCYTE COUNT: CPT | Performed by: PHYSICIAN ASSISTANT

## 2023-11-02 PROCEDURE — 84100 ASSAY OF PHOSPHORUS: CPT | Performed by: PHYSICIAN ASSISTANT

## 2023-11-02 PROCEDURE — 36415 COLL VENOUS BLD VENIPUNCTURE: CPT

## 2023-11-02 PROCEDURE — 82248 BILIRUBIN DIRECT: CPT | Performed by: PHYSICIAN ASSISTANT

## 2023-11-02 PROCEDURE — 300N000004 RESEARCH KIT COLLECTION

## 2023-11-02 PROCEDURE — 83615 LACTATE (LD) (LDH) ENZYME: CPT | Performed by: PHYSICIAN ASSISTANT

## 2023-11-02 PROCEDURE — 36589 REMOVAL TUNNELED CV CATH: CPT | Performed by: PHYSICIAN ASSISTANT

## 2023-11-02 PROCEDURE — 99213 OFFICE O/P EST LOW 20 MIN: CPT

## 2023-11-02 PROCEDURE — 36592 COLLECT BLOOD FROM PICC: CPT

## 2023-11-02 PROCEDURE — 36415 COLL VENOUS BLD VENIPUNCTURE: CPT | Performed by: PHYSICIAN ASSISTANT

## 2023-11-02 PROCEDURE — 80053 COMPREHEN METABOLIC PANEL: CPT | Performed by: PHYSICIAN ASSISTANT

## 2023-11-02 NOTE — NURSING NOTE
Chief Complaint   Patient presents with    Blood Draw     Labs drawn via CVC by RN in lab. VS taken.      CVC accessed, labs drawn. Line flushed and Saline locked. Vital signs taken. Checked into next appointment.   Lupe Robb RN

## 2023-11-02 NOTE — LETTER
11/2/2023         RE: Jennifer Ward  [address] [facility] [address]        Dear Colleague,    Thank you for referring your patient, Jennifer Ward, to the [facility] BLOOD AND MARROW TRANSPLANT PROGRAM [place]. Please see a copy of my visit note below.    Patient ID:  Jennifer Ward is a 48 year old female, currently day +21 s/p autologous hematopoietic cell transplant as rescue after BEAM therapy for Hodgkin Lymphoma. Co-enrolled on E-CELERATE study. Readmitted 10/16 with neutropenic fever and abdominal pain.     INTERVAL  HISTORY     Some LE swelling continues.  Some looser stools at times but hasn't been using any imodium  Scalp dry/itchy.      Review of Systems: ROS negative except as noted above.        PHYSICAL EXAM       KPS:  70     BP (!) 159/89   Pulse 66   Resp 16   Wt 98 kg (216 lb 1.6 oz)   SpO2 98%   BMI 36.44 kg/m      General: NAD  Eyes: sclera anicteric   Lungs: CTAB  Cardiovascular: RRR, no M/R/G   Lymphatics: 1+ edema BLE  Skin: no rashes  Neuro: A&O; non-focal  Access: L CVC NT, dressing cdi. R PAC     Oral/GI regimen related toxicities (per NCI CTCAE v 5.0):     Oral Mucositis: none  Nausea: none  Vomiting: none  Diarrhea: Grade 1     *diarrhea secondary to C. Diff is excluded from the definition of oral/GI RRT     CTCAE Terms      Vomiting: A disorder characterized by the reflexive act of ejecting the contents of the stomach through the mouth   Grade 1 Intervention not indicated   Grade 2 Outpatient IV hydration; medical intervention indicated   Grade 3 Tube feeding, TPN, or hospitalization indicated   Grade 4 Life-threatening consequences   Grade 5 Death      Oral Mucositis: A disorder characterized by ulceration or inflammation of the oral mucosal.   Grade 1 Asymptomatic or mild symptoms; intervention not indicated   Grade 2 Moderate pain or ulcer that does not interfere with oral intake; modified diet indicated   Grade 3 Severe pain; interfering with oral  intake   Grade 4 Life-threatening consequences; urgent intervention indicated   Grade 5 Death      Nausea: A disorder characterized by a queasy sensation and/or the urge to vomit.   Grade 1 Loss of appetite without alteration in eating habits  Grade 2 Oral intake decreased without significant weight loss, dehydration or malnutrition   Grade 3 Inadequate oral caloric or fluid intake; tube feeding, TPN, or hospitalization indicated   Grade 4 NA  Grade 5 NA     Diarrhea: A disorder characterized by an increase in frequency and/or loose or watery bowel movements.   Grade 1 Increase of <4 stools per day over baseline; mild increase in ostomy output compared to baseline   Grade 2 Increase of 4 - 6 stools per day over baseline; moderate increase in ostomy output compared to baseline; limiting instrumental ADL   Grade 3 Increase of >=7 stools per day over baseline; hospitalization indicated; severe increase in ostomy output compared to baseline; limiting self-care ADL    Grade 4 Life-threatening consequences; urgent intervention indicated    Grade 5 Death         Source: https://ctep.cancer.gov/protocoldevelopment/electronic_applications/docs/CTCAE_v5_Quick_Reference_8.5x11.pdf      LABS AND IMAGING: I have assessed all abnormal lab values for their clinical significance and any values considered clinically significant have been addressed in the assessment and plan.          Lab Results   Component Value Date    WBC 5.9 10/30/2023    ANEU 3.9 10/30/2023    HGB 9.0 (L) 10/30/2023    HCT 28.0 (L) 10/30/2023     (L) 10/30/2023     10/30/2023    POTASSIUM 3.4 10/30/2023    CHLORIDE 106 10/30/2023    CO2 24 10/30/2023     (H) 10/30/2023    BUN 3.2 (L) 10/30/2023    CR 0.58 10/30/2023    MAG 1.5 (L) 10/30/2023    INR 1.37 (H) 10/23/2023      SYSTEMS-BASED ASSESSMENT AND PLAN      Jennifer Ward is a 48 year old female, currently day +21 s/p autologous hematopoietic cell transplant as rescue after BEAM therapy  for Hodgkin Lymphoma. Co-enrolled on E-CELERATE study. Readmitted 10/16/23 with neutropenic fever;  difficulty to maintain PO intake, pain control, rhinovirus +.     Prep: BEAM     BMT  - BMT MD/Coordinator: Nay  - MT2016-11 with co-enrollment on MT2022-40 (E-CELERATE)  - Cell dose 7 million CD34+ cells/kg   - GCSF complete 10/23   - Restaging: next restage at day +28     HEME/COAG  - Transfusion parameters: hgb <7g/dL and plts <10,000.   - Pancytopenia secondary to Hodgkin Lymphoma and chemotherapy  - prolonged INR; had hypersensitivity reaction to IV form of vitamin K.    - Line removal today. Has port-a-cath     ID  #RHINOVIRUS + (10/22)- Symptomatic control, robitussin   #N/F: work up unremarkable.  Abx  (cefepime/flagyl) discontinued with engraftment.   Prophy: **note that antimicrobial dosing is specific to protocol: ACV 400mg PO bid, fluconazole 400mg daily, Bactrim or other PCP prophy to start at day +28.     GI  #Diarrhea: Can use PRN imodium.   #Anorexia: Dex 10mg x1, 4mg x2 last dose 10/23  She can stop carafate making her taste off and no longer indicated.   #CINV: prn ativan, prn compazine. Received Emend 10/20.   #E-CELERATE vs placebo administered 10/12.       FEN  Replace lytes PRN per sliding scale.      ENDOCRINE  # hypothyroidism: continue PTA synthroid     PSYCH  # depression: continue PTA bupropion; venlafaxine discontinued per patient request on 10/22.      NEURO  # migraines: daily topamax with prn naratriptan; propranolol prophylaxis.       35 minutes spent by me on the date of the encounter doing chart review, history and exam, documentation and further activities per the note        RTC: Day +28 as planned.     Tracy Warren PA-C  [phone]

## 2023-11-02 NOTE — NURSING NOTE
"Oncology Rooming Note    November 2, 2023 12:29 PM   Jennifer Ward is a 48 year old female who presents for:    Chief Complaint   Patient presents with    Blood Draw     Labs drawn via CVC by RN in lab. VS taken.     Oncology Clinic Visit     Nodular sclerosis Hodgkin lymphoma of lymph nodes of multiple region     Initial Vitals: BP (!) 159/89   Pulse 66   Resp 16   Wt 98 kg (216 lb 1.6 oz)   SpO2 98%   BMI 36.44 kg/m   Estimated body mass index is 36.44 kg/m  as calculated from the following:    Height as of 10/6/23: 1.64 m (5' 4.57\").    Weight as of this encounter: 98 kg (216 lb 1.6 oz). Body surface area is 2.11 meters squared.  Moderate Pain (4) Comment: all over achy and weak   No LMP recorded. Patient has had a hysterectomy.  Allergies reviewed: Yes  Medications reviewed: Yes    Medications: Medication refills not needed today.  Pharmacy name entered into [employer]: [facility] #[id number] - [address] [place] AT [facility] [address]    Clinical concerns:        Mikayla Platt CMA              "

## 2023-11-02 NOTE — DISCHARGE INSTRUCTIONS
A collaboration between [facility] and [facility]  Experts in minimally invasive, targeted treatments performed using imaging guidance    Tunneled Central Venous Catheter Removal    Today you had your existing tunneled central venous catheter removed because it was no longer needed for treatment.    Your catheter was removed by Akshat Benites PA-C    After you go home:  Avoid lying flat or bending at the waist for 2 hours following removal of the catheter to reduce risk of bleeding from catheter site.  Keep any applied tape/gauze dressings clean and dry. Change tape/gauze dressings if they get wet or soiled.  You may shower following the procedure, however cover and protect from moisture any tape/gauze dressings.   You may remove tape/gauze dressings after 3 days if the site looks like it is in the process of healing or scabbing over.  Avoid strenuous activities (elevating heart rate) or lifting more than 10 pounds for the next 3 days. Walking, elliptical and golf are examples of acceptable activities.  If there is bleeding or oozing from the procedure site, apply firm pressure to the area above your collar bone (where the catheter entered the bloodstream) for 10 minutes.  If the bleeding continues, continue to hold pressure and seek medical attention at the phone numbers below.  Mild procedure site discomfort can be treated with an ice pack and over-the-counter pain relievers.           Call our Interventional Radiology (IR) service if:  If you start bleeding from the procedure site. Our radiology provider can help you decide if you need to return to the hospital.  If you have new or worsening pain related to the procedure.  If you have concerning swelling at the procedure site.  If you develop hives or a rash or any unexplained itching.  If you have a fever of greater than 100.5  F and chills in the first 5 days after procedure.  Any other concerns related to your  procedure.    Contact Number:  [phone]  (IR control desk)  Monday - Friday 8:00 am - 4:30 pm    After hours for urgent concerns:  [phone]  After 4:30 pm Monday - Friday, Weekends and Holidays.   Ask for Interventional Radiology on-call.  Someone is available 24 hours a day.  [facility] toll free number:  [phone]

## 2023-11-02 NOTE — PROGRESS NOTES
Patient ID:  Jennifer Ward is a 48 year old female, currently day +21 s/p autologous hematopoietic cell transplant as rescue after BEAM therapy for Hodgkin Lymphoma. Co-enrolled on E-CELERATE study. Readmitted 10/16 with neutropenic fever and abdominal pain.     INTERVAL  HISTORY     Some LE swelling continues.  Some looser stools at times but hasn't been using any imodium  Scalp dry/itchy.      Review of Systems: ROS negative except as noted above.        PHYSICAL EXAM       KPS:  70     BP (!) 159/89   Pulse 66   Resp 16   Wt 98 kg (216 lb 1.6 oz)   SpO2 98%   BMI 36.44 kg/m      General: NAD  Eyes: sclera anicteric   Lungs: CTAB  Cardiovascular: RRR, no M/R/G   Lymphatics: 1+ edema BLE  Skin: no rashes  Neuro: A&O; non-focal  Access: L CVC NT, dressing cdi. R PAC     Oral/GI regimen related toxicities (per NCI CTCAE v 5.0):     Oral Mucositis: none  Nausea: none  Vomiting: none  Diarrhea: Grade 1     *diarrhea secondary to C. Diff is excluded from the definition of oral/GI RRT     CTCAE Terms      Vomiting: A disorder characterized by the reflexive act of ejecting the contents of the stomach through the mouth   Grade 1 Intervention not indicated   Grade 2 Outpatient IV hydration; medical intervention indicated   Grade 3 Tube feeding, TPN, or hospitalization indicated   Grade 4 Life-threatening consequences   Grade 5 Death      Oral Mucositis: A disorder characterized by ulceration or inflammation of the oral mucosal.   Grade 1 Asymptomatic or mild symptoms; intervention not indicated   Grade 2 Moderate pain or ulcer that does not interfere with oral intake; modified diet indicated   Grade 3 Severe pain; interfering with oral intake   Grade 4 Life-threatening consequences; urgent intervention indicated   Grade 5 Death      Nausea: A disorder characterized by a queasy sensation and/or the urge to vomit.   Grade 1 Loss of appetite without alteration in eating habits  Grade 2 Oral intake decreased without  significant weight loss, dehydration or malnutrition   Grade 3 Inadequate oral caloric or fluid intake; tube feeding, TPN, or hospitalization indicated   Grade 4 NA  Grade 5 NA     Diarrhea: A disorder characterized by an increase in frequency and/or loose or watery bowel movements.   Grade 1 Increase of <4 stools per day over baseline; mild increase in ostomy output compared to baseline   Grade 2 Increase of 4 - 6 stools per day over baseline; moderate increase in ostomy output compared to baseline; limiting instrumental ADL   Grade 3 Increase of >=7 stools per day over baseline; hospitalization indicated; severe increase in ostomy output compared to baseline; limiting self-care ADL    Grade 4 Life-threatening consequences; urgent intervention indicated    Grade 5 Death         Source: https://ctep.cancer.gov/protocoldevelopment/electronic_applications/docs/CTCAE_v5_Quick_Reference_8.5x11.pdf      LABS AND IMAGING: I have assessed all abnormal lab values for their clinical significance and any values considered clinically significant have been addressed in the assessment and plan.          Lab Results   Component Value Date    WBC 5.9 10/30/2023    ANEU 3.9 10/30/2023    HGB 9.0 (L) 10/30/2023    HCT 28.0 (L) 10/30/2023     (L) 10/30/2023     10/30/2023    POTASSIUM 3.4 10/30/2023    CHLORIDE 106 10/30/2023    CO2 24 10/30/2023     (H) 10/30/2023    BUN 3.2 (L) 10/30/2023    CR 0.58 10/30/2023    MAG 1.5 (L) 10/30/2023    INR 1.37 (H) 10/23/2023      SYSTEMS-BASED ASSESSMENT AND PLAN      Jennifer Ward is a 48 year old female, currently day +21 s/p autologous hematopoietic cell transplant as rescue after BEAM therapy for Hodgkin Lymphoma. Co-enrolled on E-CELERATE study. Readmitted 10/16/23 with neutropenic fever;  difficulty to maintain PO intake, pain control, rhinovirus +.     Prep: BEAM     BMT  - BMT MD/Coordinator: Nay  - MT2016-11 with co-enrollment on MT2022-40 (E-CELERATE)  -  Cell dose 7 million CD34+ cells/kg   - GCSF complete 10/23   - Restaging: next restage at day +28     HEME/COAG  - Transfusion parameters: hgb <7g/dL and plts <10,000.   - Pancytopenia secondary to Hodgkin Lymphoma and chemotherapy  - prolonged INR; had hypersensitivity reaction to IV form of vitamin K.    - Line removal today. Has port-a-cath     ID  #RHINOVIRUS + (10/22)- Symptomatic control, robitussin   #N/F: work up unremarkable.  Abx  (cefepime/flagyl) discontinued with engraftment.   Prophy: **note that antimicrobial dosing is specific to protocol: ACV 400mg PO bid, fluconazole 400mg daily, Bactrim or other PCP prophy to start at day +28.     GI  #Diarrhea: Can use PRN imodium.   #Anorexia: Dex 10mg x1, 4mg x2 last dose 10/23  She can stop carafate making her taste off and no longer indicated.   #CINV: prn ativan, prn compazine. Received Emend 10/20.   #E-CELERATE vs placebo administered 10/12.       FEN  Replace lytes PRN per sliding scale.      ENDOCRINE  # hypothyroidism: continue PTA synthroid     PSYCH  # depression: continue PTA bupropion; venlafaxine discontinued per patient request on 10/22.      NEURO  # migraines: daily topamax with prn naratriptan; propranolol prophylaxis.       35 minutes spent by me on the date of the encounter doing chart review, history and exam, documentation and further activities per the note        RTC: Day +28 as planned.     Tracy Warren PA-C  [phone]

## 2023-11-02 NOTE — PROGRESS NOTES
Interventional Radiology Brief Post Procedure Note    Procedure: Tunneled CVC removal.    Proceduralist: Ernesto Benites [facility], [place]    Assistant: None    Time Out: Prior to the start of the procedure and with procedural staff participation, I verbally confirmed the patient s identity using two indicators, relevant allergies, that the procedure was appropriate and matched the consent or emergent situation, and that the correct equipment/implants were available. Immediately prior to starting the procedure I conducted the Time Out with the procedural staff and re-confirmed the patient s name, procedure, and site/side. (The Joint Commission universal protocol was followed.)  Yes    Medications   Medication Event Details Admin User Admin Time       Sedation: None. Local Anesthestic used    Findings: Existing left internal jugular, 14.5 Fr., dual lumen tunneled CVC removed intact and without difficulty. Removal performed under fluoroscopy, with position of right port catheter undisturbed. Site closed with Steri-strips.    Estimated Blood Loss: Minimal    Fluoroscopy Time:  0.1 minute(s)    SPECIMENS: None    Complications: 1. None     Condition: Stable    Plan: Follow up per primary team. Upright for 2 hours, no strenuous activity for 3 days.    Approximately 15 minutes of direct face to face time occurred with the patient during this encounter.    Comments: See dictated procedure note for full details.    Ernesto Benites PA-C

## 2023-11-03 LAB — BACTERIA BLD CULT: NO GROWTH

## 2023-11-04 LAB
CULTURE HARVEST COMPLETE DATE: NORMAL

## 2023-11-06 LAB
ADDITIONAL COMMENTS: NORMAL
INTERPRETATION: NORMAL
ISCN: NORMAL
METHODS: NORMAL

## 2023-11-07 LAB — CULTURE HARVEST COMPLETE DATE: NORMAL

## 2023-11-09 ENCOUNTER — APPOINTMENT (OUTPATIENT)
Dept: LAB | Facility: CLINIC | Age: 48
End: 2023-11-09
Attending: INTERNAL MEDICINE
Payer: COMMERCIAL

## 2023-11-09 ENCOUNTER — OFFICE VISIT (OUTPATIENT)
Dept: ONCOLOGY | Facility: CLINIC | Age: 48
End: 2023-11-09
Attending: INTERNAL MEDICINE
Payer: COMMERCIAL

## 2023-11-09 ENCOUNTER — ANCILLARY PROCEDURE (OUTPATIENT)
Dept: CT IMAGING | Facility: CLINIC | Age: 48
End: 2023-11-09
Attending: PHYSICIAN ASSISTANT
Payer: COMMERCIAL

## 2023-11-09 ENCOUNTER — ALLIED HEALTH/NURSE VISIT (OUTPATIENT)
Dept: TRANSPLANT | Facility: CLINIC | Age: 48
End: 2023-11-09
Attending: INTERNAL MEDICINE
Payer: COMMERCIAL

## 2023-11-09 VITALS
WEIGHT: 204.2 LBS | OXYGEN SATURATION: 99 % | TEMPERATURE: 97.5 F | HEIGHT: 65 IN | BODY MASS INDEX: 34.02 KG/M2 | DIASTOLIC BLOOD PRESSURE: 80 MMHG | RESPIRATION RATE: 18 BRPM | SYSTOLIC BLOOD PRESSURE: 127 MMHG | HEART RATE: 74 BPM

## 2023-11-09 DIAGNOSIS — G43.809 OTHER MIGRAINE WITHOUT STATUS MIGRAINOSUS, NOT INTRACTABLE: ICD-10-CM

## 2023-11-09 DIAGNOSIS — I82.210 SUPERIOR VENA CAVA THROMBOSIS (H): ICD-10-CM

## 2023-11-09 DIAGNOSIS — C81.18 NODULAR SCLEROSIS HODGKIN LYMPHOMA OF LYMPH NODES OF MULTIPLE REGIONS (H): ICD-10-CM

## 2023-11-09 DIAGNOSIS — Z52.011 AUTOLOGOUS DONOR OF STEM CELLS: ICD-10-CM

## 2023-11-09 DIAGNOSIS — R11.0 NAUSEA: ICD-10-CM

## 2023-11-09 DIAGNOSIS — C81.18 NODULAR SCLEROSIS HODGKIN LYMPHOMA OF LYMPH NODES OF MULTIPLE REGIONS (H): Primary | ICD-10-CM

## 2023-11-09 LAB
ALBUMIN SERPL BCG-MCNC: 4.1 G/DL (ref 3.5–5.2)
ALP SERPL-CCNC: 79 U/L (ref 35–104)
ALT SERPL W P-5'-P-CCNC: 29 U/L (ref 0–50)
ANION GAP SERPL CALCULATED.3IONS-SCNC: 10 MMOL/L (ref 7–15)
AST SERPL W P-5'-P-CCNC: 28 U/L (ref 0–45)
BASOPHILS # BLD AUTO: ABNORMAL 10*3/UL
BASOPHILS # BLD MANUAL: 0.1 10E3/UL (ref 0–0.2)
BASOPHILS NFR BLD AUTO: ABNORMAL %
BASOPHILS NFR BLD MANUAL: 4 %
BILIRUB DIRECT SERPL-MCNC: <0.2 MG/DL (ref 0–0.3)
BILIRUB SERPL-MCNC: 0.5 MG/DL
BUN SERPL-MCNC: 9.4 MG/DL (ref 6–20)
CALCIUM SERPL-MCNC: 9.5 MG/DL (ref 8.6–10)
CD34 ABSOLUTE COUNT COMMENT: NORMAL
CD34 CELLS # SPEC: 1 CELLS/UL
CD34 CELLS NFR SPEC: 0.04 %
CHLORIDE SERPL-SCNC: 102 MMOL/L (ref 98–107)
CREAT SERPL-MCNC: 0.65 MG/DL (ref 0.51–0.95)
DACRYOCYTES BLD QL SMEAR: SLIGHT
DEPRECATED HCO3 PLAS-SCNC: 26 MMOL/L (ref 22–29)
EGFRCR SERPLBLD CKD-EPI 2021: >90 ML/MIN/1.73M2
EOSINOPHIL # BLD AUTO: ABNORMAL 10*3/UL
EOSINOPHIL # BLD MANUAL: 0.1 10E3/UL (ref 0–0.7)
EOSINOPHIL NFR BLD AUTO: ABNORMAL %
EOSINOPHIL NFR BLD MANUAL: 3 %
ERYTHROCYTE [DISTWIDTH] IN BLOOD BY AUTOMATED COUNT: 14.5 % (ref 10–15)
GLUCOSE SERPL-MCNC: 101 MG/DL (ref 70–99)
HCT VFR BLD AUTO: 30.3 % (ref 35–47)
HGB BLD-MCNC: 10.2 G/DL (ref 11.7–15.7)
IMM GRANULOCYTES # BLD: ABNORMAL 10*3/UL
IMM GRANULOCYTES NFR BLD: ABNORMAL %
LDH SERPL L TO P-CCNC: 189 U/L (ref 0–250)
LYMPHOCYTES # BLD AUTO: ABNORMAL 10*3/UL
LYMPHOCYTES # BLD MANUAL: 0.9 10E3/UL (ref 0.8–5.3)
LYMPHOCYTES NFR BLD AUTO: ABNORMAL %
LYMPHOCYTES NFR BLD MANUAL: 28 %
MAGNESIUM SERPL-MCNC: 1.6 MG/DL (ref 1.7–2.3)
MCH RBC QN AUTO: 28.8 PG (ref 26.5–33)
MCHC RBC AUTO-ENTMCNC: 33.7 G/DL (ref 31.5–36.5)
MCV RBC AUTO: 86 FL (ref 78–100)
MONOCYTES # BLD AUTO: ABNORMAL 10*3/UL
MONOCYTES # BLD MANUAL: 0.7 10E3/UL (ref 0–1.3)
MONOCYTES NFR BLD AUTO: ABNORMAL %
MONOCYTES NFR BLD MANUAL: 21 %
NEUTROPHILS # BLD AUTO: ABNORMAL 10*3/UL
NEUTROPHILS # BLD MANUAL: 1.4 10E3/UL (ref 1.6–8.3)
NEUTROPHILS NFR BLD AUTO: ABNORMAL %
NEUTROPHILS NFR BLD MANUAL: 44 %
NRBC # BLD AUTO: 0 10E3/UL
NRBC BLD AUTO-RTO: 0 /100
PHOSPHATE SERPL-MCNC: 4.1 MG/DL (ref 2.5–4.5)
PLAT MORPH BLD: ABNORMAL
PLATELET # BLD AUTO: 176 10E3/UL (ref 150–450)
POTASSIUM SERPL-SCNC: 3.9 MMOL/L (ref 3.4–5.3)
PRODUCT NUMBER FLOW CYTOMETRY: NORMAL
PROT SERPL-MCNC: 6.5 G/DL (ref 6.4–8.3)
RBC # BLD AUTO: 3.54 10E6/UL (ref 3.8–5.2)
RBC MORPH BLD: ABNORMAL
RETICS # AUTO: 0.1 10E6/UL (ref 0.03–0.1)
RETICS/RBC NFR AUTO: 2.7 % (ref 0.5–2)
SODIUM SERPL-SCNC: 138 MMOL/L (ref 135–145)
VIABLE CD34 CELLS NFR FLD: 68 %
WBC # BLD AUTO: 3.1 10E3/UL (ref 4–11)

## 2023-11-09 PROCEDURE — 36591 DRAW BLOOD OFF VENOUS DEVICE: CPT | Performed by: PHYSICIAN ASSISTANT

## 2023-11-09 PROCEDURE — 250N000011 HC RX IP 250 OP 636: Mod: JZ | Performed by: INTERNAL MEDICINE

## 2023-11-09 PROCEDURE — 99215 OFFICE O/P EST HI 40 MIN: CPT | Performed by: INTERNAL MEDICINE

## 2023-11-09 PROCEDURE — 93010 ELECTROCARDIOGRAM REPORT: CPT | Performed by: INTERNAL MEDICINE

## 2023-11-09 PROCEDURE — 84100 ASSAY OF PHOSPHORUS: CPT | Performed by: PHYSICIAN ASSISTANT

## 2023-11-09 PROCEDURE — 93005 ELECTROCARDIOGRAM TRACING: CPT

## 2023-11-09 PROCEDURE — 82248 BILIRUBIN DIRECT: CPT | Performed by: PHYSICIAN ASSISTANT

## 2023-11-09 PROCEDURE — 36591 DRAW BLOOD OFF VENOUS DEVICE: CPT

## 2023-11-09 PROCEDURE — 85027 COMPLETE CBC AUTOMATED: CPT | Performed by: PHYSICIAN ASSISTANT

## 2023-11-09 PROCEDURE — 85007 BL SMEAR W/DIFF WBC COUNT: CPT | Performed by: PHYSICIAN ASSISTANT

## 2023-11-09 PROCEDURE — 80053 COMPREHEN METABOLIC PANEL: CPT | Performed by: PHYSICIAN ASSISTANT

## 2023-11-09 PROCEDURE — 85045 AUTOMATED RETICULOCYTE COUNT: CPT | Performed by: PHYSICIAN ASSISTANT

## 2023-11-09 PROCEDURE — 70491 CT SOFT TISSUE NECK W/DYE: CPT | Mod: GC | Performed by: RADIOLOGY

## 2023-11-09 PROCEDURE — 71260 CT THORAX DX C+: CPT | Mod: GC | Performed by: RADIOLOGY

## 2023-11-09 PROCEDURE — 83735 ASSAY OF MAGNESIUM: CPT | Performed by: INTERNAL MEDICINE

## 2023-11-09 PROCEDURE — 83615 LACTATE (LD) (LDH) ENZYME: CPT | Performed by: PHYSICIAN ASSISTANT

## 2023-11-09 PROCEDURE — 74177 CT ABD & PELVIS W/CONTRAST: CPT | Mod: GC | Performed by: RADIOLOGY

## 2023-11-09 PROCEDURE — 300N000004 RESEARCH KIT COLLECTION: Performed by: INTERNAL MEDICINE

## 2023-11-09 PROCEDURE — 86367 STEM CELLS TOTAL COUNT: CPT | Performed by: INTERNAL MEDICINE

## 2023-11-09 PROCEDURE — 99213 OFFICE O/P EST LOW 20 MIN: CPT | Performed by: INTERNAL MEDICINE

## 2023-11-09 RX ORDER — IOPAMIDOL 755 MG/ML
107 INJECTION, SOLUTION INTRAVASCULAR ONCE
Status: COMPLETED | OUTPATIENT
Start: 2023-11-09 | End: 2023-11-09

## 2023-11-09 RX ORDER — HEPARIN SODIUM (PORCINE) LOCK FLUSH IV SOLN 100 UNIT/ML 100 UNIT/ML
5 SOLUTION INTRAVENOUS ONCE
Status: COMPLETED | OUTPATIENT
Start: 2023-11-09 | End: 2023-11-09

## 2023-11-09 RX ORDER — SULFAMETHOXAZOLE AND TRIMETHOPRIM 400; 80 MG/1; MG/1
1 TABLET ORAL DAILY
Qty: 30 TABLET | Refills: 3 | Status: SHIPPED | OUTPATIENT
Start: 2023-11-09 | End: 2024-03-07

## 2023-11-09 RX ORDER — CELECOXIB 100 MG/1
100 CAPSULE ORAL 2 TIMES DAILY PRN
Qty: 30 CAPSULE | Refills: 1 | Status: SHIPPED | OUTPATIENT
Start: 2023-11-09 | End: 2024-02-10

## 2023-11-09 RX ORDER — ONDANSETRON 8 MG/1
8 TABLET, ORALLY DISINTEGRATING ORAL EVERY 8 HOURS PRN
Qty: 30 TABLET | Refills: 3 | Status: SHIPPED | OUTPATIENT
Start: 2023-11-09 | End: 2023-11-22

## 2023-11-09 RX ADMIN — IOPAMIDOL 107 ML: 755 INJECTION, SOLUTION INTRAVASCULAR at 11:25

## 2023-11-09 RX ADMIN — Medication 5 ML: at 12:22

## 2023-11-09 ASSESSMENT — PAIN SCALES - GENERAL: PAINLEVEL: NO PAIN (0)

## 2023-11-09 NOTE — NURSING NOTE
"Oncology Rooming Note    November 9, 2023 4:52 PM   Jennifer Ward is a 48 year old female who presents for:    Chief Complaint   Patient presents with    Port Draw     Labs drawn via port by RN. Flushed with saline and heparin. Pt tolerated well. Patient checked into next appointment.      Oncology Clinic Visit     [facility] RETURN - HODGKIN LYMPHOMA     Initial Vitals: /80 (BP Location: Right arm, Patient Position: Sitting, Cuff Size: Adult Regular)   Pulse 74   Temp 97.5  F (36.4  C) (Oral)   Resp 18   Ht 1.64 m (5' 4.57\")   Wt 92.6 kg (204 lb 3.2 oz)   SpO2 99%   BMI 34.44 kg/m   Estimated body mass index is 34.44 kg/m  as calculated from the following:    Height as of this encounter: 1.64 m (5' 4.57\").    Weight as of this encounter: 92.6 kg (204 lb 3.2 oz). Body surface area is 2.05 meters squared.  No Pain (0) Comment: Data Unavailable   No LMP recorded. Patient has had a hysterectomy.  Allergies reviewed: Yes  Medications reviewed: Yes    Medications: Medication refills not needed today.  Pharmacy name entered into [employer]: [id number] - [place], [place] - [address] [place] [address] AT [facility] [address]              "

## 2023-11-09 NOTE — LETTER
"    11/9/2023         RE: Jennifer Ward  [address] [facility] [address]        Dear Colleague,    Thank you for referring your patient, Jennifer Ward, to the [facility]. Please see a copy of my visit note below.    BMT Progress Note  Nov 9, 2023     Patient ID:  Jennifer Ward is a 48 year old female, currently day +28 s/p autologous hematopoietic cell transplant with BEAM conditioning for relapsed Hodgkin Lymphoma. Co-enrolled on E-CELERATE study. Readmitted 10/16 with neutropenic fever and abdominal pain.     INTERVAL  HISTORY     Jennifer is here today with  Philip for day +28 BAN. Doing okay overall. Still struggling with sensitive gag reflex and trouble swallowing some larger pills; small pills and soft food okay. Occasional nausea and loose stools. One small sore on side of left tongue remaining. She has lingering headaches which for Tylenol is not effective. Also notes some muscle aching, mostly in thighs. Otherwise no fevers, night sweats, chest pain, SOB, abd pain, or bleeding. Energy improving slowly.      Review of Systems: ROS negative except as noted above.     PHYSICAL EXAM     KPS:  80     /80 (BP Location: Right arm, Patient Position: Sitting, Cuff Size: Adult Regular)   Pulse 74   Temp 97.5  F (36.4  C) (Oral)   Resp 18   Ht 1.64 m (5' 4.57\")   Wt 92.6 kg (204 lb 3.2 oz)   SpO2 99%   BMI 34.44 kg/m      General: NAD  Eyes: sclera anicteric   Mouth: tiny sore on lateral left tongue  Lungs: CTAB  Cardiovascular: RRR, no M/R/G   Abd: soft, NT, ND  Lymphatics: trace edema bilaterally  Skin: no rashes  Neuro: A&O; non-focal  Access: R PAC     Oral/GI regimen related toxicities (per NCI CTCAE v 5.0):     Oral Mucositis: Grade 1  Nausea: none  Vomiting: none  Diarrhea: Grade 1     *diarrhea secondary to C. Diff is excluded from the definition of oral/GI RRT     CTCAE Terms      Vomiting: A disorder characterized by the reflexive act of ejecting " the contents of the stomach through the mouth   Grade 1 Intervention not indicated   Grade 2 Outpatient IV hydration; medical intervention indicated   Grade 3 Tube feeding, TPN, or hospitalization indicated   Grade 4 Life-threatening consequences   Grade 5 Death      Oral Mucositis: A disorder characterized by ulceration or inflammation of the oral mucosal.   Grade 1 Asymptomatic or mild symptoms; intervention not indicated   Grade 2 Moderate pain or ulcer that does not interfere with oral intake; modified diet indicated   Grade 3 Severe pain; interfering with oral intake   Grade 4 Life-threatening consequences; urgent intervention indicated   Grade 5 Death      Nausea: A disorder characterized by a queasy sensation and/or the urge to vomit.   Grade 1 Loss of appetite without alteration in eating habits  Grade 2 Oral intake decreased without significant weight loss, dehydration or malnutrition   Grade 3 Inadequate oral caloric or fluid intake; tube feeding, TPN, or hospitalization indicated   Grade 4 NA  Grade 5 NA     Diarrhea: A disorder characterized by an increase in frequency and/or loose or watery bowel movements.   Grade 1 Increase of <4 stools per day over baseline; mild increase in ostomy output compared to baseline   Grade 2 Increase of 4 - 6 stools per day over baseline; moderate increase in ostomy output compared to baseline; limiting instrumental ADL   Grade 3 Increase of >=7 stools per day over baseline; hospitalization indicated; severe increase in ostomy output compared to baseline; limiting self-care ADL    Grade 4 Life-threatening consequences; urgent intervention indicated    Grade 5 Death      Source: https://ctep.cancer.gov/protocoldevelopment/electronic_applications/docs/CTCAE_v5_Quick_Reference_8.5x11.pdf      LABS AND IMAGING: I have assessed all abnormal lab values for their clinical significance and any values considered clinically significant have been addressed in the assessment and plan.        Lab Results   Component Value Date    WBC 3.1 (L) 11/09/2023    ANEU 1.4 (L) 11/09/2023    HGB 10.2 (L) 11/09/2023    HCT 30.3 (L) 11/09/2023     11/09/2023     11/09/2023    POTASSIUM 3.9 11/09/2023    CHLORIDE 102 11/09/2023    CO2 26 11/09/2023     (H) 11/09/2023    BUN 9.4 11/09/2023    CR 0.65 11/09/2023    MAG 1.5 (L) 10/30/2023    INR 1.37 (H) 10/23/2023        SYSTEMS-BASED ASSESSMENT AND PLAN      Jennifer Ward is a 48 year old female, currently day +28 s/p autologous hematopoietic cell transplant with BEAM conditioning for relapsed Hodgkin Lymphoma. Co-enrolled on E-CELERATE study. Readmitted 10/16/23 with neutropenic fever;  difficulty to maintain PO intake, pain control, rhinovirus +.     BMT  - BMT MD/Coordinator: Nay  - Protocol: [id number] with co-enrollment on MT2022-40 (E-CELERATE)  - S/p BEAM conditioning 10/6-10/11/23 and stem cell infusion 10/12/23 (cell dose 7 million CD34+ cells/kg).   - GCSF complete 10/23/23.   - Day +28 CT neck/CAP shows ongoing remission; incidental port-associated SVC thrombus as below.      HEME/COAG  # Pancytopenia secondary to Hodgkin Lymphoma and chemotherapy  - Transfusion parameters: hgb <7g/dL and plts <10,000.   - Counts recovering nicely!    # Line-associated thrombus  11/9/23 CT incidentally showed nonocclusive thrombus/fibrin sheath in SVC associated with port (not seen well on previous CT PE due to contrast timing). D/w Radiology and IR, IR feels there is at least a small thrombus component.   - Thus will start Eliquis starter pack (10 mg BID for 7 days, then 5 mg BID thereafter) and plan to remove port. Plan for 6 weeks of anticoagulation if she can get the port out soon.   - She had placed outside with [facility] so will touch base with her primary oncologist Dr. Reddy about removing.      ID  # Ppx: continue  mg BID through 1 year. Start Bactrim now that counts have recovered. Okay to stop fluconazole.   # Vaccines:  hold off on flu shot until day +60, COVID series until day +100. Rest of childhood vaccines to start at 1 year.     Historical issues:  # RHINOVIRUS + (10/22)- Symptomatic control, robitussin   # N/F: work up unremarkable. Abx (cefepime/flagyl) discontinued with engraftment.      GI  # Diarrhea: Can use PRN imodium.   # Mucositis: salt and soda rinses.   # CINV: not really using Compazine due to trouble swallowing large pills. Prescribed ODT Zofran.       FEN  Cr stable. Replace lytes PRN per sliding scale.      ENDOCRINE  # Hypothyroidism: continue PTA synthroid     NEURO  # Migraines: daily topamax with prn naratriptan; propranolol prophylaxis. Adding PRN Celebrex as Tylenol unhelpful.      PSYCH  # Depression: continue PTA bupropion; venlafaxine discontinued per patient request on 10/22.       PLAN:  - Start Bactrim and Eliquis  - D/w primary oncologist about removing port  - RTC for Day 42 LOVE visit    Total of 45 minutes on patient visit, reviewing records, interpreting test results, placing orders, and documentation on the day of service.    Tammy Gonzalez MD  Attending Physician, [facility]

## 2023-11-09 NOTE — NURSING NOTE
I verified name and date of birth. Then performed an EKG and transmitted  the results right away.  Elizabeth Lopez MA

## 2023-11-09 NOTE — PROGRESS NOTES
"BMT Progress Note  Nov 9, 2023     Patient ID:  Jennifer Ward is a 48 year old female, currently day +28 s/p autologous hematopoietic cell transplant with BEAM conditioning for relapsed Hodgkin Lymphoma. Co-enrolled on E-CELERATE study. Readmitted 10/16 with neutropenic fever and abdominal pain.     INTERVAL  HISTORY     Jennifer is here today with  Philip for day +28 BAN. Doing okay overall. Still struggling with sensitive gag reflex and trouble swallowing some larger pills; small pills and soft food okay. Occasional nausea and loose stools. One small sore on side of left tongue remaining. She has lingering headaches which for Tylenol is not effective. Also notes some muscle aching, mostly in thighs. Otherwise no fevers, night sweats, chest pain, SOB, abd pain, or bleeding. Energy improving slowly.      Review of Systems: ROS negative except as noted above.     PHYSICAL EXAM     KPS:  80     /80 (BP Location: Right arm, Patient Position: Sitting, Cuff Size: Adult Regular)   Pulse 74   Temp 97.5  F (36.4  C) (Oral)   Resp 18   Ht 1.64 m (5' 4.57\")   Wt 92.6 kg (204 lb 3.2 oz)   SpO2 99%   BMI 34.44 kg/m      General: NAD  Eyes: sclera anicteric   Mouth: tiny sore on lateral left tongue  Lungs: CTAB  Cardiovascular: RRR, no M/R/G   Abd: soft, NT, ND  Lymphatics: trace edema bilaterally  Skin: no rashes  Neuro: A&O; non-focal  Access: R PAC     Oral/GI regimen related toxicities (per NCI CTCAE v 5.0):     Oral Mucositis: Grade 1  Nausea: none  Vomiting: none  Diarrhea: Grade 1     *diarrhea secondary to C. Diff is excluded from the definition of oral/GI RRT     CTCAE Terms      Vomiting: A disorder characterized by the reflexive act of ejecting the contents of the stomach through the mouth   Grade 1 Intervention not indicated   Grade 2 Outpatient IV hydration; medical intervention indicated   Grade 3 Tube feeding, TPN, or hospitalization indicated   Grade 4 Life-threatening consequences   Grade 5 " Death      Oral Mucositis: A disorder characterized by ulceration or inflammation of the oral mucosal.   Grade 1 Asymptomatic or mild symptoms; intervention not indicated   Grade 2 Moderate pain or ulcer that does not interfere with oral intake; modified diet indicated   Grade 3 Severe pain; interfering with oral intake   Grade 4 Life-threatening consequences; urgent intervention indicated   Grade 5 Death      Nausea: A disorder characterized by a queasy sensation and/or the urge to vomit.   Grade 1 Loss of appetite without alteration in eating habits  Grade 2 Oral intake decreased without significant weight loss, dehydration or malnutrition   Grade 3 Inadequate oral caloric or fluid intake; tube feeding, TPN, or hospitalization indicated   Grade 4 NA  Grade 5 NA     Diarrhea: A disorder characterized by an increase in frequency and/or loose or watery bowel movements.   Grade 1 Increase of <4 stools per day over baseline; mild increase in ostomy output compared to baseline   Grade 2 Increase of 4 - 6 stools per day over baseline; moderate increase in ostomy output compared to baseline; limiting instrumental ADL   Grade 3 Increase of >=7 stools per day over baseline; hospitalization indicated; severe increase in ostomy output compared to baseline; limiting self-care ADL    Grade 4 Life-threatening consequences; urgent intervention indicated    Grade 5 Death      Source: https://ctep.cancer.gov/protocoldevelopment/electronic_applications/docs/CTCAE_v5_Quick_Reference_8.5x11.pdf      LABS AND IMAGING: I have assessed all abnormal lab values for their clinical significance and any values considered clinically significant have been addressed in the assessment and plan.       Lab Results   Component Value Date    WBC 3.1 (L) 11/09/2023    ANEU 1.4 (L) 11/09/2023    HGB 10.2 (L) 11/09/2023    HCT 30.3 (L) 11/09/2023     11/09/2023     11/09/2023    POTASSIUM 3.9 11/09/2023    CHLORIDE 102 11/09/2023    CO2 26  11/09/2023     (H) 11/09/2023    BUN 9.4 11/09/2023    CR 0.65 11/09/2023    MAG 1.5 (L) 10/30/2023    INR 1.37 (H) 10/23/2023        SYSTEMS-BASED ASSESSMENT AND PLAN      Jennifer Ward is a 48 year old female, currently day +28 s/p autologous hematopoietic cell transplant with BEAM conditioning for relapsed Hodgkin Lymphoma. Co-enrolled on E-CELERATE study. Readmitted 10/16/23 with neutropenic fever;  difficulty to maintain PO intake, pain control, rhinovirus +.     BMT  - BMT MD/Coordinator: Nay  - Protocol: [id number] with co-enrollment on MT2022-40 (E-CELERATE)  - S/p BEAM conditioning 10/6-10/11/23 and stem cell infusion 10/12/23 (cell dose 7 million CD34+ cells/kg).   - GCSF complete 10/23/23.   - Day +28 CT neck/CAP shows ongoing remission; incidental port-associated SVC thrombus as below.      HEME/COAG  # Pancytopenia secondary to Hodgkin Lymphoma and chemotherapy  - Transfusion parameters: hgb <7g/dL and plts <10,000.   - Counts recovering nicely!    # Line-associated thrombus  11/9/23 CT incidentally showed nonocclusive thrombus/fibrin sheath in SVC associated with port (not seen well on previous CT PE due to contrast timing). D/w Radiology and IR, IR feels there is at least a small thrombus component.   - Thus will start Eliquis starter pack (10 mg BID for 7 days, then 5 mg BID thereafter) and plan to remove port. Plan for 6 weeks of anticoagulation if she can get the port out soon.   - She had placed outside with [facility] so will touch base with her primary oncologist Dr. Reddy about removing.      ID  # Ppx: continue  mg BID through 1 year. Start Bactrim now that counts have recovered. Okay to stop fluconazole.   # Vaccines: hold off on flu shot until day +60, COVID series until day +100. Rest of childhood vaccines to start at 1 year.     Historical issues:  # RHINOVIRUS + (10/22)- Symptomatic control, robitussin   # N/F: work up unremarkable. Abx (cefepime/flagyl) discontinued  with engraftment.      GI  # Diarrhea: Can use PRN imodium.   # Mucositis: salt and soda rinses.   # CINV: not really using Compazine due to trouble swallowing large pills. Prescribed ODT Zofran.       FEN  Cr stable. Replace lytes PRN per sliding scale.      ENDOCRINE  # Hypothyroidism: continue PTA synthroid     NEURO  # Migraines: daily topamax with prn naratriptan; propranolol prophylaxis. Adding PRN Celebrex as Tylenol unhelpful.      PSYCH  # Depression: continue PTA bupropion; venlafaxine discontinued per patient request on 10/22.       PLAN:  - Start Bactrim and Eliquis  - D/w primary oncologist about removing port  - RTC for Day 42 LOVE visit    Total of 45 minutes on patient visit, reviewing records, interpreting test results, placing orders, and documentation on the day of service.    Tammy Gonzalez MD  Attending Physician, [facility]

## 2023-11-09 NOTE — NURSING NOTE
Chief Complaint   Patient presents with    Port Draw     Labs drawn via port by RN. Flushed with saline and heparin. Pt tolerated well. Patient checked into next appointment.       Anayeli Rae RN

## 2023-11-10 LAB
ATRIAL RATE - MUSE: 75 BPM
DIASTOLIC BLOOD PRESSURE - MUSE: NORMAL MMHG
INTERPRETATION ECG - MUSE: NORMAL
P AXIS - MUSE: 53 DEGREES
PR INTERVAL - MUSE: 140 MS
QRS DURATION - MUSE: 80 MS
QT - MUSE: 412 MS
QTC - MUSE: 460 MS
R AXIS - MUSE: 19 DEGREES
SYSTOLIC BLOOD PRESSURE - MUSE: NORMAL MMHG
T AXIS - MUSE: 42 DEGREES
VENTRICULAR RATE- MUSE: 75 BPM

## 2023-11-15 ENCOUNTER — TELEPHONE (OUTPATIENT)
Dept: ONCOLOGY | Facility: CLINIC | Age: 48
End: 2023-11-15
Payer: COMMERCIAL

## 2023-11-15 DIAGNOSIS — C81.18 NODULAR SCLEROSIS HODGKIN LYMPHOMA OF LYMPH NODES OF MULTIPLE REGIONS (H): ICD-10-CM

## 2023-11-15 DIAGNOSIS — I82.210 SUPERIOR VENA CAVA THROMBOSIS (H): Primary | ICD-10-CM

## 2023-11-15 RX ORDER — APIXABAN 5 MG (74)
KIT ORAL
Qty: 1 EACH | Refills: 0 | Status: SHIPPED | OUTPATIENT
Start: 2023-11-15 | End: 2023-12-15

## 2023-11-15 NOTE — TELEPHONE ENCOUNTER
COPAY CARD OBTAINED    Medication: ELIQUIS 5 MG PO TABS  Sponsor: [employer]  Expected Copay: $ 75  Copay card Monthly Max Amount: $    Copay card Annual Amount: $ 6,400    Good thru 12/31/24            Thank you,    Fredis Sky  Oncology Pharmacy Liaison II  [email or web address]  Phone: [phone]  Fax: [phone]

## 2023-11-15 NOTE — TELEPHONE ENCOUNTER
"Fax from [facility] requesting a  prior auth for ondansetron ODT 8mg    Per office notes :not really using Compazine due to trouble swallowing large pills. Prescribed ODT Zofran. \"    Plan phone [phone]  ID [id number]  "

## 2023-11-15 NOTE — TELEPHONE ENCOUNTER
PA Initiation    Medication: ONDANSETRON 8 MG PO TBDP  Insurance Company: [facility] - Phone [phone] Fax [phone]  Pharmacy Filling the Rx: [facility] #[id number] - [address] [place] AT [facility] & [address]  Filling Pharmacy Phone: [phone]  Filling Pharmacy Fax:    Start Date: 11/15/2023  Central Prior Authorization Team   Phone: [phone]

## 2023-11-16 NOTE — TELEPHONE ENCOUNTER
Prior Authorization Approval    Medication: ONDANSETRON 8 MG PO TBDP  Authorization Effective Date: 11/16/2023  Authorization Expiration Date: 11/16/2024  Approved Dose/Quantity: 30  Reference #:     Insurance Company: [facility] - Phone [phone] Fax [phone]  Expected CoPay: $    CoPay Card Available:      Financial Assistance Needed: No  [facility] the [id number] [place], [place] - [address] [place] [address] AT [facility] & [address]  Pharmacy Notified: Yes  Patient Notified: Pharmacy will notify patient.

## 2023-11-22 ENCOUNTER — OFFICE VISIT (OUTPATIENT)
Dept: TRANSPLANT | Facility: CLINIC | Age: 48
End: 2023-11-22
Payer: COMMERCIAL

## 2023-11-22 ENCOUNTER — LAB (OUTPATIENT)
Dept: LAB | Facility: CLINIC | Age: 48
End: 2023-11-22
Payer: COMMERCIAL

## 2023-11-22 VITALS
HEART RATE: 71 BPM | DIASTOLIC BLOOD PRESSURE: 80 MMHG | OXYGEN SATURATION: 99 % | TEMPERATURE: 97.8 F | SYSTOLIC BLOOD PRESSURE: 115 MMHG | RESPIRATION RATE: 18 BRPM

## 2023-11-22 DIAGNOSIS — Z52.011 AUTOLOGOUS DONOR OF STEM CELLS: Primary | ICD-10-CM

## 2023-11-22 DIAGNOSIS — C81.18 NODULAR SCLEROSIS HODGKIN LYMPHOMA OF LYMPH NODES OF MULTIPLE REGIONS (H): Primary | ICD-10-CM

## 2023-11-22 LAB
ALBUMIN SERPL BCG-MCNC: 4.3 G/DL (ref 3.5–5.2)
ALP SERPL-CCNC: 68 U/L (ref 40–150)
ALT SERPL W P-5'-P-CCNC: 19 U/L (ref 0–50)
ANION GAP SERPL CALCULATED.3IONS-SCNC: 9 MMOL/L (ref 7–15)
AST SERPL W P-5'-P-CCNC: 19 U/L (ref 0–45)
BASOPHILS # BLD AUTO: 0.1 10E3/UL (ref 0–0.2)
BASOPHILS NFR BLD AUTO: 3 %
BILIRUB DIRECT SERPL-MCNC: <0.2 MG/DL (ref 0–0.3)
BILIRUB SERPL-MCNC: 0.4 MG/DL
BUN SERPL-MCNC: 13.1 MG/DL (ref 6–20)
CALCIUM SERPL-MCNC: 9.3 MG/DL (ref 8.6–10)
CHLORIDE SERPL-SCNC: 104 MMOL/L (ref 98–107)
CREAT SERPL-MCNC: 0.75 MG/DL (ref 0.51–0.95)
DEPRECATED HCO3 PLAS-SCNC: 24 MMOL/L (ref 22–29)
EGFRCR SERPLBLD CKD-EPI 2021: >90 ML/MIN/1.73M2
EOSINOPHIL # BLD AUTO: 0.1 10E3/UL (ref 0–0.7)
EOSINOPHIL NFR BLD AUTO: 3 %
ERYTHROCYTE [DISTWIDTH] IN BLOOD BY AUTOMATED COUNT: 14.8 % (ref 10–15)
GLUCOSE SERPL-MCNC: 112 MG/DL (ref 70–99)
HCT VFR BLD AUTO: 30.1 % (ref 35–47)
HGB BLD-MCNC: 10.1 G/DL (ref 11.7–15.7)
IMM GRANULOCYTES # BLD: 0 10E3/UL
IMM GRANULOCYTES NFR BLD: 0 %
LDH SERPL L TO P-CCNC: 160 U/L (ref 0–250)
LYMPHOCYTES # BLD AUTO: 0.9 10E3/UL (ref 0.8–5.3)
LYMPHOCYTES NFR BLD AUTO: 28 %
MCH RBC QN AUTO: 29.4 PG (ref 26.5–33)
MCHC RBC AUTO-ENTMCNC: 33.6 G/DL (ref 31.5–36.5)
MCV RBC AUTO: 88 FL (ref 78–100)
MONOCYTES # BLD AUTO: 0.5 10E3/UL (ref 0–1.3)
MONOCYTES NFR BLD AUTO: 17 %
NEUTROPHILS # BLD AUTO: 1.6 10E3/UL (ref 1.6–8.3)
NEUTROPHILS NFR BLD AUTO: 49 %
NRBC # BLD AUTO: 0 10E3/UL
NRBC BLD AUTO-RTO: 0 /100
PHOSPHATE SERPL-MCNC: 4.6 MG/DL (ref 2.5–4.5)
PLATELET # BLD AUTO: 196 10E3/UL (ref 150–450)
POTASSIUM SERPL-SCNC: 4.1 MMOL/L (ref 3.4–5.3)
PROT SERPL-MCNC: 6.5 G/DL (ref 6.4–8.3)
RBC # BLD AUTO: 3.43 10E6/UL (ref 3.8–5.2)
RETICS # AUTO: 0.08 10E6/UL (ref 0.03–0.1)
RETICS/RBC NFR AUTO: 2.4 % (ref 0.5–2)
SODIUM SERPL-SCNC: 137 MMOL/L (ref 135–145)
WBC # BLD AUTO: 3.2 10E3/UL (ref 4–11)

## 2023-11-22 PROCEDURE — 85025 COMPLETE CBC W/AUTO DIFF WBC: CPT | Performed by: PHYSICIAN ASSISTANT

## 2023-11-22 PROCEDURE — 85045 AUTOMATED RETICULOCYTE COUNT: CPT | Performed by: PHYSICIAN ASSISTANT

## 2023-11-22 PROCEDURE — 84100 ASSAY OF PHOSPHORUS: CPT | Performed by: PHYSICIAN ASSISTANT

## 2023-11-22 PROCEDURE — 99213 OFFICE O/P EST LOW 20 MIN: CPT

## 2023-11-22 PROCEDURE — 80053 COMPREHEN METABOLIC PANEL: CPT | Performed by: PHYSICIAN ASSISTANT

## 2023-11-22 PROCEDURE — 83615 LACTATE (LD) (LDH) ENZYME: CPT | Performed by: PHYSICIAN ASSISTANT

## 2023-11-22 PROCEDURE — 36415 COLL VENOUS BLD VENIPUNCTURE: CPT | Performed by: PHYSICIAN ASSISTANT

## 2023-11-22 PROCEDURE — 82248 BILIRUBIN DIRECT: CPT | Performed by: PHYSICIAN ASSISTANT

## 2023-11-22 PROCEDURE — 99215 OFFICE O/P EST HI 40 MIN: CPT

## 2023-11-22 RX ORDER — ACYCLOVIR 400 MG/1
400 TABLET ORAL 2 TIMES DAILY
Qty: 60 TABLET | Refills: 3 | Status: SHIPPED | OUTPATIENT
Start: 2023-11-22 | End: 2024-04-11

## 2023-11-22 NOTE — NURSING NOTE
"Oncology Rooming Note    November 22, 2023 1:05 PM   Jennifer Ward is a 48 year old female who presents for:    Chief Complaint   Patient presents with    Port Draw     Labs drawn via port accessed by RN. VS taken.    Oncology Clinic Visit     Hodgkin's disease      Initial Vitals: /80   Pulse 71   Temp 97.8  F (36.6  C) (Oral)   Resp 18   SpO2 99%  Estimated body mass index is 34.44 kg/m  as calculated from the following:    Height as of 11/9/23: 1.64 m (5' 4.57\").    Weight as of 11/9/23: 92.6 kg (204 lb 3.2 oz). There is no height or weight on file to calculate BSA.  Data Unavailable Comment: Data Unavailable   No LMP recorded. Patient has had a hysterectomy.  Allergies reviewed: Yes  Medications reviewed: Yes    Medications: MEDICATION REFILLS NEEDED TODAY. Provider was notified.  Pharmacy name entered into [employer]: [id number] - [place] [id number] [address] AT [facility] [address]    Clinical concerns:  needs acyclovir refills-can also do pills for acyclovir if easier       Pamela Valladares"

## 2023-11-22 NOTE — NURSING NOTE
Chief Complaint   Patient presents with    Port Draw     Labs drawn via port accessed by RN. VS taken.     Port accessed with 20 g3/4 inch power needle by RN, labs collected, line flushed with saline and heparin. Needle removed from port after lab draw. Vitals taken. Pt checked in for appointment(s).

## 2023-11-22 NOTE — LETTER
"    11/22/2023         RE: Jennifer Ward  [address] [facility] [address]        Dear Colleague,    Thank you for referring your patient, Jennifer Ward, to the [facility]LOOD AND [facility]ARROW [facility]PL[facility] PRO[facility] [place]. Please see a copy of my visit note below.    BMT Progress Note  Nov 22, 2023      Patient ID:  Jennifer Ward is a 48 year old female, currently day +42 s/p autologous hematopoietic cell transplant with BEAM conditioning for relapsed Hodgkin Lymphoma. Co-enrolled on [employer]-CELERATE study. Readmitted 10/16 with neutropenic fever and abdominal pain.     INTERVAL  HISTORY      Jennifer is here today. Doing very well. Her main complaint is muscle soreness at the end of the day or if she is going up stairs. She said her appetite is better, gag reflex is gone, stools have been formed for a few weeks, HA's are better (cannot recall last attack), and her mouth sores are gone.      Review of Systems: ROS negative except as noted above.     PHYSICAL EXAM      KPS:  80     /80 (BP Location: Right arm, Patient Position: Sitting, Cuff Size: Adult Regular)   Pulse 74   Temp 97.5  F (36.4  C) (Oral)   Resp 18   Ht 1.64 m (5' 4.57\")   Wt 92.6 kg (204 lb 3.2 oz)   SpO2 99%   BMI 34.44 kg/m       General: NAD  Eyes: sclera anicteric   Mouth: mucus membranes moist without lesions   Lungs: CTAB  Cardiovascular: RRR, no M/R/G   Abd: soft, NT, ND  Lymphatics: no edema   Skin: no rashes  Neuro: A&O; non-focal  Access: R PAC      Oral/GI regimen related toxicities (per NCI CTCAE v 5.0):     Oral Mucositis: none  Nausea: none  Vomiting: none  Diarrhea: none     *diarrhea secondary to C. Diff is excluded from the definition of oral/GI RRT     CTCAE Terms      Vomiting: A disorder characterized by the reflexive act of ejecting the contents of the stomach through the mouth   Grade 1 Intervention not indicated   Grade 2 Outpatient IV hydration; medical intervention indicated   Grade 3 Tube feeding, TPN, " or hospitalization indicated   Grade 4 Life-threatening consequences   Grade 5 Death      Oral Mucositis: A disorder characterized by ulceration or inflammation of the oral mucosal.   Grade 1 Asymptomatic or mild symptoms; intervention not indicated   Grade 2 Moderate pain or ulcer that does not interfere with oral intake; modified diet indicated   Grade 3 Severe pain; interfering with oral intake   Grade 4 Life-threatening consequences; urgent intervention indicated   Grade 5 Death      Nausea: A disorder characterized by a queasy sensation and/or the urge to vomit.   Grade 1 Loss of appetite without alteration in eating habits  Grade 2 Oral intake decreased without significant weight loss, dehydration or malnutrition   Grade 3 Inadequate oral caloric or fluid intake; tube feeding, TPN, or hospitalization indicated   Grade 4 NA  Grade 5 NA     Diarrhea: A disorder characterized by an increase in frequency and/or loose or watery bowel movements.   Grade 1 Increase of <4 stools per day over baseline; mild increase in ostomy output compared to baseline   Grade 2 Increase of 4 - 6 stools per day over baseline; moderate increase in ostomy output compared to baseline; limiting instrumental ADL   Grade 3 Increase of >=7 stools per day over baseline; hospitalization indicated; severe increase in ostomy output compared to baseline; limiting self-care ADL    Grade 4 Life-threatening consequences; urgent intervention indicated    Grade 5 Death      Source: https://ctep.cancer.gov/protocoldevelopment/electronic_applications/docs/CTCAE_v5_Quick_Reference_8.5x11.pdf      LABS AND IMAGING: I have assessed all abnormal lab values for their clinical significance and any values considered clinically significant have been addressed in the assessment and plan.             Lab Results   Component Value Date     WBC 3.1 (L) 11/09/2023     ANEU 1.4 (L) 11/09/2023     HGB 10.2 (L) 11/09/2023     HCT 30.3 (L) 11/09/2023       11/09/2023      11/09/2023     POTASSIUM 3.9 11/09/2023     CHLORIDE 102 11/09/2023     CO2 26 11/09/2023      (H) 11/09/2023     BUN 9.4 11/09/2023     CR 0.65 11/09/2023     MAG 1.5 (L) 10/30/2023     INR 1.37 (H) 10/23/2023         SYSTEMS-BASED ASSESSMENT AND PLAN      Jennifer Ward is a 48 year old female, currently day +42 s/p autologous hematopoietic cell transplant with BEAM conditioning for relapsed Hodgkin Lymphoma. Co-enrolled on E-CELERATE study. Readmitted 10/16/23 with neutropenic fever;  difficulty to maintain PO intake, pain control, rhinovirus +.     BMT  - BMT MD/Coordinator: Nay  - Protocol: [id number] with co-enrollment on [id number] (E-CELERATE)  - S/p BEAM conditioning 10/6-10/11/23 and stem cell infusion 10/12/23 (cell dose 7 million CD34+ cells/kg).   - GCSF complete 10/23/23.   - Day +28 CT neck/CAP shows ongoing remission; incidental port-associated SVC thrombus as below.      HEME/COAG  # Pancytopenia secondary to Hodgkin Lymphoma and chemotherapy  - Transfusion parameters: hgb <7g/dL and plts <10,000.   - Counts recovering nicely!     # Line-associated thrombus  11/9/23 CT incidentally showed nonocclusive thrombus/fibrin sheath in SVC associated with port (not seen well on previous CT PE due to contrast timing). D/w Radiology and IR, IR feels there is at least a small thrombus component.   - Thus will start Eliquis starter pack (10 mg BID for 7 days, then 5 mg BID thereafter) and plan to remove port. Plan for 6 weeks of anticoagulation if she can get the port out soon.   - She had placed outside with [facility] so will touch base with her primary oncologist Dr. Reddy about removing.      ID  # Ppx: continue  mg BID through 1 year. Start Bactrim now that counts have recovered. Okay to stop fluconazole.   # Vaccines: hold off on flu shot until day +60, COVID series until day +100. Rest of childhood vaccines to start at 1 year.      Historical issues:  # RHINOVIRUS +  (10/22)- Symptomatic control, robitussin   # N/F: work up unremarkable. Abx (cefepime/flagyl) discontinued with engraftment.      GI  # Diarrhea: Can use PRN imodium.   # Mucositis: salt and soda rinses.   # CINV: not really using Compazine due to trouble swallowing large pills. Prescribed ODT Zofran.       FEN  Cr stable. Replace lytes PRN per sliding scale.      ENDOCRINE  # Hypothyroidism: continue PTA synthroid     NEURO  # Migraines: daily topamax with prn naratriptan; propranolol prophylaxis. Adding PRN Celebrex as Tylenol unhelpful.       PSYCH  # Depression: continue PTA bupropion; venlafaxine discontinued per patient request on 10/22.         PLAN:  - RTC Day 100 BAN visit      Total of 45 minutes on patient visit, reviewing records, interpreting test results, placing orders, and documentation on the day of service.     Tato Pena PA-C

## 2023-11-22 NOTE — PROGRESS NOTES
"BMT Progress Note  Nov 22, 2023      Patient ID:  Jennifer Ward is a 48 year old female, currently day +42 s/p autologous hematopoietic cell transplant with BEAM conditioning for relapsed Hodgkin Lymphoma. Co-enrolled on [employer]-CELERATE study. Readmitted 10/16 with neutropenic fever and abdominal pain.     INTERVAL  HISTORY      Jennifer is here today. Doing very well. Her main complaint is muscle soreness at the end of the day or if she is going up stairs. She said her appetite is better, gag reflex is gone, stools have been formed for a few weeks, HA's are better (cannot recall last attack), and her mouth sores are gone.      Review of Systems: ROS negative except as noted above.     PHYSICAL EXAM      KPS:  80     /80 (BP Location: Right arm, Patient Position: Sitting, Cuff Size: Adult Regular)   Pulse 74   Temp 97.5  F (36.4  C) (Oral)   Resp 18   Ht 1.64 m (5' 4.57\")   Wt 92.6 kg (204 lb 3.2 oz)   SpO2 99%   BMI 34.44 kg/m       General: NAD  Eyes: sclera anicteric   Mouth: mucus membranes moist without lesions   Lungs: CTAB  Cardiovascular: RRR, no M/R/G   Abd: soft, NT, ND  Lymphatics: no edema   Skin: no rashes  Neuro: A&O; non-focal  Access: R PAC      Oral/GI regimen related toxicities (per NCI CTCAE v 5.0):     Oral Mucositis: none  Nausea: none  Vomiting: none  Diarrhea: none     *diarrhea secondary to C. Diff is excluded from the definition of oral/GI RRT     CTCAE Terms      Vomiting: A disorder characterized by the reflexive act of ejecting the contents of the stomach through the mouth   Grade 1 Intervention not indicated   Grade 2 Outpatient IV hydration; medical intervention indicated   Grade 3 Tube feeding, TPN, or hospitalization indicated   Grade 4 Life-threatening consequences   Grade 5 Death      Oral Mucositis: A disorder characterized by ulceration or inflammation of the oral mucosal.   Grade 1 Asymptomatic or mild symptoms; intervention not indicated   Grade 2 Moderate pain or ulcer " that does not interfere with oral intake; modified diet indicated   Grade 3 Severe pain; interfering with oral intake   Grade 4 Life-threatening consequences; urgent intervention indicated   Grade 5 Death      Nausea: A disorder characterized by a queasy sensation and/or the urge to vomit.   Grade 1 Loss of appetite without alteration in eating habits  Grade 2 Oral intake decreased without significant weight loss, dehydration or malnutrition   Grade 3 Inadequate oral caloric or fluid intake; tube feeding, TPN, or hospitalization indicated   Grade 4 NA  Grade 5 NA     Diarrhea: A disorder characterized by an increase in frequency and/or loose or watery bowel movements.   Grade 1 Increase of <4 stools per day over baseline; mild increase in ostomy output compared to baseline   Grade 2 Increase of 4 - 6 stools per day over baseline; moderate increase in ostomy output compared to baseline; limiting instrumental ADL   Grade 3 Increase of >=7 stools per day over baseline; hospitalization indicated; severe increase in ostomy output compared to baseline; limiting self-care ADL    Grade 4 Life-threatening consequences; urgent intervention indicated    Grade 5 Death      Source: https://ctep.cancer.gov/protocoldevelopment/electronic_applications/docs/CTCAE_v5_Quick_Reference_8.5x11.pdf      LABS AND IMAGING: I have assessed all abnormal lab values for their clinical significance and any values considered clinically significant have been addressed in the assessment and plan.             Lab Results   Component Value Date     WBC 3.1 (L) 11/09/2023     ANEU 1.4 (L) 11/09/2023     HGB 10.2 (L) 11/09/2023     HCT 30.3 (L) 11/09/2023      11/09/2023      11/09/2023     POTASSIUM 3.9 11/09/2023     CHLORIDE 102 11/09/2023     CO2 26 11/09/2023      (H) 11/09/2023     BUN 9.4 11/09/2023     CR 0.65 11/09/2023     MAG 1.5 (L) 10/30/2023     INR 1.37 (H) 10/23/2023         SYSTEMS-BASED ASSESSMENT AND PLAN       Jennifer Ward is a 48 year old female, currently day +42 s/p autologous hematopoietic cell transplant with BEAM conditioning for relapsed Hodgkin Lymphoma. Co-enrolled on E-CELERATE study. Readmitted 10/16/23 with neutropenic fever;  difficulty to maintain PO intake, pain control, rhinovirus +.     BMT  - BMT MD/Coordinator: Nay  - Protocol: [id number] with co-enrollment on [id number] (E-CELERATE)  - S/p BEAM conditioning 10/6-10/11/23 and stem cell infusion 10/12/23 (cell dose 7 million CD34+ cells/kg).   - GCSF complete 10/23/23.   - Day +28 CT neck/CAP shows ongoing remission; incidental port-associated SVC thrombus as below.      HEME/COAG  # Pancytopenia secondary to Hodgkin Lymphoma and chemotherapy  - Transfusion parameters: hgb <7g/dL and plts <10,000.   - Counts recovering nicely!     # Line-associated thrombus  11/9/23 CT incidentally showed nonocclusive thrombus/fibrin sheath in SVC associated with port (not seen well on previous CT PE due to contrast timing). D/w Radiology and IR, IR feels there is at least a small thrombus component.   - Thus will start Eliquis starter pack (10 mg BID for 7 days, then 5 mg BID thereafter) and plan to remove port. Plan for 6 weeks of anticoagulation if she can get the port out soon.   - She had placed outside with [facility] so will touch base with her primary oncologist Dr. Reddy about removing.      ID  # Ppx: continue  mg BID through 1 year. Start Bactrim now that counts have recovered. Okay to stop fluconazole.   # Vaccines: hold off on flu shot until day +60, COVID series until day +100. Rest of childhood vaccines to start at 1 year.      Historical issues:  # RHINOVIRUS + (10/22)- Symptomatic control, robitussin   # N/F: work up unremarkable. Abx (cefepime/flagyl) discontinued with engraftment.      GI  # Diarrhea: Can use PRN imodium.   # Mucositis: salt and soda rinses.   # CINV: not really using Compazine due to trouble swallowing large pills.  Prescribed ODT Zofran.       FEN  Cr stable. Replace lytes PRN per sliding scale.      ENDOCRINE  # Hypothyroidism: continue PTA synthroid     NEURO  # Migraines: daily topamax with prn naratriptan; propranolol prophylaxis. Adding PRN Celebrex as Tylenol unhelpful.       PSYCH  # Depression: continue PTA bupropion; venlafaxine discontinued per patient request on 10/22.         PLAN:  - RTC Day 100 BAN visit      Total of 45 minutes on patient visit, reviewing records, interpreting test results, placing orders, and documentation on the day of service.     Tato Pena PA-C

## 2023-11-24 LAB — BACTERIA BLD CULT: NO GROWTH

## 2024-01-18 ENCOUNTER — HOSPITAL ENCOUNTER (OUTPATIENT)
Dept: PET IMAGING | Facility: CLINIC | Age: 49
Setting detail: NUCLEAR MEDICINE
Discharge: HOME | End: 2024-01-18
Attending: PHYSICIAN ASSISTANT | Admitting: PHYSICIAN ASSISTANT
Payer: COMMERCIAL

## 2024-01-18 ENCOUNTER — ANCILLARY ORDERS (OUTPATIENT)
Dept: ONCOLOGY | Facility: CLINIC | Age: 49
End: 2024-01-18

## 2024-01-18 ENCOUNTER — HOSPITAL ENCOUNTER (OUTPATIENT)
Dept: PET IMAGING | Facility: CLINIC | Age: 49
Discharge: HOME | End: 2024-01-18
Attending: PHYSICIAN ASSISTANT | Admitting: PHYSICIAN ASSISTANT
Payer: COMMERCIAL

## 2024-01-18 DIAGNOSIS — C81.18 NODULAR SCLEROSIS HODGKIN LYMPHOMA OF LYMPH NODES OF MULTIPLE REGIONS (H): ICD-10-CM

## 2024-01-18 DIAGNOSIS — C81.18 NODULAR SCLEROSIS HODGKIN LYMPHOMA OF LYMPH NODES OF MULTIPLE REGIONS (H): Primary | ICD-10-CM

## 2024-01-18 PROCEDURE — 250N000011 HC RX IP 250 OP 636: Performed by: PHYSICIAN ASSISTANT

## 2024-01-18 PROCEDURE — 74177 CT ABD & PELVIS W/CONTRAST: CPT | Mod: 26 | Performed by: RADIOLOGY

## 2024-01-18 PROCEDURE — 70491 CT SOFT TISSUE NECK W/DYE: CPT

## 2024-01-18 PROCEDURE — 343N000001 HC RX 343: Performed by: PHYSICIAN ASSISTANT

## 2024-01-18 PROCEDURE — 71260 CT THORAX DX C+: CPT | Mod: 26 | Performed by: RADIOLOGY

## 2024-01-18 PROCEDURE — 70491 CT SOFT TISSUE NECK W/DYE: CPT | Mod: 26 | Performed by: RADIOLOGY

## 2024-01-18 PROCEDURE — A9552 F18 FDG: HCPCS | Performed by: PHYSICIAN ASSISTANT

## 2024-01-18 PROCEDURE — 71260 CT THORAX DX C+: CPT

## 2024-01-18 PROCEDURE — 78816 PET IMAGE W/CT FULL BODY: CPT | Mod: 26 | Performed by: RADIOLOGY

## 2024-01-18 PROCEDURE — 78816 PET IMAGE W/CT FULL BODY: CPT | Mod: PS

## 2024-01-18 RX ORDER — IOPAMIDOL 755 MG/ML
1-135 INJECTION, SOLUTION INTRAVASCULAR ONCE
Status: COMPLETED | OUTPATIENT
Start: 2024-01-18 | End: 2024-01-18

## 2024-01-18 RX ADMIN — IOPAMIDOL 125 ML: 755 INJECTION, SOLUTION INTRAVENOUS at 08:07

## 2024-01-18 RX ADMIN — FLUDEOXYGLUCOSE F-18 12.22 MILLICURIE: 500 INJECTION, SOLUTION INTRAVENOUS at 07:09

## 2024-02-01 ENCOUNTER — OFFICE VISIT (OUTPATIENT)
Dept: ONCOLOGY | Facility: CLINIC | Age: 49
End: 2024-02-01
Attending: INTERNAL MEDICINE
Payer: COMMERCIAL

## 2024-02-01 ENCOUNTER — LAB (OUTPATIENT)
Dept: LAB | Facility: CLINIC | Age: 49
End: 2024-02-01
Attending: INTERNAL MEDICINE
Payer: COMMERCIAL

## 2024-02-01 VITALS
SYSTOLIC BLOOD PRESSURE: 119 MMHG | RESPIRATION RATE: 16 BRPM | HEART RATE: 67 BPM | DIASTOLIC BLOOD PRESSURE: 55 MMHG | WEIGHT: 203.9 LBS | TEMPERATURE: 97.7 F | OXYGEN SATURATION: 100 % | BODY MASS INDEX: 34.39 KG/M2

## 2024-02-01 DIAGNOSIS — C81.18 NODULAR SCLEROSIS HODGKIN LYMPHOMA OF LYMPH NODES OF MULTIPLE REGIONS (H): Primary | ICD-10-CM

## 2024-02-01 DIAGNOSIS — D64.9 ANEMIA, UNSPECIFIED TYPE: ICD-10-CM

## 2024-02-01 DIAGNOSIS — Z94.84 HISTORY OF PERIPHERAL STEM CELL TRANSPLANT (H): ICD-10-CM

## 2024-02-01 LAB
ALBUMIN SERPL BCG-MCNC: 4.6 G/DL (ref 3.5–5.2)
ALP SERPL-CCNC: 70 U/L (ref 40–150)
ALT SERPL W P-5'-P-CCNC: 13 U/L (ref 0–50)
ANION GAP SERPL CALCULATED.3IONS-SCNC: 9 MMOL/L (ref 7–15)
AST SERPL W P-5'-P-CCNC: 15 U/L (ref 0–45)
BASOPHILS # BLD AUTO: 0.1 10E3/UL (ref 0–0.2)
BASOPHILS NFR BLD AUTO: 1 %
BILIRUB DIRECT SERPL-MCNC: <0.2 MG/DL (ref 0–0.3)
BILIRUB SERPL-MCNC: 0.7 MG/DL
BUN SERPL-MCNC: 20.3 MG/DL (ref 6–20)
CALCIUM SERPL-MCNC: 9.9 MG/DL (ref 8.6–10)
CHLORIDE SERPL-SCNC: 103 MMOL/L (ref 98–107)
CREAT SERPL-MCNC: 1.05 MG/DL (ref 0.51–0.95)
DEPRECATED HCO3 PLAS-SCNC: 27 MMOL/L (ref 22–29)
EGFRCR SERPLBLD CKD-EPI 2021: 65 ML/MIN/1.73M2
EOSINOPHIL # BLD AUTO: 0.2 10E3/UL (ref 0–0.7)
EOSINOPHIL NFR BLD AUTO: 4 %
ERYTHROCYTE [DISTWIDTH] IN BLOOD BY AUTOMATED COUNT: 12.4 % (ref 10–15)
FERRITIN SERPL-MCNC: 308 NG/ML (ref 6–175)
GLUCOSE SERPL-MCNC: 91 MG/DL (ref 70–99)
HCT VFR BLD AUTO: 32.9 % (ref 35–47)
HGB BLD-MCNC: 11.3 G/DL (ref 11.7–15.7)
IMM GRANULOCYTES # BLD: 0 10E3/UL
IMM GRANULOCYTES NFR BLD: 0 %
IRON BINDING CAPACITY (ROCHE): 283 UG/DL (ref 240–430)
IRON SATN MFR SERPL: 16 % (ref 15–46)
IRON SERPL-MCNC: 45 UG/DL (ref 37–145)
LDH SERPL L TO P-CCNC: 157 U/L (ref 0–250)
LYMPHOCYTES # BLD AUTO: 0.7 10E3/UL (ref 0.8–5.3)
LYMPHOCYTES NFR BLD AUTO: 16 %
MCH RBC QN AUTO: 29.8 PG (ref 26.5–33)
MCHC RBC AUTO-ENTMCNC: 34.3 G/DL (ref 31.5–36.5)
MCV RBC AUTO: 87 FL (ref 78–100)
MONOCYTES # BLD AUTO: 0.4 10E3/UL (ref 0–1.3)
MONOCYTES NFR BLD AUTO: 9 %
NEUTROPHILS # BLD AUTO: 3.1 10E3/UL (ref 1.6–8.3)
NEUTROPHILS NFR BLD AUTO: 70 %
NRBC # BLD AUTO: 0 10E3/UL
NRBC BLD AUTO-RTO: 0 /100
PHOSPHATE SERPL-MCNC: 4.8 MG/DL (ref 2.5–4.5)
PLATELET # BLD AUTO: 208 10E3/UL (ref 150–450)
POTASSIUM SERPL-SCNC: 4 MMOL/L (ref 3.4–5.3)
PROT SERPL-MCNC: 6.7 G/DL (ref 6.4–8.3)
RBC # BLD AUTO: 3.79 10E6/UL (ref 3.8–5.2)
RETICS # AUTO: 0.06 10E6/UL (ref 0.03–0.1)
RETICS/RBC NFR AUTO: 1.7 % (ref 0.5–2)
SODIUM SERPL-SCNC: 139 MMOL/L (ref 135–145)
T4 FREE SERPL-MCNC: 0.71 NG/DL (ref 0.9–1.7)
TSH SERPL DL<=0.005 MIU/L-ACNC: 6.92 UIU/ML (ref 0.3–4.2)
WBC # BLD AUTO: 4.4 10E3/UL (ref 4–11)

## 2024-02-01 PROCEDURE — 83550 IRON BINDING TEST: CPT

## 2024-02-01 PROCEDURE — 36415 COLL VENOUS BLD VENIPUNCTURE: CPT | Performed by: PHYSICIAN ASSISTANT

## 2024-02-01 PROCEDURE — 84443 ASSAY THYROID STIM HORMONE: CPT

## 2024-02-01 PROCEDURE — 82040 ASSAY OF SERUM ALBUMIN: CPT | Performed by: PHYSICIAN ASSISTANT

## 2024-02-01 PROCEDURE — 85045 AUTOMATED RETICULOCYTE COUNT: CPT | Performed by: PHYSICIAN ASSISTANT

## 2024-02-01 PROCEDURE — 84100 ASSAY OF PHOSPHORUS: CPT | Performed by: PHYSICIAN ASSISTANT

## 2024-02-01 PROCEDURE — 99213 OFFICE O/P EST LOW 20 MIN: CPT | Performed by: INTERNAL MEDICINE

## 2024-02-01 PROCEDURE — 86360 T CELL ABSOLUTE COUNT/RATIO: CPT | Performed by: PHYSICIAN ASSISTANT

## 2024-02-01 PROCEDURE — 86359 T CELLS TOTAL COUNT: CPT | Performed by: PHYSICIAN ASSISTANT

## 2024-02-01 PROCEDURE — 84439 ASSAY OF FREE THYROXINE: CPT

## 2024-02-01 PROCEDURE — 82728 ASSAY OF FERRITIN: CPT

## 2024-02-01 PROCEDURE — 83615 LACTATE (LD) (LDH) ENZYME: CPT | Performed by: PHYSICIAN ASSISTANT

## 2024-02-01 PROCEDURE — 85025 COMPLETE CBC W/AUTO DIFF WBC: CPT | Performed by: PHYSICIAN ASSISTANT

## 2024-02-01 PROCEDURE — 82248 BILIRUBIN DIRECT: CPT | Performed by: PHYSICIAN ASSISTANT

## 2024-02-01 PROCEDURE — 99215 OFFICE O/P EST HI 40 MIN: CPT | Performed by: INTERNAL MEDICINE

## 2024-02-01 PROCEDURE — G2211 COMPLEX E/M VISIT ADD ON: HCPCS | Performed by: INTERNAL MEDICINE

## 2024-02-01 PROCEDURE — 82784 ASSAY IGA/IGD/IGG/IGM EACH: CPT | Performed by: PHYSICIAN ASSISTANT

## 2024-02-01 ASSESSMENT — PAIN SCALES - GENERAL: PAINLEVEL: NO PAIN (0)

## 2024-02-01 NOTE — NURSING NOTE
Chief Complaint   Patient presents with    Blood Draw     Vpt blood draw by lab RN. Vitals taken and appointment arrived     Stephanie Barker RN

## 2024-02-01 NOTE — NURSING NOTE
"Oncology Rooming Note    February 1, 2024 3:52 PM   Jennifer Ward is a 48 year old female who presents for:    Chief Complaint   Patient presents with    Blood Draw     Vpt blood draw by lab RN. Vitals taken and appointment arrived    Oncology Clinic Visit     Nodular sclerosis Hodgkin lymphoma      Initial Vitals: /55   Pulse 67   Temp 97.7  F (36.5  C) (Oral)   Resp 16   Wt 92.5 kg (203 lb 14.4 oz)   SpO2 100%   BMI 34.39 kg/m   Estimated body mass index is 34.39 kg/m  as calculated from the following:    Height as of 11/9/23: 1.64 m (5' 4.57\").    Weight as of this encounter: 92.5 kg (203 lb 14.4 oz). Body surface area is 2.05 meters squared.  No Pain (0) Comment: Data Unavailable   No LMP recorded. Patient has had a hysterectomy.  Allergies reviewed: Yes  Medications reviewed: Yes    Medications: Medication refills not needed today.  Pharmacy name entered into [place]: [id number] [address] AT [facility] [address]    Frailty Screening:   Is the patient here for a new oncology consult visit in cancer care? 2. No      Clinical concerns:  none    Pamela Valladares              "

## 2024-02-01 NOTE — LETTER
2/1/2024         RE: Jennifer Ward  [address] [facility] [address]        Dear Colleague,    Thank you for referring your patient, Jennifer Ward, to the [facility]. Please see a copy of my visit note below.    BMT Progress Note  Feb 1, 2024     Patient ID:  Jennifer Ward is a 48 year old female, currently day +112 s/p autologous hematopoietic cell transplant with BEAM conditioning for relapsed Hodgkin Lymphoma. Co-enrolled on E-CELERATE study.      INTERVAL  HISTORY     Jennifer is here today with  Philip for day +100 BAN. Doing better overall. Does feel like she has a perpetual cold for the last few months including the time of the PET-CT on 1/18/24 (congestion, runny nose, etc). Now improved but still has slight runny nose. Also having some hot flashes and muscle aches sometimes. Swallowing is much better, able to eat normally and take pills again. Energy is improving, now back to work remotely.      Review of Systems: ROS negative except as noted above.     PHYSICAL EXAM     KPS:  80     /55   Pulse 67   Temp 97.7  F (36.5  C) (Oral)   Resp 16   Wt 92.5 kg (203 lb 14.4 oz)   SpO2 100%   BMI 34.39 kg/m      General: NAD  Eyes: sclera anicteric   Lymph: no cervical or axillary LAD appreciated  Lungs: CTAB  Cardiovascular: RRR, no M/R/G   Abd: soft, NT, ND  Lymphatics: no peripheral edema  Skin: no rashes  Neuro: A&O; non-focal    LABS AND IMAGING: I have assessed all abnormal lab values for their clinical significance and any values considered clinically significant have been addressed in the assessment and plan.       Lab Results   Component Value Date    WBC 4.4 02/01/2024    ANEU 1.4 (L) 11/09/2023    HGB 11.3 (L) 02/01/2024    HCT 32.9 (L) 02/01/2024     02/01/2024     02/01/2024    POTASSIUM 4.0 02/01/2024    CHLORIDE 103 02/01/2024    CO2 27 02/01/2024    GLC 91 02/01/2024    BUN 20.3 (H) 02/01/2024    CR 1.05 (H) 02/01/2024    MAG  1.6 (L) 11/09/2023    INR 1.37 (H) 10/23/2023      1/18/24 PET-CT:  IMPRESSION:   1. Increased size with new intense hypermetabolic FDG uptake in  bilateral level 2A cervical nodes, concerning for disease recurrence.  Deauville score: 5.  2. No other lymphadenopathy or sites of active disease identified.  3. Unchanged subcentimeter pulmonary nodules since at least 2022.  4. Persistent elevated right hemidiaphragm is suspicious for  paralysis.     SYSTEMS-BASED ASSESSMENT AND PLAN      Jennifer Ward is a 48 year old female, currently day +28 s/p autologous hematopoietic cell transplant with BEAM conditioning for relapsed Hodgkin Lymphoma. Co-enrolled on E-[employer]EL[employer] study.      BMT  - BMT MD/Coordinator: Nay  - Protocol: [id number] with co-enrollment on [id number] (E-CELERATE)  - S/p BEAM conditioning 10/6-10/11/23 and stem cell infusion 10/12/23 (cell dose 7 million CD34+ cells/kg). GCSF complete 10/23/23.   - Day +28 CT neck/CAP showed ongoing remission; incidental port-associated SVC thrombus as below.   - Day +100 PET showed increased FDG avidity of a single right and single left level 2A cervical LN, size borderline ~1 cm. Remainder of body clear. Had been having cold symptoms at the time so could be reactive. Will plan to obtain close interval follow-up CT neck in 2-3 weeks (1 month after the PET).      HEME/COAG  # Pancytopenia secondary to Hodgkin Lymphoma and chemotherapy  - Transfusion parameters: hgb <7g/dL and plts <10,000.   - Counts recovering nicely!    # Line-associated thrombus  11/9/23 CT incidentally showed nonocclusive thrombus/fibrin sheath in SVC associated with port (not seen well on previous CT PE due to contrast timing). D/w Radiology and IR, IR feels there is at least a small thrombus component.   - Had port removed with [facility].  - Completed 6 weeks of Eliquis ending 12/15/23.      ID  # Ppx: continue  mg BID through 1 year and Bactrim through 6 months.   # Vaccines: could get  flu and COVID shots now that she is past day +100 but will wait until after CT neck to avoid potential for reactive lymphadenopathy. Rest of childhood vaccines to start at 1 year.     Historical issues:  # RHINOVIRUS + (10/22)- Symptomatic control, robitussin   # N/F: work up unremarkable. Abx (cefepime/flagyl) discontinued with engraftment.      GI  # Diarrhea: Can use PRN imodium.   # CINV: now resolved.        FEN  Cr stable. Replace lytes PRN per sliding scale.      ENDOCRINE  # Hypothyroidism?: was put on levothyroxine at one point but she doesn't think it was for hypothyroidism and she stopped. Add on TSH      NEURO  # Migraines: continue propranolol and PRN naratriptan. Considering botox injections with neurologist.       PLAN:  - CT neck in 2-3 weeks, hold off on vaccinations until then  - Follow-up with Dr. Reddy's office in March  - RT for Day +180 BAN    Total of 40 minutes on patient visit, reviewing records, interpreting test results, placing orders, and documentation on the day of service.    The longitudinal plan of care for the condition(s) below were addressed during this visit. Due to the added complexity in care, I will continue to support Jennifer in the subsequent management of this condition(s) and with the ongoing continuity of care of this condition(s).    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

## 2024-02-01 NOTE — PROGRESS NOTES
BMT Progress Note  Feb 1, 2024     Patient ID:  Jennifer Ward is a 48 year old female, currently day +112 s/p autologous hematopoietic cell transplant with BEAM conditioning for relapsed Hodgkin Lymphoma. Co-enrolled on [employer] study.      INTERVAL  HISTORY     Jennifer is here today with  Philip for day +100 BAN. Doing better overall. Does feel like she has a perpetual cold for the last few months including the time of the PET-CT on 1/18/24 (congestion, runny nose, etc). Now improved but still has slight runny nose. Also having some hot flashes and muscle aches sometimes. Swallowing is much better, able to eat normally and take pills again. Energy is improving, now back to work remotely.      Review of Systems: ROS negative except as noted above.     PHYSICAL EXAM     KPS:  80     /55   Pulse 67   Temp 97.7  F (36.5  C) (Oral)   Resp 16   Wt 92.5 kg (203 lb 14.4 oz)   SpO2 100%   BMI 34.39 kg/m      General: NAD  Eyes: sclera anicteric   Lymph: no cervical or axillary LAD appreciated  Lungs: CTAB  Cardiovascular: RRR, no M/R/G   Abd: soft, NT, ND  Lymphatics: no peripheral edema  Skin: no rashes  Neuro: A&O; non-focal    LABS AND IMAGING: I have assessed all abnormal lab values for their clinical significance and any values considered clinically significant have been addressed in the assessment and plan.       Lab Results   Component Value Date    WBC 4.4 02/01/2024    ANEU 1.4 (L) 11/09/2023    HGB 11.3 (L) 02/01/2024    HCT 32.9 (L) 02/01/2024     02/01/2024     02/01/2024    POTASSIUM 4.0 02/01/2024    CHLORIDE 103 02/01/2024    CO2 27 02/01/2024    GLC 91 02/01/2024    BUN 20.3 (H) 02/01/2024    CR 1.05 (H) 02/01/2024    MAG 1.6 (L) 11/09/2023    INR 1.37 (H) 10/23/2023      1/18/24 PET-CT:  IMPRESSION:   1. Increased size with new intense hypermetabolic FDG uptake in  bilateral level 2A cervical nodes, concerning for disease recurrence.  Deauville score: 5.  2. No other  lymphadenopathy or sites of active disease identified.  3. Unchanged subcentimeter pulmonary nodules since at least 2022.  4. Persistent elevated right hemidiaphragm is suspicious for  paralysis.     SYSTEMS-BASED ASSESSMENT AND PLAN      Jennifer Ward is a 48 year old female, currently day +28 s/p autologous hematopoietic cell transplant with BEAM conditioning for relapsed Hodgkin Lymphoma. Co-enrolled on [employer] study.      BMT  - BMT MD/Coordinator: Nay  - Protocol: [id number] with co-enrollment on MT2022-40 (E-CELERATE)  - S/p BEAM conditioning 10/6-10/11/23 and stem cell infusion 10/12/23 (cell dose 7 million CD34+ cells/kg). GCSF complete 10/23/23.   - Day +28 CT neck/CAP showed ongoing remission; incidental port-associated SVC thrombus as below.   - Day +100 PET showed increased FDG avidity of a single right and single left level 2A cervical LN, size borderline ~1 cm. Remainder of body clear. Had been having cold symptoms at the time so could be reactive. Will plan to obtain close interval follow-up CT neck in 2-3 weeks (1 month after the PET).      HEME/COAG  # Pancytopenia secondary to Hodgkin Lymphoma and chemotherapy  - Transfusion parameters: hgb <7g/dL and plts <10,000.   - Counts recovering nicely!    # Line-associated thrombus  11/9/23 CT incidentally showed nonocclusive thrombus/fibrin sheath in SVC associated with port (not seen well on previous CT PE due to contrast timing). D/w Radiology and IR, IR feels there is at least a small thrombus component.   - Had port removed with [facility].  - Completed 6 weeks of Eliquis ending 12/15/23.      ID  # Ppx: continue  mg BID through 1 year and Bactrim through 6 months.   # Vaccines: could get flu and COVID shots now that she is past day +100 but will wait until after CT neck to avoid potential for reactive lymphadenopathy. Rest of childhood vaccines to start at 1 year.     Historical issues:  # RHINOVIRUS + (10/22)- Symptomatic control,  robitussin   # N/F: work up unremarkable. Abx (cefepime/flagyl) discontinued with engraftment.      GI  # Diarrhea: Can use PRN imodium.   # CINV: now resolved.        FEN  Cr stable. Replace lytes PRN per sliding scale.      ENDOCRINE  # Hypothyroidism?: was put on levothyroxine at one point but she doesn't think it was for hypothyroidism and she stopped. Add on TSH      NEURO  # Migraines: continue propranolol and PRN naratriptan. Considering botox injections with neurologist.       PLAN:  - CT neck in 2-3 weeks, hold off on vaccinations until then  - Follow-up with Dr. Reddy's office in March  - RT for Day +180 BAN    Total of 40 minutes on patient visit, reviewing records, interpreting test results, placing orders, and documentation on the day of service.    The longitudinal plan of care for the condition(s) below were addressed during this visit. Due to the added complexity in care, I will continue to support Jennifer in the subsequent management of this condition(s) and with the ongoing continuity of care of this condition(s).    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

## 2024-02-02 LAB
CD3 CELLS # BLD: 546 CELLS/UL (ref 603–2990)
CD3 CELLS NFR BLD: 72 % (ref 49–84)
CD3+CD4+ CELLS # BLD: 306 CELLS/UL (ref 441–2156)
CD3+CD4+ CELLS NFR BLD: 40 % (ref 28–63)
CD3+CD4+ CELLS/CD3+CD8+ CLL BLD: 2.12 % (ref 1.4–2.6)
CD3+CD8+ CELLS # BLD: 145 CELLS/UL (ref 125–1312)
CD3+CD8+ CELLS NFR BLD: 19 % (ref 10–40)
IGA SERPL-MCNC: 34 MG/DL (ref 84–499)
IGG SERPL-MCNC: 549 MG/DL (ref 610–1616)
IGM SERPL-MCNC: 19 MG/DL (ref 35–242)
T CELL COMMENT: ABNORMAL

## 2024-02-08 ENCOUNTER — TELEPHONE (OUTPATIENT)
Dept: TRANSPLANT | Facility: CLINIC | Age: 49
End: 2024-02-08
Payer: COMMERCIAL

## 2024-02-22 ENCOUNTER — HOSPITAL ENCOUNTER (OUTPATIENT)
Dept: CT IMAGING | Facility: CLINIC | Age: 49
Discharge: HOME | End: 2024-02-22
Attending: INTERNAL MEDICINE | Admitting: INTERNAL MEDICINE
Payer: COMMERCIAL

## 2024-02-22 DIAGNOSIS — C81.18 NODULAR SCLEROSIS HODGKIN LYMPHOMA OF LYMPH NODES OF MULTIPLE REGIONS (H): ICD-10-CM

## 2024-02-22 PROCEDURE — 70491 CT SOFT TISSUE NECK W/DYE: CPT

## 2024-02-22 PROCEDURE — 250N000009 HC RX 250: Performed by: INTERNAL MEDICINE

## 2024-02-22 PROCEDURE — 250N000011 HC RX IP 250 OP 636: Performed by: INTERNAL MEDICINE

## 2024-02-22 RX ORDER — IOPAMIDOL 755 MG/ML
500 INJECTION, SOLUTION INTRAVASCULAR ONCE
Status: COMPLETED | OUTPATIENT
Start: 2024-02-22 | End: 2024-02-22

## 2024-02-22 RX ADMIN — SODIUM CHLORIDE 70 ML: 9 INJECTION, SOLUTION INTRAVENOUS at 08:01

## 2024-02-22 RX ADMIN — IOPAMIDOL 90 ML: 755 INJECTION, SOLUTION INTRAVENOUS at 08:01

## 2024-03-06 DIAGNOSIS — C81.18 NODULAR SCLEROSIS HODGKIN LYMPHOMA OF LYMPH NODES OF MULTIPLE REGIONS (H): ICD-10-CM

## 2024-03-06 DIAGNOSIS — Z52.011 AUTOLOGOUS DONOR OF STEM CELLS: ICD-10-CM

## 2024-03-07 RX ORDER — SULFAMETHOXAZOLE AND TRIMETHOPRIM 400; 80 MG/1; MG/1
1 TABLET ORAL DAILY
Qty: 30 TABLET | Refills: 3 | Status: SHIPPED | OUTPATIENT
Start: 2024-03-07 | End: 2024-04-11

## 2024-04-10 DIAGNOSIS — C81.18 NODULAR SCLEROSIS HODGKIN LYMPHOMA OF LYMPH NODES OF MULTIPLE REGIONS (H): Primary | ICD-10-CM

## 2024-04-11 ENCOUNTER — ANCILLARY PROCEDURE (OUTPATIENT)
Dept: CT IMAGING | Facility: CLINIC | Age: 49
End: 2024-04-11
Attending: PHYSICIAN ASSISTANT
Payer: COMMERCIAL

## 2024-04-11 ENCOUNTER — OFFICE VISIT (OUTPATIENT)
Dept: TRANSPLANT | Facility: CLINIC | Age: 49
End: 2024-04-11
Attending: INTERNAL MEDICINE
Payer: COMMERCIAL

## 2024-04-11 ENCOUNTER — LAB (OUTPATIENT)
Dept: LAB | Facility: CLINIC | Age: 49
End: 2024-04-11
Attending: INTERNAL MEDICINE
Payer: COMMERCIAL

## 2024-04-11 ENCOUNTER — ANCILLARY PROCEDURE (OUTPATIENT)
Dept: CT IMAGING | Facility: CLINIC | Age: 49
End: 2024-04-11
Attending: INTERNAL MEDICINE
Payer: COMMERCIAL

## 2024-04-11 VITALS
BODY MASS INDEX: 34.93 KG/M2 | WEIGHT: 207.1 LBS | SYSTOLIC BLOOD PRESSURE: 132 MMHG | DIASTOLIC BLOOD PRESSURE: 72 MMHG | HEART RATE: 55 BPM | OXYGEN SATURATION: 100 % | RESPIRATION RATE: 16 BRPM | TEMPERATURE: 97.9 F

## 2024-04-11 DIAGNOSIS — C81.18 NODULAR SCLEROSIS HODGKIN LYMPHOMA OF LYMPH NODES OF MULTIPLE REGIONS (H): ICD-10-CM

## 2024-04-11 DIAGNOSIS — Z52.011 AUTOLOGOUS DONOR OF STEM CELLS: ICD-10-CM

## 2024-04-11 DIAGNOSIS — C81.18 NODULAR SCLEROSIS HODGKIN LYMPHOMA OF LYMPH NODES OF MULTIPLE REGIONS (H): Primary | ICD-10-CM

## 2024-04-11 LAB
ALBUMIN SERPL BCG-MCNC: 4.3 G/DL (ref 3.5–5.2)
ALP SERPL-CCNC: 77 U/L (ref 40–150)
ALT SERPL W P-5'-P-CCNC: 13 U/L (ref 0–50)
ANION GAP SERPL CALCULATED.3IONS-SCNC: 11 MMOL/L (ref 7–15)
AST SERPL W P-5'-P-CCNC: 15 U/L (ref 0–45)
BASOPHILS # BLD AUTO: 0 10E3/UL (ref 0–0.2)
BASOPHILS NFR BLD AUTO: 1 %
BILIRUB SERPL-MCNC: 0.7 MG/DL
BUN SERPL-MCNC: 15 MG/DL (ref 6–20)
CALCIUM SERPL-MCNC: 9.2 MG/DL (ref 8.6–10)
CHLORIDE SERPL-SCNC: 104 MMOL/L (ref 98–107)
CREAT SERPL-MCNC: 0.87 MG/DL (ref 0.51–0.95)
DEPRECATED HCO3 PLAS-SCNC: 24 MMOL/L (ref 22–29)
EGFRCR SERPLBLD CKD-EPI 2021: 81 ML/MIN/1.73M2
EOSINOPHIL # BLD AUTO: 0.1 10E3/UL (ref 0–0.7)
EOSINOPHIL NFR BLD AUTO: 2 %
ERYTHROCYTE [DISTWIDTH] IN BLOOD BY AUTOMATED COUNT: 12.8 % (ref 10–15)
GLUCOSE SERPL-MCNC: 90 MG/DL (ref 70–99)
HCT VFR BLD AUTO: 31.5 % (ref 35–47)
HGB BLD-MCNC: 10.4 G/DL (ref 11.7–15.7)
IMM GRANULOCYTES # BLD: 0 10E3/UL
IMM GRANULOCYTES NFR BLD: 1 %
LDH SERPL L TO P-CCNC: 147 U/L (ref 0–250)
LYMPHOCYTES # BLD AUTO: 0.9 10E3/UL (ref 0.8–5.3)
LYMPHOCYTES NFR BLD AUTO: 24 %
MCH RBC QN AUTO: 28.8 PG (ref 26.5–33)
MCHC RBC AUTO-ENTMCNC: 33 G/DL (ref 31.5–36.5)
MCV RBC AUTO: 87 FL (ref 78–100)
MONOCYTES # BLD AUTO: 0.3 10E3/UL (ref 0–1.3)
MONOCYTES NFR BLD AUTO: 9 %
NEUTROPHILS # BLD AUTO: 2.4 10E3/UL (ref 1.6–8.3)
NEUTROPHILS NFR BLD AUTO: 63 %
NRBC # BLD AUTO: 0 10E3/UL
NRBC BLD AUTO-RTO: 0 /100
PLATELET # BLD AUTO: 189 10E3/UL (ref 150–450)
POTASSIUM SERPL-SCNC: 3.9 MMOL/L (ref 3.4–5.3)
PROT SERPL-MCNC: 6.6 G/DL (ref 6.4–8.3)
RBC # BLD AUTO: 3.61 10E6/UL (ref 3.8–5.2)
SODIUM SERPL-SCNC: 139 MMOL/L (ref 135–145)
WBC # BLD AUTO: 3.7 10E3/UL (ref 4–11)

## 2024-04-11 PROCEDURE — 74177 CT ABD & PELVIS W/CONTRAST: CPT | Mod: GC | Performed by: RADIOLOGY

## 2024-04-11 PROCEDURE — 85025 COMPLETE CBC W/AUTO DIFF WBC: CPT | Performed by: PHYSICIAN ASSISTANT

## 2024-04-11 PROCEDURE — 82784 ASSAY IGA/IGD/IGG/IGM EACH: CPT | Performed by: PHYSICIAN ASSISTANT

## 2024-04-11 PROCEDURE — 36415 COLL VENOUS BLD VENIPUNCTURE: CPT | Performed by: PHYSICIAN ASSISTANT

## 2024-04-11 PROCEDURE — 99213 OFFICE O/P EST LOW 20 MIN: CPT

## 2024-04-11 PROCEDURE — 70491 CT SOFT TISSUE NECK W/DYE: CPT | Performed by: STUDENT IN AN ORGANIZED HEALTH CARE EDUCATION/TRAINING PROGRAM

## 2024-04-11 PROCEDURE — 71260 CT THORAX DX C+: CPT | Mod: GC | Performed by: RADIOLOGY

## 2024-04-11 PROCEDURE — 83615 LACTATE (LD) (LDH) ENZYME: CPT | Performed by: PHYSICIAN ASSISTANT

## 2024-04-11 PROCEDURE — 80053 COMPREHEN METABOLIC PANEL: CPT | Performed by: PHYSICIAN ASSISTANT

## 2024-04-11 PROCEDURE — 99214 OFFICE O/P EST MOD 30 MIN: CPT

## 2024-04-11 RX ORDER — ACYCLOVIR 400 MG/1
400 TABLET ORAL 2 TIMES DAILY
Qty: 60 TABLET | Refills: 3 | Status: SHIPPED | OUTPATIENT
Start: 2024-04-11 | End: 2024-09-16

## 2024-04-11 RX ORDER — SULFAMETHOXAZOLE AND TRIMETHOPRIM 400; 80 MG/1; MG/1
1 TABLET ORAL DAILY
Qty: 30 TABLET | Refills: 3 | Status: SHIPPED | OUTPATIENT
Start: 2024-04-11

## 2024-04-11 RX ORDER — IOPAMIDOL 755 MG/ML
99 INJECTION, SOLUTION INTRAVASCULAR ONCE
Status: COMPLETED | OUTPATIENT
Start: 2024-04-11 | End: 2024-04-11

## 2024-04-11 RX ADMIN — IOPAMIDOL 99 ML: 755 INJECTION, SOLUTION INTRAVASCULAR at 10:13

## 2024-04-11 ASSESSMENT — PAIN SCALES - GENERAL: PAINLEVEL: NO PAIN (0)

## 2024-04-11 NOTE — PROGRESS NOTES
BMT Progress Note  Apr 11, 2024     Patient ID:  Jennifer Ward is a 48 year old female, currently day +182 s/p autologous hematopoietic cell transplant with BEAM conditioning for relapsed Hodgkin Lymphoma. Co-enrolled on [employer] study.      INTERVAL  HISTORY     Jennifer is here today with her mom for day +180 BAN. Great overall, feels like things are finally starting to get back to normal. Having more migraines again lately, going to meet with her neurologist next week. Tends to catch colds easily from her grandchildren but none recently. Her lower extremity muscle aches and tingling are now resolved. Still having hot flashes since her KALYN-BSO last year. Otherwise no fever/chills, lumps/bumps, SOB, chest pain, N/V, abdominal pain, diarrhea, or bleeding. Working remotely full-time, has some work trips planned ([place] in June, [place] in October).      Review of Systems: ROS negative except as noted above.     PHYSICAL EXAM     KPS:  100     /72 (BP Location: Left arm, Patient Position: Sitting, Cuff Size: Adult Large)   Pulse 55   Temp 97.9  F (36.6  C) (Oral)   Resp 16   Wt 93.9 kg (207 lb 1.6 oz)   SpO2 100%   BMI 34.93 kg/m      General: Awake, alert, in no acute distress.   HEENT: Normocephalic, atraumatic. No scleral icterus.   CV: Regular rate and rhythm. No murmurs, rubs, or gallops appreciated.  Resp: Good inspiratory effort, lungs clear to auscultation bilaterally.  GI: Abdomen soft, nontender, nondistended.   Ext: No peripheral edema bilaterally.  Neuro: CN II-XII grossly intact. No focal deficits appreciated.  Skin: No rash, unusual bruising or prominent lesions.  Psych: Pleasant, normal affect.     LABS AND IMAGING: I have assessed all abnormal lab values for their clinical significance and any values considered clinically significant have been addressed in the assessment and plan.       Lab Results   Component Value Date    WBC 3.7 (L) 04/11/2024    ANEU 1.4 (L) 11/09/2023    HGB 10.4 (L)  04/11/2024    HCT 31.5 (L) 04/11/2024     04/11/2024     04/11/2024    POTASSIUM 3.9 04/11/2024    CHLORIDE 104 04/11/2024    CO2 24 04/11/2024    GLC 90 04/11/2024    BUN 15.0 04/11/2024    CR 0.87 04/11/2024    MAG 1.6 (L) 11/09/2023    INR 1.37 (H) 10/23/2023      4/11/24 CT Neck:  Impression:  Normal CT study of the neck with contrast.  No evident mass or  adenopathy within the neck.    CT CAP reading pending    SYSTEMS-BASED ASSESSMENT AND PLAN      Jennifer Ward is a 48 year old female, currently day +182 s/p autologous hematopoietic cell transplant with BEAM conditioning for relapsed Hodgkin Lymphoma. Co-enrolled on [employer] study.      BMT  - BMT MD/Coordinator: Nay  - Protocol: UM0655-08 with co-enrollment on CA8735-14 (E-CELERATE)  - S/p BEAM conditioning 10/6-10/11/23 and stem cell infusion 10/12/23 (cell dose 7 million CD34+ cells/kg). GCSF complete 10/23/23.   - Day +28 CT neck/CAP showed ongoing remission; incidental port-associated SVC thrombus as below.   - Day +100 PET showed increased FDG avidity of a single right and single left level 2A cervical LN, size borderline ~1 cm. Remainder of body clear. Had been having cold symptoms at the time so could be reactive. Follow up CT neck 2/22/24 was reassuring.  - Day +180 CT neck shows ongoing remission, awaiting final read for CT CAP but personally reviewed images.   - Next restaging at 1 year.      HEME/COAG  - Counts now recovered.   - Line-associated thrombus: 11/9/23 CT incidentally showed nonocclusive thrombus/fibrin sheath in SVC associated with port (not seen well on previous CT PE due to contrast timing). D/w Radiology and IR, IR feels there is at least a small thrombus component. Had port removed with [facility] and completed 6 weeks of Eliquis ending 12/15/23.      ID  - Ppx: continue  mg BID and Bactrim through 1 year   - Vaccines: received flu shot at ~D100, declined COVID shots. Rest of childhood vaccines to  start at 1 year.     ENDOCRINE  - Hypothyroidism?: was put on levothyroxine at one point but she doesn't think it was for hypothyroidism and she stopped. Add on TSH.     NEURO  - Migraines: continue propranolol and PRN naratriptan. Considering botox injections with neurologist.       PLAN:  - Recommend local oncology follow-up in 2-3 months  - RTC for 1 year BAN    Total of 30 minutes on patient visit, reviewing records, interpreting test results, placing orders, and documentation on the day of service.    Tammy Gonzalez MD  Attending Physician, [facility]

## 2024-04-11 NOTE — NURSING NOTE
"Oncology Rooming Note    April 11, 2024 2:00 PM   Jennifer Ward is a 48 year old female who presents for:    Chief Complaint   Patient presents with    Labs Only     Labs drawn off PIV by RN, ALISA done    Oncology Clinic Visit     Hodgkin's Lymphoma     Initial Vitals: /72 (BP Location: Left arm, Patient Position: Sitting, Cuff Size: Adult Large)   Pulse 55   Temp 97.9  F (36.6  C) (Oral)   Resp 16   Wt 93.9 kg (207 lb 1.6 oz)   SpO2 100%   BMI 34.93 kg/m   Estimated body mass index is 34.93 kg/m  as calculated from the following:    Height as of 11/9/23: 1.64 m (5' 4.57\").    Weight as of this encounter: 93.9 kg (207 lb 1.6 oz). Body surface area is 2.07 meters squared.  No Pain (0) Comment: Data Unavailable   No LMP recorded. Patient has had a hysterectomy.  Allergies reviewed: Yes  Medications reviewed: Yes    Medications: MEDICATION REFILLS NEEDED TODAY. Provider was notified.  Pharmacy name entered into [email or web address]: [id number] - [address] AT [facility] & [address]    Frailty Screening:   Is the patient here for a new oncology consult visit in cancer care? 2. No      Clinical concerns: None       Mita Ray LPN  4/11/2024              "

## 2024-04-11 NOTE — DISCHARGE INSTRUCTIONS

## 2024-04-11 NOTE — LETTER
4/11/2024         RE: Jennifer Ward  [address] [address]        Dear Colleague,    Thank you for referring your patient, Jennifer Ward, to the [facility] [place]. Please see a copy of my visit note below.    BMT Progress Note  Apr 11, 2024     Patient ID:  Jennifer Ward is a 48 year old female, currently day +182 s/p autologous hematopoietic cell transplant with BEAM conditioning for relapsed Hodgkin Lymphoma. Co-enrolled on [employer] study.      INTERVAL  HISTORY     Jennifer is here today with her mom for day +180 BAN. Great overall, feels like things are finally starting to get back to normal. Having more migraines again lately, going to meet with her neurologist next week. Tends to catch colds easily from her grandchildren but none recently. Her lower extremity muscle aches and tingling are now resolved. Still having hot flashes since her KALYN-BSO last year. Otherwise no fever/chills, lumps/bumps, SOB, chest pain, N/V, abdominal pain, diarrhea, or bleeding. Working remotely full-time, has some work trips planned ([employer] in June, [place] in October).      Review of Systems: ROS negative except as noted above.     PHYSICAL EXAM     KPS:  100     /72 (BP Location: Left arm, Patient Position: Sitting, Cuff Size: Adult Large)   Pulse 55   Temp 97.9  F (36.6  C) (Oral)   Resp 16   Wt 93.9 kg (207 lb 1.6 oz)   SpO2 100%   BMI 34.93 kg/m      General: Awake, alert, in no acute distress.   HEENT: Normocephalic, atraumatic. No scleral icterus.   CV: Regular rate and rhythm. No murmurs, rubs, or gallops appreciated.  Resp: Good inspiratory effort, lungs clear to auscultation bilaterally.  GI: Abdomen soft, nontender, nondistended.   Ext: No peripheral edema bilaterally.  Neuro: CN II-XII grossly intact. No focal deficits appreciated.  Skin: No rash, unusual bruising or prominent lesions.  Psych: Pleasant, normal affect.     LABS AND IMAGING:  I have assessed all abnormal lab values for their clinical significance and any values considered clinically significant have been addressed in the assessment and plan.       Lab Results   Component Value Date    WBC 3.7 (L) 04/11/2024    ANEU 1.4 (L) 11/09/2023    HGB 10.4 (L) 04/11/2024    HCT 31.5 (L) 04/11/2024     04/11/2024     04/11/2024    POTASSIUM 3.9 04/11/2024    CHLORIDE 104 04/11/2024    CO2 24 04/11/2024    GLC 90 04/11/2024    BUN 15.0 04/11/2024    CR 0.87 04/11/2024    MAG 1.6 (L) 11/09/2023    INR 1.37 (H) 10/23/2023      4/11/24 CT Neck:  Impression:  Normal CT study of the neck with contrast.  No evident mass or  adenopathy within the neck.    CT CAP reading pending    SYSTEMS-BASED ASSESSMENT AND PLAN      Jennifer Ward is a 48 year old female, currently day +182 s/p autologous hematopoietic cell transplant with BEAM conditioning for relapsed Hodgkin Lymphoma. Co-enrolled on [employer] study.      BMT  - BMT MD/Coordinator: Nay  - Protocol: [id number] with co-enrollment on TM9929-06 (E-CELERATE)  - S/p BEAM conditioning 10/6-10/11/23 and stem cell infusion 10/12/23 (cell dose 7 million CD34+ cells/kg). GCSF complete 10/23/23.   - Day +28 CT neck/CAP showed ongoing remission; incidental port-associated SVC thrombus as below.   - Day +100 PET showed increased FDG avidity of a single right and single left level 2A cervical LN, size borderline ~1 cm. Remainder of body clear. Had been having cold symptoms at the time so could be reactive. Follow up CT neck 2/22/24 was reassuring.  - Day +180 CT neck shows ongoing remission, awaiting final read for CT CAP but personally reviewed images.   - Next restaging at 1 year.      HEME/COAG  - Counts now recovered.   - Line-associated thrombus: 11/9/23 CT incidentally showed nonocclusive thrombus/fibrin sheath in SVC associated with port (not seen well on previous CT PE due to contrast timing). D/w Radiology and IR, IR feels there is at  least a small thrombus component. Had port removed with [facility] and completed 6 weeks of Eliquis ending 12/15/23.      ID  - Ppx: continue  mg BID and Bactrim through 1 year   - Vaccines: received flu shot at ~D100, declined COVID shots. Rest of childhood vaccines to start at 1 year.     ENDOCRINE  - Hypothyroidism?: was put on levothyroxine at one point but she doesn't think it was for hypothyroidism and she stopped. Add on TSH.     NEURO  - Migraines: continue propranolol and PRN naratriptan. Considering botox injections with neurologist.       PLAN:  - Recommend local oncology follow-up in 2-3 months  - RTC for 1 year BAN    Total of 30 minutes on patient visit, reviewing records, interpreting test results, placing orders, and documentation on the day of service.    Tammy Gonzalez MD  Attending Physician, [facility]

## 2024-04-11 NOTE — NURSING NOTE
Chief Complaint   Patient presents with    Labs Only     Labs drawn off PIV by RN, VS done     Labs drawn from PIV placed by RN. Line flushed with saline. Vitals taken. Pt to self check in for appointment(s) and verbalized understanding of this.

## 2024-04-12 LAB
IGA SERPL-MCNC: 34 MG/DL (ref 84–499)
IGG SERPL-MCNC: 522 MG/DL (ref 610–1616)
IGM SERPL-MCNC: 30 MG/DL (ref 35–242)

## 2024-07-02 DIAGNOSIS — C81.18 NODULAR SCLEROSIS HODGKIN LYMPHOMA OF LYMPH NODES OF MULTIPLE REGIONS (H): Primary | ICD-10-CM

## 2024-08-21 DIAGNOSIS — Z52.011 AUTOLOGOUS DONOR OF STEM CELLS: ICD-10-CM

## 2024-09-08 ENCOUNTER — HEALTH MAINTENANCE LETTER (OUTPATIENT)
Age: 49
End: 2024-09-08

## 2024-09-16 ENCOUNTER — MYC REFILL (OUTPATIENT)
Dept: TRANSPLANT | Facility: CLINIC | Age: 49
End: 2024-09-16
Payer: COMMERCIAL

## 2024-09-16 DIAGNOSIS — Z52.011 AUTOLOGOUS DONOR OF STEM CELLS: ICD-10-CM

## 2024-09-16 RX ORDER — ACYCLOVIR 400 MG/1
400 TABLET ORAL 2 TIMES DAILY
Qty: 60 TABLET | Refills: 2 | Status: SHIPPED | OUTPATIENT
Start: 2024-09-16

## 2024-10-08 ENCOUNTER — ANCILLARY PROCEDURE (OUTPATIENT)
Dept: CT IMAGING | Facility: CLINIC | Age: 49
End: 2024-10-08
Attending: INTERNAL MEDICINE
Payer: COMMERCIAL

## 2024-10-08 ENCOUNTER — LAB (OUTPATIENT)
Dept: LAB | Facility: CLINIC | Age: 49
End: 2024-10-08
Attending: INTERNAL MEDICINE

## 2024-10-08 ENCOUNTER — VIRTUAL VISIT (OUTPATIENT)
Dept: TRANSPLANT | Facility: CLINIC | Age: 49
End: 2024-10-08
Attending: INTERNAL MEDICINE
Payer: COMMERCIAL

## 2024-10-08 VITALS
BODY MASS INDEX: 39.03 KG/M2 | SYSTOLIC BLOOD PRESSURE: 131 MMHG | DIASTOLIC BLOOD PRESSURE: 77 MMHG | TEMPERATURE: 98 F | RESPIRATION RATE: 18 BRPM | WEIGHT: 231.4 LBS | OXYGEN SATURATION: 100 % | HEART RATE: 62 BPM

## 2024-10-08 DIAGNOSIS — C81.18 NODULAR SCLEROSIS HODGKIN LYMPHOMA OF LYMPH NODES OF MULTIPLE REGIONS (H): ICD-10-CM

## 2024-10-08 DIAGNOSIS — R41.3 POOR SHORT-TERM MEMORY: ICD-10-CM

## 2024-10-08 DIAGNOSIS — Z78.0 MENOPAUSE: ICD-10-CM

## 2024-10-08 DIAGNOSIS — Z91.89 AT HIGH RISK FOR OSTEOPOROSIS: ICD-10-CM

## 2024-10-08 DIAGNOSIS — Z91.89 AT RISK FOR HYPOTHYROIDISM: ICD-10-CM

## 2024-10-08 DIAGNOSIS — C81.18 NODULAR SCLEROSIS HODGKIN LYMPHOMA OF LYMPH NODES OF MULTIPLE REGIONS (H): Primary | ICD-10-CM

## 2024-10-08 DIAGNOSIS — Z94.84 HISTORY OF AUTOLOGOUS STEM CELL TRANSPLANT (H): Primary | ICD-10-CM

## 2024-10-08 DIAGNOSIS — Z52.011 AUTOLOGOUS DONOR OF STEM CELLS: ICD-10-CM

## 2024-10-08 DIAGNOSIS — R53.81 PHYSICAL DECONDITIONING: ICD-10-CM

## 2024-10-08 LAB
ALBUMIN SERPL BCG-MCNC: 4.5 G/DL (ref 3.5–5.2)
ALP SERPL-CCNC: 84 U/L (ref 40–150)
ALT SERPL W P-5'-P-CCNC: 15 U/L (ref 0–50)
ANION GAP SERPL CALCULATED.3IONS-SCNC: 12 MMOL/L (ref 7–15)
AST SERPL W P-5'-P-CCNC: 17 U/L (ref 0–45)
BASOPHILS # BLD AUTO: 0 10E3/UL (ref 0–0.2)
BASOPHILS NFR BLD AUTO: 1 %
BILIRUB SERPL-MCNC: 0.8 MG/DL
BUN SERPL-MCNC: 18.8 MG/DL (ref 6–20)
CALCIUM SERPL-MCNC: 9.4 MG/DL (ref 8.8–10.4)
CD3 CELLS # BLD: 472 CELLS/UL (ref 603–2990)
CD3 CELLS NFR BLD: 62 % (ref 49–84)
CD3+CD4+ CELLS # BLD: 270 CELLS/UL (ref 441–2156)
CD3+CD4+ CELLS NFR BLD: 35 % (ref 28–63)
CD3+CD4+ CELLS/CD3+CD8+ CLL BLD: 2.12 % (ref 1.4–2.6)
CD3+CD8+ CELLS # BLD: 127 CELLS/UL (ref 125–1312)
CD3+CD8+ CELLS NFR BLD: 17 % (ref 10–40)
CHLORIDE SERPL-SCNC: 103 MMOL/L (ref 98–107)
CREAT SERPL-MCNC: 0.85 MG/DL (ref 0.51–0.95)
EGFRCR SERPLBLD CKD-EPI 2021: 83 ML/MIN/1.73M2
EOSINOPHIL # BLD AUTO: 0.1 10E3/UL (ref 0–0.7)
EOSINOPHIL NFR BLD AUTO: 5 %
ERYTHROCYTE [DISTWIDTH] IN BLOOD BY AUTOMATED COUNT: 12.6 % (ref 10–15)
GLUCOSE SERPL-MCNC: 124 MG/DL (ref 70–99)
HCO3 SERPL-SCNC: 22 MMOL/L (ref 22–29)
HCT VFR BLD AUTO: 33.1 % (ref 35–47)
HGB BLD-MCNC: 11.2 G/DL (ref 11.7–15.7)
IMM GRANULOCYTES # BLD: 0 10E3/UL
IMM GRANULOCYTES NFR BLD: 0 %
LDH SERPL L TO P-CCNC: 148 U/L (ref 0–250)
LYMPHOCYTES # BLD AUTO: 0.7 10E3/UL (ref 0.8–5.3)
LYMPHOCYTES NFR BLD AUTO: 22 %
MCH RBC QN AUTO: 29.2 PG (ref 26.5–33)
MCHC RBC AUTO-ENTMCNC: 33.8 G/DL (ref 31.5–36.5)
MCV RBC AUTO: 86 FL (ref 78–100)
MONOCYTES # BLD AUTO: 0.3 10E3/UL (ref 0–1.3)
MONOCYTES NFR BLD AUTO: 9 %
NEUTROPHILS # BLD AUTO: 2 10E3/UL (ref 1.6–8.3)
NEUTROPHILS NFR BLD AUTO: 63 %
NRBC # BLD AUTO: 0 10E3/UL
NRBC BLD AUTO-RTO: 0 /100
PLATELET # BLD AUTO: 158 10E3/UL (ref 150–450)
POTASSIUM SERPL-SCNC: 4.1 MMOL/L (ref 3.4–5.3)
PROT SERPL-MCNC: 6.7 G/DL (ref 6.4–8.3)
RBC # BLD AUTO: 3.83 10E6/UL (ref 3.8–5.2)
SODIUM SERPL-SCNC: 137 MMOL/L (ref 135–145)
T CELL COMMENT: ABNORMAL
WBC # BLD AUTO: 3.1 10E3/UL (ref 4–11)

## 2024-10-08 PROCEDURE — 71260 CT THORAX DX C+: CPT | Mod: GC | Performed by: RADIOLOGY

## 2024-10-08 PROCEDURE — 99215 OFFICE O/P EST HI 40 MIN: CPT | Mod: 95 | Performed by: NURSE PRACTITIONER

## 2024-10-08 PROCEDURE — 83615 LACTATE (LD) (LDH) ENZYME: CPT | Performed by: INTERNAL MEDICINE

## 2024-10-08 PROCEDURE — 80048 BASIC METABOLIC PNL TOTAL CA: CPT | Performed by: INTERNAL MEDICINE

## 2024-10-08 PROCEDURE — 84443 ASSAY THYROID STIM HORMONE: CPT

## 2024-10-08 PROCEDURE — 36415 COLL VENOUS BLD VENIPUNCTURE: CPT | Performed by: INTERNAL MEDICINE

## 2024-10-08 PROCEDURE — 86359 T CELLS TOTAL COUNT: CPT | Performed by: INTERNAL MEDICINE

## 2024-10-08 PROCEDURE — 74177 CT ABD & PELVIS W/CONTRAST: CPT | Mod: GC | Performed by: RADIOLOGY

## 2024-10-08 PROCEDURE — 82784 ASSAY IGA/IGD/IGG/IGM EACH: CPT | Performed by: INTERNAL MEDICINE

## 2024-10-08 PROCEDURE — 84439 ASSAY OF FREE THYROXINE: CPT

## 2024-10-08 PROCEDURE — 85025 COMPLETE CBC W/AUTO DIFF WBC: CPT | Performed by: INTERNAL MEDICINE

## 2024-10-08 PROCEDURE — 70491 CT SOFT TISSUE NECK W/DYE: CPT | Mod: GC | Performed by: RADIOLOGY

## 2024-10-08 RX ORDER — IOPAMIDOL 755 MG/ML
102 INJECTION, SOLUTION INTRAVASCULAR ONCE
Status: COMPLETED | OUTPATIENT
Start: 2024-10-08 | End: 2024-10-08

## 2024-10-08 RX ADMIN — IOPAMIDOL 102 ML: 755 INJECTION, SOLUTION INTRAVASCULAR at 07:57

## 2024-10-08 ASSESSMENT — PAIN SCALES - GENERAL: PAINLEVEL: NO PAIN (0)

## 2024-10-08 NOTE — PROGRESS NOTES
Virtual Visit Details    Type of service:  Video Visit     Originating Location (pt. Location): Home    Distant Location (provider location):  Off-site  Platform used for Video Visit: [employer]

## 2024-10-08 NOTE — PROGRESS NOTES
BMT Progress Note  Oct 10, 2024     Patient ID:  Jennifer Ward is a 48 year old female, currently day +364 s/p autologous hematopoietic cell transplant with BEAM conditioning for relapsed Hodgkin Lymphoma. Co-enrolled on [employer] study.     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 PET showed lymphoma limited to left cervical, supraclavicular, and axillary lymph nodes. BmBx negative.      Late relapse in May 2023 presenting with right neck swelling. Right cervical LN biopsy showed recurrent vs new primary cHL (nodular sclerosing subtype). PET with FDG-avid right level II and level V cervical lymph nodes only (SUVmax 13.2)     Date Treatment Name Response Side Effects / Toxicities   12/2008 to 5/2009 ABVD x 6 cycles followed by adjuvant radiation CR Fatigue, N/V   6/2023 to 8/2023 Brentuximab/nivolumab x 2 cycles, nivolumab alone x 2 cycles CR Brentuximab infusion reaction (difficulty breathing)   10/12/23 Consolidative autologous transplant with BEAM conditioning  Ongoing CR Neutropenic fever, line-associated DVT            INTERVAL  HISTORY     Jennifer is here today with her  for 1 year BAN. Doing fairly well overall, only ongoing symptom is still the muscle/joint pains (since BMT) and hot flashes (since KALYN-BSO in 2023). States that she was previously on something for hot flashes but it got stopped estuardo-transplant. She just found that that her mom has shingles today, she and her  have been living with her mom while their house is being remodeled. Otherwise no fevers, chest pain, SOB, N/V, diarrhea, or bleeding.      Review of Systems: ROS negative except as noted above.     PHYSICAL EXAM     KPS:  100     /71 (BP Location: Right arm, Patient Position: Right side, Cuff Size: Adult Large)   Pulse 54   Temp 97.5  F (36.4  C) (Oral)   Resp 16   Wt  105.1 kg (231 lb 12.8 oz)   SpO2 100%   BMI 39.09 kg/m      General: Awake, alert, in no acute distress.   HEENT: Normocephalic, atraumatic. No scleral icterus.   CV: Regular rate and rhythm. No murmurs, rubs, or gallops appreciated.  Resp: Good inspiratory effort, lungs clear to auscultation bilaterally.  GI: Abdomen soft, nontender, nondistended.   Ext: No peripheral edema bilaterally.  Neuro: CN II-XII grossly intact. No focal deficits appreciated.  Skin: No rash, unusual bruising or prominent lesions.  Psych: Pleasant, normal affect.     LABS AND IMAGING: I have assessed all abnormal lab values for their clinical significance and any values considered clinically significant have been addressed in the assessment and plan.       Lab Results   Component Value Date    WBC 3.1 (L) 10/08/2024    ANEU 1.4 (L) 11/09/2023    HGB 11.2 (L) 10/08/2024    HCT 33.1 (L) 10/08/2024     10/08/2024     10/08/2024    POTASSIUM 4.1 10/08/2024    CHLORIDE 103 10/08/2024    CO2 22 10/08/2024     (H) 10/08/2024    BUN 18.8 10/08/2024    CR 0.85 10/08/2024    MAG 1.6 (L) 11/09/2023    INR 1.37 (H) 10/23/2023      10/8/24 CT CAP:  IMPRESSION:   1. No suspicious lymphadenopathy in the chest, abdomen, or pelvis.  2. Incidentally noted duplicated left renal system.  3. Stable left adrenal adenoma.  4. Colonic diverticulosis without evidence of acute diverticulitis.    CT Neck:  IMPRESSION:   No cervical mass or adenopathy.    SYSTEMS-BASED ASSESSMENT AND PLAN      Jennifer Ward is a 48 year old female, currently day +364 s/p autologous hematopoietic cell transplant with BEAM conditioning for relapsed Hodgkin Lymphoma. Co-enrolled on [employer] study.      BMT  - BMT MD/Coordinator: Nay  - Protocol: [id number] with co-enrollment on BF8279-30 ([employer])  - S/p BEAM conditioning 10/6-10/11/23 and stem cell infusion 10/12/23 (cell dose 7 million CD34+ cells/kg). GCSF complete 10/23/23.   - Day +28 CT neck/CAP  showed ongoing remission; incidental port-associated SVC thrombus as below.   - Day +100 PET showed increased FDG avidity of a single right and single left level 2A cervical LN, size borderline ~1 cm. Remainder of body clear. Had been having cold symptoms at the time so could be reactive. Follow up CT neck 2/22/24 was reassuring.  - Day +180 CT's with ongoing remission.   - 1 year CT's with ongoing remission.  - Next restaging at 1.5 years.      HEME/COAG  - Counts now recovered.   - Line-associated thrombus: 11/9/23 CT incidentally showed nonocclusive thrombus/fibrin sheath in SVC associated with port (not seen well on previous CT PE due to contrast timing). D/w Radiology and IR, IR feels there is at least a small thrombus component. Had port removed with [facility] and completed 6 weeks of Eliquis ending 12/15/23.      ID  - Ppx: continue  mg BID until 2nd shingles host. Okay to stop Bactrim now.   - Vaccines: declined COVID booster previously. Give 1 year vaccines today (Vaxelis, Shingrix, PCV20); will need 2nd dose of all of these in 2 months. Also good to get seasonal flu shot.     ENDOCRINE  - Hypothyroidism: she was put on levothyroxine at one point but she doesn't think it was for hypothyroidism and she stopped. Rechecked thyroid studies today, TSH elevated at 7.34 and free T4 mildly low at 0.75 which could be suggestive of mild hypothyroidism. Recommend discussing with PCP if she should go back on levothyroxine.   - Hot flashes: 2/2 surgical menopause after KALYN-BSO in 3/2023. She was on Effexor at one point but this was stopped estuardo-transplant. She would like to restart as hot flashes have become more bothersome. Will restart Effexor 37.5 mg daily but there is room to go up, asked her to let us know if this dose is not effective.     NEURO  - Migraines: continue propranolol and PRN naratriptan. Considering botox injections with neurologist.       PLAN:  - 1 year vaccines today  - Will need 14-month  vaccines locally in 2 months  - Restart Effexor  - RTC for 1.5 year BAN    Total of 30 minutes on patient visit, reviewing records, interpreting test results, placing orders, and documentation on the day of service.    The longitudinal plan of care for the diagnosis(es)/condition(s) as documented were addressed during this visit. Due to the added complexity in care, I will continue to support Jennifer in the subsequent management and with ongoing continuity of care.     Tammy Gonzalez MD  Attending Physician, [facility]

## 2024-10-08 NOTE — NURSING NOTE
Chief Complaint   Patient presents with    Blood Draw     Labs drawn from IV by RN in lab. Vital signs taken.     Labs drawn from IV (started in radiology) by RN In lab. Vital signs taken. IV dc'd.  Linda Davies RN

## 2024-10-08 NOTE — DISCHARGE INSTRUCTIONS

## 2024-10-08 NOTE — PROGRESS NOTES
BMT 1-Year Post-Autologous BMT  Survivorship Care Plan        Date: October 8, 2024    Treatment Team:  Patient Care Team:  Dar Reddy as PCP - General (Medical Oncology)  Tammy Gonzalez MD as Assigned Cancer Care Provider  Tammy Gonzalez MD as BMT Physician (Transplant)  LeninCarol burt LICSW as   Laurie Bui RN as BMT Nurse Coordinator    Date of Transplant: 10/12/2023    Patient ID:  Jennifer Ward is a 49 year old female, currently 1-year s/p autologous hematopoietic cell transplant with BEAM conditioning for relapsed Hodgkin Lymphoma. Co-enrolled on Zostel study.      CC: Jennifer was seen in the BMT Survivorship clinic today for a comprehensive visit.     HPI: Jennifer states she has been doing well. Notes her appetite has returned to normal. Additionally, she has returned to working from home full-time. Her biggest issue is that her legs are sore when she attempts to work out. She was open to a PT/OT cancer rehabilitation and neurologic referrals for further work up and relief of ongoing symptoms. 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 PET showed lymphoma limited to left cervical, supraclavicular, and axillary lymph nodes. BmBx negative.      Late relapse in May 2023 presenting with right neck swelling. Right cervical LN biopsy showed recurrent vs new primary cHL (nodular sclerosing subtype). PET with FDG-avid right level II and level V cervical lymph nodes only (SUVmax 13.2)     Date Treatment Name Response Side Effects / Toxicities   12/2008 to 5/2009 ABVD x 6 cycles followed by adjuvant radiation CR Fatigue, N/V               PMH:   Past Surgical History:   Procedure Laterality Date    BONE MARROW BIOPSY, BONE SPECIMEN, NEEDLE/TROCAR Left  9/25/2023    Procedure: BIOPSY, BONE MARROW;  Surgeon: Patricia Salcedo PA-C;  Location: [facility] OR    INSERT CATHETER VASCULAR ACCESS Left 10/4/2023    Procedure: cvc tunnled line Insert Catheter Vascular Access;  Surgeon: Yobani Reeves MD;  Location: [facility] OR    IR CVC TUNNEL PLACEMENT > 5 YRS OF AGE  10/4/2023    IR CVC TUNNEL REMOVAL LEFT  11/2/2023     PSH:  Past Surgical History:   Procedure Laterality Date    BONE MARROW BIOPSY, BONE SPECIMEN, NEEDLE/TROCAR Left 9/25/2023    Procedure: BIOPSY, BONE MARROW;  Surgeon: Patricia Salcedo PA-C;  Location: [facility] OR    INSERT CATHETER VASCULAR ACCESS Left 10/4/2023    Procedure: cvc tunnled line Insert Catheter Vascular Access;  Surgeon: Yobani Reeves MD;  Location: [facility] OR    IR CVC TUNNEL PLACEMENT > 5 YRS OF AGE  10/4/2023    IR CVC TUNNEL REMOVAL LEFT  11/2/2023     Past Family History:  Father had non-Hodgkin lymphoma in his 40s.     Current Medications:  Current Outpatient Medications   Medication Sig Dispense Refill    acyclovir (ZOVIRAX) 400 MG tablet Take 1 tablet (400 mg) by mouth 2 times daily. 60 tablet 1    naratriptan (AMERGE) 2.5 MG tablet Take 2.5 mg by mouth at onset of headache      propranolol (INDERAL) 20 MG tablet Take 20 mg by mouth 2 times daily      venlafaxine (EFFEXOR XR) 37.5 MG 24 hr capsule Take 1 capsule (37.5 mg) by mouth daily. 30 capsule 1     No current facility-administered medications for this visit.         General Health Maintenance:   Vaccinations should be given at 1 and 2 years after your transplant, these may be given at your annual anniversary visits in the BMT clinic, by your primary care provider, or your local oncologist. An exception is the influenza vaccine, which can be given after day +60 post-transplant during influenza season. See table below for more information.   You can receive the COVID19 vaccine if you have not already, for more information on how to sign up for an appointment you can  the [employer] vaccine website at [email or web address] or the         [place] Department of Health Vaccine Connector portal at https://[place].gov/covid19/vaccine/connector/connector.jsp  For general health concerns you can be seen by your primary care provider.   If you have questions about your transplant or follow up tests contact your BMT RN coordinator.        Vaccine Administration Schedule       Vaccinations Post-BMT: Please refer to our [facility] BMT Vaccination Guidelines updated in March of 2021.         Survivorship Care Plan:     This individualized care plan is designed to inform you and your healthcare team of the recommended follow-up visits, tests, health maintenance activities, and cancer screening you should receive after transplant.     Immune System:  Risks Preventative Measures Recommendations   Infections Symptoms of a cold such as fever, cough, congestion, and shortness of breath should be reported to your primary care provider  Immunizations will be administered at 1 & 2 years after transplant. See table above. Vaccines to be administered by PCP or local oncologist     Eyes:   Risks Preventative Measures Recommendations   Cataracts, dry eyes, viral infections, and other eye changes Yearly eye exam   Screening and treatment for high blood pressure or diabetes  Call if you have eye pain, visual changes, or floaters immediately Patient will schedule an annual routine exam with community ophthalmologist     Mouth:  Risks Preventative Measures Recommendations   Dry mouth, cavities, and oral cancer You can use over the counter Biotene for dry mouth or try sucking on sour candy before meals  Get a dental checkup every year and a cleaning every 6 months  If you have a prosthetic heart valve or central venous catheter or  port , you may need to take an antibiotic before your dental visits None at this time, patient has dental provider and will schedule routine exam          Lungs  Risks Preventative Measures Recommendations   Changes in function from chemotherapy or radiation; lung infections (pneumonia) Tell your provider about difficulty breathing, a cough, or new shortness of breath   Avoid use of tobacco products or smoking  Routine lung exams Pulmonary Function Tests (Recommended annually post-transplant)    Ordered PFT test     Heart and Blood Vessels  Heart Disease Risk Score: The ASCVD Risk score (Viry HINOJOSA, et al., 2019) failed to calculate for the following reasons:    Cannot find a previous HDL lab    Cannot find a previous total cholesterol lab    Risks Preventative Measures Recommendations   Damage from chemotherapy and/or radiation; early development of heart valve disease  Ask your provider if you should receive consultation from a cardiologist (heart doctor) or have special screening  Follow a  heart healthy  lifestyle. For more information visit the National Heart, Lung, and Blood [place] website at: https://www.nhlbi.nih.gov/health/health-topics/topics/heart-healthy-lifestyle-changes   Don t smoke. If you currently smoke and are ready to quit, we can help you find ways to quit  Maintain a healthy weight  Exercise regularly  Avoid foods that have high amounts of:  Salt/sodium (less than 2,300 mg of sodium per day)  Saturated and trans fats  Limit alcohol to less than 1 drink for women and 2 drinks for men per day  Sugar such as soft drinks or sugary snacks Fasting Lipid Profile or Direct LDL (Recommended annually)    States she will follow up with her PCP to have her cholesterol/lipids checked.     Hormones  Risks Preventative Measures Recommendations   Low thyroid function, low function of other glands (adrenals and others) Certain endocrine/hormone disorders are more common after transplant. For this reason, talk to your provider about:  Fatigue  Muscle weakness  Changes in cold tolerance  Reduced interest in sex  Erectile dysfunction   Certain tests may be  used to monitor for hormone changes if you are experiencing symptoms. These tests are done at 1 year TSH with T4 reflex (Recommended 1 & 2 years post-transplant), Fasting Glucose (Recommended 6 mos & annually post-transplant), and Hgb A1C (Recommended annually post-transplant)    Recent TSH was abnormal with a low T4 and symptoms of hypothyroidism including weight gain and fatigue. Will repeat TSH/T4 and work with primary care team to initiate treatment.    TSH   Date Value Ref Range Status   10/08/2024 7.34 (H) 0.30 - 4.20 uIU/mL Final     Free T4   Date Value Ref Range Status   10/08/2024 0.75 (L) 0.90 - 1.70 ng/dL Final            Liver:  Risks Preventative Measures Recommendations    Damage from chemotherapy or other drugs, buildup of iron from blood transfusions, and infections Certain tests may be ordered at your next survivorship visit to evaluate liver function based on your individual risk factors.  Talk to your provider before taking herbal supplements or over the counter drugs like Tylenol.  Ask your doctor if you should be treated for iron overload  Avoid alcohol in excess   Hepatic Panel/LFTs (Recommended every 3-6 mos the 1st year post-transplant & annually)       Kidneys and Bladder:  Risks Preventative Measures Recommendations   Damage from chemotherapy or other drugs, infections, high blood pressure Monitoring blood tests of kidney function during follow up visits  Treating high blood pressure and diabetes   Drink adequate amounts of water  Report symptoms of infection such as frequent urination, pain with urination, foul odor to urine, or blood in the urine  Talk to your provider before taking herbal supplements or over the counter drugs like Ibuprofen Serum creatinine checked as part of anniversary labs or at least annually by primary provider       Nervous System:  Risks Preventative Measures Recommendations   Neuropathy from chemotherapy, changes in cognitive function Report changes in sensation  or discomfort in feet or hands  Tell your provider about ongoing changes in memory, ability to concentrate, or inability to make decisions   Neuropsychology Referral (Ordered based on concerns for cognitive dysfunction)       Muscle & Connective Tissue  Risks Preventative Measures Recommendations   Reduced muscle strength & stamina Let your provider know if:  You notice changes in your muscle strength  Require extra assistance with daily activities  Need help creating an exercise routine  Notice reduced range of motion of the arms, hips, or legs  Follow general guidelines for physical activity as recommended by the Office of Disease Prevention & Health Promotion:    Avoid Inactivity  Some physical activity is better than none -- any amount has benefits.    Do Aerobic Activity  Do aerobic physical activity in episodes of at least 10 minutes, as many times as possible per day. This could include going for walks or using the elliptical or stationary bike.  Ask your doctor what aerobic activities would be safe and helpful for you, and set a goal for yourself!    Strengthen Muscles  Do muscle-strengthening activities (such as lifting light weights or using resistance bands and/or going up and down stairs) that are moderate or high intensity and involve all major muscle groups at least 4 days a week. PT/OT Referral (Ordered based on exam findings or symptoms), Bone Scan (Recommended 1 year), and Calcium & Vitamin D Levels (Recommended annually)     Emotional Health  Risks Preventative Measures Recommendations   Stress, depression, anxiety Talk to your provider about:  Changes in feelings, mood, or emotional wellbeing  Interest in support groups  If you are concerned about your caregivers emotional wellbeing  Desire to speak with a counselor for ongoing support Other recommend starting counsling through the NMD[facility]       Sexual Health  Risks Preventative Measures Recommendations   Reduced libido due to hormonal changes,  erectile dysfunction, vaginal dryness, and sexually transmitted diseases  Talk to your provider about:  Reduced libido or concerns regarding your sexual health  Men: Erectile dysfunction   Women: Vaginal dryness, pain during intercourse, vaginal bleeding after intercourse, changes in vaginal discharge that may indicate infection (green/white/foul odor)   General Recommendations:  It is safe to have sex if your platelet count is > 50,000 and you feel physically and emotionally ready   Women can use water-based lubricants to reduce discomfort from dryness. Prescription topical estrogen may help as well.  Use barrier protection such as condoms to prevent sexually transmitted diseases, you are at higher risk for infections due to a weakened immune system    For more information check out the National Marrow Donor Program web page on this topic:  https://bethematch.org/patients-and-families/life-after-transplant/coping-with-life-after-transplant/relationships-and-sexual-health/  Referral to [facility] for management of menopause symtpoms/hot sweats, vaginal dryness treatment with potential topical estrogen cream, consideration of HRT, annual mammograms/clinical breast exams, and monitoring of BMD health.             Cancer Screening:  Risks Preventative Measures Recommendations   Higher risk for the development of solid tumors, PTLD, and blood cancers Preform a full self skin exam monthly to assess for any changes in moles or signs of skin cancer  Minimize excessive sun exposure and wear sunscreen  Women should preform breast-self exams and report any changes in such as a new lump, discharge from nipples, and red or dimpled areas of the breast.   All women should be screened for breast cancer following the guidelines below:  Average Risk (no radiation exposure)   Age 20-40 years: Annual clinical breast exam  Age >40 years: Annual clinical breast exam & annual mammogram  Screening for colorectal cancer should  begin at age 50.  Fecal Occult Blood Test (Recommended annually), Colonoscopy (Recommended every 10 years after the age of 50), and Dermatology Referral (Annual skin exam or evaluation of lesion)         Recommended Tests & Follow-Up:  Referrals & Tests Ordered Today:  Your BMT physician will review these tests and referral results. If you require treatment based on the results, a member of the BMT team will contact you.  Orders placed today:  Orders Placed This Encounter   Procedures    DXA scan    Vitamin D panel    Adult Neurology  Referral    Physical Therapy  Referral    Gynecology referral    DERMATOLOGY REFERRAL    General PFT Lab (Please always keep checked)    Pulmonary Function Test     (Note to providers: After signing orders use the refresh tool in the note window to display results)   Future Tests/Referrals:   All recommendations above should be completed within 2 months either by your primary care provider or local oncologist (cancer doctor).   Recommendations that were not ordered today should be completed by your:  Local Oncologist         MARIE Ryan CNP    I spent 50 minutes with the patient and family, over half of which was spent discussing preventative care strategies, self-management practices, and potential complications after transplant.      Information used for these recommendations was obtained from: ANTHONY Ortega., SKYLAR Celaya, JOVANY Abdi, ARMIDA Cruz., WILL Aleman., Steven, SERGEY L.,   Ghazal, K. M. (2013). Prevalence of Hematopoietic Cell Transplant Survivors in the [place]. Biology of Blood and Marrow Transplantation?: Journal of the American Society for Blood and Marrow Transplantation, 19(10), 7558-8609. doi 10.1016/j.bbmt.2013.07.020

## 2024-10-08 NOTE — NURSING NOTE
Current patient location:   of [facility]     Is the patient currently in the state of [place]? YES    Visit mode:VIDEO    If the visit is dropped, the patient can be reconnected by: VIDEO VISIT: Text to cell phone:   Telephone Information:   Mobile [phone]       Will anyone else be joining the visit? NO  (If patient encounters technical issues they should call [phone]    Are changes needed to the allergy or medication list? Pt stated no changes to allergies and Pt stated no med changes    Are refills needed on medications prescribed by this physician? NO    Rooming Documentation:  Questionnaire(s) completed    Reason for visit: Blood Draw (Labs drawn from IV by RN in lab. Vital signs taken.)    Marianna OLIVER

## 2024-10-08 NOTE — LETTER
10/8/2024      Jennifer Ward  [id number] [address] [address]      Dear Colleague,    Thank you for referring your patient, Jennifer Ward, to the [facility] BLOOD AND MARROW TRANSPLANT PROGRAM [place]. Please see a copy of my visit note below.        Again, thank you for allowing me to participate in the care of your patient.        Sincerely,        MARIE Ryan CNP

## 2024-10-09 LAB
IGA SERPL-MCNC: 28 MG/DL (ref 84–499)
IGG SERPL-MCNC: 548 MG/DL (ref 610–1616)
IGM SERPL-MCNC: 45 MG/DL (ref 35–242)

## 2024-10-10 ENCOUNTER — OFFICE VISIT (OUTPATIENT)
Dept: ONCOLOGY | Facility: CLINIC | Age: 49
End: 2024-10-10
Attending: INTERNAL MEDICINE
Payer: COMMERCIAL

## 2024-10-10 VITALS
DIASTOLIC BLOOD PRESSURE: 71 MMHG | TEMPERATURE: 97.5 F | HEART RATE: 54 BPM | RESPIRATION RATE: 16 BRPM | SYSTOLIC BLOOD PRESSURE: 106 MMHG | WEIGHT: 231.8 LBS | BODY MASS INDEX: 39.09 KG/M2 | OXYGEN SATURATION: 100 %

## 2024-10-10 DIAGNOSIS — N95.1 MENOPAUSAL SYNDROME (HOT FLASHES): ICD-10-CM

## 2024-10-10 DIAGNOSIS — C81.18 NODULAR SCLEROSIS HODGKIN LYMPHOMA OF LYMPH NODES OF MULTIPLE REGIONS (H): Primary | ICD-10-CM

## 2024-10-10 DIAGNOSIS — Z52.011 AUTOLOGOUS DONOR OF STEM CELLS: ICD-10-CM

## 2024-10-10 LAB
T4 FREE SERPL-MCNC: 0.75 NG/DL (ref 0.9–1.7)
TSH SERPL DL<=0.005 MIU/L-ACNC: 7.34 UIU/ML (ref 0.3–4.2)

## 2024-10-10 PROCEDURE — G2211 COMPLEX E/M VISIT ADD ON: HCPCS | Performed by: INTERNAL MEDICINE

## 2024-10-10 PROCEDURE — 99213 OFFICE O/P EST LOW 20 MIN: CPT | Mod: 25 | Performed by: INTERNAL MEDICINE

## 2024-10-10 PROCEDURE — G0009 ADMIN PNEUMOCOCCAL VACCINE: HCPCS | Performed by: INTERNAL MEDICINE

## 2024-10-10 PROCEDURE — 90472 IMMUNIZATION ADMIN EACH ADD: CPT | Performed by: INTERNAL MEDICINE

## 2024-10-10 PROCEDURE — 90677 PCV20 VACCINE IM: CPT | Performed by: INTERNAL MEDICINE

## 2024-10-10 PROCEDURE — 250N000011 HC RX IP 250 OP 636: Performed by: INTERNAL MEDICINE

## 2024-10-10 PROCEDURE — 250N000021 HC RX MED A9270 GY (STAT IND- M) 250: Performed by: INTERNAL MEDICINE

## 2024-10-10 PROCEDURE — 99214 OFFICE O/P EST MOD 30 MIN: CPT | Performed by: INTERNAL MEDICINE

## 2024-10-10 PROCEDURE — 90750 HZV VACC RECOMBINANT IM: CPT | Performed by: INTERNAL MEDICINE

## 2024-10-10 PROCEDURE — 90697 DTAP-IPV-HIB-HEPB VACCINE IM: CPT | Performed by: INTERNAL MEDICINE

## 2024-10-10 PROCEDURE — 99213 OFFICE O/P EST LOW 20 MIN: CPT | Performed by: INTERNAL MEDICINE

## 2024-10-10 RX ORDER — VENLAFAXINE HYDROCHLORIDE 37.5 MG/1
37.5 CAPSULE, EXTENDED RELEASE ORAL DAILY
Qty: 30 CAPSULE | Refills: 1 | Status: SHIPPED | OUTPATIENT
Start: 2024-10-10

## 2024-10-10 RX ORDER — ACYCLOVIR 400 MG/1
400 TABLET ORAL 2 TIMES DAILY
Qty: 60 TABLET | Refills: 1 | Status: SHIPPED | OUTPATIENT
Start: 2024-10-10

## 2024-10-10 RX ADMIN — PNEUMOCOCCAL 20-VALENT CONJUGATE VACCINE 0.5 ML
2.2; 2.2; 2.2; 2.2; 2.2; 2.2; 2.2; 2.2; 2.2; 2.2; 2.2; 2.2; 2.2; 2.2; 2.2; 2.2; 4.4; 2.2; 2.2; 2.2 INJECTION, SUSPENSION INTRAMUSCULAR at 18:09

## 2024-10-10 RX ADMIN — ZOSTER VACCINE RECOMBINANT, ADJUVANTED 0.5 ML: KIT at 18:12

## 2024-10-10 RX ADMIN — DIPHTHERIA AND TETANUS TOXOIDS AND ACELLULAR PERTUSSIS, INACTIVATED POLIOVIRUS, HAEMOPHILUS B CONJUGATE AND HEPATITIS B VACCINE 0.5 ML: 15; 5; 20; 20; 3; 5; 29; 7; 26; 10; 3 INJECTION, SUSPENSION INTRAMUSCULAR at 18:12

## 2024-10-10 ASSESSMENT — PAIN SCALES - GENERAL: PAINLEVEL: NO PAIN (0)

## 2024-10-10 NOTE — LETTER
10/10/2024      Jennifer Ward  [address] [address] [address]      Dear Colleague,    Thank you for referring your patient, Jennifer Ward, to the [facility]. Please see a copy of my visit note below.    BMT Progress Note  Oct 10, 2024     Patient ID:  Jennifer Ward is a 48 year old female, currently day +364 s/p autologous hematopoietic cell transplant with BEAM conditioning for relapsed Hodgkin Lymphoma. Co-enrolled on TuneGO-CELERATE study.     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 PET showed lymphoma limited to left cervical, supraclavicular, and axillary lymph nodes. BmBx negative.      Late relapse in May 2023 presenting with right neck swelling. Right cervical LN biopsy showed recurrent vs new primary cHL (nodular sclerosing subtype). PET with FDG-avid right level II and level V cervical lymph nodes only (SUVmax 13.2)     Date Treatment Name Response Side Effects / Toxicities   12/2008 to 5/2009 ABVD x 6 cycles followed by adjuvant radiation CR Fatigue, N/V   6/2023 to 8/2023 Brentuximab/nivolumab x 2 cycles, nivolumab alone x 2 cycles CR Brentuximab infusion reaction (difficulty breathing)   10/12/23 Consolidative autologous transplant with BEAM conditioning  Ongoing CR Neutropenic fever, line-associated DVT            INTERVAL  HISTORY     Jennifer is here today with her  for 1 year BAN. Doing fairly well overall, only ongoing symptom is still the muscle/joint pains (since BMT) and hot flashes (since KALYN-BSO in 2023). States that she was previously on something for hot flashes but it got stopped estuardo-transplant. She just found that that her mom has shingles today, she and her  have been living with her mom while their house is being remodeled. Otherwise no fevers, chest pain, SOB, N/V, diarrhea, or bleeding.       Review of Systems: ROS negative except as noted above.     PHYSICAL EXAM     KPS:  100     /71 (BP Location: Right arm, Patient Position: Right side, Cuff Size: Adult Large)   Pulse 54   Temp 97.5  F (36.4  C) (Oral)   Resp 16   Wt 105.1 kg (231 lb 12.8 oz)   SpO2 100%   BMI 39.09 kg/m      General: Awake, alert, in no acute distress.   HEENT: Normocephalic, atraumatic. No scleral icterus.   CV: Regular rate and rhythm. No murmurs, rubs, or gallops appreciated.  Resp: Good inspiratory effort, lungs clear to auscultation bilaterally.  GI: Abdomen soft, nontender, nondistended.   Ext: No peripheral edema bilaterally.  Neuro: CN II-XII grossly intact. No focal deficits appreciated.  Skin: No rash, unusual bruising or prominent lesions.  Psych: Pleasant, normal affect.     LABS AND IMAGING: I have assessed all abnormal lab values for their clinical significance and any values considered clinically significant have been addressed in the assessment and plan.       Lab Results   Component Value Date    WBC 3.1 (L) 10/08/2024    ANEU 1.4 (L) 11/09/2023    HGB 11.2 (L) 10/08/2024    HCT 33.1 (L) 10/08/2024     10/08/2024     10/08/2024    POTASSIUM 4.1 10/08/2024    CHLORIDE 103 10/08/2024    CO2 22 10/08/2024     (H) 10/08/2024    BUN 18.8 10/08/2024    CR 0.85 10/08/2024    MAG 1.6 (L) 11/09/2023    INR 1.37 (H) 10/23/2023      10/8/24 CT CAP:  IMPRESSION:   1. No suspicious lymphadenopathy in the chest, abdomen, or pelvis.  2. Incidentally noted duplicated left renal system.  3. Stable left adrenal adenoma.  4. Colonic diverticulosis without evidence of acute diverticulitis.    CT Neck:  IMPRESSION:   No cervical mass or adenopathy.    SYSTEMS-BASED ASSESSMENT AND PLAN      Jennifer Ward is a 48 year old female, currently day +364 s/p autologous hematopoietic cell transplant with BEAM conditioning for relapsed Hodgkin Lymphoma. Co-enrolled on luma-id study.      BMT  - BMT  MD/Coordinator: Nay  - Protocol: [id number] with co-enrollment on GA1998-08 (E-CELERATE)  - S/p BEAM conditioning 10/6-10/11/23 and stem cell infusion 10/12/23 (cell dose 7 million CD34+ cells/kg). GCSF complete 10/23/23.   - Day +28 CT neck/CAP showed ongoing remission; incidental port-associated SVC thrombus as below.   - Day +100 PET showed increased FDG avidity of a single right and single left level 2A cervical LN, size borderline ~1 cm. Remainder of body clear. Had been having cold symptoms at the time so could be reactive. Follow up CT neck 2/22/24 was reassuring.  - Day +180 CT's with ongoing remission.   - 1 year CT's with ongoing remission.  - Next restaging at 1.5 years.      HEME/COAG  - Counts now recovered.   - Line-associated thrombus: 11/9/23 CT incidentally showed nonocclusive thrombus/fibrin sheath in SVC associated with port (not seen well on previous CT PE due to contrast timing). D/w Radiology and IR, IR feels there is at least a small thrombus component. Had port removed with [facility] and completed 6 weeks of Eliquis ending 12/15/23.      ID  - Ppx: continue  mg BID until 2nd shingles host. Okay to stop Bactrim now.   - Vaccines: declined COVID booster previously. Give 1 year vaccines today (Vaxelis, Shingrix, PCV20); will need 2nd dose of all of these in 2 months. Also good to get seasonal flu shot.     ENDOCRINE  - Hypothyroidism: she was put on levothyroxine at one point but she doesn't think it was for hypothyroidism and she stopped. Rechecked thyroid studies today, TSH elevated at 7.34 and free T4 mildly low at 0.75 which could be suggestive of mild hypothyroidism. Recommend discussing with PCP if she should go back on levothyroxine.   - Hot flashes: 2/2 surgical menopause after KALYN-BSO in 3/2023. She was on Effexor at one point but this was stopped estuardo-transplant. She would like to restart as hot flashes have become more bothersome. Will restart Effexor 37.5 mg daily but  there is room to go up, asked her to let us know if this dose is not effective.     NEURO  - Migraines: continue propranolol and PRN naratriptan. Considering botox injections with neurologist.       PLAN:  - 1 year vaccines today  - Will need 14-month vaccines locally in 2 months  - Restart Effexor  - RTC for 1.5 year BAN    Total of 30 minutes on patient visit, reviewing records, interpreting test results, placing orders, and documentation on the day of service.    The longitudinal plan of care for the diagnosis(es)/condition(s) as documented were addressed during this visit. Due to the added complexity in care, I will continue to support Jennifer in the subsequent management and with ongoing continuity of care.     Tammy Gonzalez MD  Attending Physician, [facility]       Again, thank you for allowing me to participate in the care of your patient.        Sincerely,        Tammy Gonzalez MD

## 2024-10-10 NOTE — NURSING NOTE
PT received her 1 year vaccines:  Vaxelis  Prevnar  Shingrix.  Vaccine information supplied.  See ZAINAB Lopez, KENRICKA

## 2024-10-15 ENCOUNTER — TELEPHONE (OUTPATIENT)
Dept: ONCOLOGY | Facility: CLINIC | Age: 49
End: 2024-10-15
Payer: COMMERCIAL

## 2024-10-15 NOTE — PROGRESS NOTES
CLINICAL NUTRITION SERVICES     Reason for Contact: Questions from Oncology Distress Screening  1. How concerned are you about your ability to eat? :  0  2. How concerned are you about unintended weight loss or your current weight? : 9    Action: RD called patient indicating reason for phone call. Left a VM with a return call back number.     Follow up: Wait for a return phone call.    Flor Schmitz RD,   [phone]

## 2024-11-04 ENCOUNTER — HOSPITAL ENCOUNTER (OUTPATIENT)
Dept: BONE DENSITY | Facility: CLINIC | Age: 49
Discharge: HOME | End: 2024-11-04
Attending: NURSE PRACTITIONER | Admitting: NURSE PRACTITIONER
Payer: COMMERCIAL

## 2024-11-04 ENCOUNTER — LAB (OUTPATIENT)
Dept: LAB | Facility: CLINIC | Age: 49
End: 2024-11-04
Payer: COMMERCIAL

## 2024-11-04 DIAGNOSIS — Z91.89 AT HIGH RISK FOR OSTEOPOROSIS: ICD-10-CM

## 2024-11-04 DIAGNOSIS — C81.18 NODULAR SCLEROSIS HODGKIN LYMPHOMA OF LYMPH NODES OF MULTIPLE REGIONS (H): ICD-10-CM

## 2024-11-04 DIAGNOSIS — Z94.84 HISTORY OF AUTOLOGOUS STEM CELL TRANSPLANT (H): ICD-10-CM

## 2024-11-04 LAB — VIT D+METAB SERPL-MCNC: 29 NG/ML (ref 20–50)

## 2024-11-04 PROCEDURE — 77080 DXA BONE DENSITY AXIAL: CPT

## 2024-11-04 PROCEDURE — 36415 COLL VENOUS BLD VENIPUNCTURE: CPT

## 2024-11-04 PROCEDURE — 82306 VITAMIN D 25 HYDROXY: CPT

## 2024-11-19 RX ORDER — ACYCLOVIR 400 MG/1
400 TABLET ORAL 2 TIMES DAILY
Qty: 60 TABLET | Refills: 3 | OUTPATIENT
Start: 2024-11-19

## 2024-12-10 DIAGNOSIS — Z52.011 AUTOLOGOUS DONOR OF STEM CELLS: ICD-10-CM

## 2024-12-10 DIAGNOSIS — N95.1 MENOPAUSAL SYNDROME (HOT FLASHES): ICD-10-CM

## 2025-01-13 DIAGNOSIS — C81.18 NODULAR SCLEROSIS HODGKIN LYMPHOMA OF LYMPH NODES OF MULTIPLE REGIONS (H): Primary | ICD-10-CM

## 2025-03-10 RX ORDER — VENLAFAXINE HYDROCHLORIDE 37.5 MG/1
37.5 CAPSULE, EXTENDED RELEASE ORAL DAILY
Qty: 30 CAPSULE | Refills: 1 | OUTPATIENT
Start: 2025-03-10

## 2025-03-10 RX ORDER — ACYCLOVIR 400 MG/1
400 TABLET ORAL 2 TIMES DAILY
Qty: 60 TABLET | Refills: 1 | OUTPATIENT
Start: 2025-03-10

## 2025-04-04 ENCOUNTER — OFFICE VISIT (OUTPATIENT)
Dept: TRANSPLANT | Facility: CLINIC | Age: 50
End: 2025-04-04
Payer: COMMERCIAL

## 2025-04-04 ENCOUNTER — LAB (OUTPATIENT)
Dept: LAB | Facility: CLINIC | Age: 50
End: 2025-04-04
Payer: COMMERCIAL

## 2025-04-04 VITALS
TEMPERATURE: 97.6 F | HEART RATE: 55 BPM | DIASTOLIC BLOOD PRESSURE: 85 MMHG | WEIGHT: 238.1 LBS | BODY MASS INDEX: 40.16 KG/M2 | SYSTOLIC BLOOD PRESSURE: 142 MMHG | OXYGEN SATURATION: 99 % | RESPIRATION RATE: 16 BRPM

## 2025-04-04 DIAGNOSIS — C81.18 NODULAR SCLEROSIS HODGKIN LYMPHOMA OF LYMPH NODES OF MULTIPLE REGIONS (H): Primary | ICD-10-CM

## 2025-04-04 DIAGNOSIS — Z52.011 AUTOLOGOUS DONOR OF STEM CELLS: ICD-10-CM

## 2025-04-04 LAB
ALBUMIN SERPL BCG-MCNC: 4.7 G/DL (ref 3.5–5.2)
ALP SERPL-CCNC: 90 U/L (ref 40–150)
ALT SERPL W P-5'-P-CCNC: 27 U/L (ref 0–50)
ANION GAP SERPL CALCULATED.3IONS-SCNC: 11 MMOL/L (ref 7–15)
AST SERPL W P-5'-P-CCNC: 31 U/L (ref 0–45)
BASOPHILS # BLD AUTO: 0 10E3/UL (ref 0–0.2)
BASOPHILS NFR BLD AUTO: 1 %
BILIRUB SERPL-MCNC: 1 MG/DL
BUN SERPL-MCNC: 16.5 MG/DL (ref 6–20)
CALCIUM SERPL-MCNC: 9.7 MG/DL (ref 8.8–10.4)
CHLORIDE SERPL-SCNC: 104 MMOL/L (ref 98–107)
CREAT SERPL-MCNC: 0.83 MG/DL (ref 0.51–0.95)
EGFRCR SERPLBLD CKD-EPI 2021: 85 ML/MIN/1.73M2
EOSINOPHIL # BLD AUTO: 0.1 10E3/UL (ref 0–0.7)
EOSINOPHIL NFR BLD AUTO: 3 %
ERYTHROCYTE [DISTWIDTH] IN BLOOD BY AUTOMATED COUNT: 13.5 % (ref 10–15)
GLUCOSE SERPL-MCNC: 88 MG/DL (ref 70–99)
HCO3 SERPL-SCNC: 25 MMOL/L (ref 22–29)
HCT VFR BLD AUTO: 36.6 % (ref 35–47)
HGB BLD-MCNC: 12 G/DL (ref 11.7–15.7)
IMM GRANULOCYTES # BLD: 0 10E3/UL
IMM GRANULOCYTES NFR BLD: 0 %
LYMPHOCYTES # BLD AUTO: 1.1 10E3/UL (ref 0.8–5.3)
LYMPHOCYTES NFR BLD AUTO: 24 %
MCH RBC QN AUTO: 28.1 PG (ref 26.5–33)
MCHC RBC AUTO-ENTMCNC: 32.8 G/DL (ref 31.5–36.5)
MCV RBC AUTO: 86 FL (ref 78–100)
MONOCYTES # BLD AUTO: 0.3 10E3/UL (ref 0–1.3)
MONOCYTES NFR BLD AUTO: 7 %
NEUTROPHILS # BLD AUTO: 2.9 10E3/UL (ref 1.6–8.3)
NEUTROPHILS NFR BLD AUTO: 65 %
NRBC # BLD AUTO: 0 10E3/UL
NRBC BLD AUTO-RTO: 0 /100
PLATELET # BLD AUTO: 193 10E3/UL (ref 150–450)
POTASSIUM SERPL-SCNC: 4.7 MMOL/L (ref 3.4–5.3)
PROT SERPL-MCNC: 7.3 G/DL (ref 6.4–8.3)
RBC # BLD AUTO: 4.27 10E6/UL (ref 3.8–5.2)
SODIUM SERPL-SCNC: 140 MMOL/L (ref 135–145)
WBC # BLD AUTO: 4.5 10E3/UL (ref 4–11)

## 2025-04-04 PROCEDURE — 36415 COLL VENOUS BLD VENIPUNCTURE: CPT | Performed by: INTERNAL MEDICINE

## 2025-04-04 PROCEDURE — 90471 IMMUNIZATION ADMIN: CPT | Performed by: INTERNAL MEDICINE

## 2025-04-04 PROCEDURE — 90697 DTAP-IPV-HIB-HEPB VACCINE IM: CPT | Performed by: INTERNAL MEDICINE

## 2025-04-04 PROCEDURE — 84155 ASSAY OF PROTEIN SERUM: CPT | Performed by: INTERNAL MEDICINE

## 2025-04-04 PROCEDURE — 99213 OFFICE O/P EST LOW 20 MIN: CPT | Mod: 25

## 2025-04-04 PROCEDURE — 90677 PCV20 VACCINE IM: CPT | Performed by: INTERNAL MEDICINE

## 2025-04-04 PROCEDURE — 90472 IMMUNIZATION ADMIN EACH ADD: CPT | Performed by: INTERNAL MEDICINE

## 2025-04-04 PROCEDURE — 90750 HZV VACC RECOMBINANT IM: CPT | Performed by: INTERNAL MEDICINE

## 2025-04-04 PROCEDURE — 250N000011 HC RX IP 250 OP 636: Performed by: INTERNAL MEDICINE

## 2025-04-04 PROCEDURE — 82435 ASSAY OF BLOOD CHLORIDE: CPT | Performed by: INTERNAL MEDICINE

## 2025-04-04 PROCEDURE — 250N000021 HC RX MED A9270 GY (STAT IND- M) 250: Performed by: INTERNAL MEDICINE

## 2025-04-04 PROCEDURE — G0009 ADMIN PNEUMOCOCCAL VACCINE: HCPCS | Performed by: INTERNAL MEDICINE

## 2025-04-04 PROCEDURE — 85004 AUTOMATED DIFF WBC COUNT: CPT | Performed by: INTERNAL MEDICINE

## 2025-04-04 PROCEDURE — 85018 HEMOGLOBIN: CPT | Performed by: INTERNAL MEDICINE

## 2025-04-04 RX ORDER — HEPARIN SODIUM (PORCINE) LOCK FLUSH IV SOLN 100 UNIT/ML 100 UNIT/ML
5 SOLUTION INTRAVENOUS
Status: COMPLETED | OUTPATIENT
Start: 2025-04-04 | End: 2025-04-04

## 2025-04-04 RX ADMIN — DIPHTHERIA AND TETANUS TOXOIDS AND ACELLULAR PERTUSSIS, INACTIVATED POLIOVIRUS, HAEMOPHILUS B CONJUGATE AND HEPATITIS B VACCINE 0.5 ML: 15; 5; 20; 20; 3; 5; 29; 7; 26; 10; 3 INJECTION, SUSPENSION INTRAMUSCULAR at 15:39

## 2025-04-04 RX ADMIN — PNEUMOCOCCAL 20-VALENT CONJUGATE VACCINE 0.5 ML
2.2; 2.2; 2.2; 2.2; 2.2; 2.2; 2.2; 2.2; 2.2; 2.2; 2.2; 2.2; 2.2; 2.2; 2.2; 2.2; 4.4; 2.2; 2.2; 2.2 INJECTION, SUSPENSION INTRAMUSCULAR at 15:40

## 2025-04-04 RX ADMIN — ZOSTER VACCINE RECOMBINANT, ADJUVANTED 0.5 ML: KIT at 15:41

## 2025-04-04 ASSESSMENT — PAIN SCALES - GENERAL: PAINLEVEL_OUTOF10: NO PAIN (0)

## 2025-04-04 NOTE — NURSING NOTE
Pt received 14 month Vaccines at today's visit (see MAR). Pt tolerated vaccines well in Right and Left Deltoids. VIS provided on each of the Vaccines. Pt with no further questions at this time. Pt aware that she is due for more Vaccines at her 24 month visit.     This RN confirmed with Dr. Garcia that Pt is to continue Acyclovir for 3 months following today's vaccines. Pt notified and will  refill at local pharmacy.      Anyi Teresa RN

## 2025-04-04 NOTE — PROGRESS NOTES
BMT Progress Note  Apr 4, 2025     Patient ID:  Jennifer Ward is a 48 year old female, currently day +540 s/p autologous hematopoietic cell transplant with BEAM conditioning for relapsed Hodgkin Lymphoma. Co-enrolled on DiBcom study.      INTERVAL  HISTORY     Jennifer is here today for an anniversary visit.  She is now a year and a half after autologous transplant.  All in all she is happy about her current state of life and slowly regaining more normal pattern of life.  Her primary concerns are ongoing fatigue, difficulty with her weight which has never been a problem before, and she is interested in finding out ways that she can improve her health.  She has no signs or symptoms of lymphoma, no problems with recurrent infections, and no new specific medical problems beyond some aforementioned thyroid abnormalities.     Review of Systems: ROS negative except as noted above.     PHYSICAL EXAM     KPS:  90     BP (!) 142/85 (BP Location: Right arm, Patient Position: Sitting, Cuff Size: Adult Large)   Pulse 55   Temp 97.6  F (36.4  C) (Oral)   Resp 16   Wt 108 kg (238 lb 1.6 oz)   SpO2 99%   BMI 40.16 kg/m      General: Awake, alert, in no acute distress.   HEENT: Normocephalic, atraumatic. No scleral icterus.   CV: Regular rate and rhythm.  Resp: Good inspiratory effort,  Ext: No peripheral edema bilaterally.  Neuro: CN II-XII grossly intact. No focal deficits appreciated.  Skin: No rash, unusual bruising or prominent lesions.  Psych: Pleasant, normal affect.     LABS AND IMAGING: I have assessed all abnormal lab values for their clinical significance and any values considered clinically significant have been addressed in the assessment and plan.         Blood Counts       Recent Labs   Lab Test 04/04/25  1337 10/08/24  0855 04/11/24  1106   HGB 12.0 11.2* 10.4*   HCT 36.6 33.1* 31.5*   WBC 4.5 3.1* 3.7*   ANEUTAUTO 2.9 2.0 2.4   ALYMPAUTO 1.1 0.7* 0.9   AMONOAUTO 0.3 0.3 0.3   AEOSAUTO 0.1 0.1 0.1   ABSBASO  0.0 0.0 0.0   NRBCMAN 0.0 0.0 0.0    158 189           Chemistries     Basic Panel  Recent Labs   Lab Test 04/04/25  1337 10/08/24  0855 04/11/24  1106    137 139   POTASSIUM 4.7 4.1 3.9   CHLORIDE 104 103 104   CO2 25 22 24   BUN 16.5 18.8 15.0   CR 0.83 0.85 0.87   GLC 88 124* 90        Calcium, Magnesium, Phosphorus  Recent Labs   Lab Test 04/04/25  1337 10/08/24  0855 04/11/24  1106 02/01/24  1540 11/22/23  1255 11/09/23  1219 11/02/23  1224 10/30/23  0724 10/27/23  0404   PHYLICIA 9.7 9.4 9.2 9.9 9.3 9.5   < > 8.7 8.3*   MAG  --   --   --   --   --  1.6*  --  1.5* 1.6*   PHOS  --   --   --  4.8* 4.6* 4.1   < >  --  2.8    < > = values in this interval not displayed.        LFTs  Recent Labs   Lab Test 04/04/25  1337 10/08/24  0855 04/11/24  1106   BILITOTAL 1.0 0.8 0.7   ALKPHOS 90 84 77   AST 31 17 15   ALT 27 15 13   ALBUMIN 4.7 4.5 4.3         4/11/24 CT Neck:  Impression:  Normal CT study of the neck with contrast.  No evident mass or  adenopathy within the neck.    CT CAP reading pending    SYSTEMS-BASED ASSESSMENT AND PLAN      Jennifer Ward is a 48 year old female, currently day +540 s/p autologous hematopoietic cell transplant with BEAM conditioning for relapsed Hodgkin Lymphoma. Co-enrolled on E-CELERATE study.      BMT  - BMT MD/Coordinator: Nay  - Protocol: [id number] with co-enrollment on [id number] (E-CELERATE)  - S/p BEAM conditioning 10/6-10/11/23 and stem cell infusion 10/12/23 (cell dose 7 million CD34+ cells/kg). GCSF complete 10/23/23.   - Day +28 CT neck/CAP showed ongoing remission; incidental port-associated SVC thrombus as below.   - Day +100 PET showed increased FDG avidity of a single right and single left level 2A cervical LN, size borderline ~1 cm. Remainder of body clear. Had been having cold symptoms at the time so could be reactive. Follow up CT neck 2/22/24 was reassuring.  - Day +180 CT scans shows ongoing remission  - Next restaging at 2 year.      HEME/COAG  -  Counts now recovered.   - Line-associated thrombus: 11/9/23 CT incidentally showed nonocclusive thrombus/fibrin sheath in SVC associated with port (not seen well on previous CT PE due to contrast timing). D/w Radiology and IR, IR feels there is at least a small thrombus component. Had port removed with [facility] and completed 6 weeks of Eliquis ending 12/15/23.      ID  - Ppx: continue  mg BID until completing her Shingrix  - Vaccines: We will give 14-month vaccines today    ENDOCRINE  - Hypothyroidism -she has mildly elevated TSH, and a mildly reduced free T4.  She has fatigue and is gaining weight.  I did suggest she bring this up with her primary care physician.  -Possible metabolic syndrome-she has some health changes that are consistent with the development of metabolic syndrome, weight gain, fatty liver, and possibly some tendency towards diabetes etc.  I recommend that she take this issue up with her primary care physician, but did point out that patients who undergo treatment for cancer with aggressive chemotherapy due to experience higher rates of metabolic syndrome and I pointed out that there are ongoing approaches to treating including options such as metformin, Ozempic, among other possibilities.     NEURO  - Migraines: continue propranolol and PRN naratriptan. Considering botox injections with neurologist.       PLAN:  -14-month vaccines today  - RTC for 2 year BAN    The longitudinal plan of care for the diagnosis(es)/condition(s) as documented were addressed during this visit. Due to the added complexity in care, I will continue to support Jennifer in the subsequent management and with ongoing continuity of care.      30 minutes spent on the date of the encounter doing chart review, history and exam, lab review, documentation and coordniating care.    CEDRICK VASQUEZ MD  April 4, 2025

## 2025-04-04 NOTE — NURSING NOTE
"Oncology Rooming Note    April 4, 2025 2:24 PM   Jennifer Ward is a 49 year old female who presents for:    Chief Complaint   Patient presents with    Blood Draw     Labs drawn with piv start.  VS taken.    Oncology Clinic Visit     RTN Nodular sclerosis Hodgkin lymphoma of lymph nodes of multiple regions     Initial Vitals: BP (!) 142/85 (BP Location: Right arm, Patient Position: Sitting, Cuff Size: Adult Large)   Pulse 55   Temp 97.6  F (36.4  C) (Oral)   Resp 16   Wt 108 kg (238 lb 1.6 oz)   SpO2 99%   BMI 40.16 kg/m   Estimated body mass index is 40.16 kg/m  as calculated from the following:    Height as of 11/9/23: 1.64 m (5' 4.57\").    Weight as of this encounter: 108 kg (238 lb 1.6 oz). Body surface area is 2.22 meters squared.  No Pain (0) Comment: Data Unavailable   No LMP recorded. Patient has had a hysterectomy.  Allergies reviewed: Yes  Medications reviewed: Yes    Medications: MEDICATION REFILLS NEEDED TODAY. Provider was notified.  Pharmacy name entered into [facility]: [facility] - [place], [place] - [address] [place] [address]    Frailty Screening:   Is the patient here for a new oncology consult visit in cancer care? 2. No    PHQ9:  Did this patient require a PHQ9?: No      Clinical concerns: Discuss when will be getting next round of vaccines. Discuss Acyclovir. Request refill of Effexor.       Indigo Michel             "

## 2025-04-05 RX ORDER — ACYCLOVIR 400 MG/1
400 TABLET ORAL 2 TIMES DAILY
Qty: 180 TABLET | Refills: 1 | Status: SHIPPED | OUTPATIENT
Start: 2025-04-05

## 2025-07-17 DIAGNOSIS — C81.18 NODULAR SCLEROSIS HODGKIN LYMPHOMA OF LYMPH NODES OF MULTIPLE REGIONS (H): Primary | ICD-10-CM

## (undated) DEVICE — TRAY BONE MARROW BIOPSY ASC 640 31-0097A

## (undated) DEVICE — COVER ULTRASOUND PROBE W/GEL FLEXI-FEEL 6"X58" LF  25-FF658

## (undated) DEVICE — SU ETHILON 2-0 FS 18" 664H

## (undated) DEVICE — KIT INTRODUCER FLUENT MICRO 5FRX10CM ECHO TIP KIT-038-04

## (undated) DEVICE — DECANTER BAG 2002S

## (undated) DEVICE — GLOVE BIOGEL PI ULTRATOUCH G SZ 8.0 42180

## (undated) DEVICE — NDL BX BONE MARROW 11GA 4"

## (undated) DEVICE — GOWN XLG DISP 9545

## (undated) DEVICE — DILATOR VASCULAR COONS 12FRX20CM DIST TPR G03929

## (undated) DEVICE — CAP LUER LOCK MALE/FEMALE DUAL 2C6250

## (undated) DEVICE — LINEN GOWN XLG 5407

## (undated) DEVICE — COVER EASY EQUIP BAG W/BAND LATEX FREE EZ-28

## (undated) DEVICE — DRSG BIOPATCH GERMICIDAL SPLIT SPONGE 7MM LG

## (undated) DEVICE — PACK CENTRAL LINE INSERTION SAN32CLFCG

## (undated) DEVICE — ADH SKIN CLOSURE PREMIERPRO EXOFIN MICOR HV 0.5ML 3471

## (undated) DEVICE — TEAR AWAY INTRODUCER

## (undated) DEVICE — LINEN TOWEL PACK X5 5464

## (undated) RX ORDER — HEPARIN SODIUM,PORCINE 10 UNIT/ML
VIAL (ML) INTRAVENOUS
Status: DISPENSED
Start: 2023-10-04

## (undated) RX ORDER — CALCIUM GLUCONATE 94 MG/ML
INJECTION, SOLUTION INTRAVENOUS
Status: DISPENSED
Start: 2023-10-04

## (undated) RX ORDER — ACETAMINOPHEN 325 MG/1
TABLET ORAL
Status: DISPENSED
Start: 2023-10-04

## (undated) RX ORDER — OXYCODONE HYDROCHLORIDE 5 MG/1
TABLET ORAL
Status: DISPENSED
Start: 2023-10-04

## (undated) RX ORDER — LIDOCAINE HYDROCHLORIDE 10 MG/ML
INJECTION, SOLUTION EPIDURAL; INFILTRATION; INTRACAUDAL; PERINEURAL
Status: DISPENSED
Start: 2023-10-04

## (undated) RX ORDER — OXYCODONE HYDROCHLORIDE 5 MG/1
TABLET ORAL
Status: DISPENSED
Start: 2023-09-25

## (undated) RX ORDER — CEFAZOLIN SODIUM 2 G/50ML
SOLUTION INTRAVENOUS
Status: DISPENSED
Start: 2023-10-04

## (undated) RX ORDER — LIDOCAINE HYDROCHLORIDE 10 MG/ML
INJECTION, SOLUTION EPIDURAL; INFILTRATION; INTRACAUDAL; PERINEURAL
Status: DISPENSED
Start: 2023-11-02

## (undated) RX ORDER — HEPARIN SODIUM (PORCINE) LOCK FLUSH IV SOLN 100 UNIT/ML 100 UNIT/ML
SOLUTION INTRAVENOUS
Status: DISPENSED
Start: 2023-10-04